# Patient Record
Sex: MALE | Race: WHITE | Employment: FULL TIME | URBAN - METROPOLITAN AREA
[De-identification: names, ages, dates, MRNs, and addresses within clinical notes are randomized per-mention and may not be internally consistent; named-entity substitution may affect disease eponyms.]

---

## 2017-05-16 ENCOUNTER — HOSPITAL ENCOUNTER (EMERGENCY)
Facility: CLINIC | Age: 77
Discharge: HOME | End: 2017-05-16
Attending: EMERGENCY MEDICINE | Admitting: EMERGENCY MEDICINE
Payer: MEDICARE

## 2017-05-16 ENCOUNTER — APPOINTMENT (OUTPATIENT)
Dept: GENERAL RADIOLOGY | Facility: CLINIC | Age: 77
End: 2017-05-16
Attending: EMERGENCY MEDICINE
Payer: MEDICARE

## 2017-05-16 VITALS
HEART RATE: 75 BPM | DIASTOLIC BLOOD PRESSURE: 77 MMHG | TEMPERATURE: 98.1 F | OXYGEN SATURATION: 96 % | SYSTOLIC BLOOD PRESSURE: 110 MMHG | RESPIRATION RATE: 10 BRPM | WEIGHT: 161 LBS | BODY MASS INDEX: 23.78 KG/M2

## 2017-05-16 DIAGNOSIS — I48.0 PAROXYSMAL ATRIAL FIBRILLATION (H): ICD-10-CM

## 2017-05-16 LAB
ALBUMIN SERPL-MCNC: 3.8 G/DL (ref 3.4–5)
ALP SERPL-CCNC: 74 U/L (ref 40–150)
ALT SERPL W P-5'-P-CCNC: 30 U/L (ref 0–70)
ANION GAP SERPL CALCULATED.3IONS-SCNC: 7 MMOL/L (ref 3–14)
AST SERPL W P-5'-P-CCNC: 26 U/L (ref 0–45)
BASOPHILS # BLD AUTO: 0 10E9/L (ref 0–0.2)
BASOPHILS NFR BLD AUTO: 0.6 %
BILIRUB SERPL-MCNC: 0.5 MG/DL (ref 0.2–1.3)
BUN SERPL-MCNC: 16 MG/DL (ref 7–30)
CALCIUM SERPL-MCNC: 8.5 MG/DL (ref 8.5–10.1)
CHLORIDE SERPL-SCNC: 109 MMOL/L (ref 94–109)
CO2 SERPL-SCNC: 25 MMOL/L (ref 20–32)
CREAT SERPL-MCNC: 1.08 MG/DL (ref 0.66–1.25)
DIFFERENTIAL METHOD BLD: NORMAL
EOSINOPHIL # BLD AUTO: 0.2 10E9/L (ref 0–0.7)
EOSINOPHIL NFR BLD AUTO: 3.1 %
ERYTHROCYTE [DISTWIDTH] IN BLOOD BY AUTOMATED COUNT: 13 % (ref 10–15)
GFR SERPL CREATININE-BSD FRML MDRD: 66 ML/MIN/1.7M2
GLUCOSE SERPL-MCNC: 81 MG/DL (ref 70–99)
HCT VFR BLD AUTO: 48 % (ref 40–53)
HGB BLD-MCNC: 16.8 G/DL (ref 13.3–17.7)
IMM GRANULOCYTES # BLD: 0 10E9/L (ref 0–0.4)
IMM GRANULOCYTES NFR BLD: 0.2 %
INTERPRETATION ECG - MUSE: NORMAL
INTERPRETATION ECG - MUSE: NORMAL
LYMPHOCYTES # BLD AUTO: 2.1 10E9/L (ref 0.8–5.3)
LYMPHOCYTES NFR BLD AUTO: 40 %
MCH RBC QN AUTO: 31.8 PG (ref 26.5–33)
MCHC RBC AUTO-ENTMCNC: 35 G/DL (ref 31.5–36.5)
MCV RBC AUTO: 91 FL (ref 78–100)
MONOCYTES # BLD AUTO: 0.4 10E9/L (ref 0–1.3)
MONOCYTES NFR BLD AUTO: 6.8 %
NEUTROPHILS # BLD AUTO: 2.5 10E9/L (ref 1.6–8.3)
NEUTROPHILS NFR BLD AUTO: 49.3 %
NRBC # BLD AUTO: 0 10*3/UL
NRBC BLD AUTO-RTO: 0 /100
PLATELET # BLD AUTO: 190 10E9/L (ref 150–450)
POTASSIUM SERPL-SCNC: 4.3 MMOL/L (ref 3.4–5.3)
PROT SERPL-MCNC: 7 G/DL (ref 6.8–8.8)
RBC # BLD AUTO: 5.28 10E12/L (ref 4.4–5.9)
SODIUM SERPL-SCNC: 141 MMOL/L (ref 133–144)
TROPONIN I SERPL-MCNC: 0.03 UG/L (ref 0–0.04)
TSH SERPL DL<=0.005 MIU/L-ACNC: 2.34 MU/L (ref 0.4–4)
WBC # BLD AUTO: 5.1 10E9/L (ref 4–11)

## 2017-05-16 PROCEDURE — 80053 COMPREHEN METABOLIC PANEL: CPT | Performed by: EMERGENCY MEDICINE

## 2017-05-16 PROCEDURE — 85025 COMPLETE CBC W/AUTO DIFF WBC: CPT | Performed by: EMERGENCY MEDICINE

## 2017-05-16 PROCEDURE — 93005 ELECTROCARDIOGRAM TRACING: CPT

## 2017-05-16 PROCEDURE — 99291 CRITICAL CARE FIRST HOUR: CPT | Mod: 25

## 2017-05-16 PROCEDURE — 71020 XR CHEST 2 VW: CPT

## 2017-05-16 PROCEDURE — 84443 ASSAY THYROID STIM HORMONE: CPT | Performed by: EMERGENCY MEDICINE

## 2017-05-16 PROCEDURE — 84484 ASSAY OF TROPONIN QUANT: CPT | Performed by: EMERGENCY MEDICINE

## 2017-05-16 PROCEDURE — 96374 THER/PROPH/DIAG INJ IV PUSH: CPT

## 2017-05-16 PROCEDURE — 25000125 ZZHC RX 250

## 2017-05-16 RX ORDER — PROPOFOL 10 MG/ML
INJECTION, EMULSION INTRAVENOUS
Status: COMPLETED
Start: 2017-05-16 | End: 2017-05-16

## 2017-05-16 RX ORDER — METOPROLOL SUCCINATE 25 MG/1
12.5 TABLET, EXTENDED RELEASE ORAL DAILY
Qty: 30 TABLET | Refills: 0 | Status: SHIPPED | OUTPATIENT
Start: 2017-05-16 | End: 2017-06-09

## 2017-05-16 RX ORDER — PROPOFOL 10 MG/ML
70 INJECTION, EMULSION INTRAVENOUS ONCE
Status: COMPLETED | OUTPATIENT
Start: 2017-05-16 | End: 2017-05-16

## 2017-05-16 RX ADMIN — PROPOFOL 70 MG: 10 INJECTION, EMULSION INTRAVENOUS at 12:26

## 2017-05-16 ASSESSMENT — ENCOUNTER SYMPTOMS
SHORTNESS OF BREATH: 0
PALPITATIONS: 1

## 2017-05-16 NOTE — ED PROVIDER NOTES
History     Chief Complaint:  Palpitations    HPI   Nigel Rodarte Jr. is a 76 year old male, who reports experiencing atrial fibrillation with increasing frequency over the past year, who presents with palpitations. The patient noticed the palpitations at 6:00 PM last night. It was a little bit difficult to sleep last night. He does feel a little bit more tired and weak than usual. His episodes of a-fib do not typically last this long. No significant shortness of breath or chest pain. He is not anticoagulated. Last ate at 0900 this morning.     Cardiac Risk Factors:  CAD:    Neg  Hypertension:   Neg  Hyperlipidemia:  Neg  Diabetes:   Neg  Tobacco use:   Neg  Gender:   M  Age:    76  Familial Hx of CAD:  Neg    Allergies:  The patient has no known drug allergies.    Medications:  Levaquin  Aspirin  Glucosamine sulfate     Past Medical History:    Arthritis  Renal disease  A-fib    Past Surgical History:    Appendectomy  Hip arthroplasty  Colonoscopy  Cystoscopy  Right ear surgery  Shoulder surgery  Bilateral carpal tunnel    Family History:    Lung cancer    Social History:  Relationship status:   Tobacco use: Negative  Alcohol use: Negative  The patient presents with his wife.     Review of Systems   Respiratory: Negative for shortness of breath.    Cardiovascular: Positive for palpitations. Negative for chest pain.   All other systems reviewed and are negative.      Physical Exam   First Vitals:  BP: 116/81  Temp: 98.1  F (36.7  C)  Temp src: Oral  Pulse: 75  Resp: 18  SpO2: 98 %    Physical Exam  Constitutional: Thin white male, supine.  HENT: No signs of trauma.   Eyes: EOM are normal. Pupils are equal, round, and reactive to light.   Neck: Normal range of motion. No JVD present. No cervical adenopathy.  Cardiovascular: Irregularly irregular rhythm.  Exam reveals no gallop and no friction rub.    No murmur heard.  2+ radial and femoral pulses bilaterally.  Pulmonary/Chest: Bilateral breath sounds  normal. No wheezes, rhonchi or rales.  Abdominal: Soft. No tenderness. No rebound or guarding.   Musculoskeletal: No edema. No tenderness.   Lymphadenopathy: No lymphadenopathy.   Neurological: Alert and oriented to person, place, and time. Normal strength. Coordination normal.   Skin: Skin is warm and dry. No rash noted. No erythema.       Emergency Department Course   ECG (10:56:12):  Indication: Screening for cardiovascular disease.   Rate 77 bpm. WY interval *. QRS duration 92. QT/QTc 360/407. P-R-T axes 72.   Interpretation: Atrial fibrillation.  Agree with computer interpretation.   Interpreted at 1104 by Dr. Barroso.    ECG (12:29:19):  Indication: Palpitations.   Rate 62 bpm. WY interval 186. QRS duration 96. QT/QTc 418/424. P-R-T axes 39.   Interpretation: Normal sinus rhythm. Possible left atrial enlargement.  Agree with computer interpretation.  Interpreted at 1230 by Dr. Barroso.      Procedures:  Cardioversion procedure note:  Patient brought to stabilization area, placed on high flow oxygen by mask. Risks and benefits were discussed again, and procedure note was agreed to. Patient received 70 mg of propofol, and was shocked once, sync mode, with 100 J, and converted to a sinus rhythm. Post-conversion EKG reveals sinus rhythm with left atrial abnormality. Patient awoke without any neuro deficits.     Imaging:  Radiographic findings were communicated with the patient who voiced understanding of the findings.    Chest XR, per radiology:  No active infiltrate. No change.    Laboratory:  CBC: WBC 5.1, HGB 16.8,   CMP: WNL (Creatinine 1.08)  1120: Troponin I: 0.027  TSH: 2.34    Interventions:  1226: Propofol, 70 mg, IV    Emergency Department Course:  Nursing notes and vitals reviewed.  I performed an exam of the patient as documented above.  The above workup was undertaken.  1215: The cardioversion procedure described above was performed.  1253: I discussed the patient with Dr. Orellana of  cardiology.    Findings and plan explained to the Patient. Patient discharged home, status improved, with instructions regarding supportive care, medications, and reasons to return as well as the importance of close follow-up was reviewed.      Impression & Plan      Medical Decision Making:  Nigel Rodarte Jr. is a 76 year old male who comes in after feeling an irregular heart beat since about 6:00 PM yesterday. He had noticed a very regular pulse before exercising and lifting weights, and then noticed the change. He does not have chest pain or pressure, or shortness of breath, but feels more tired since this started. He has had problems with intermittent atrial fibrillation, but it does not seem to have lasted this long before. He is using no stimulants or new medications. He did undergo stress testing about 2 years ago, which was unremarkable. He is physically very active, and does not drink to excess. He has an unremarkable exam, except for an irregular pulse. EKG shows atrial fibrillation with controlled rate. Labs and x-rays obtained, and are unremarkable. I discussed risks and benefits of cardioversion. Since it had less than 24 hours of symptoms, patient agreed to go ahead.     I discussed the case with Dr. Orellana, who states EP clinic will contact him for follow up. He recommended starting patient on Eliquis 5 BID, and metoprolol 12.5 XL daily. I discussed this with the patient, who agreed to take this. He will be discharged home. If he has recurrent symptoms, he should recheck in the ED.     Diagnosis:    ICD-10-CM   1. Paroxysmal atrial fibrillation (H) I48.0     Disposition:  Discharge to home with cardiology follow up.     Discharge Medications:   APIXABAN ANTICOAGULANT (ELIQUIS) 5 MG TABLET    Take 1 tablet (5 mg) by mouth 2 times daily    METOPROLOL (TOPROL-XL) 25 MG 24 HR TABLET    Take 0.5 tablets (12.5 mg) by mouth daily     IJamie am serving as a scribe on 5/16/2017 at 11:05 AM to  personally document services performed by Cortez Barroso MD, based on my observations and the provider's statements to me.     EMERGENCY DEPARTMENT       Cortez Barroso MD  05/16/17 [id number]

## 2017-05-16 NOTE — DISCHARGE INSTRUCTIONS
Discharge Instructions for Atrial Fibrillation  You have been diagnosed with atrial fibrillation. With this condition, your heart s two upper chambers quiver rather than squeeze the blood out in a normal pattern. This leads to an irregular and sometimes rapid heartbeat. Some people will develop associated symptoms such as a flip-flopping heartbeat, lightheadedness, or shortness of breath. Other people may have no symptoms at all. Atrial fibrillation is serious because it affects the heart s ability to fill with blood as it should. Blood clots may form. This increases the risk for stroke. Untreated atrial fibrillation can also lead to heart failure. Atrial fibrillation can be controlled. With treatment, most people with atrial fibrillation lead normal lives. It is estimated that over 2.5 million Americans have atrial fibrillation.  Treatment options  Recommended treatment for atrial fibrillation depends on your age, symptoms, how long you have had atrial fibrillation, and other factors. You will have a complete evaluation to find out if you have any abnormalities that caused your heart to go into atrial fibrillation. This might be blocked heart arteries or a thyroid problem. Your doctor will assess your particular case and discuss choices with you.  Treatment choices may include:    Treating an underlying disorder that puts you at risk for atrial fibrillation. For example, correcting an abnormal thyroid or electrolyte problem, or treating a blocked heart artery.    Restoring a normal heart rhythm with an electrical shock (cardioversion) or with an antiarrhythmic medicine (chemical cardioversion)    Using medication to control your heart rate in atrial fibrillation.    Preventing the risk for blood clot and stroke using blood-thinning medicines. Your doctor will tell you what he or she recommends. Choices may include aspirin, clopidogrel, warfarin, dabigatran, rivaroxaban, or apixaban.    Doing catheter ablation or  maze procedure. These use different methods to destroy certain areas of heart tissue. This interrupts the electrical signals causing atrial fibrillation. One of these procedures may be a choice when medicines do not work.    Other treatment choices may be recommended for you by your doctor.  Managing risk factors for stroke and preventing heart failure are important parts of any treatment plan for atrial fibrillation.  Home care    Take your medicines exactly as directed. Don t skip doses.    Work with your doctor to find the right medicaines and doses for you.    Learn to take your own pulse. Keep a record of your results. Ask your doctor which pulse rates mean that you need medical attention. Slowing your pulse is often the goal of treatment. Ask your doctor if it s OK for you to use an automatic machine to check your pulse at home. Sometimes these machines don t count the pulse correctly when you have atrial fibrillation.    Limit your intake of coffee, tea, cola, and other beverages with caffeine to 2 cups per day. Talk with your doctor about whether you should eliminate caffeine.    Avoid over-the-counter medicines that have caffeine in them.    Let your doctor know what medicines you take, including prescription and over-the-counter medicines, as well as any supplements. They interfere with some medicines given for atrial fibrillation.    Ask your doctor about whether you can drink alcohol. Some people need to avoid alcohol to better treat atrial fibrillation. If you are taking blood-thinner medicines, alcohol may interfere with them by increasing their effect.    Never take stimulants such as amphetamines or cocaine. These drugs can speed up your heart rate and trigger atrial fibrillation.  Follow-up  Make a follow-up appointment as directed by our staff.     When to call your doctor  Call your doctor immediately if you have any of the following:    Weakness    Dizziness    Fainting    Fatigue    Shortness of  breath    Chest pain with increased activity    A change in the usual regularity of your heartbeat, or an unusually fast heartbeat     [id number] [employer]. [address] [place] [address], [place] [address]. All rights reserved. This information is not intended as a substitute for professional medical care. Always follow your healthcare professional's instructions.

## 2017-05-16 NOTE — ED AVS SNAPSHOT
Emergency Department    [address] [place] [id number]    Phone:  [phone]    Fax:  [phone]                                       Nigel Rodarte Jr.   MRN: [id number]    Department:   Emergency Department   Date of Visit:  5/16/2017           After Visit Summary Signature Page     I have received my discharge instructions, and my questions have been answered. I have discussed any challenges I see with this plan with the nurse or doctor.    ..........................................................................................................................................  Patient/Patient Representative Signature      ..........................................................................................................................................  Patient Representative Print Name and Relationship to Patient    ..................................................               ................................................  Date                                            Time    ..........................................................................................................................................  Reviewed by Signature/Title    ...................................................              ..............................................  Date                                                            Time

## 2017-05-16 NOTE — ED AVS SNAPSHOT
Emergency Department    [address] [facility] [address]    Phone:  [phone]    Fax:  [phone]                                       Nigel Rodarte Jr.   MRN: [id number]    Department:   Emergency Department   Date of Visit:  5/16/2017           Patient Information     Date Of Birth          1940        Your diagnoses for this visit were:     Paroxysmal atrial fibrillation (H)        You were seen by Cortez Barroso MD.      Follow-up Information     Follow up with Grzegorz Malhotra MD.    Specialties:  Cardiology, Cardiology    Why:  cardiology will call;  recheck ed if symptoms reoccur    Contact information:     [facility]   [address] [facility] [address]    [facility] [id number] [phone]          Discharge Instructions         Discharge Instructions for Atrial Fibrillation  You have been diagnosed with atrial fibrillation. With this condition, your heart s two upper chambers quiver rather than squeeze the blood out in a normal pattern. This leads to an irregular and sometimes rapid heartbeat. Some people will develop associated symptoms such as a flip-flopping heartbeat, lightheadedness, or shortness of breath. Other people may have no symptoms at all. Atrial fibrillation is serious because it affects the heart s ability to fill with blood as it should. Blood clots may form. This increases the risk for stroke. Untreated atrial fibrillation can also lead to heart failure. Atrial fibrillation can be controlled. With treatment, most people with atrial fibrillation lead normal lives. It is estimated that over 2.5 million Americans have atrial fibrillation.  Treatment options  Recommended treatment for atrial fibrillation depends on your age, symptoms, how long you have had atrial fibrillation, and other factors. You will have a complete evaluation to find out if you have any abnormalities that caused your heart to go into atrial fibrillation. This might be blocked  heart arteries or a thyroid problem. Your doctor will assess your particular case and discuss choices with you.  Treatment choices may include:    Treating an underlying disorder that puts you at risk for atrial fibrillation. For example, correcting an abnormal thyroid or electrolyte problem, or treating a blocked heart artery.    Restoring a normal heart rhythm with an electrical shock (cardioversion) or with an antiarrhythmic medicine (chemical cardioversion)    Using medication to control your heart rate in atrial fibrillation.    Preventing the risk for blood clot and stroke using blood-thinning medicines. Your doctor will tell you what he or she recommends. Choices may include aspirin, clopidogrel, warfarin, dabigatran, rivaroxaban, or apixaban.    Doing catheter ablation or maze procedure. These use different methods to destroy certain areas of heart tissue. This interrupts the electrical signals causing atrial fibrillation. One of these procedures may be a choice when medicines do not work.    Other treatment choices may be recommended for you by your doctor.  Managing risk factors for stroke and preventing heart failure are important parts of any treatment plan for atrial fibrillation.  Home care    Take your medicines exactly as directed. Don t skip doses.    Work with your doctor to find the right medicaines and doses for you.    Learn to take your own pulse. Keep a record of your results. Ask your doctor which pulse rates mean that you need medical attention. Slowing your pulse is often the goal of treatment. Ask your doctor if it s OK for you to use an automatic machine to check your pulse at home. Sometimes these machines don t count the pulse correctly when you have atrial fibrillation.    Limit your intake of coffee, tea, cola, and other beverages with caffeine to 2 cups per day. Talk with your doctor about whether you should eliminate caffeine.    Avoid over-the-counter medicines that have caffeine  in them.    Let your doctor know what medicines you take, including prescription and over-the-counter medicines, as well as any supplements. They interfere with some medicines given for atrial fibrillation.    Ask your doctor about whether you can drink alcohol. Some people need to avoid alcohol to better treat atrial fibrillation. If you are taking blood-thinner medicines, alcohol may interfere with them by increasing their effect.    Never take stimulants such as amphetamines or cocaine. These drugs can speed up your heart rate and trigger atrial fibrillation.  Follow-up  Make a follow-up appointment as directed by our staff.     When to call your doctor  Call your doctor immediately if you have any of the following:    Weakness    Dizziness    Fainting    Fatigue    Shortness of breath    Chest pain with increased activity    A change in the usual regularity of your heartbeat, or an unusually fast heartbeat     0984-7151 [employer]. [address] [place], [place] [address]. All rights reserved. This information is not intended as a substitute for professional medical care. Always follow your healthcare professional's instructions.          24 Hour Appointment Hotline       To make an appointment at any [facility], call [phone] ([phone]). If you don't have a family doctor or clinic, we will help you find one. [facility] clinics are conveniently located to serve the needs of you and your family.             Review of your medicines      START taking        Dose / Directions Last dose taken    apixaban ANTICOAGULANT 5 MG tablet   Commonly known as:  ELIQUIS   Dose:  5 mg   Quantity:  60 tablet        Take 1 tablet (5 mg) by mouth 2 times daily   Refills:  0        metoprolol 25 MG 24 hr tablet   Commonly known as:  TOPROL-XL   Dose:  12.5 mg   Quantity:  30 tablet        Take 0.5 tablets (12.5 mg) by mouth daily   Refills:  0          Our records show that you are taking the  medicines listed below. If these are incorrect, please call your family doctor or clinic.        Dose / Directions Last dose taken    ASPIRIN PO   Dose:  81 mg        Take 81 mg by mouth daily   Refills:  0        DAILY MULTIVITAMIN PO   Dose:  1 tablet        Take 1 tablet by mouth daily   Refills:  0        GLUCOSAMINE SULFATE PO   Dose:  1 tablet        Take 1 tablet by mouth 2 times daily as needed   Refills:  0        levofloxacin 250 MG tablet   Commonly known as:  LEVAQUIN   Dose:  250 mg   Quantity:  3 tablet        Take 1 tablet (250 mg) by mouth daily   Refills:  0                Prescriptions were sent or printed at these locations (2 Prescriptions)                   Other Prescriptions                Printed at Department/Unit printer (2 of 2)         apixaban ANTICOAGULANT (ELIQUIS) 5 MG tablet               metoprolol (TOPROL-XL) 25 MG 24 hr tablet                Procedures and tests performed during your visit     Procedure/Test Number of Times Performed    CBC with platelets differential 1    Comprehensive metabolic panel 1    EKG 12 lead 2    TSH with free T4 reflex 1    Troponin I 1    XR Chest 2 Views 1      Orders Needing Specimen Collection     None      Pending Results     No orders found from 5/14/2017 to 5/17/2017.            Pending Culture Results     No orders found from 5/14/2017 to 5/17/2017.            Pending Results Instructions     If you had any lab results that were not finalized at the time of your Discharge, you can call the ED Lab Result RN at [phone]. You will be contacted by this team for any positive Lab results or changes in treatment. The nurses are available 7 days a week from 10A to 6:30P.  You can leave a message 24 hours per day and they will return your call.        Test Results From Your Hospital Stay        5/16/2017 11:48 AM      Component Results     Component Value Ref Range & Units Status    WBC 5.1 4.0 - 11.0 10e9/L Final    RBC Count 5.28 4.4 - 5.9 10e12/L  Final    Hemoglobin 16.8 13.3 - 17.7 g/dL Final    Hematocrit 48.0 40.0 - 53.0 % Final    MCV 91 78 - 100 fl Final    MCH 31.8 26.5 - 33.0 pg Final    MCHC 35.0 31.5 - 36.5 g/dL Final    RDW 13.0 10.0 - 15.0 % Final    Platelet Count 190 150 - 450 10e9/L Final    Diff Method Automated Method  Final    % Neutrophils 49.3 % Final    % Lymphocytes 40.0 % Final    % Monocytes 6.8 % Final    % Eosinophils 3.1 % Final    % Basophils 0.6 % Final    % Immature Granulocytes 0.2 % Final    Nucleated RBCs 0 0 /100 Final    Absolute Neutrophil 2.5 1.6 - 8.3 10e9/L Final    Absolute Lymphocytes 2.1 0.8 - 5.3 10e9/L Final    Absolute Monocytes 0.4 0.0 - 1.3 10e9/L Final    Absolute Eosinophils 0.2 0.0 - 0.7 10e9/L Final    Absolute Basophils 0.0 0.0 - 0.2 10e9/L Final    Abs Immature Granulocytes 0.0 0 - 0.4 10e9/L Final    Absolute Nucleated RBC 0.0  Final         5/16/2017 12:02 PM      Component Results     Component Value Ref Range & Units Status    Sodium 141 133 - 144 mmol/L Final    Potassium 4.3 3.4 - 5.3 mmol/L Final    Chloride 109 94 - 109 mmol/L Final    Carbon Dioxide 25 20 - 32 mmol/L Final    Anion Gap 7 3 - 14 mmol/L Final    Glucose 81 70 - 99 mg/dL Final    Urea Nitrogen 16 7 - 30 mg/dL Final    Creatinine 1.08 0.66 - 1.25 mg/dL Final    GFR Estimate 66 >60 mL/min/1.7m2 Final    Non  GFR Calc    GFR Estimate If Black 80 >60 mL/min/1.7m2 Final    African American GFR Calc    Calcium 8.5 8.5 - 10.1 mg/dL Final    Bilirubin Total 0.5 0.2 - 1.3 mg/dL Final    Albumin 3.8 3.4 - 5.0 g/dL Final    Protein Total 7.0 6.8 - 8.8 g/dL Final    Alkaline Phosphatase 74 40 - 150 U/L Final    ALT 30 0 - 70 U/L Final    AST 26 0 - 45 U/L Final         5/16/2017 12:02 PM      Component Results     Component Value Ref Range & Units Status    Troponin I ES 0.027 0.000 - 0.045 ug/L Final    The 99th percentile for upper reference range is 0.045 ug/L.  Troponin values   in   the range of 0.045 - 0.120 ug/L may be  associated with risks of adverse   clinical events.           5/16/2017 12:02 PM      Narrative     XR CHEST 2 VW   5/16/2017 11:38 AM     HISTORY: cp    COMPARISON: Film dated 7/18/2015    FINDINGS: The heart is negative.  The lungs are clear. The pulmonary  vasculature is normal.  Suture anchor is seen in the right humeral  head.        Impression     IMPRESSION: No active infiltrate. No change.        JACINTA BERNSTEIN MD         5/16/2017 12:07 PM      Component Results     Component Value Ref Range & Units Status    TSH 2.34 0.40 - 4.00 mU/L Final                Clinical Quality Measure: Blood Pressure Screening     Your blood pressure was checked while you were in the emergency department today. The last reading we obtained was  BP: 103/78 . Please read the guidelines below about what these numbers mean and what you should do about them.  If your systolic blood pressure (the top number) is less than 120 and your diastolic blood pressure (the bottom number) is less than 80, then your blood pressure is normal. There is nothing more that you need to do about it.  If your systolic blood pressure (the top number) is 120-139 or your diastolic blood pressure (the bottom number) is 80-89, your blood pressure may be higher than it should be. You should have your blood pressure rechecked within a year by a primary care provider.  If your systolic blood pressure (the top number) is 140 or greater or your diastolic blood pressure (the bottom number) is 90 or greater, you may have high blood pressure. High blood pressure is treatable, but if left untreated over time it can put you at risk for heart attack, stroke, or kidney failure. You should have your blood pressure rechecked by a primary care provider within the next 4 weeks.  If your provider in the emergency department today gave you specific instructions to follow-up with your doctor or provider even sooner than that, you should follow that instruction and not wait for up  "to 4 weeks for your follow-up visit.        Thank you for choosing [facility]       Thank you for choosing [facility] for your care. Our goal is always to provide you with excellent care. Hearing back from our patients is one way we can continue to improve our services. Please take a few minutes to complete the written survey that you may receive in the mail after you visit with us. Thank you!        [facility] Information     [employer] lets you send messages to your doctor, view your test results, renew your prescriptions, schedule appointments and more. To sign up, go to [email or web address] . Click on \"Log in\" on the left side of the screen, which will take you to the Welcome page. Then click on \"Sign up Now\" on the right side of the page.     You will be asked to enter the access code listed below, as well as some personal information. Please follow the directions to create your username and password.     Your access code is: [email or web address]  Expires: 2017 11:52 AM     Your access code will  in 90 days. If you need help or a new code, please call your [facility] clinic or [phone].        Care EveryWhere ID     This is your Care EveryWhere ID. This could be used by other organizations to access your [facility] medical records  [id number]        After Visit Summary       This is your record. Keep this with you and show to your community pharmacist(s) and doctor(s) at your next visit.                  "

## 2017-05-17 ENCOUNTER — TELEPHONE (OUTPATIENT)
Dept: CARDIOLOGY | Facility: CLINIC | Age: 77
End: 2017-05-17

## 2017-05-17 DIAGNOSIS — I48.91 ATRIAL FIBRILLATION (H): Primary | ICD-10-CM

## 2017-05-17 NOTE — TELEPHONE ENCOUNTER
Left message asking patient to contact scheduling to schedule zio patch monitor placement. Patient is scheduled to see Dr. Malhotra tomorrow. Asked that patient contact EP RN with questions/concerns. Gracie

## 2017-05-18 ENCOUNTER — ANTICOAGULATION THERAPY VISIT (OUTPATIENT)
Dept: CARDIOLOGY | Facility: CLINIC | Age: 77
End: 2017-05-18
Payer: COMMERCIAL

## 2017-05-18 ENCOUNTER — OFFICE VISIT (OUTPATIENT)
Dept: CARDIOLOGY | Facility: CLINIC | Age: 77
End: 2017-05-18
Payer: COMMERCIAL

## 2017-05-18 VITALS
WEIGHT: 167 LBS | DIASTOLIC BLOOD PRESSURE: 62 MMHG | SYSTOLIC BLOOD PRESSURE: 92 MMHG | HEIGHT: 69 IN | HEART RATE: 72 BPM | BODY MASS INDEX: 24.73 KG/M2

## 2017-05-18 DIAGNOSIS — I48.0 PAROXYSMAL ATRIAL FIBRILLATION (H): ICD-10-CM

## 2017-05-18 DIAGNOSIS — I48.0 PAROXYSMAL ATRIAL FIBRILLATION (H): Primary | ICD-10-CM

## 2017-05-18 DIAGNOSIS — I48.91 ATRIAL FIBRILLATION (H): ICD-10-CM

## 2017-05-18 DIAGNOSIS — Z79.01 LONG-TERM (CURRENT) USE OF ANTICOAGULANTS: ICD-10-CM

## 2017-05-18 DIAGNOSIS — Z79.01 LONG TERM CURRENT USE OF ANTICOAGULANT THERAPY: Primary | ICD-10-CM

## 2017-05-18 LAB — INR POINT OF CARE: 1 (ref 0.86–1.14)

## 2017-05-18 PROCEDURE — 99203 OFFICE O/P NEW LOW 30 MIN: CPT | Performed by: INTERNAL MEDICINE

## 2017-05-18 PROCEDURE — 36416 COLLJ CAPILLARY BLOOD SPEC: CPT | Performed by: INTERNAL MEDICINE

## 2017-05-18 PROCEDURE — 85610 PROTHROMBIN TIME: CPT | Mod: QW | Performed by: INTERNAL MEDICINE

## 2017-05-18 RX ORDER — WARFARIN SODIUM 5 MG/1
TABLET ORAL
Qty: 45 TABLET | Refills: 3 | Status: SHIPPED | OUTPATIENT
Start: 2017-05-18 | End: 2017-06-09

## 2017-05-18 NOTE — LETTER
5/18/2017    Osmar Espinoza MD  [address]  [facility] [address]    RE: Nigel TAE Rodarte Jr.       Dear Colleague,    I had the pleasure of seeing Nigel STRONG Cayden Silva in the [facility].    REASON FOR VISIT:  Evaluation of paroxysmal atrial fibrillation.      Mr. Rodarte is a pleasant 76-year-old gentleman, former  of [employer] who is here for evaluation of paroxysmal atrial fibrillation.      The patient does not have any major medical history.  He informs having intermittent episodes of palpitations in the last  year.  In the last 4 months, the episodes have become more frequent, sometimes may last up to week.      Most recently on 05/16, he had another episode which was sustained.  He came to the ED for evaluation and was found to be in atrial fibrillation with controlled ventricular response.  He was cardioverted and was started on Eliquis and metoprolol.      He denies any symptoms of chest pain, lightheadedness, near-syncope or syncopal episode. He also informs that he did not start Eliquis due to the price.      Stress echo obtained in 07/2015 showed normal LV function and was negative for ischemia.  EKG after cardioversion showed normal sinus rhythm.     Outpatient Encounter Prescriptions as of 5/18/2017   Medication Sig Dispense Refill     [DISCONTINUED] metoprolol (TOPROL-XL) 25 MG 24 hr tablet Take 0.5 tablets (12.5 mg) by mouth daily 30 tablet 0     [DISCONTINUED] ASPIRIN PO Take 81 mg by mouth daily       [DISCONTINUED] GLUCOSAMINE SULFATE PO Take 1 tablet by mouth 2 times daily as needed        [DISCONTINUED] Multiple Vitamin (DAILY MULTIVITAMIN PO) Take 1 tablet by mouth daily        [DISCONTINUED] apixaban ANTICOAGULANT (ELIQUIS) 5 MG tablet Take 1 tablet (5 mg) by mouth 2 times daily 60 tablet 0     [DISCONTINUED] levofloxacin (LEVAQUIN) 250 MG tablet Take 1 tablet (250 mg) by mouth daily 3 tablet 0     No facility-administered  encounter medications on file as of 5/18/2017.       ASSESSMENT AND PLAN:   1.  Paroxysmal atrial fibrillation.  He was started on metoprolol; however, he only took 1 dose so far.  Today, I recommend increasing the dose of metoprolol to 25 mg daily.  He will follow up with me in a month to see if metoprolol is controlling symptoms.   2.  Embolic prevention.  CHADS-VASc score of 2.  Apparently NOACs are too expensive.  He would like to switch to Coumadin.  I will refer him to Coumadin Clinic.   3.  Followup care.  Follow up in clinic with me in a month or earlier as needed.     Again, thank you for allowing me to participate in the care of your patient.      Sincerely,    Grzegorz Malhotra MD     [facility]

## 2017-05-18 NOTE — PROGRESS NOTES
ANTICOAGULATION INITIAL CLINIC VISIT    Patient Name:  Nigel Rodarte Jr.  Date:  5/18/2017  Referred by: Dr. Malhotra  Contact Type:  Face to Face    SUBJECTIVE:  Coumadin education was completed today.  Topics covered include:  -Introduction to coumadin  -Proper Administration  -INR Testing  -Sign/Symptoms of Bleeding  -Signs/Symptoms of Clot Formation or Stroke  -Dietary Intake of Vitamin K  -Drug Interactions  -Anticoagulation Identification (bracelet, necklace or wallet card)  -Future Surgery  -Effects of Alcohol, Tobacco, and Exercise on Coumadin    Coumadin Education Booklet and Coumadin Identification Wallet Card were given to the patient.       Patient Findings     Positives Initiation of therapy          OBJECTIVE    INR Protime   Date Value Ref Range Status   05/18/2017 1.0 0.86 - 1.14 Final       ASSESSMENT / PLAN  INR assessment SUB    Recheck INR In: 4 DAYS    INR Location Clinic      Anticoagulation Summary as of 5/18/2017     INR goal 2.0-3.0   Today's INR 1.0!   Maintenance plan No maintenance plan   Full instructions 5/18: 5 mg; 5/19: 5 mg; 5/20: 5 mg; 5/21: 5 mg; Otherwise No maintenance plan   Next INR check 5/22/2017   Target end date Indefinite    Indications   Long-term (current) use of anticoagulants [Z79.01] [Z79.01]  Atrial fibrillation (H) [I48.91] [I48.91]         Anticoagulation Episode Summary     INR check location     Preferred lab     Send INR reminders to [facility] HEART INR NURSE    Comments       Anticoagulation Care Providers     Provider Role Specialty Phone number    Grzegorz Malhotra MD Responsible Cardiology [phone]            See the Encounter Report to view Anticoagulation Flowsheet and Dosing Calendar (Go to Encounters tab in chart review, and find the Anticoagulation Therapy Visit)    INR 1.0.  New start for afib per Dr. Malhotra.  Patient was in the ER on 5/16 (Tuesday) for afib and was cardioverted and told to start Eliquis.  They were unable to  the Eliquis  from the pharmacy since they said they didn't have a prior auth.  Patient was seen today by Dr. Malhotra and he started Warfarin.  Will start 5mg/day and recheck INR on Monday.  Education provided.  He does enjoy [employer].  They will be going out of town for about 1 week so we will need to check INR on Monday and Wednesday then they are back the next Wednesday.  Bay Anton, RN

## 2017-05-18 NOTE — PROGRESS NOTES
"HPI and Plan:   See dictation  [id number]    Physical Exam:  Vitals: BP 92/62  Pulse 72  Ht 1.753 m (5' 9\")  Wt 75.8 kg (167 lb)  BMI 24.66 kg/m2    Constitutional:  AAO x3.  Pt is in NAD.  HEAD: normocephalic.  SKIN: Skin normal color, texture and turgor with no lesions or eruptions.  Eyes: PERRL, EOMI.  ENT:  Supple, normal JVP. No lymphadenopathy or thyroid enlargement.  Chest:  CTAB.  Cardiac:    RRR, normal  S1 and S2.  No murmurs rubs or gallop.    Abdomen:  Normal BS.  Soft, non-tender and non-distended.  No rebound or guarding.    Extremities:  Pedious pulses palpable B/L.  No LE edema noticed.   Neurological: Strength and sensation grossly symmetric and intact throughout.       CURRENT MEDICATIONS:  Current Outpatient Prescriptions   Medication Sig Dispense Refill     metoprolol (TOPROL-XL) 25 MG 24 hr tablet Take 0.5 tablets (12.5 mg) by mouth daily 30 tablet 0     ASPIRIN PO Take 81 mg by mouth daily       GLUCOSAMINE SULFATE PO Take 1 tablet by mouth 2 times daily as needed        Multiple Vitamin (DAILY MULTIVITAMIN PO) Take 1 tablet by mouth daily          ALLERGIES   No Known Allergies    PAST MEDICAL HISTORY:  Past Medical History:   Diagnosis Date     Arthritis     osteoarthrosis     Elevated PSA      Fatigue      Palpitations      Renal disease     kidney calculus     SOB (shortness of breath)        PAST SURGICAL HISTORY:  Past Surgical History:   Procedure Laterality Date     APPENDECTOMY       ARTHROPLASTY HIP  1/14/2013    Procedure: ARTHROPLASTY HIP;  RIGHT TOTAL HIP ARTHROPLASTY ;  Surgeon: Jamie Ohara MD;  Location:  OR     COLONOSCOPY N/A 2/9/2016    Procedure: COLONOSCOPY;  Surgeon: Astrid Vital MD;  Location:  GI     CYSTOSCOPY, RETROGRADES, EXTRACT STONE, COMBINED  9/12/2013    Procedure: COMBINED CYSTOSCOPY, RETROGRADES, EXTRACT STONE;  CYSTOSCOPY, RIGHT RETROGRADE, STONE EXTRACTION;  Surgeon: Carlos Mcknight MD;  Location:  OR     ENT SURGERY      right ear surgery "     GENITOURINARY SURGERY      cystoscopy for stone removal     ORTHOPEDIC SURGERY      shoulder surgeries,bilat carpel tunnel       FAMILY HISTORY:  Family History   Problem Relation Age of Onset     Lung Cancer Brother        SOCIAL HISTORY:  Social History     Social History     Marital status:      Spouse name: N/A     Number of children: N/A     Years of education: N/A     Social History Main Topics     Smoking status: Never Smoker     Smokeless tobacco: None     Alcohol use No     Drug use: No     Sexual activity: Not Asked     Other Topics Concern     None     Social History Narrative       Review of Systems:  Skin:        Eyes:       ENT:       Respiratory:       Cardiovascular:       Gastroenterology:      Genitourinary:       Musculoskeletal:       Neurologic:       Psychiatric:       Heme/Lymph/Imm:       Endocrine:           Recent Lab Results:  LIPID RESULTS:  No results found for: CHOL, HDL, LDL, TRIG, CHOLHDLRATIO    LIVER ENZYME RESULTS:  Lab Results   Component Value Date    AST 26 05/16/2017    ALT 30 05/16/2017       CBC RESULTS:  Lab Results   Component Value Date    WBC 5.1 05/16/2017    RBC 5.28 05/16/2017    HGB 16.8 05/16/2017    HCT 48.0 05/16/2017    MCV 91 05/16/2017    MCH 31.8 05/16/2017    MCHC 35.0 05/16/2017    RDW 13.0 05/16/2017     05/16/2017       BMP RESULTS:  Lab Results   Component Value Date     05/16/2017    POTASSIUM 4.3 05/16/2017    CHLORIDE 109 05/16/2017    CO2 25 05/16/2017    ANIONGAP 7 05/16/2017    GLC 81 05/16/2017    BUN 16 05/16/2017    CR 1.08 05/16/2017    GFRESTIMATED 66 05/16/2017    GFRESTBLACK 80 05/16/2017    CHERI 8.5 05/16/2017        A1C RESULTS:  No results found for: A1C    INR RESULTS:  Lab Results   Component Value Date    INR 1.22 (H) 01/17/2013    INR 1.18 (H) 01/16/2013         ECHOCARDIOGRAM  No results found for this or any previous visit (from the past 8760 hour(s)).      Orders Placed This Encounter   Procedures     Follow-Up  with Electrophysiologist     INR/ANTICOAG REFERRAL     No orders of the defined types were placed in this encounter.    Medications Discontinued During This Encounter   Medication Reason     levofloxacin (LEVAQUIN) 250 MG tablet Stopped by Patient     apixaban ANTICOAGULANT (ELIQUIS) 5 MG tablet          Encounter Diagnosis   Name Primary?     Paroxysmal atrial fibrillation (H) Yes         CC  No referring provider defined for this encounter.

## 2017-05-18 NOTE — MR AVS SNAPSHOT
Nigel Rodarte Jr.   5/18/2017 3:00 PM   Anticoagulation Therapy Visit    Description:  76 year old male   Provider:  YAIR ANTICOAGULATION   Department:  [facility]           INR as of 5/18/2017     Today's INR 1.0!      Anticoagulation Summary as of 5/18/2017     INR goal 2.0-3.0   Today's INR 1.0!   Full instructions 5/18: 5 mg; 5/19: 5 mg; 5/20: 5 mg; 5/21: 5 mg; Otherwise No maintenance plan   Next INR check 5/22/2017    Indications   Long-term (current) use of anticoagulants [Z79.01] [Z79.01]  Atrial fibrillation (H) [I48.91] [I48.91]         Your next Anticoagulation Clinic appointment(s)     May 22, 2017 10:00 AM CDT   Anticoagulation Visit with  ANTICOAGULATION   [facility] ([facility])    [address] [place] [id number]   [phone]              Contact Numbers     Anticoagulant (INR) Clinic Number: [id number]          May 2017 Details    Sun Mon Tue Wed Thu Fri Sat      1               2               3               4               5               6                 7               8               9               10               11               12               13                 14               15               16               17               18      5 mg   See details      19      5 mg         20      5 mg           21      5 mg         22            23               24               25               26               27                 28               29               30               31                   Date Details   05/18 This INR check       Date of next INR:  5/22/2017         How to take your warfarin dose     To take:  5 mg Take 1 of the 5 mg tablets.

## 2017-05-18 NOTE — MR AVS SNAPSHOT
After Visit Summary   5/18/2017    Nigel Rodarte Jr.    MRN: [id number]           Patient Information     Date Of Birth          1940        Visit Information        Provider Department      5/18/2017 2:15 PM Grzegorz Malhotra MD [facility]        Today's Diagnoses     Paroxysmal atrial fibrillation (H)    -  1       Follow-ups after your visit        Additional Services     INR/ANTICOAG REFERRAL       Your provider has referred you to INR Services.    Please be aware that coverage of these services is subject to the terms and limitations of your health insurance plan.  Call member services at your health plan with any benefit or coverage questions.    Indication for Anticoagulation: {IAtrial fibrillation  If nonstandard INR is desired, indicate goal range and explanation: 2-3  Expected Duration of Therapy: indefinitely            Follow-Up with Electrophysiologist                 Your next 10 appointments already scheduled     May 18, 2017  3:00 PM CDT   Anticoagulation Visit with MAZARIEGOS ANTICOAGULATION   [facility] ([facility])    [address] [place] [id number] [phone]              Future tests that were ordered for you today     Open Future Orders        Priority Expected Expires Ordered    Follow-Up with Electrophysiologist Routine 6/17/2017 5/18/2018 5/18/2017    Zio Patch Monitor Routine 5/18/2017 5/17/2018 5/17/2017            Who to contact     If you have questions or need follow up information about today's clinic visit or your schedule please contact [facility] directly at [phone].  Normal or non-critical lab and imaging results will be communicated to you by MyChart, letter or phone within 4 business days after the clinic has received the results. If you do not hear from us within 7 days, please contact the clinic  "through [employer] or phone. If you have a critical or abnormal lab result, we will notify you by phone as soon as possible.  Submit refill requests through [employer] or call your pharmacy and they will forward the refill request to us. Please allow 3 business days for your refill to be completed.          Additional Information About Your Visit        [employer] Information     [employer] lets you send messages to your doctor, view your test results, renew your prescriptions, schedule appointments and more. To sign up, go to [email or web address] . Click on \"Log in\" on the left side of the screen, which will take you to the Welcome page. Then click on \"Sign up Now\" on the right side of the page.     You will be asked to enter the access code listed below, as well as some personal information. Please follow the directions to create your username and password.     Your access code is: [email or web address]  Expires: 2017 11:52 AM     Your access code will  in 90 days. If you need help or a new code, please call your [facility] clinic or [phone].        Care EveryWhere ID     This is your Care EveryWhere ID. This could be used by other organizations to access your [facility] medical records  [id number]        Your Vitals Were     Pulse Height BMI (Body Mass Index)             72 1.753 m (5' 9\") 24.66 kg/m2          Blood Pressure from Last 3 Encounters:   17 92/62   17 110/77   16 120/78    Weight from Last 3 Encounters:   17 75.8 kg (167 lb)   17 73 kg (161 lb)   16 68.5 kg (151 lb)                 Today's Medication Changes          These changes are accurate as of: 17  2:59 PM.  If you have any questions, ask your nurse or doctor.               Stop taking these medicines if you haven't already. Please contact your care team if you have questions.     apixaban ANTICOAGULANT 5 MG tablet   Commonly known as:  ELIQUIS   Stopped by:  Grzegorz Malhotra MD                    " Primary Care Provider Office Phone # Fax #    Osmar Espinoza [phone]       [address]  [facility] [address]        Thank you!     Thank you for choosing [facility] [place]  for your care. Our goal is always to provide you with excellent care. Hearing back from our patients is one way we can continue to improve our services. Please take a few minutes to complete the written survey that you may receive in the mail after your visit with us. Thank you!             Your Updated Medication List - Protect others around you: Learn how to safely use, store and throw away your medicines at www.disposemymeds.org.          This list is accurate as of: 5/18/17  2:59 PM.  Always use your most recent med list.                   Brand Name Dispense Instructions for use    ASPIRIN PO      Take 81 mg by mouth daily       DAILY MULTIVITAMIN PO      Take 1 tablet by mouth daily       GLUCOSAMINE SULFATE PO      Take 1 tablet by mouth 2 times daily as needed       metoprolol 25 MG 24 hr tablet    TOPROL-XL    30 tablet    Take 0.5 tablets (12.5 mg) by mouth daily

## 2017-05-19 NOTE — PROGRESS NOTES
REASON FOR VISIT:  Evaluation of paroxysmal atrial fibrillation.      HISTORY OF PRESENT ILLNESS:  Mr. Ko is a pleasant 76-year-old gentleman, former  of [employer] who is here for evaluation of paroxysmal atrial fibrillation.      The patient does not have any major medical history.  He informs having intermittent episodes of palpitations in the last  year.  In the last 4 months, the episodes have become more frequent, sometimes may last up to week.      Most recently on , he had another episode which was sustained.  He came to the ED for evaluation and was found to be in atrial fibrillation with controlled ventricular response.  He was cardioverted and was started on Eliquis and metoprolol.      He denies any symptoms of chest pain, lightheadedness, near-syncope or syncopal episode. He also informs that he did not start Eliquis due to the price.      Stress echo obtained in 2015 showed normal LV function and was negative for ischemia.  EKG after cardioversion showed normal sinus rhythm.      ASSESSMENT AND PLAN:   1.  Paroxysmal atrial fibrillation.  He was started on metoprolol; however, he only took 1 dose so far.  Today, I recommend increasing the dose of metoprolol to 25 mg daily.  He will follow up with me in a month to see if metoprolol is controlling symptoms.   2.  Embolic prevention.  CHADS-VASc score of 2.  Apparently NOACs are too expensive.  He would like to switch to Coumadin.  I will refer him to Coumadin Clinic.   3.  Followup care.  Follow up in clinic with me in a month or earlier as needed.         JOSE RONQUILLO MD             D: 2017 14:51   T: 2017 12:18   MT: KERWIN      Name:     BEN KO   MRN:      [id number]        Account:      [id number]   :      1940           Service Date: 2017      Document: [id number]

## 2017-05-22 ENCOUNTER — ANTICOAGULATION THERAPY VISIT (OUTPATIENT)
Dept: CARDIOLOGY | Facility: CLINIC | Age: 77
End: 2017-05-22
Payer: COMMERCIAL

## 2017-05-22 DIAGNOSIS — Z79.01 LONG-TERM (CURRENT) USE OF ANTICOAGULANTS: ICD-10-CM

## 2017-05-22 DIAGNOSIS — I48.91 ATRIAL FIBRILLATION, UNSPECIFIED TYPE (H): ICD-10-CM

## 2017-05-22 LAB — INR POINT OF CARE: 1.1 (ref 0.86–1.14)

## 2017-05-22 PROCEDURE — 36416 COLLJ CAPILLARY BLOOD SPEC: CPT | Performed by: INTERNAL MEDICINE

## 2017-05-22 PROCEDURE — 85610 PROTHROMBIN TIME: CPT | Mod: QW | Performed by: INTERNAL MEDICINE

## 2017-05-22 PROCEDURE — 99207 ZZC NO CHARGE NURSE ONLY: CPT | Performed by: INTERNAL MEDICINE

## 2017-05-22 NOTE — MR AVS SNAPSHOT
Ngiel Rodarte Jr.   5/22/2017 10:00 AM   Anticoagulation Therapy Visit    Description:  76 year old male   Provider:  YAIR ANTICOAGULATION   Department:  [facility]           INR as of 5/22/2017     Today's INR 1.1!      Anticoagulation Summary as of 5/22/2017     INR goal 2.0-3.0   Today's INR 1.1!   Full instructions 5/22: 5 mg; 5/23: 5 mg; 5/24: 5 mg; Otherwise No maintenance plan   Next INR check 5/25/2017    Indications   Long-term (current) use of anticoagulants [Z79.01] [Z79.01]  Atrial fibrillation (H) [I48.91] [I48.91]         Your next Anticoagulation Clinic appointment(s)     May 25, 2017  7:20 AM CDT   Anticoagulation Visit with  ANTICOAGULATION   [facility] ([facility])    [address] [place] [phone]   [phone]              Contact Numbers     Anticoagulant (INR) Clinic Number: [phone]          May 2017 Details    Sun Mon Tue Wed Thu Fri Sat      1               2               3               4               5               6                 7               8               9               10               11               12               13                 14               15               16               17               18               19               20                 21               22      5 mg   See details      23      5 mg         24      5 mg         25            26               27                 28               29               30               31                   Date Details   05/22 This INR check       Date of next INR:  5/25/2017         How to take your warfarin dose     To take:  5 mg Take 1 of the 5 mg tablets.

## 2017-05-22 NOTE — PROGRESS NOTES
ANTICOAGULATION FOLLOW-UP CLINIC VISIT    Patient Name:  Nigel Rodarte Jr.  Date:  5/22/2017  Contact Type:  Face to Face    SUBJECTIVE:     Patient Findings     Positives Missed doses (he decided to wait one day before starting warfarin)           OBJECTIVE    INR Protime   Date Value Ref Range Status   05/22/2017 1.1 0.86 - 1.14 Final       ASSESSMENT / PLAN  INR assessment SUB    Recheck INR In: 3 DAYS    INR Location Clinic      Anticoagulation Summary as of 5/22/2017     INR goal 2.0-3.0   Today's INR 1.1!   Maintenance plan No maintenance plan   Full instructions 5/22: 5 mg; 5/23: 5 mg; 5/24: 5 mg; Otherwise No maintenance plan   Next INR check 5/25/2017   Target end date Indefinite    Indications   Long-term (current) use of anticoagulants [Z79.01] [Z79.01]  Atrial fibrillation (H) [I48.91] [I48.91]         Anticoagulation Episode Summary     INR check location     Preferred lab     Send INR reminders to [facility] HEART INR NURSE    Comments       Anticoagulation Care Providers     Provider Role Specialty Phone number    Grzegorz Malhotra MD Responsible Cardiology [phone]            See the Encounter Report to view Anticoagulation Flowsheet and Dosing Calendar (Go to Encounters tab in chart review, and find the Anticoagulation Therapy Visit)    INR 1.1 He decided to wait one additional day before starting warfarin so has only taken 3 doses. No change in meds or diet. No abnormal bleeding or bruising. Will continue with 5 mg daily with recheck in 3 days before he goes out of town for a long weekend. Dosage adjustment made based on physician directed care plan.    Gladys Herrera RN

## 2017-05-24 ENCOUNTER — ANTICOAGULATION THERAPY VISIT (OUTPATIENT)
Dept: CARDIOLOGY | Facility: CLINIC | Age: 77
End: 2017-05-24
Payer: COMMERCIAL

## 2017-05-24 DIAGNOSIS — Z79.01 LONG-TERM (CURRENT) USE OF ANTICOAGULANTS: ICD-10-CM

## 2017-05-24 DIAGNOSIS — I48.91 ATRIAL FIBRILLATION, UNSPECIFIED TYPE (H): ICD-10-CM

## 2017-05-24 LAB — INR POINT OF CARE: 1.3 (ref 0.86–1.14)

## 2017-05-24 PROCEDURE — 85610 PROTHROMBIN TIME: CPT | Performed by: INTERNAL MEDICINE

## 2017-05-24 PROCEDURE — 36416 COLLJ CAPILLARY BLOOD SPEC: CPT | Performed by: INTERNAL MEDICINE

## 2017-05-24 NOTE — PROGRESS NOTES
ANTICOAGULATION FOLLOW-UP CLINIC VISIT    Patient Name:  Nigel Rodarte Jr.  Date:  5/24/2017  Contact Type:  Face to Face    SUBJECTIVE:     Patient Findings     Positives No Problem Findings           OBJECTIVE    INR Protime   Date Value Ref Range Status   05/24/2017 1.3 (A) 0.86 - 1.14 Final       ASSESSMENT / PLAN  INR assessment SUB    Recheck INR In: 1 WEEK    INR Location Clinic      Anticoagulation Summary as of 5/24/2017     INR goal 2.0-3.0   Today's INR 1.3!   Maintenance plan No maintenance plan   Full instructions 5/24: 7.5 mg; 5/25: 5 mg; 5/26: 5 mg; 5/27: 5 mg; 5/28: 5 mg; 5/29: 5 mg; 5/30: 5 mg; Otherwise No maintenance plan   Next INR check 5/31/2017   Target end date Indefinite    Indications   Long-term (current) use of anticoagulants [Z79.01] [Z79.01]  Atrial fibrillation (H) [I48.91] [I48.91]         Anticoagulation Episode Summary     INR check location     Preferred lab     Send INR reminders to [facility] HEART INR NURSE    Comments       Anticoagulation Care Providers     Provider Role Specialty Phone number    Grzegorz Malhotra MD Responsible Cardiology [phone]            See the Encounter Report to view Anticoagulation Flowsheet and Dosing Calendar (Go to Encounters tab in chart review, and find the Anticoagulation Therapy Visit)    INR 1.3 Recent new start on Warfarin for afib, s/p DCCV 5/16. No change in meds or diet. He has not resumed eating his usual veggies yet. No abnormal bleeding or bruising. Will increase dosing to 7.5 mg today then 5 mg daily with recheck in 1 week (he is going out of town and not returning until late on 5/30). He will resume eating normal amount of veggies over the weekend (he likes [employer]). He's scheduled for a prostate biopsy on 6/30 and Dr Simmons is requesting a 5 day warfarin hold prior to the procedure. Will review with Dr Malhotra. He has noted an occasional bounding heart rate or burst of an irregular rate but no sustained irregular beats. He  will consider wearing his fitbit in order to keep track of his HR and will document any symptoms. Asked that he notify Dr Malhotra's nurse if he is having sustained irregular rhythm and/or symptoms. Dosage adjustment made based on physician directed care plan.    Gladys Herrera RN

## 2017-05-24 NOTE — MR AVS SNAPSHOT
Nigel Rodarte Jr.   5/24/2017 7:20 AM   Anticoagulation Therapy Visit    Description:  77 year old male   Provider:   ANTICOAGULATION   Department:  [facility]           INR as of 5/24/2017     Today's INR 1.3!      Anticoagulation Summary as of 5/24/2017     INR goal 2.0-3.0   Today's INR 1.3!   Full instructions 5/24: 7.5 mg; 5/25: 5 mg; 5/26: 5 mg; 5/27: 5 mg; 5/28: 5 mg; 5/29: 5 mg; 5/30: 5 mg; Otherwise No maintenance plan   Next INR check 5/31/2017    Indications   Long-term (current) use of anticoagulants [Z79.01] [Z79.01]  Atrial fibrillation (H) [I48.91] [I48.91]         Your next Anticoagulation Clinic appointment(s)     May 24, 2017  7:20 AM CDT   Anticoagulation Visit with  ANTICOAGULATION   [facility] ([facility])    [address] [place] [id number]   [phone]            May 31, 2017  7:00 AM CDT   Anticoagulation Visit with  ANTICOAGULATION   [facility] ([facility])    [address] [id number]  [facility] [address]   [phone]              Contact Numbers     Anticoagulant (INR) Clinic Number: [phone]          May 2017 Details    Sun Mon Tue Wed Thu Fri Sat      1               2               3               4               5               6                 7               8               9               10               11               12               13                 14               15               16               17               18               19               20                 21               22               23               24      7.5 mg   See details      25      5 mg         26      5 mg         27      5 mg           28      5 mg         29      5 mg         30      5 mg         31                Date Details   05/24 This INR check       Date of next INR:  5/31/2017         How to take your warfarin dose     To take:   5 mg Take 1 of the 5 mg tablets.    To take:  7.5 mg Take 1.5 of the 5 mg tablets.

## 2017-05-31 ENCOUNTER — ANTICOAGULATION THERAPY VISIT (OUTPATIENT)
Dept: CARDIOLOGY | Facility: CLINIC | Age: 77
End: 2017-05-31
Payer: COMMERCIAL

## 2017-05-31 DIAGNOSIS — I48.91 ATRIAL FIBRILLATION, UNSPECIFIED TYPE (H): ICD-10-CM

## 2017-05-31 DIAGNOSIS — Z79.01 LONG-TERM (CURRENT) USE OF ANTICOAGULANTS: ICD-10-CM

## 2017-05-31 LAB — INR POINT OF CARE: 1.4 (ref 0.86–1.14)

## 2017-05-31 PROCEDURE — 85610 PROTHROMBIN TIME: CPT | Mod: QW | Performed by: INTERNAL MEDICINE

## 2017-05-31 PROCEDURE — 36416 COLLJ CAPILLARY BLOOD SPEC: CPT | Performed by: INTERNAL MEDICINE

## 2017-05-31 NOTE — PROGRESS NOTES
ANTICOAGULATION FOLLOW-UP CLINIC VISIT    Patient Name:  Nigel Rodarte Jr.  Date:  5/31/2017  Contact Type:  Face to Face    SUBJECTIVE:     Patient Findings     Positives No Problem Findings           OBJECTIVE    INR Protime   Date Value Ref Range Status   05/31/2017 1.4 (A) 0.86 - 1.14 Final       ASSESSMENT / PLAN  INR assessment SUB    Recheck INR In: 5 DAYS    INR Location Clinic      Anticoagulation Summary as of 5/31/2017     INR goal 2.0-3.0   Today's INR 1.4!   Maintenance plan No maintenance plan   Full instructions 5/31: 7.5 mg; 6/1: 5 mg; 6/2: 5 mg; 6/3: 7.5 mg; 6/4: 5 mg; Otherwise No maintenance plan   Next INR check 6/5/2017   Target end date Indefinite    Indications   Long-term (current) use of anticoagulants [Z79.01] [Z79.01]  Atrial fibrillation (H) [I48.91] [I48.91]         Anticoagulation Episode Summary     INR check location     Preferred lab     Send INR reminders to [facility] HEART INR NURSE    Comments       Anticoagulation Care Providers     Provider Role Specialty Phone number    Grzegorz Malhotra MD Responsible Cardiology [phone]            See the Encounter Report to view Anticoagulation Flowsheet and Dosing Calendar (Go to Encounters tab in chart review, and find the Anticoagulation Therapy Visit)    INR 1.4. Pt is a recent new start for treatment of atrial fibrillation.No diet or medication changes. He had a salad this week but with no dark greens in it. He denies bruising or bleeding issues. He will take 7.5 mg W (today) and Saturday, and 5 mg the other days. Recheck in 5 days. He has a procedure (prostate bx) coming up in the near future (end of June), the md is aksing for a 5 day hold. Pt also mentioned today that since starting Metoprolol and warfarin he has noticed substernal heaviness. He denies any shortness of breath or chest pain. He also denies any injuries or any diet changes. Will update  and contact patient with his recommendations.Dosage adjustment  made based on physician directed care plan.    Nica James, RN        Spoke with  regarding patient's reported symptoms of chest heaviness since starting metoprolol. Per : If symptoms are not causing chest pain or shortness of breath, continue with metoprolol. Okay for 5 day warfarin hold for biopsy procedure scheduled for 6/30/17. Call attempted to reach patient with 's recommendations, left message with call back number.

## 2017-05-31 NOTE — MR AVS SNAPSHOT
Nigel Rodarte Jr.   5/31/2017 7:00 AM   Anticoagulation Therapy Visit    Description:  77 year old male   Provider:  YAIR ANTICOAGULATION   Department:  [facility]           INR as of 5/31/2017     Today's INR 1.4!      Anticoagulation Summary as of 5/31/2017     INR goal 2.0-3.0   Today's INR 1.4!   Full instructions 5/31: 7.5 mg; 6/1: 5 mg; 6/2: 5 mg; 6/3: 7.5 mg; 6/4: 5 mg; Otherwise No maintenance plan   Next INR check 6/5/2017    Indications   Long-term (current) use of anticoagulants [Z79.01] [Z79.01]  Atrial fibrillation (H) [I48.91] [I48.91]         Your next Anticoagulation Clinic appointment(s)     Jun 05, 2017  7:40 AM CDT   Anticoagulation Visit with  ANTICOAGULATION   [facility] ([facility])    [address] [place] [id number]   [phone]              Contact Numbers     Anticoagulant (INR) Clinic Number: [id number]          May 2017 Details    Sun Mon Tue Wed Thu Fri Sat      1               2               3               4               5               6                 7               8               9               10               11               12               13                 14               15               16               17               18               19               20                 21               22               23               24               25               26               27                 28               29               30               31      7.5 mg   See details          Date Details   05/31 This INR check               How to take your warfarin dose     To take:  7.5 mg Take 1.5 of the 5 mg tablets.           June 2017 Details    Sun Mon Tue Wed Thu Fri Sat         1      5 mg         2      5 mg         3      7.5 mg           4      5 mg         5            6               7               8               9               10                 11               12                13               14               15               16               17                 18               19               20               21               22               23               24                 25               26               27               28               29               30                 Date Details   No additional details    Date of next INR:  6/5/2017         How to take your warfarin dose     To take:  5 mg Take 1 of the 5 mg tablets.    To take:  7.5 mg Take 1.5 of the 5 mg tablets.

## 2017-05-31 NOTE — PROGRESS NOTES
Received message from patient saying he had his phone on but missed my call. Another call attempted to reach him to go over 's recommendations regarding symptoms and warfarin hold. Left detailed message with call back number.

## 2017-06-05 ENCOUNTER — ANTICOAGULATION THERAPY VISIT (OUTPATIENT)
Dept: CARDIOLOGY | Facility: CLINIC | Age: 77
End: 2017-06-05
Payer: COMMERCIAL

## 2017-06-05 DIAGNOSIS — I48.91 ATRIAL FIBRILLATION, UNSPECIFIED TYPE (H): ICD-10-CM

## 2017-06-05 DIAGNOSIS — Z79.01 LONG-TERM (CURRENT) USE OF ANTICOAGULANTS: ICD-10-CM

## 2017-06-05 LAB — INR POINT OF CARE: 1.5 (ref 0.86–1.14)

## 2017-06-05 PROCEDURE — 36416 COLLJ CAPILLARY BLOOD SPEC: CPT | Performed by: INTERNAL MEDICINE

## 2017-06-05 PROCEDURE — 85610 PROTHROMBIN TIME: CPT | Mod: QW | Performed by: INTERNAL MEDICINE

## 2017-06-05 NOTE — PROGRESS NOTES
ANTICOAGULATION FOLLOW-UP CLINIC VISIT    Patient Name:  Nigel Rodarte Jr.  Date:  6/5/2017  Contact Type:  Face to Face    SUBJECTIVE:     Patient Findings     Positives No Problem Findings           OBJECTIVE    INR Protime   Date Value Ref Range Status   06/05/2017 1.5 (A) 0.86 - 1.14 Final       ASSESSMENT / PLAN  INR assessment SUB    Recheck INR In: 4 DAYS    INR Location Clinic      Anticoagulation Summary as of 6/5/2017     INR goal 2.0-3.0   Today's INR 1.5!   Maintenance plan No maintenance plan   Full instructions 6/5: 7.5 mg; 6/6: 7.5 mg; 6/7: 7.5 mg; 6/8: 7.5 mg; Otherwise No maintenance plan   Next INR check 6/9/2017   Target end date Indefinite    Indications   Long-term (current) use of anticoagulants [Z79.01] [Z79.01]  Atrial fibrillation (H) [I48.91] [I48.91]         Anticoagulation Episode Summary     INR check location     Preferred lab     Send INR reminders to [facility] HEART INR NURSE    Comments       Anticoagulation Care Providers     Provider Role Specialty Phone number    Grzegorz Malhotra MD Responsible Cardiology [phone]            See the Encounter Report to view Anticoagulation Flowsheet and Dosing Calendar (Go to Encounters tab in chart review, and find the Anticoagulation Therapy Visit)    INR 1.5 INR rising slowly. No change in meds or diet. No abnormal bleeding or bruising. He took 2 days of 7.5 mg dosing and 5 mg on other days last week and INR clark only 0.1 point so will dose with 7.5 mg [facility] with recheck on Friday to see if he needs anymore 7.5 mg doses during the week or if the other doses will be 5 mg days. Dr Malhotra authorized a 5 day warfarin hold prior to a prostate biopsy scheduled on 6/30/17.   Dosage adjustment made based on physician directed care plan.    Gladys Herrera RN

## 2017-06-05 NOTE — MR AVS SNAPSHOT
Nigel Rodarte Jr.   6/5/2017 7:40 AM   Anticoagulation Therapy Visit    Description:  77 year old male   Provider:   ANTICOAGULATION   Department:  [facility]           INR as of 6/5/2017     Today's INR 1.5!      Anticoagulation Summary as of 6/5/2017     INR goal 2.0-3.0   Today's INR 1.5!   Full instructions 6/5: 7.5 mg; 6/6: 7.5 mg; 6/7: 7.5 mg; 6/8: 7.5 mg; Otherwise No maintenance plan   Next INR check 6/9/2017    Indications   Long-term (current) use of anticoagulants [Z79.01] [Z79.01]  Atrial fibrillation (H) [I48.91] [I48.91]         Your next Anticoagulation Clinic appointment(s)     Jun 05, 2017  7:40 AM CDT   Anticoagulation Visit with  ANTICOAGULATION   [facility] ([facility])    [id number]  [facility] [address]   [phone]            Jun 09, 2017  4:00 PM CDT   Anticoagulation Visit with  ANTICOAGULATION   [facility] ([facility])    [id number]  [facility] [address]   [phone]              Contact Numbers     Anticoagulant (INR) Clinic Number: [phone]          June 2017 Details    Sun Mon Tue Wed Thu Fri Sat         1               2               3                 4               5      7.5 mg   See details      6      7.5 mg         7      7.5 mg         8      7.5 mg         9            10                 11               12               13               14               15               16               17                 18               19               20               21               22               23               24                 25               26               27               28               29               30                 Date Details   06/05 This INR check       Date of next INR:  6/9/2017         How to take your warfarin dose     To take:  7.5 mg Take 1.5 of the 5 mg tablets.

## 2017-06-09 ENCOUNTER — ANTICOAGULATION THERAPY VISIT (OUTPATIENT)
Dept: CARDIOLOGY | Facility: CLINIC | Age: 77
End: 2017-06-09
Payer: COMMERCIAL

## 2017-06-09 DIAGNOSIS — Z79.01 LONG-TERM (CURRENT) USE OF ANTICOAGULANTS: ICD-10-CM

## 2017-06-09 DIAGNOSIS — Z79.01 LONG TERM CURRENT USE OF ANTICOAGULANT THERAPY: ICD-10-CM

## 2017-06-09 DIAGNOSIS — I48.91 ATRIAL FIBRILLATION, UNSPECIFIED TYPE (H): ICD-10-CM

## 2017-06-09 LAB — INR POINT OF CARE: 2.1 (ref 0.86–1.14)

## 2017-06-09 PROCEDURE — 36416 COLLJ CAPILLARY BLOOD SPEC: CPT | Performed by: INTERNAL MEDICINE

## 2017-06-09 PROCEDURE — 85610 PROTHROMBIN TIME: CPT | Mod: QW | Performed by: INTERNAL MEDICINE

## 2017-06-09 RX ORDER — WARFARIN SODIUM 5 MG/1
TABLET ORAL
Qty: 120 TABLET | Refills: 0 | Status: SHIPPED | OUTPATIENT
Start: 2017-06-09 | End: 2017-09-26

## 2017-06-09 RX ORDER — METOPROLOL SUCCINATE 25 MG/1
TABLET, EXTENDED RELEASE ORAL
Qty: 90 TABLET | Refills: 1 | Status: SHIPPED | OUTPATIENT
Start: 2017-06-09 | End: 2018-02-08

## 2017-06-09 NOTE — PROGRESS NOTES
ANTICOAGULATION FOLLOW-UP CLINIC VISIT    Patient Name:  Nigel Rodarte Jr.  Date:  6/9/2017  Contact Type:  Face to Face    SUBJECTIVE:     Patient Findings     Positives No Problem Findings           OBJECTIVE    INR Protime   Date Value Ref Range Status   06/09/2017 2.1 (A) 0.86 - 1.14 Final       ASSESSMENT / PLAN  INR assessment THER    Recheck INR In: 6 DAYS    INR Location Clinic      Anticoagulation Summary as of 6/9/2017     INR goal 2.0-3.0   Today's INR 2.1   Maintenance plan 5 mg (5 mg x 1) on Mon, Fri; 7.5 mg (5 mg x 1.5) all other days   Full instructions 6/9: 5 mg; 6/12: 5 mg; Otherwise 5 mg on Mon, Fri; 7.5 mg all other days   Weekly total 47.5 mg   Plan last modified Gladys Herrera RN (6/9/2017)   Next INR check 6/15/2017   Target end date Indefinite    Indications   Long-term (current) use of anticoagulants [Z79.01] [Z79.01]  Atrial fibrillation (H) [I48.91] [I48.91]         Anticoagulation Episode Summary     INR check location     Preferred lab     Send INR reminders to [facility] HEART INR NURSE    Comments       Anticoagulation Care Providers     Provider Role Specialty Phone number    Grzegorz Malhotra MD Responsible Cardiology [phone]            See the Encounter Report to view Anticoagulation Flowsheet and Dosing Calendar (Go to Encounters tab in chart review, and find the Anticoagulation Therapy Visit)    INR 2.1 INR in range on increased dosing schedule. No change in meds or diet. No abnormal bleeding or bruising. Will continue with current dosing of 5 mg MF and 7.5 mg all other days with recheck in 6 days. Also scheduled an INR appt on 6/19 after OV with Dr Malhotra. Pt already has authorization for a 5 day warfarin hold prior to a prostate biopsy on 6/30/17. Dosage adjustment made based on physician directed care plan.    Gladys Herrera, RN

## 2017-06-09 NOTE — MR AVS SNAPSHOT
Nigel Rodarte Jr.   6/9/2017 4:00 PM   Anticoagulation Therapy Visit    Description:  77 year old male   Provider:   ANTICOAGULATION   Department:  [facility]           INR as of 6/9/2017     Today's INR 2.1      Anticoagulation Summary as of 6/9/2017     INR goal 2.0-3.0   Today's INR 2.1   Full instructions 6/9: 5 mg; 6/12: 5 mg; Otherwise 5 mg on Mon, Fri; 7.5 mg all other days   Next INR check 6/15/2017    Indications   Long-term (current) use of anticoagulants [Z79.01] [Z79.01]  Atrial fibrillation (H) [I48.91] [I48.91]         Your next Anticoagulation Clinic appointment(s)     Jun 09, 2017  4:00 PM CDT   Anticoagulation Visit with  ANTICOAGULATION   [facility] ([facility])    [address] [place] [address]   [phone]            Car 15, 2017  7:40 AM CDT   Anticoagulation Visit with  ANTICOAGULATION   [facility] ([facility])    [address] [place], [place] [address]  [facility] [address]   [phone]              Contact Numbers     Anticoagulant (INR) Clinic Number: [id number]          June 2017 Details    Sun Mon Tue Wed Thu Fri Sat         1               2               3                 4               5               6               7               8               9      5 mg   See details      10      7.5 mg           11      7.5 mg         12      5 mg         13      7.5 mg         14      7.5 mg         15            16               17                 18               19               20               21               22               23               24                 25               26               27               28               29               30                 Date Details   06/09 This INR check       Date of next INR:  6/15/2017         How to take your warfarin dose     To take:  5 mg Take 1 of the 5 mg tablets.    To take:  7.5 mg Take 1.5  of the 5 mg tablets.

## 2017-06-15 ENCOUNTER — ANTICOAGULATION THERAPY VISIT (OUTPATIENT)
Dept: CARDIOLOGY | Facility: CLINIC | Age: 77
End: 2017-06-15
Payer: COMMERCIAL

## 2017-06-15 DIAGNOSIS — I48.91 ATRIAL FIBRILLATION, UNSPECIFIED TYPE (H): ICD-10-CM

## 2017-06-15 DIAGNOSIS — Z79.01 LONG-TERM (CURRENT) USE OF ANTICOAGULANTS: ICD-10-CM

## 2017-06-15 LAB — INR POINT OF CARE: 3 (ref 0.86–1.14)

## 2017-06-15 PROCEDURE — 99207 ZZC NO CHARGE NURSE ONLY: CPT | Performed by: INTERNAL MEDICINE

## 2017-06-15 PROCEDURE — 36416 COLLJ CAPILLARY BLOOD SPEC: CPT | Performed by: INTERNAL MEDICINE

## 2017-06-15 PROCEDURE — 85610 PROTHROMBIN TIME: CPT | Mod: QW | Performed by: INTERNAL MEDICINE

## 2017-06-15 NOTE — PROGRESS NOTES
ANTICOAGULATION FOLLOW-UP CLINIC VISIT    Patient Name:  Nigel Rodarte Jr.  Date:  6/15/2017  Contact Type:  Face to Face    SUBJECTIVE:     Patient Findings     Positives No Problem Findings           OBJECTIVE    INR Protime   Date Value Ref Range Status   06/15/2017 3.0 (A) 0.86 - 1.14 Final       ASSESSMENT / PLAN  INR assessment THER    Recheck INR In: 4 DAYS    INR Location Clinic      Anticoagulation Summary as of 6/15/2017     INR goal 2.0-3.0   Today's INR 3.0   Maintenance plan 5 mg (5 mg x 1) on Mon, Wed, Fri; 7.5 mg (5 mg x 1.5) all other days   Full instructions 6/15: 5 mg; Otherwise 5 mg on Mon, Wed, Fri; 7.5 mg all other days   Weekly total 45 mg   Plan last modified Gladys Herrera RN (6/15/2017)   Next INR check 6/19/2017   Target end date Indefinite    Indications   Long-term (current) use of anticoagulants [Z79.01] [Z79.01]  Atrial fibrillation (H) [I48.91] [I48.91]         Anticoagulation Episode Summary     INR check location     Preferred lab     Send INR reminders to [facility] HEART INR NURSE    Comments       Anticoagulation Care Providers     Provider Role Specialty Phone number    Grzegorz Malhotra MD Responsible Cardiology [phone]            See the Encounter Report to view Anticoagulation Flowsheet and Dosing Calendar (Go to Encounters tab in chart review, and find the Anticoagulation Therapy Visit)    INR 3.0 INR clark from 2.1 to 3.0 this week. No change in meds or diet (he has been eating salads every other day but lighter greens than he usually eats). No abnormal bleeding or bruising. Will decrease dosing by 2.5 mg/wk - take 5 mg today then take 5 mg MWF and 7.5 mg all other days with recheck in 4 days after he has OV with Dr House. Advised that this is the time to resume the diet he wants to eat so on Monday we will need to ask him if he has added darker greens to his diet yet. Dosage adjustment made based on physician directed care plan.    Gladys Herrera, TAMMY

## 2017-06-15 NOTE — MR AVS SNAPSHOT
Nigel Rodarte Jr.   6/15/2017 7:40 AM   Anticoagulation Therapy Visit    Description:  77 year old male   Provider:   ANTICOAGULATION   Department:  [facility]           INR as of 6/15/2017     Today's INR 3.0      Anticoagulation Summary as of 6/15/2017     INR goal 2.0-3.0   Today's INR 3.0   Full instructions 6/15: 5 mg; Otherwise 5 mg on Mon, Wed, Fri; 7.5 mg all other days   Next INR check 6/19/2017    Indications   Long-term (current) use of anticoagulants [Z79.01] [Z79.01]  Atrial fibrillation (H) [I48.91] [I48.91]         Your next Anticoagulation Clinic appointment(s)     Car 15, 2017  7:40 AM CDT   Anticoagulation Visit with  ANTICOAGULATION   [facility] ([facility])    [address] [place] [address]   [phone]            Jun 19, 2017  3:00 PM CDT   Anticoagulation Visit with  ANTICOAGULATION   [facility] ([facility])    [address] [place] [address]   [phone]              Contact Numbers     Anticoagulant (INR) Clinic Number: [id number]          June 2017 Details    Sun Mon Tue Wed Thu Fri Sat         1               2               3                 4               5               6               7               8               9               10                 11               12               13               14               15      5 mg   See details      16      5 mg         17      7.5 mg           18      7.5 mg         19            20               21               22               23               24                 25               26               27               28               29               30                 Date Details   06/15 This INR check       Date of next INR:  6/19/2017         How to take your warfarin dose     To take:  5 mg Take 1 of the 5 mg tablets.    To take:  7.5 mg Take 1.5 of the 5 mg  tablets.

## 2017-06-19 ENCOUNTER — ANTICOAGULATION THERAPY VISIT (OUTPATIENT)
Dept: CARDIOLOGY | Facility: CLINIC | Age: 77
End: 2017-06-19
Payer: COMMERCIAL

## 2017-06-19 ENCOUNTER — OFFICE VISIT (OUTPATIENT)
Dept: CARDIOLOGY | Facility: CLINIC | Age: 77
End: 2017-06-19
Attending: INTERNAL MEDICINE
Payer: COMMERCIAL

## 2017-06-19 VITALS
SYSTOLIC BLOOD PRESSURE: 118 MMHG | HEART RATE: 64 BPM | DIASTOLIC BLOOD PRESSURE: 74 MMHG | BODY MASS INDEX: 25.03 KG/M2 | WEIGHT: 169 LBS | HEIGHT: 69 IN

## 2017-06-19 DIAGNOSIS — I48.91 ATRIAL FIBRILLATION, UNSPECIFIED TYPE (H): ICD-10-CM

## 2017-06-19 DIAGNOSIS — I48.0 PAROXYSMAL ATRIAL FIBRILLATION (H): Primary | ICD-10-CM

## 2017-06-19 DIAGNOSIS — Z79.01 LONG-TERM (CURRENT) USE OF ANTICOAGULANTS: ICD-10-CM

## 2017-06-19 LAB — INR POINT OF CARE: 2.9 (ref 0.86–1.14)

## 2017-06-19 PROCEDURE — 99214 OFFICE O/P EST MOD 30 MIN: CPT | Performed by: INTERNAL MEDICINE

## 2017-06-19 PROCEDURE — 85610 PROTHROMBIN TIME: CPT | Mod: QW | Performed by: INTERNAL MEDICINE

## 2017-06-19 PROCEDURE — 36416 COLLJ CAPILLARY BLOOD SPEC: CPT | Performed by: INTERNAL MEDICINE

## 2017-06-19 RX ORDER — FLECAINIDE ACETATE 50 MG/1
50 TABLET ORAL 2 TIMES DAILY
Qty: 60 TABLET | Refills: 3 | Status: SHIPPED | OUTPATIENT
Start: 2017-06-19 | End: 2017-11-02

## 2017-06-19 NOTE — MR AVS SNAPSHOT
Nigel Rodarte Jr.   6/19/2017 3:00 PM   Anticoagulation Therapy Visit    Description:  77 year old male   Provider:   ANTICOAGULATION   Department:  [facility]           INR as of 6/19/2017     Today's INR 2.9      Anticoagulation Summary as of 6/19/2017     INR goal 2.0-3.0   Today's INR 2.9   Full instructions 6/25: Hold; 6/26: Hold; 6/27: Hold; 6/28: Hold; 6/29: Hold; Otherwise 5 mg on Mon, Wed, Fri; 7.5 mg all other days   Next INR check 7/10/2017    Indications   Long-term (current) use of anticoagulants [Z79.01] [Z79.01]  Atrial fibrillation (H) [I48.91] [I48.91]         Your next Anticoagulation Clinic appointment(s)     Jul 10, 2017  7:00 AM CDT   Anticoagulation Visit with  ANTICOAGULATION   [facility] ([facility])    [address] [place] [address]   [phone]              Contact Numbers     Anticoagulant (INR) Clinic Number: [phone]          June 2017 Details    Sun Mon Tue Wed Thu Fri Sat         1               2               3                 4               5               6               7               8               9               10                 11               12               13               14               15               16               17                 18               19      5 mg   See details      20      7.5 mg         21      5 mg         22      7.5 mg         23      5 mg         24      7.5 mg           25      Hold         26      Hold         27      Hold         28      Hold         29      Hold         30      5 mg           Date Details   06/19 This INR check               How to take your warfarin dose     To take:  5 mg Take 1 of the 5 mg tablets.    To take:  7.5 mg Take 1.5 of the 5 mg tablets.    Hold Do not take your warfarin dose. See the Details table to the right for additional instructions.                July 2017 Details    Sun Mon Tue Wed Thu Fri Sat            1      7.5 mg           2      7.5 mg         3      5 mg         4      7.5 mg         5      5 mg         6      7.5 mg         7      5 mg         8      7.5 mg           9      7.5 mg         10            11               12               13               14               15                 16               17               18               19               20               21               22                 23               24               25               26               27               28               29                 30               31                     Date Details   No additional details    Date of next INR:  7/10/2017         How to take your warfarin dose     To take:  5 mg Take 1 of the 5 mg tablets.    To take:  7.5 mg Take 1.5 of the 5 mg tablets.

## 2017-06-19 NOTE — PROGRESS NOTES
ANTICOAGULATION FOLLOW-UP CLINIC VISIT    Patient Name:  Nigel Rodarte Jr.  Date:  6/19/2017  Contact Type:  Face to Face    SUBJECTIVE:     Patient Findings     Positives No Problem Findings           OBJECTIVE    INR Protime   Date Value Ref Range Status   06/19/2017 2.9 (A) 0.86 - 1.14 Final       ASSESSMENT / PLAN  INR assessment THER    Recheck INR In: 3 WEEKS    INR Location Clinic      Anticoagulation Summary as of 6/19/2017     INR goal 2.0-3.0   Today's INR 2.9   Maintenance plan 5 mg (5 mg x 1) on Mon, Wed, Fri; 7.5 mg (5 mg x 1.5) all other days   Full instructions 6/25: Hold; 6/26: Hold; 6/27: Hold; 6/28: Hold; 6/29: Hold; Otherwise 5 mg on Mon, Wed, Fri; 7.5 mg all other days   Weekly total 45 mg   Plan last modified Gladys Herrera RN (6/15/2017)   Next INR check 7/10/2017   Target end date Indefinite    Indications   Long-term (current) use of anticoagulants [Z79.01] [Z79.01]  Atrial fibrillation (H) [I48.91] [I48.91]         Anticoagulation Episode Summary     INR check location     Preferred lab     Send INR reminders to [facility] HEART INR NURSE    Comments       Anticoagulation Care Providers     Provider Role Specialty Phone number    Grzegorz Malhotra MD Responsible Cardiology [phone]            See the Encounter Report to view Anticoagulation Flowsheet and Dosing Calendar (Go to Encounters tab in chart review, and find the Anticoagulation Therapy Visit)    INR 2.9 OV with Dr Malhotra today. Pt had about 6 hours of palpitations yesterday but heart rhythm regular now. Dr Malhotra started him on Flecainide 50 mg bid. He has been eating salads 3-4x/wk (lighter to mixed greens). He can add one vegetable to his diet per week. No abnormal bleeding or bruising. Will continue currrent dosing of 5 mg MWF and 7.5 mg all other days. He is scheduled for a prostate biopsy on 6/30 and already has authorization to hold warfarin for 5 days prior (hold starting on  6/25) then he will resume dosing evening of  procedure (unless instructed otherwise by procedural MD) with recheck of INR 10 days later. Dosage adjustment made based on physician directed care plan.    Gladys Herrear RN

## 2017-06-19 NOTE — MR AVS SNAPSHOT
After Visit Summary   6/19/2017    Nigel Rodarte Jr.    MRN: [id number]           Patient Information     Date Of Birth          1940        Visit Information        Provider Department      6/19/2017 2:15 PM Grzegorz Malhotra MD [facility]        Today's Diagnoses     Paroxysmal atrial fibrillation (H)    -  1       Follow-ups after your visit        Additional Services     Follow-Up with Cardiac Advanced Practice Provider                 Your next 10 appointments already scheduled     Jun 20, 2017  2:00 PM CDT   Ech Complete with [id number]   [facility] CV Echocardiography ([facility])    [address]  [address]  [facility] [place] [address] [phone]           1.  Please bring or wear a comfortable two-piece outfit. 2.  You may eat, drink and take your normal medicines. 3.  For any questions that cannot be answered, please contact the ordering physician            Jun 29, 2017  7:30 AM CDT   Return Visit with Briseyda Meyers PA-C   [facility] ([facility])    [address]  [place] [address] [phone]              Future tests that were ordered for you today     Open Future Orders        Priority Expected Expires Ordered    Follow-Up with Cardiac Advanced Practice Provider Routine 6/26/2017 6/19/2018 6/19/2017    EKG 12-lead complete w/read - Clinics (to be scheduled) Routine 6/26/2017 6/19/2018 6/19/2017    Echocardiogram Routine 6/26/2017 6/19/2018 6/19/2017            Who to contact     If you have questions or need follow up information about today's clinic visit or your schedule please contact [facility] directly at [phone].  Normal or non-critical lab and imaging results will be communicated to you by MyChart, letter or phone within 4 business days after the  "clinic has received the results. If you do not hear from us within 7 days, please contact the clinic through [employer] or phone. If you have a critical or abnormal lab result, we will notify you by phone as soon as possible.  Submit refill requests through [employer] or call your pharmacy and they will forward the refill request to us. Please allow 3 business days for your refill to be completed.          Additional Information About Your Visit        [employer] Information     [employer] lets you send messages to your doctor, view your test results, renew your prescriptions, schedule appointments and more. To sign up, go to [email or web address] . Click on \"Log in\" on the left side of the screen, which will take you to the Welcome page. Then click on \"Sign up Now\" on the right side of the page.     You will be asked to enter the access code listed below, as well as some personal information. Please follow the directions to create your username and password.     Your access code is: [email or web address]  Expires: 2017 11:52 AM     Your access code will  in 90 days. If you need help or a new code, please call your [facility] clinic or [phone].        Care EveryWhere ID     This is your Care EveryWhere ID. This could be used by other organizations to access your [facility] medical records  [id number]        Your Vitals Were     Pulse Height BMI (Body Mass Index)             64 1.753 m (5' 9\") 24.96 kg/m2          Blood Pressure from Last 3 Encounters:   17 118/74   17 92/62   17 110/77    Weight from Last 3 Encounters:   17 76.7 kg (169 lb)   17 75.8 kg (167 lb)   17 73 kg (161 lb)              We Performed the Following     Follow-Up with Electrophysiologist          Today's Medication Changes          These changes are accurate as of: 17  3:00 PM.  If you have any questions, ask your nurse or doctor.               Start taking these medicines.        Dose/Directions    " flecainide 50 MG tablet   Commonly known as:  TAMBOCOR   Used for:  Paroxysmal atrial fibrillation (H)   Started by:  Grzegorz Malhotra MD        Dose:  50 mg   Take 1 tablet (50 mg) by mouth 2 times daily   Quantity:  60 tablet   Refills:  3            Where to get your medicines      These medications were sent to [facility] [address] [place] [address] AT [facility]  & [facility]  [address], [facility] [address]     Phone:  [phone]     flecainide 50 MG tablet                Primary Care Provider Office Phone # Fax #    Osmar Espinoza [phone]       [employer] [address]  [facility] [address]        Thank you!     Thank you for choosing [facility] [place]  for your care. Our goal is always to provide you with excellent care. Hearing back from our patients is one way we can continue to improve our services. Please take a few minutes to complete the written survey that you may receive in the mail after your visit with us. Thank you!             Your Updated Medication List - Protect others around you: Learn how to safely use, store and throw away your medicines at www.disposemymeds.org.          This list is accurate as of: 6/19/17  3:00 PM.  Always use your most recent med list.                   Brand Name Dispense Instructions for use    flecainide 50 MG tablet    TAMBOCOR    60 tablet    Take 1 tablet (50 mg) by mouth 2 times daily       metoprolol 25 MG 24 hr tablet    TOPROL-XL    90 tablet    Take 1 tab daily       warfarin 5 MG tablet    COUMADIN    120 tablet    1 tab on Mondays and Fridays and take 1 1/2 tabs on all other days or as directed per INR clinic

## 2017-06-19 NOTE — LETTER
6/19/2017    Osmar Espinoza MD  [place]   [address]  [facility] [address]    RE: Nigel Rodarte Jr.       Dear Colleague,    I had the pleasure of seeing Nigel STRONG Cayden Silva in the [facility].    REASON FOR VISIT:  Evaluation of paroxysmal atrial fibrillation.      Mr. Rodarte is a delightful 77-year-old gentleman, former  of [employer] team, who is here for evaluation of paroxysmal atrial fibrillation.      The patient presents with intermittent episodes of palpitations which started last year.  In the last 4 months, the episodes have become more frequent and sometimes they may last for a week.  Most recently, on 05/16/2017, he had another episode which was sustained.  He came to the ED for evaluation and confirmed to have atrial fibrillation with controlled ventricular response.  He underwent cardioversion and was started on Eliquis and metoprolol.      At the moment, he is doing well.  He informs he continues to have episodes of atrial fibrillation.  He has about 2-3 episodes per week, some of the episode may last for 8 hours.  Some episodes may be associated with a sensation of tachycardia and he feels uncomfortable.      EKG done on 05/16/2017 showed normal sinus rhythm with a QRS measuring 96 milliseconds.  Stress echo obtained in 07/2015 was negative for ischemia and revealed structurally normal heart with trace to mild TR.      Outpatient Encounter Prescriptions as of 6/19/2017   Medication Sig Dispense Refill     flecainide (TAMBOCOR) 50 MG tablet Take 1 tablet (50 mg) by mouth 2 times daily 60 tablet 3     metoprolol (TOPROL-XL) 25 MG 24 hr tablet Take 1 tab daily 90 tablet 1     warfarin (COUMADIN) 5 MG tablet 1 tab on Mondays and Fridays and take 1 1/2 tabs on all other days or as directed per INR clinic 120 tablet 0     [DISCONTINUED] GLUCOSAMINE SULFATE PO Take 1 tablet by mouth 2 times daily as needed        No facility-administered encounter  medications on file as of 6/19/2017.      PLAN:   1.  Paroxysmal atrial fibrillation.  He continues to be symptomatic despite metoprolol therapy.  I recommend starting flecainide 50 mg b.i.d.  He will obtain an echocardiogram and will follow up with us in a week with an EKG.  If the EKG looks okay, we may titrate up to 75 mg or 100 mg b.i.d. depending on his symptoms.   2.  Embolic prevention.  CHADS-VASc score of 2.  He is currently taking Coumadin and doing well with Coumadin.   3.  Followup care.  Follow up in a week and probably with me in 6 months.     Again, thank you for allowing me to participate in the care of your patient.      Sincerely,    Grzegorz Malhotra MD     [facility]

## 2017-06-19 NOTE — PROGRESS NOTES
"[id number]    HPI and Plan:   See dictation      Physical Exam:  Vitals: /74  Pulse 64  Ht 1.753 m (5' 9\")  Wt 76.7 kg (169 lb)  BMI 24.96 kg/m2    Constitutional:  AAO x3.  Pt is in NAD.  HEAD: normocephalic.  SKIN: Skin normal color, texture and turgor with no lesions or eruptions.  Eyes: PERRL, EOMI.  ENT:  Supple, normal JVP. No lymphadenopathy or thyroid enlargement.  Chest:  CTAB.  Cardiac:  RRR, normal  S1 and S2.  No murmurs rubs or gallop.   Abdomen:  Normal BS.  Soft, non-tender and non-distended.  No rebound or guarding.    Extremities:  Pedious pulses palpable B/L.  No LE edema noticed.   Neurological: Strength and sensation grossly symmetric and intact throughout.       CURRENT MEDICATIONS:  Current Outpatient Prescriptions   Medication Sig Dispense Refill     flecainide (TAMBOCOR) 50 MG tablet Take 1 tablet (50 mg) by mouth 2 times daily 60 tablet 3     metoprolol (TOPROL-XL) 25 MG 24 hr tablet Take 1 tab daily 90 tablet 1     warfarin (COUMADIN) 5 MG tablet 1 tab on Mondays and Fridays and take 1 1/2 tabs on all other days or as directed per INR clinic 120 tablet 0       ALLERGIES   No Known Allergies    PAST MEDICAL HISTORY:  Past Medical History:   Diagnosis Date     Arthritis     osteoarthrosis     Elevated PSA      Fatigue      Palpitations      Renal disease     kidney calculus     SOB (shortness of breath)        PAST SURGICAL HISTORY:  Past Surgical History:   Procedure Laterality Date     APPENDECTOMY       ARTHROPLASTY HIP  1/14/2013    Procedure: ARTHROPLASTY HIP;  RIGHT TOTAL HIP ARTHROPLASTY ;  Surgeon: Jamie Ohara MD;  Location:  OR     COLONOSCOPY N/A 2/9/2016    Procedure: COLONOSCOPY;  Surgeon: Astrid Vital MD;  Location:  GI     CYSTOSCOPY, RETROGRADES, EXTRACT STONE, COMBINED  9/12/2013    Procedure: COMBINED CYSTOSCOPY, RETROGRADES, EXTRACT STONE;  CYSTOSCOPY, RIGHT RETROGRADE, STONE EXTRACTION;  Surgeon: Carlos Mcknight MD;  Location:  OR     ENT SURGERY  "     right ear surgery     GENITOURINARY SURGERY      cystoscopy for stone removal     ORTHOPEDIC SURGERY      shoulder surgeries,bilat carpel tunnel       FAMILY HISTORY:  Family History   Problem Relation Age of Onset     Lung Cancer Brother        SOCIAL HISTORY:  Social History     Social History     Marital status:      Spouse name: N/A     Number of children: N/A     Years of education: N/A     Social History Main Topics     Smoking status: Never Smoker     Smokeless tobacco: None     Alcohol use No     Drug use: No     Sexual activity: Not Asked     Other Topics Concern     None     Social History Narrative       Review of Systems:  Skin:  Negative     Eyes:  Negative    ENT:  Negative    Respiratory:  Negative    Cardiovascular:    Positive for;palpitations  Gastroenterology: Negative    Genitourinary:  Negative    Musculoskeletal:  Negative    Neurologic:  Negative    Psychiatric:  Negative    Heme/Lymph/Imm:  Negative    Endocrine:  Negative        Recent Lab Results:  LIPID RESULTS:  No results found for: CHOL, HDL, LDL, TRIG, CHOLHDLRATIO    LIVER ENZYME RESULTS:  Lab Results   Component Value Date    AST 26 05/16/2017    ALT 30 05/16/2017       CBC RESULTS:  Lab Results   Component Value Date    WBC 5.1 05/16/2017    RBC 5.28 05/16/2017    HGB 16.8 05/16/2017    HCT 48.0 05/16/2017    MCV 91 05/16/2017    MCH 31.8 05/16/2017    MCHC 35.0 05/16/2017    RDW 13.0 05/16/2017     05/16/2017       BMP RESULTS:  Lab Results   Component Value Date     05/16/2017    POTASSIUM 4.3 05/16/2017    CHLORIDE 109 05/16/2017    CO2 25 05/16/2017    ANIONGAP 7 05/16/2017    GLC 81 05/16/2017    BUN 16 05/16/2017    CR 1.08 05/16/2017    GFRESTIMATED 66 05/16/2017    GFRESTBLACK 80 05/16/2017    CHERI 8.5 05/16/2017        A1C RESULTS:  No results found for: A1C    INR RESULTS:  Lab Results   Component Value Date    INR 3.0 (A) 06/15/2017    INR 2.1 (A) 06/09/2017    INR 1.22 (H) 01/17/2013    INR 1.18  (H) 01/16/2013         ECHOCARDIOGRAM  No results found for this or any previous visit (from the past 8760 hour(s)).      Orders Placed This Encounter   Procedures     Follow-Up with Cardiac Advanced Practice Provider     EKG 12-lead complete w/read - Clinics (to be scheduled)     Echocardiogram     Orders Placed This Encounter   Medications     flecainide (TAMBOCOR) 50 MG tablet     Sig: Take 1 tablet (50 mg) by mouth 2 times daily     Dispense:  60 tablet     Refill:  3     Medications Discontinued During This Encounter   Medication Reason     GLUCOSAMINE SULFATE PO Medication Reconciliation Clean Up         Encounter Diagnosis   Name Primary?     Paroxysmal atrial fibrillation (H) Yes         CC  Grzegorz Malhotra MD   [facility]  [address]    [place] [id number]

## 2017-06-20 ENCOUNTER — HOSPITAL ENCOUNTER (OUTPATIENT)
Dept: CARDIOLOGY | Facility: CLINIC | Age: 77
Discharge: HOME | End: 2017-06-20
Attending: INTERNAL MEDICINE | Admitting: INTERNAL MEDICINE
Payer: MEDICARE

## 2017-06-20 DIAGNOSIS — I48.0 PAROXYSMAL ATRIAL FIBRILLATION (H): ICD-10-CM

## 2017-06-20 PROCEDURE — 93306 TTE W/DOPPLER COMPLETE: CPT

## 2017-06-20 PROCEDURE — 93306 TTE W/DOPPLER COMPLETE: CPT | Mod: 26 | Performed by: INTERNAL MEDICINE

## 2017-06-20 NOTE — PROGRESS NOTES
REASON FOR VISIT:  Evaluation of paroxysmal atrial fibrillation.      HISTORY OF PRESENT ILLNESS:  Mr. Ko is a delightful 77-year-old gentleman, former  of [employer], who is here for evaluation of paroxysmal atrial fibrillation.      The patient presents with intermittent episodes of palpitations which started last year.  In the last 4 months, the episodes have become more frequent and sometimes they may last for a week.  Most recently, on 2017, he had another episode which was sustained.  He came to the ED for evaluation and confirmed to have atrial fibrillation with controlled ventricular response.  He underwent cardioversion and was started on Eliquis and metoprolol.      At the moment, he is doing well.  He informs he continues to have episodes of atrial fibrillation.  He has about 2-3 episodes per week, some of the episode may last for 8 hours.  Some episodes may be associated with a sensation of tachycardia and he feels uncomfortable.      EKG done on 2017 showed normal sinus rhythm with a QRS measuring 96 milliseconds.  Stress echo obtained in 2015 was negative for ischemia and revealed structurally normal heart with trace to mild TR.      PLAN:   1.  Paroxysmal atrial fibrillation.  He continues to be symptomatic despite metoprolol therapy.  I recommend starting flecainide 50 mg b.i.d.  He will obtain an echocardiogram and will follow up with us in a week with an EKG.  If the EKG looks okay, we may titrate up to 75 mg or 100 mg b.i.d. depending on his symptoms.   2.  Embolic prevention.  CHADS-VASc score of 2.  He is currently taking Coumadin and doing well with Coumadin.   3.  Followup care.  Follow up in a week and probably with me in 6 months.         JOSE RONQUILLO MD             D: 2017 15:01   T: 2017 12:02   MT: KERWIN      Name:     BEN KO   MRN:      [id number]        Account:      [id number]   :      1940           Service Date:  06/19/2017      Document: [id number]

## 2017-06-23 ENCOUNTER — TELEPHONE (OUTPATIENT)
Dept: CARDIOLOGY | Facility: CLINIC | Age: 77
End: 2017-06-23

## 2017-06-23 NOTE — TELEPHONE ENCOUNTER
LM on identified home phone regarding normal echo results. Pt was instructed to call back at given number with any questions. TAMMY Montanez  Echo showed normal LV function/ thickness.  No change in plans.  Please update pt on results.     Grzegorz Malhotra

## 2017-06-23 NOTE — TELEPHONE ENCOUNTER
Pt's wife asking if pt should be holding just warfarin or should he hold other meds also for prostate biopsy. Reviewed with wife that pt should only hold warfarin for 5 days before the biopsy (hold starts on Sunday). Wife verbalizes understanding. Tamar

## 2017-06-29 ENCOUNTER — OFFICE VISIT (OUTPATIENT)
Dept: CARDIOLOGY | Facility: CLINIC | Age: 77
End: 2017-06-29
Payer: COMMERCIAL

## 2017-06-29 VITALS
HEIGHT: 69 IN | BODY MASS INDEX: 24.44 KG/M2 | WEIGHT: 165 LBS | DIASTOLIC BLOOD PRESSURE: 62 MMHG | HEART RATE: 60 BPM | SYSTOLIC BLOOD PRESSURE: 108 MMHG

## 2017-06-29 DIAGNOSIS — I48.0 PAROXYSMAL ATRIAL FIBRILLATION (H): ICD-10-CM

## 2017-06-29 PROCEDURE — 99213 OFFICE O/P EST LOW 20 MIN: CPT | Performed by: PHYSICIAN ASSISTANT

## 2017-06-29 PROCEDURE — 93000 ELECTROCARDIOGRAM COMPLETE: CPT | Performed by: PHYSICIAN ASSISTANT

## 2017-06-29 NOTE — LETTER
"6/29/2017    Osmar Espinoza MD  [facility]  [address]  [place] [address]    RE: Nigel Rodarte Jr.       Dear Colleague,    I had the pleasure of seeing Nigel Rodarte Jr. in the [facility].    HPI and Plan:   78 y/o M with h/o pAFib dx'd first in 5/2017 when presented to ER with c/o palpitations and \"unwellness.\"  Had DCCV and started on metoprolol and Eliquis.      Met with Dr. Malhotra in May - metoprolol increased to 25 mg daily for continued (but improved) palpitations. On 6/19, saw Dr. Malhotra and noted continued palpitations, lasting up to 8 hours, 2-3 times per week and started flecainide 50 mg BID.    Feels flecainide 50 has \"worked great\" without c/o recurrent palpitations. No c/o dizziness/lightheadedness/fatigue. No CP, SOB. Overall feeling \"normal.\"    EKG today with SB 54 bpm and QRS duration 94 ms. Has not taken meds yet this AM.      ASSESSMENT/PLAN:    1. Paroxysmal AFib - as above.  Flecainide 50 mg bid working well and no c/o palpitations. As he's bradycardic despite no meds this AM, will keep him at same dose.     To see Dr. Malhotra in 6 months but call if concerns/questions/recurrent AFib.  If recurrent AFib, may increase flecainide to 75 mg BID and decrease metoprolol back to 12.5 mg daily.    Continue warfarin for CHADSVASc 2 (age >75)      Orders Placed This Encounter   Procedures     Follow-Up with Electrophysiologist     EKG 12-lead complete w/read - Clinics       Encounter Diagnosis   Name Primary?     Paroxysmal atrial fibrillation (H)        CURRENT MEDICATIONS:  Current Outpatient Prescriptions   Medication Sig Dispense Refill     flecainide (TAMBOCOR) 50 MG tablet Take 1 tablet (50 mg) by mouth 2 times daily 60 tablet 3     metoprolol (TOPROL-XL) 25 MG 24 hr tablet Take 1 tab daily 90 tablet 1     warfarin (COUMADIN) 5 MG tablet 1 tab on Mondays and Fridays and take 1 1/2 tabs on all other days or as directed per INR clinic 120 tablet 0 "       ALLERGIES   No Known Allergies    PAST MEDICAL HISTORY:  Past Medical History:   Diagnosis Date     Arthritis     osteoarthrosis     Elevated PSA      Fatigue      Palpitations      Renal disease     kidney calculus     SOB (shortness of breath)        PAST SURGICAL HISTORY:  Past Surgical History:   Procedure Laterality Date     APPENDECTOMY       ARTHROPLASTY HIP  1/14/2013    Procedure: ARTHROPLASTY HIP;  RIGHT TOTAL HIP ARTHROPLASTY ;  Surgeon: Jamie Ohara MD;  Location:  OR     COLONOSCOPY N/A 2/9/2016    Procedure: COLONOSCOPY;  Surgeon: Astrid Vital MD;  Location:  GI     CYSTOSCOPY, RETROGRADES, EXTRACT STONE, COMBINED  9/12/2013    Procedure: COMBINED CYSTOSCOPY, RETROGRADES, EXTRACT STONE;  CYSTOSCOPY, RIGHT RETROGRADE, STONE EXTRACTION;  Surgeon: Carlos Mcknight MD;  Location:  OR     ENT SURGERY      right ear surgery     GENITOURINARY SURGERY      cystoscopy for stone removal     ORTHOPEDIC SURGERY      shoulder surgeries,bilat carpel tunnel       FAMILY HISTORY:  Family History   Problem Relation Age of Onset     Lung Cancer Brother        SOCIAL HISTORY:  Social History     Social History     Marital status:      Spouse name: N/A     Number of children: N/A     Years of education: N/A     Social History Main Topics     Smoking status: Never Smoker     Smokeless tobacco: None     Alcohol use No     Drug use: No     Sexual activity: Not Asked     Other Topics Concern     None     Social History Narrative       Review of Systems:  Skin:  Negative       Eyes:  Negative      ENT:  Negative      Respiratory:  Negative for shortness of breath;dyspnea on exertion;cough     Cardiovascular:  Negative for;chest pain;edema;syncope or near-syncope;exercise intolerance;fatigue;lightheadedness;dizziness;palpitations      Gastroenterology: Negative for melena;hematochezia    Genitourinary:  Negative      Musculoskeletal:  Negative      Neurologic:  Negative      Psychiatric:   "Negative      Heme/Lymph/Imm:  Negative      Endocrine:  Negative        Physical Exam:  Vitals: /62  Pulse 60  Ht 1.753 m (5' 9\")  Wt 74.8 kg (165 lb)  BMI 24.37 kg/m2    Constitutional:  cooperative, alert and oriented, well developed, well nourished, in no acute distress        Skin:  warm and dry to the touch, no apparent skin lesions or masses noted        Head:  normocephalic, no masses or lesions        Eyes:  pupils equal and round;conjunctivae and lids unremarkable;sclera white;no xanthalasma        ENT:  no pallor or cyanosis, dentition good        Neck:  JVP normal;no carotid bruit        Chest:  normal breath sounds, clear to auscultation, normal A-P diameter, normal symmetry, normal respiratory excursion, no use of accessory muscles          Cardiac: regular rhythm;no S3 or S4                  Abdomen:  abdomen soft        Vascular: pulses full and equal                                        Extremities and Back:  no deformities, clubbing, cyanosis, erythema observed;no edema              Neurological:  affect appropriate, oriented to time, person and place        Thank you for allowing me to participate in the care of your patient.    Sincerely,     Briseyda Meyers PA-C     [facility]    "

## 2017-06-29 NOTE — PATIENT INSTRUCTIONS
1. EKG today showed normal rhythm - hooray!  Rate was kind of slow (54 beats a minute) but as long as no symptoms (dizziness, fatigue, lightheadedness) etc.    2. Continue current medications for now as they're working well!    3. If you start having increasing palpitations or any questions/concerns about medications, CALL!  Our AFib nurses are Alycia: [phone]    4. See Dr. Malhotra in ~ 6 months but call if need to be seen sooner!

## 2017-06-29 NOTE — PROGRESS NOTES
"HPI and Plan:   76 y/o M with h/o pAFib dx'd first in 5/2017 when presented to ER with c/o palpitations and \"unwellness.\"  Had DCCV and started on metoprolol and Eliquis.      Met with Dr. Malhotra in May - metoprolol increased to 25 mg daily for continued (but improved) palpitations. On 6/19, saw Dr. Malhotra and noted continued palpitations, lasting up to 8 hours, 2-3 times per week and started flecainide 50 mg BID.    Feels flecainide 50 has \"worked great\" without c/o recurrent palpitations. No c/o dizziness/lightheadedness/fatigue. No CP, SOB. Overall feeling \"normal.\"    EKG today with SB 54 bpm and QRS duration 94 ms. Has not taken meds yet this AM.      ASSESSMENT/PLAN:    1. Paroxysmal AFib - as above.  Flecainide 50 mg bid working well and no c/o palpitations. As he's bradycardic despite no meds this AM, will keep him at same dose.     To see Dr. Malhotra in 6 months but call if concerns/questions/recurrent AFib.  If recurrent AFib, may increase flecainide to 75 mg BID and decrease metoprolol back to 12.5 mg daily.    Continue warfarin for CHADSVASc 2 (age >75)      Orders Placed This Encounter   Procedures     Follow-Up with Electrophysiologist     EKG 12-lead complete w/read - Clinics       Encounter Diagnosis   Name Primary?     Paroxysmal atrial fibrillation (H)        CURRENT MEDICATIONS:  Current Outpatient Prescriptions   Medication Sig Dispense Refill     flecainide (TAMBOCOR) 50 MG tablet Take 1 tablet (50 mg) by mouth 2 times daily 60 tablet 3     metoprolol (TOPROL-XL) 25 MG 24 hr tablet Take 1 tab daily 90 tablet 1     warfarin (COUMADIN) 5 MG tablet 1 tab on Mondays and Fridays and take 1 1/2 tabs on all other days or as directed per INR clinic 120 tablet 0       ALLERGIES   No Known Allergies    PAST MEDICAL HISTORY:  Past Medical History:   Diagnosis Date     Arthritis     osteoarthrosis     Elevated PSA      Fatigue      Palpitations      Renal disease     kidney calculus     SOB (shortness of " "breath)        PAST SURGICAL HISTORY:  Past Surgical History:   Procedure Laterality Date     APPENDECTOMY       ARTHROPLASTY HIP  1/14/2013    Procedure: ARTHROPLASTY HIP;  RIGHT TOTAL HIP ARTHROPLASTY ;  Surgeon: Jamie Ohara MD;  Location:  OR     COLONOSCOPY N/A 2/9/2016    Procedure: COLONOSCOPY;  Surgeon: Astrid Vital MD;  Location:  GI     CYSTOSCOPY, RETROGRADES, EXTRACT STONE, COMBINED  9/12/2013    Procedure: COMBINED CYSTOSCOPY, RETROGRADES, EXTRACT STONE;  CYSTOSCOPY, RIGHT RETROGRADE, STONE EXTRACTION;  Surgeon: Carlos Mcknight MD;  Location:  OR     ENT SURGERY      right ear surgery     GENITOURINARY SURGERY      cystoscopy for stone removal     ORTHOPEDIC SURGERY      shoulder surgeries,bilat carpel tunnel       FAMILY HISTORY:  Family History   Problem Relation Age of Onset     Lung Cancer Brother        SOCIAL HISTORY:  Social History     Social History     Marital status:      Spouse name: N/A     Number of children: N/A     Years of education: N/A     Social History Main Topics     Smoking status: Never Smoker     Smokeless tobacco: None     Alcohol use No     Drug use: No     Sexual activity: Not Asked     Other Topics Concern     None     Social History Narrative       Review of Systems:  Skin:  Negative       Eyes:  Negative      ENT:  Negative      Respiratory:  Negative for shortness of breath;dyspnea on exertion;cough     Cardiovascular:  Negative for;chest pain;edema;syncope or near-syncope;exercise intolerance;fatigue;lightheadedness;dizziness;palpitations      Gastroenterology: Negative for melena;hematochezia    Genitourinary:  Negative      Musculoskeletal:  Negative      Neurologic:  Negative      Psychiatric:  Negative      Heme/Lymph/Imm:  Negative      Endocrine:  Negative        Physical Exam:  Vitals: /62  Pulse 60  Ht 1.753 m (5' 9\")  Wt 74.8 kg (165 lb)  BMI 24.37 kg/m2    Constitutional:  cooperative, alert and oriented, well developed, " well nourished, in no acute distress        Skin:  warm and dry to the touch, no apparent skin lesions or masses noted        Head:  normocephalic, no masses or lesions        Eyes:  pupils equal and round;conjunctivae and lids unremarkable;sclera white;no xanthalasma        ENT:  no pallor or cyanosis, dentition good        Neck:  JVP normal;no carotid bruit        Chest:  normal breath sounds, clear to auscultation, normal A-P diameter, normal symmetry, normal respiratory excursion, no use of accessory muscles          Cardiac: regular rhythm;no S3 or S4                  Abdomen:  abdomen soft        Vascular: pulses full and equal                                        Extremities and Back:  no deformities, clubbing, cyanosis, erythema observed;no edema              Neurological:  affect appropriate, oriented to time, person and place              CC  Osmar Espinoza MD  [facility]  [address]  [place] [address]

## 2017-06-29 NOTE — MR AVS SNAPSHOT
After Visit Summary   6/29/2017    Nigel Rodarte Jr.    MRN: [id number]           Patient Information     Date Of Birth          1940        Visit Information        Provider Department      6/29/2017 7:30 AM Briseyda Meyers PA-C [facility]        Today's Diagnoses     Paroxysmal atrial fibrillation (H)          Care Instructions    1. EKG today showed normal rhythm - hooray!  Rate was kind of slow (54 beats a minute) but as long as no symptoms (dizziness, fatigue, lightheadedness) etc.    2. Continue current medications for now as they're working well!    3. If you start having increasing palpitations or any questions/concerns about medications, CALL!  Our AFib nurses are Alycia: [phone]    4. See Dr. Malhotra in ~ 6 months but call if need to be seen sooner!          Follow-ups after your visit        Additional Services     Follow-Up with Electrophysiologist                 Your next 10 appointments already scheduled     Jul 10, 2017  7:00 AM CDT   Anticoagulation Visit with [facility]TICOAG[facility]   [facility] ([facility])    [address] [place] [id number] [phone]              Future tests that were ordered for you today     Open Future Orders        Priority Expected Expires Ordered    EKG 12-lead complete w/read - Clinics Routine 12/26/2017 6/29/2018 6/29/2017    Follow-Up with Electrophysiologist Routine 12/26/2017 6/29/2018 6/29/2017            Who to contact     If you have questions or need follow up information about today's clinic visit or your schedule please contact [facility] directly at [phone].  Normal or non-critical lab and imaging results will be communicated to you by MyChart, letter or phone within 4 business days after the clinic has received the results. If you do not hear from us within  "7 days, please contact the clinic through [email or web address] or phone. If you have a critical or abnormal lab result, we will notify you by phone as soon as possible.  Submit refill requests through [email or web address] or call your pharmacy and they will forward the refill request to us. Please allow 3 business days for your refill to be completed.          Additional Information About Your Visit        [email or web address] Information     [email or web address] lets you send messages to your doctor, view your test results, renew your prescriptions, schedule appointments and more. To sign up, go to [email or web address] . Click on \"Log in\" on the left side of the screen, which will take you to the Welcome page. Then click on \"Sign up Now\" on the right side of the page.     You will be asked to enter the access code listed below, as well as some personal information. Please follow the directions to create your username and password.     Your access code is: [email or web address]  Expires: 2017 11:52 AM     Your access code will  in 90 days. If you need help or a new code, please call your [place] clinic or [phone].        Care EveryWhere ID     This is your Care EveryWhere ID. This could be used by other organizations to access your [place] medical records  [id number]        Your Vitals Were     Pulse Height BMI (Body Mass Index)             60 1.753 m (5' 9\") 24.37 kg/m2          Blood Pressure from Last 3 Encounters:   17 108/62   17 118/74   17 92/62    Weight from Last 3 Encounters:   17 74.8 kg (165 lb)   17 76.7 kg (169 lb)   17 75.8 kg (167 lb)              We Performed the Following     EKG 12-lead complete w/read - Clinics (to be scheduled)     Follow-Up with Cardiac Advanced Practice Provider        Primary Care Provider Office Phone # Fax #    Osmar Espinoza [phone]       [facility]  [address]  [facility] [address]        Equal Access to Services     ISABELA PEREZ AH: Tucker mariscal " brissa Delgadillo, waangelada naunchristianha, qasammyta kamelissa gonzalez, cameron petein hayaajames perezjosie freddyjames laferozjames shaheen. So [facility] [phone].    ATENCIÓN: Si habla barb, tiene a funez disposición servicios gratuitos de asistencia lingüística. Lesia al [phone].    We comply with applicable federal civil rights laws and [place] laws. We do not discriminate on the basis of race, color, national origin, age, disability sex, sexual orientation or gender identity.            Thank you!     Thank you for choosing [facility] [place]  for your care. Our goal is always to provide you with excellent care. Hearing back from our patients is one way we can continue to improve our services. Please take a few minutes to complete the written survey that you may receive in the mail after your visit with us. Thank you!             Your Updated Medication List - Protect others around you: Learn how to safely use, store and throw away your medicines at www.disposemymeds.org.          This list is accurate as of: 6/29/17  7:57 AM.  Always use your most recent med list.                   Brand Name Dispense Instructions for use Diagnosis    flecainide 50 MG tablet    TAMBOCOR    60 tablet    Take 1 tablet (50 mg) by mouth 2 times daily    Paroxysmal atrial fibrillation (H)       metoprolol 25 MG 24 hr tablet    TOPROL-XL    90 tablet    Take 1 tab daily    Long-term (current) use of anticoagulants, Atrial fibrillation, unspecified type (H)       warfarin 5 MG tablet    COUMADIN    120 tablet    1 tab on Mondays and Fridays and take 1 1/2 tabs on all other days or as directed per INR clinic    Long term current use of anticoagulant therapy, Atrial fibrillation, unspecified type (H)

## 2017-07-10 ENCOUNTER — ANTICOAGULATION THERAPY VISIT (OUTPATIENT)
Dept: CARDIOLOGY | Facility: CLINIC | Age: 77
End: 2017-07-10
Payer: COMMERCIAL

## 2017-07-10 DIAGNOSIS — I48.91 ATRIAL FIBRILLATION, UNSPECIFIED TYPE (H): ICD-10-CM

## 2017-07-10 DIAGNOSIS — Z79.01 LONG-TERM (CURRENT) USE OF ANTICOAGULANTS: ICD-10-CM

## 2017-07-10 LAB — INR POINT OF CARE: 1.5 (ref 0.86–1.14)

## 2017-07-10 PROCEDURE — 85610 PROTHROMBIN TIME: CPT | Mod: QW | Performed by: INTERNAL MEDICINE

## 2017-07-10 PROCEDURE — 36416 COLLJ CAPILLARY BLOOD SPEC: CPT | Performed by: INTERNAL MEDICINE

## 2017-07-10 NOTE — PROGRESS NOTES
ANTICOAGULATION FOLLOW-UP CLINIC VISIT    Patient Name:  Nigel Rodarte Jr.  Date:  7/10/2017  Contact Type:  Face to Face    SUBJECTIVE:     Patient Findings     Positives Intentional hold of therapy (held warfarin for 5 days before prostate biopsy and 3 days after per urologist request)           OBJECTIVE    INR Protime   Date Value Ref Range Status   07/10/2017 1.5 (A) 0.86 - 1.14 Final       ASSESSMENT / PLAN  INR assessment SUB    Recheck INR In: 1 WEEK    INR Location Clinic      Anticoagulation Summary as of 7/10/2017     INR goal 2.0-3.0   Today's INR 1.5!   Maintenance plan 5 mg (5 mg x 1) on Mon, Wed, Fri; 7.5 mg (5 mg x 1.5) all other days   Full instructions 5 mg on Mon, Wed, Fri; 7.5 mg all other days   Weekly total 45 mg   Plan last modified Gladys Herrera, RN (6/15/2017)   Next INR check 7/17/2017   Target end date Indefinite    Indications   Long-term (current) use of anticoagulants [Z79.01] [Z79.01]  Atrial fibrillation (H) [I48.91] [I48.91]         Anticoagulation Episode Summary     INR check location     Preferred lab     Send INR reminders to [facility] HEART INR NURSE    Comments       Anticoagulation Care Providers     Provider Role Specialty Phone number    Grzegorz Malhotra MD Responsible Cardiology [phone]            See the Encounter Report to view Anticoagulation Flowsheet and Dosing Calendar (Go to Encounters tab in chart review, and find the Anticoagulation Therapy Visit)    INR 1.5 He held warfarin for 5 days before prostate biopsy then held for 3 days after the biopsy per urologist's recommendation. He had hematuria initially after the procedure but now clear. No malignancy found. No change in other meds or diet. He has been taking warfarin again for only 7 days (typically takes 10 days of warfarin for INR to return to range) so will continue current dosing of 5 mg MWF and 7.5 mg all other days with recheck in 1 week (he can't come in later this week).Dosage adjustment made  based on physician directed care plan.    Gladys Herrera RN

## 2017-07-10 NOTE — MR AVS SNAPSHOT
Nigel Rodarte Jr.   7/10/2017 7:00 AM   Anticoagulation Therapy Visit    Description:  77 year old male   Provider:  YAIR ANTICOAGULATION   Department:  [facility]           INR as of 7/10/2017     Today's INR 1.5!      Anticoagulation Summary as of 7/10/2017     INR goal 2.0-3.0   Today's INR 1.5!   Full instructions 5 mg on Mon, Wed, Fri; 7.5 mg all other days   Next INR check 7/17/2017    Indications   Long-term (current) use of anticoagulants [Z79.01] [Z79.01]  Atrial fibrillation (H) [I48.91] [I48.91]         Your next Anticoagulation Clinic appointment(s)     Jul 17, 2017  7:40 AM CDT   Anticoagulation Visit with  ANTICOAGULATION   [facility] ([facility])    [address] [place] [address]   [phone]              Contact Numbers     Anticoagulant (INR) Clinic Number: [phone]          July 2017 Details    Sun Mon Tue Wed Thu Fri Sat           1                 2               3               4               5               6               7               8                 9               10      5 mg   See details      11      7.5 mg         12      5 mg         13      7.5 mg         14      5 mg         15      7.5 mg           16      7.5 mg         17            18               19               20               21               22                 23               24               25               26               27               28               29                 30               31                     Date Details   07/10 This INR check       Date of next INR:  7/17/2017         How to take your warfarin dose     To take:  5 mg Take 1 of the 5 mg tablets.    To take:  7.5 mg Take 1.5 of the 5 mg tablets.

## 2017-07-17 ENCOUNTER — ANTICOAGULATION THERAPY VISIT (OUTPATIENT)
Dept: CARDIOLOGY | Facility: CLINIC | Age: 77
End: 2017-07-17
Payer: COMMERCIAL

## 2017-07-17 DIAGNOSIS — I48.91 ATRIAL FIBRILLATION, UNSPECIFIED TYPE (H): ICD-10-CM

## 2017-07-17 DIAGNOSIS — Z79.01 LONG-TERM (CURRENT) USE OF ANTICOAGULANTS: ICD-10-CM

## 2017-07-17 LAB — INR POINT OF CARE: 1.6 (ref 0.86–1.14)

## 2017-07-17 PROCEDURE — 85610 PROTHROMBIN TIME: CPT | Mod: QW | Performed by: INTERNAL MEDICINE

## 2017-07-17 PROCEDURE — 36416 COLLJ CAPILLARY BLOOD SPEC: CPT | Performed by: INTERNAL MEDICINE

## 2017-07-17 NOTE — MR AVS SNAPSHOT
Nigel Rodarte Jr.   7/17/2017 7:40 AM   Anticoagulation Therapy Visit    Description:  77 year old male   Provider:   ANTICOAGULATION   Department:  [facility]           INR as of 7/17/2017     Today's INR 1.6!      Anticoagulation Summary as of 7/17/2017     INR goal 2.0-3.0   Today's INR 1.6!   Full instructions 7/17: 7.5 mg; 7/19: 7.5 mg; Otherwise 5 mg on Mon, Wed, Fri; 7.5 mg all other days   Next INR check 7/24/2017    Indications   Long-term (current) use of anticoagulants [Z79.01] [Z79.01]  Atrial fibrillation (H) [I48.91] [I48.91]         Your next Anticoagulation Clinic appointment(s)     Jul 17, 2017  7:40 AM CDT   Anticoagulation Visit with  ANTICOAGULATION   [facility] ([facility])    [facility] [facility] [address]  [facility] [address]   [phone]            Jul 24, 2017  7:40 AM CDT   Anticoagulation Visit with  ANTICOAGULATION   [facility] ([facility])    [id number]  [facility] [address]   [phone]              Contact Numbers     Anticoagulant (INR) Clinic Number: [phone]          July 2017 Details    Sun Mon Tue Wed Thu Fri Sat           1                 2               3               4               5               6               7               8                 9               10               11               12               13               14               15                 16               17      7.5 mg   See details      18      7.5 mg         19      7.5 mg         20      7.5 mg         21      5 mg         22      7.5 mg           23      7.5 mg         24            25               26               27               28               29                 30               31                     Date Details   07/17 This INR check       Date of next INR:  7/24/2017         How to take your warfarin dose     To take:  5 mg Take 1  of the 5 mg tablets.    To take:  7.5 mg Take 1.5 of the 5 mg tablets.

## 2017-07-17 NOTE — PROGRESS NOTES
ANTICOAGULATION FOLLOW-UP CLINIC VISIT    Patient Name:  Nigel Rodarte Jr.  Date:  7/17/2017  Contact Type:  Face to Face    SUBJECTIVE:     Patient Findings     Positives Missed doses (missed 1/2 dose x2)           OBJECTIVE    INR Protime   Date Value Ref Range Status   07/17/2017 1.6 (A) 0.86 - 1.14 Final       ASSESSMENT / PLAN  INR assessment SUB    Recheck INR In: 1 WEEK    INR Location Clinic      Anticoagulation Summary as of 7/17/2017     INR goal 2.0-3.0   Today's INR 1.6!   Maintenance plan 5 mg (5 mg x 1) on Mon, Wed, Fri; 7.5 mg (5 mg x 1.5) all other days   Full instructions 7/17: 7.5 mg; 7/19: 7.5 mg; Otherwise 5 mg on Mon, Wed, Fri; 7.5 mg all other days   Weekly total 45 mg   Plan last modified Gladys Herrera RN (6/15/2017)   Next INR check 7/24/2017   Target end date Indefinite    Indications   Long-term (current) use of anticoagulants [Z79.01] [Z79.01]  Atrial fibrillation (H) [I48.91] [I48.91]         Anticoagulation Episode Summary     INR check location     Preferred lab     Send INR reminders to [facility] HEART INR NURSE    Comments       Anticoagulation Care Providers     Provider Role Specialty Phone number    Grzegorz Malhotra MD Responsible Cardiology [phone]            See the Encounter Report to view Anticoagulation Flowsheet and Dosing Calendar (Go to Encounters tab in chart review, and find the Anticoagulation Therapy Visit)    INR 1.6 He missed total of 5 mg last week (took only 5 mg on 2 days when he should have taken 7.5 mg). Reviewed with him that he can make up missed mg dosing the next day. No other changes. Will boost dosing by total of 5 mg this week - take 7.5 mg MTuW then resume usual dosing of 5 mg MWF and 7.5 mg all other days with recheck in 1 week. Dosage adjustment made based on physician directed care plan.    Gladys Herrera RN

## 2017-07-24 ENCOUNTER — ANTICOAGULATION THERAPY VISIT (OUTPATIENT)
Dept: CARDIOLOGY | Facility: CLINIC | Age: 77
End: 2017-07-24
Payer: COMMERCIAL

## 2017-07-24 DIAGNOSIS — Z79.01 LONG-TERM (CURRENT) USE OF ANTICOAGULANTS: ICD-10-CM

## 2017-07-24 DIAGNOSIS — I48.91 ATRIAL FIBRILLATION, UNSPECIFIED TYPE (H): ICD-10-CM

## 2017-07-24 LAB — INR POINT OF CARE: 2.9 (ref 0.86–1.14)

## 2017-07-24 PROCEDURE — 36416 COLLJ CAPILLARY BLOOD SPEC: CPT | Performed by: INTERNAL MEDICINE

## 2017-07-24 PROCEDURE — 85610 PROTHROMBIN TIME: CPT | Mod: QW | Performed by: INTERNAL MEDICINE

## 2017-07-24 NOTE — PROGRESS NOTES
ANTICOAGULATION FOLLOW-UP CLINIC VISIT    Patient Name:  Nigel Rodarte Jr.  Date:  7/24/2017  Contact Type:  Face to Face    SUBJECTIVE:     Patient Findings     Positives No Problem Findings           OBJECTIVE    INR Protime   Date Value Ref Range Status   07/24/2017 2.9 (A) 0.86 - 1.14 Final       ASSESSMENT / PLAN  INR assessment THER    Recheck INR In: 3 WEEKS    INR Location Clinic      Anticoagulation Summary as of 7/24/2017     INR goal 2.0-3.0   Today's INR 2.9   Maintenance plan 5 mg (5 mg x 1) on Mon, Wed, Fri; 7.5 mg (5 mg x 1.5) all other days   Full instructions 5 mg on Mon, Wed, Fri; 7.5 mg all other days   Weekly total 45 mg   No change documented Gladys Herrera RN   Plan last modified Gladys Herrera RN (6/15/2017)   Next INR check 8/14/2017   Target end date Indefinite    Indications   Long-term (current) use of anticoagulants [Z79.01] [Z79.01]  Atrial fibrillation (H) [I48.91] [I48.91]         Anticoagulation Episode Summary     INR check location     Preferred lab     Send INR reminders to [facility] HEART INR NURSE    Comments       Anticoagulation Care Providers     Provider Role Specialty Phone number    Grzegorz Malhotra MD Responsible Cardiology [phone]            See the Encounter Report to view Anticoagulation Flowsheet and Dosing Calendar (Go to Encounters tab in chart review, and find the Anticoagulation Therapy Visit)    INR back in range after boost in dosing last to make up for 5 mg of missed dosing. No change in other meds or diet. No abnormal bleeding or bruising. Will continue previously successful dosing schedule of 5 mg MWF and 7.5 mg all other days with recheck in 3 weeks. Dosage adjustment made based on physician directed care plan.    Gladys Herrera, RN

## 2017-07-24 NOTE — MR AVS SNAPSHOT
Nigel Rodarte Jr.   7/24/2017 7:40 AM   Anticoagulation Therapy Visit    Description:  77 year old male   Provider:   ANTICOAGULATION   Department:  [facility]           INR as of 7/24/2017     Today's INR 2.9      Anticoagulation Summary as of 7/24/2017     INR goal 2.0-3.0   Today's INR 2.9   Full instructions 5 mg on Mon, Wed, Fri; 7.5 mg all other days   Next INR check 8/14/2017    Indications   Long-term (current) use of anticoagulants [Z79.01] [Z79.01]  Atrial fibrillation (H) [I48.91] [I48.91]         Your next Anticoagulation Clinic appointment(s)     Jul 24, 2017  7:40 AM CDT   Anticoagulation Visit with  ANTICOAGULATION   [facility] ([facility])    [address] [place] [id number]   [phone]            Aug 14, 2017  7:20 AM CDT   Anticoagulation Visit with  ANTICOAGULATION   [facility] ([facility])    [address] [place] [address]   [phone]              Contact Numbers     Anticoagulant (INR) Clinic Number: [id number]          July 2017 Details    Sun Mon Tue Wed Thu Fri Sat           1                 2               3               4               5               6               7               8                 9               10               11               12               13               14               15                 16               17               18               19               20               21               22                 23               24      5 mg   See details      25      7.5 mg         26      5 mg         27      7.5 mg         28      5 mg         29      7.5 mg           30      7.5 mg         31      5 mg               Date Details   07/24 This INR check               How to take your warfarin dose     To take:  5 mg Take 1 of the 5 mg tablets.    To take:  7.5 mg Take 1.5 of the 5 mg  tablets.           August 2017 Details    Sun Mon Tue Wed Thu Fri Sat       1      7.5 mg         2      5 mg         3      7.5 mg         4      5 mg         5      7.5 mg           6      7.5 mg         7      5 mg         8      7.5 mg         9      5 mg         10      7.5 mg         11      5 mg         12      7.5 mg           13      7.5 mg         14            15               16               17               18               19                 20               21               22               23               24               25               26                 27               28               29               30               31                  Date Details   No additional details    Date of next INR:  8/14/2017         How to take your warfarin dose     To take:  5 mg Take 1 of the 5 mg tablets.    To take:  7.5 mg Take 1.5 of the 5 mg tablets.

## 2017-08-16 ENCOUNTER — ANTICOAGULATION THERAPY VISIT (OUTPATIENT)
Dept: CARDIOLOGY | Facility: CLINIC | Age: 77
End: 2017-08-16
Payer: COMMERCIAL

## 2017-08-16 DIAGNOSIS — Z79.01 LONG-TERM (CURRENT) USE OF ANTICOAGULANTS: ICD-10-CM

## 2017-08-16 DIAGNOSIS — I48.91 ATRIAL FIBRILLATION, UNSPECIFIED TYPE (H): ICD-10-CM

## 2017-08-16 LAB — INR POINT OF CARE: 2.2 (ref 0.86–1.14)

## 2017-08-16 PROCEDURE — 85610 PROTHROMBIN TIME: CPT | Mod: QW | Performed by: INTERNAL MEDICINE

## 2017-08-16 PROCEDURE — 36416 COLLJ CAPILLARY BLOOD SPEC: CPT | Performed by: INTERNAL MEDICINE

## 2017-08-16 NOTE — MR AVS SNAPSHOT
Nigel Rodarte Jr.   8/16/2017 7:40 AM   Anticoagulation Therapy Visit    Description:  77 year old male   Provider:   ANTICOAGULATION   Department:  [facility]           INR as of 8/16/2017     Today's INR 2.2      Anticoagulation Summary as of 8/16/2017     INR goal 2.0-3.0   Today's INR 2.2   Full instructions 5 mg on Mon, Wed, Fri; 7.5 mg all other days   Next INR check 9/13/2017    Indications   Long-term (current) use of anticoagulants [Z79.01] [Z79.01]  Atrial fibrillation (H) [I48.91] [I48.91]         Your next Anticoagulation Clinic appointment(s)     Aug 16, 2017  7:40 AM CDT   Anticoagulation Visit with  ANTICOAGULATION   [facility] ([facility])    [address] [place] [id number]   [phone]            Sep 11, 2017  7:00 AM CDT   Anticoagulation Visit with  ANTICOAGULATION   [facility] ([facility])    [address] [place] [address]   [phone]              Contact Numbers     Anticoagulant (INR) Clinic Number: [phone]          August 2017 Details    Sun Mon Tue Wed Thu Fri Sat       1               2               3               4               5                 6               7               8               9               10               11               12                 13               14               15               16      5 mg   See details      17      7.5 mg         18      5 mg         19      7.5 mg           20      7.5 mg         21      5 mg         22      7.5 mg         23      5 mg         24      7.5 mg         25      5 mg         26      7.5 mg           27      7.5 mg         28      5 mg         29      7.5 mg         30      5 mg         31      7.5 mg            Date Details   08/16 This INR check               How to take your warfarin dose     To take:  5 mg Take 1 of the 5 mg tablets.    To take:   7.5 mg Take 1.5 of the 5 mg tablets.           September 2017 Details    Sun Mon Tue Wed Thu Fri Sat          1      5 mg         2      7.5 mg           3      7.5 mg         4      5 mg         5      7.5 mg         6      5 mg         7      7.5 mg         8      5 mg         9      7.5 mg           10      7.5 mg         11      5 mg         12      7.5 mg         13            14               15               16                 17               18               19               20               21               22               23                 24               25               26               27               28               29               30                Date Details   No additional details    Date of next INR:  9/13/2017         How to take your warfarin dose     To take:  5 mg Take 1 of the 5 mg tablets.    To take:  7.5 mg Take 1.5 of the 5 mg tablets.

## 2017-08-16 NOTE — PROGRESS NOTES
ANTICOAGULATION FOLLOW-UP CLINIC VISIT    Patient Name:  Nigel Rodarte Jr.  Date:  8/16/2017  Contact Type:  Face to Face    SUBJECTIVE:        OBJECTIVE    INR Protime   Date Value Ref Range Status   08/16/2017 2.2 (A) 0.86 - 1.14 Final       ASSESSMENT / PLAN  INR assessment THER    Recheck INR In: 4 WEEKS    INR Location Clinic      Anticoagulation Summary as of 8/16/2017     INR goal 2.0-3.0   Today's INR 2.2   Maintenance plan 5 mg (5 mg x 1) on Mon, Wed, Fri; 7.5 mg (5 mg x 1.5) all other days   Full instructions 5 mg on Mon, Wed, Fri; 7.5 mg all other days   Weekly total 45 mg   No change documented Gladys Herrera RN   Plan last modified Gladys Herrera RN (6/15/2017)   Next INR check 9/13/2017   Target end date Indefinite    Indications   Long-term (current) use of anticoagulants [Z79.01] [Z79.01]  Atrial fibrillation (H) [I48.91] [I48.91]         Anticoagulation Episode Summary     INR check location     Preferred lab     Send INR reminders to [facility] HEART INR NURSE    Comments       Anticoagulation Care Providers     Provider Role Specialty Phone number    Grzegorz Malhotra MD Responsible Cardiology [phone]            See the Encounter Report to view Anticoagulation Flowsheet and Dosing Calendar (Go to Encounters tab in chart review, and find the Anticoagulation Therapy Visit)    INR 2.2 No changes or complications. Will continue current dosing of 5 mg MWF and 7.5 mg all other days with recheck in 4 weeks. Dosage adjustment made based on physician directed care plan.    Gladys Herrera RN

## 2017-08-17 ENCOUNTER — HOSPITAL ENCOUNTER (EMERGENCY)
Facility: CLINIC | Age: 77
Discharge: HOME | End: 2017-08-17
Attending: EMERGENCY MEDICINE | Admitting: EMERGENCY MEDICINE
Payer: MEDICARE

## 2017-08-17 ENCOUNTER — APPOINTMENT (OUTPATIENT)
Dept: GENERAL RADIOLOGY | Facility: CLINIC | Age: 77
End: 2017-08-17
Attending: EMERGENCY MEDICINE
Payer: MEDICARE

## 2017-08-17 ENCOUNTER — APPOINTMENT (OUTPATIENT)
Dept: CT IMAGING | Facility: CLINIC | Age: 77
End: 2017-08-17
Attending: EMERGENCY MEDICINE
Payer: MEDICARE

## 2017-08-17 VITALS
BODY MASS INDEX: 23.85 KG/M2 | SYSTOLIC BLOOD PRESSURE: 122 MMHG | TEMPERATURE: 97.5 F | WEIGHT: 161 LBS | DIASTOLIC BLOOD PRESSURE: 70 MMHG | HEART RATE: 60 BPM | HEIGHT: 69 IN | RESPIRATION RATE: 16 BRPM | OXYGEN SATURATION: 98 %

## 2017-08-17 DIAGNOSIS — S43.401A SPRAIN OF RIGHT SHOULDER, UNSPECIFIED SHOULDER SPRAIN TYPE, INITIAL ENCOUNTER: ICD-10-CM

## 2017-08-17 DIAGNOSIS — S16.1XXA CERVICAL STRAIN, INITIAL ENCOUNTER: ICD-10-CM
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
## 2017-08-17 DIAGNOSIS — S09.90XA CLOSED HEAD INJURY, INITIAL ENCOUNTER: ICD-10-CM

## 2017-08-17 LAB
BASOPHILS # BLD AUTO: 0 10E9/L (ref 0–0.2)
BASOPHILS NFR BLD AUTO: 0.5 %
DIFFERENTIAL METHOD BLD: NORMAL
EOSINOPHIL # BLD AUTO: 0.2 10E9/L (ref 0–0.7)
EOSINOPHIL NFR BLD AUTO: 2.7 %
ERYTHROCYTE [DISTWIDTH] IN BLOOD BY AUTOMATED COUNT: 12.8 % (ref 10–15)
HCT VFR BLD AUTO: 43.1 % (ref 40–53)
HGB BLD-MCNC: 15 G/DL (ref 13.3–17.7)
IMM GRANULOCYTES # BLD: 0 10E9/L (ref 0–0.4)
IMM GRANULOCYTES NFR BLD: 0.2 %
INR PPP: 2.25 (ref 0.86–1.14)
LYMPHOCYTES # BLD AUTO: 1.7 10E9/L (ref 0.8–5.3)
LYMPHOCYTES NFR BLD AUTO: 25.8 %
MCH RBC QN AUTO: 30.7 PG (ref 26.5–33)
MCHC RBC AUTO-ENTMCNC: 34.8 G/DL (ref 31.5–36.5)
MCV RBC AUTO: 88 FL (ref 78–100)
MONOCYTES # BLD AUTO: 0.4 10E9/L (ref 0–1.3)
MONOCYTES NFR BLD AUTO: 6.7 %
NEUTROPHILS # BLD AUTO: 4.1 10E9/L (ref 1.6–8.3)
NEUTROPHILS NFR BLD AUTO: 64.1 %
NRBC # BLD AUTO: 0 10*3/UL
NRBC BLD AUTO-RTO: 0 /100
PLATELET # BLD AUTO: 184 10E9/L (ref 150–450)
RBC # BLD AUTO: 4.88 10E12/L (ref 4.4–5.9)
WBC # BLD AUTO: 6.4 10E9/L (ref 4–11)

## 2017-08-17 PROCEDURE — 85025 COMPLETE CBC W/AUTO DIFF WBC: CPT | Performed by: EMERGENCY MEDICINE

## 2017-08-17 PROCEDURE — 85610 PROTHROMBIN TIME: CPT | Performed by: EMERGENCY MEDICINE

## 2017-08-17 PROCEDURE — 99285 EMERGENCY DEPT VISIT HI MDM: CPT | Mod: 25

## 2017-08-17 PROCEDURE — 72125 CT NECK SPINE W/O DYE: CPT

## 2017-08-17 PROCEDURE — 73030 X-RAY EXAM OF SHOULDER: CPT | Mod: RT

## 2017-08-17 PROCEDURE — 70450 CT HEAD/BRAIN W/O DYE: CPT

## 2017-08-17 RX ORDER — HYDROCODONE BITARTRATE AND ACETAMINOPHEN 5; 325 MG/1; MG/1
1-2 TABLET ORAL EVERY 4 HOURS PRN
Qty: 10 TABLET | Refills: 0 | Status: SHIPPED | OUTPATIENT
Start: 2017-08-17 | End: 2018-01-25

## 2017-08-17 ASSESSMENT — ENCOUNTER SYMPTOMS
ARTHRALGIAS: 1
HEADACHES: 1
NUMBNESS: 0
ABDOMINAL PAIN: 0
NECK PAIN: 1
NAUSEA: 0
BACK PAIN: 0
PHOTOPHOBIA: 0
WEAKNESS: 0
LIGHT-HEADEDNESS: 0
VOMITING: 0
DIZZINESS: 0
WOUND: 0

## 2017-08-17 NOTE — ED AVS SNAPSHOT
Emergency Department    [address] [place] [address]    Phone:  [phone]    Fax:  [phone]                                       Nigel Rodarte Jr.   MRN: [id number]    Department:   Emergency Department   Date of Visit:  8/17/2017           After Visit Summary Signature Page     I have received my discharge instructions, and my questions have been answered. I have discussed any challenges I see with this plan with the nurse or doctor.    ..........................................................................................................................................  Patient/Patient Representative Signature      ..........................................................................................................................................  Patient Representative Print Name and Relationship to Patient    ..................................................               ................................................  Date                                            Time    ..........................................................................................................................................  Reviewed by Signature/Title    ...................................................              ..............................................  Date                                                            Time

## 2017-08-17 NOTE — ED AVS SNAPSHOT
Emergency Department    [address] [facility] [address]    Phone:  [phone]    Fax:  [phone]                                       Nigel Rodarte Jr.   MRN: [id number]    Department:   Emergency Department   Date of Visit:  8/17/2017           Patient Information     Date Of Birth          1940        Your diagnoses for this visit were:     Fall, initial encounter     Closed head injury, initial encounter     Cervical strain, initial encounter     Sprain and contusion of right shoulder, unspecified shoulder sprain type, initial encounter        You were seen by William Reed MD.      Follow-up Information     Schedule an appointment as soon as possible for a visit with Osmar Espinoza MD.    Specialty:  Family Practice    Contact information:    [employer]  [address]  [facility] [address] [phone]          Schedule an appointment as soon as possible for a visit to follow up.    Why:  with your orthopedist        Follow up with  Emergency Department.    Specialty:  EMERGENCY MEDICINE    Why:  If symptoms worsen    Contact information:    [address] [facility] [address] [phone]        Discharge Instructions       Discharge Instructions  Head Injury    You have been seen today for a head injury. Your evaluation included a history and physical examination. You may have had a CT (CAT) scan performed, though most head injuries do not require a scan. Based on this evaluation, your provider today does not feel that your head injury is serious.    Generally, every Emergency Department visit should have a follow-up clinic visit with either a primary or a specialty clinic/provider. Please follow-up as instructed by your emergency provider today.  Return to the Emergency Department if:    You are confused or you are not acting right.    Your headache gets worse or you start to have a really bad headache even with your recommended treatment  plan.    You vomit (throw up) more than once.    You have a seizure.    You have trouble walking.    You have weakness or paralysis (cannot move) in an arm or a leg.    You have blood or fluid coming from your ears or nose.    You have new symptoms or anything that worries you.    Sleeping:  It is okay for you to sleep, but someone should wake you up if instructed by your provider, and someone should check on you at your usual time to wake up.     Activity:    Do not drive for at least 24 hours.    Do not drive if you have dizzy spells or trouble concentrating, or remembering things.    Do not return to any contact sports until cleared by your regular provider.     MORE INFORMATION:    Concussion:  A concussion is a minor head injury that may cause temporary problems with the way the brain works. Although concussions are important, they are generally not an emergency or a reason that a person needs to be hospitalized. Some concussion symptoms include confusion, amnesia (forgetful), nausea (sick to your stomach) and vomiting (throwing up), dizziness, fatigue, memory or concentration problems, irritability and sleep problems. For most people, concussions are mild and temporary but some will have more severe and persistent symptoms that require on-going care and treatment.  CT Scans: Your evaluation today may have included a CT scan (CAT scan) to look for things like bleeding or a skull fracture (broken bone).  CT scans involve radiation and too many CT scans can cause serious health problems like cancer, especially in children.  Because of this, your provider may not have ordered a CT scan today if they think you are at low risk for a serious or life threatening problem.    If you were given a prescription for medicine here today, be sure to read all of the information (including the package insert) that comes with your prescription.  This will include important information about the medicine, its side effects, and  any warnings that you need to know about.  The pharmacist who fills the prescription can provide more information and answer questions you may have about the medicine.  If you have questions or concerns that the pharmacist cannot address, please call or return to the Emergency Department.     Remember that you can always come back to the Emergency Department if you are not able to see your regular provider in the amount of time listed above, if you get any new symptoms, or if there is anything that worries you.      Discharge References/Attachments     SHOULDER SPRAIN  (ENGLISH)    CERVICAL STRAIN, UNDERSTANDING (ENGLISH)    AC JOINT SPRAIN (ADULT) (ENGLISH)      Future Appointments        Provider Department Dept Phone Center    9/11/2017 7:00 AM MAZARIEGOS P Anticoagulation Check [facility] AT [place] [phone] [facility]      24 Hour Appointment Hotline       To make an appointment at any [facility], call [phone] ([phone]). If you don't have a family doctor or clinic, we will help you find one. [facility] clinics are conveniently located to serve the needs of you and your family.             Review of your medicines      START taking        Dose / Directions Last dose taken    HYDROcodone-acetaminophen 5-325 MG per tablet   Commonly known as:  NORCO   Dose:  1-2 tablet   Quantity:  10 tablet        Take 1-2 tablets by mouth every 4 hours as needed   Refills:  0          Our records show that you are taking the medicines listed below. If these are incorrect, please call your family doctor or clinic.        Dose / Directions Last dose taken    flecainide 50 MG tablet   Commonly known as:  TAMBOCOR   Dose:  50 mg   Quantity:  60 tablet        Take 1 tablet (50 mg) by mouth 2 times daily   Refills:  3        metoprolol 25 MG 24 hr tablet   Commonly known as:  TOPROL-XL   Quantity:  90 tablet        Take 1 tab daily   Refills:  1        warfarin 5 MG tablet   Commonly  known as:  COUMADIN   Quantity:  120 tablet        1 tab on Mondays and Fridays and take 1 1/2 tabs on all other days or as directed per INR clinic   Refills:  0                Prescriptions were sent or printed at these locations (1 Prescription)                   Other Prescriptions                Printed at Department/Unit printer (1 of 1)         HYDROcodone-acetaminophen (NORCO) 5-325 MG per tablet                Procedures and tests performed during your visit     CBC with platelets differential    CT Cervical Spine w/o Contrast    CT Head w/o Contrast    INR    XR Shoulder Right 3 Views      Orders Needing Specimen Collection     None      Pending Results     Date and Time Order Name Status Description    8/17/2017 1506 CT Cervical Spine w/o Contrast Preliminary             Pending Culture Results     No orders found from 8/15/2017 to 8/18/2017.            Pending Results Instructions     If you had any lab results that were not finalized at the time of your Discharge, you can call the ED Lab Result RN at [phone]. You will be contacted by this team for any positive Lab results or changes in treatment. The nurses are available 7 days a week from 10A to 6:30P.  You can leave a message 24 hours per day and they will return your call.        Test Results From Your Hospital Stay        8/17/2017  3:16 PM      Component Results     Component Value Ref Range & Units Status    WBC 6.4 4.0 - 11.0 10e9/L Final    RBC Count 4.88 4.4 - 5.9 10e12/L Final    Hemoglobin 15.0 13.3 - 17.7 g/dL Final    Hematocrit 43.1 40.0 - 53.0 % Final    MCV 88 78 - 100 fl Final    MCH 30.7 26.5 - 33.0 pg Final    MCHC 34.8 31.5 - 36.5 g/dL Final    RDW 12.8 10.0 - 15.0 % Final    Platelet Count 184 150 - 450 10e9/L Final    Diff Method Automated Method  Final    % Neutrophils 64.1 % Final    % Lymphocytes 25.8 % Final    % Monocytes 6.7 % Final    % Eosinophils 2.7 % Final    % Basophils 0.5 % Final    % Immature Granulocytes 0.2 %  Final    Nucleated RBCs 0 0 /100 Final    Absolute Neutrophil 4.1 1.6 - 8.3 10e9/L Final    Absolute Lymphocytes 1.7 0.8 - 5.3 10e9/L Final    Absolute Monocytes 0.4 0.0 - 1.3 10e9/L Final    Absolute Eosinophils 0.2 0.0 - 0.7 10e9/L Final    Absolute Basophils 0.0 0.0 - 0.2 10e9/L Final    Abs Immature Granulocytes 0.0 0 - 0.4 10e9/L Final    Absolute Nucleated RBC 0.0  Final         8/17/2017  3:27 PM      Component Results     Component Value Ref Range & Units Status    INR 2.25 (H) 0.86 - 1.14 Final         8/17/2017  3:40 PM      Narrative     CT SCAN OF THE HEAD WITHOUT CONTRAST   8/17/2017 3:33 PM     HISTORY: fall, head injury    TECHNIQUE:  Axial images of the head and coronal reformations without  IV contrast material. Radiation dose for this scan was reduced using  automated exposure control, adjustment of the mA and/or kV according  to patient size, or iterative reconstruction technique.    COMPARISON: MRI 8/10/2012    FINDINGS:  There is generalized atrophy of the brain.  There is low  attenuation in the white matter of the cerebral hemispheres consistent  with sequelae of small vessel ischemic disease. There is no evidence  of intracranial hemorrhage, mass, acute infarct or anomaly.     The visualized portions of the sinuses and mastoids appear normal.  There is no evidence of trauma.        Impression     IMPRESSION:   1. No acute abnormality.  2.  Atrophy of the brain.  White matter changes consistent with  sequelae of small vessel ischemic disease. This is unchanged.      ASHLEY CASTRO MD         8/17/2017  3:41 PM      Narrative     CT CERVICAL SPINE WITHOUT CONTRAST   8/17/2017  3:31 PM     HISTORY: Fall, neck pain.     TECHNIQUE: Axial images of the cervical spine were obtained without  intravenous contrast. Multiplanar reformations were performed.   Radiation dose for this scan was reduced using automated exposure  control, adjustment of the mA and/or kV according to patient size, or  iterative  reconstruction technique.    COMPARISON: None.    FINDINGS: There is no evidence of fracture. There is multilevel  degenerative disc disease and degenerative facet arthropathy. There is  no significant spinal canal stenosis. Multiple mild to moderate  foraminal stenoses are seen. Paraspinous soft tissues are  unremarkable.        Impression     IMPRESSION: Degenerative changes. No evidence of acute trauma.         8/17/2017  3:53 PM      Narrative     RIGHT SHOULDER THREE OR MORE VIEWS   8/17/2017 3:39 PM     HISTORY: Pain after fall.    COMPARISON: 3/20/2005.    FINDINGS: Orthopedic anchor in the right humeral neck region, new  since the prior study. Additionally, there is widened distance at the  right AC joint, new since the prior study. No adjacent soft tissue  swelling and I suspect this AC joint separation is also chronic. Right  shoulder otherwise negative. Nothing clearly acute.        Impression     IMPRESSION:   1. No fracture.  2. Right AC joint separation, new since 3/20/2005 but no adjacent soft  tissue swelling. I suspect this is chronic but clinical correlation  requested.   3. Orthopedic anchor in the right humeral neck.    HUNTER MA MD                Clinical Quality Measure: Blood Pressure Screening     Your blood pressure was checked while you were in the emergency department today. The last reading we obtained was  BP: 122/70 . Please read the guidelines below about what these numbers mean and what you should do about them.  If your systolic blood pressure (the top number) is less than 120 and your diastolic blood pressure (the bottom number) is less than 80, then your blood pressure is normal. There is nothing more that you need to do about it.  If your systolic blood pressure (the top number) is 120-139 or your diastolic blood pressure (the bottom number) is 80-89, your blood pressure may be higher than it should be. You should have your blood pressure rechecked within a year by a  "primary care provider.  If your systolic blood pressure (the top number) is 140 or greater or your diastolic blood pressure (the bottom number) is 90 or greater, you may have high blood pressure. High blood pressure is treatable, but if left untreated over time it can put you at risk for heart attack, stroke, or kidney failure. You should have your blood pressure rechecked by a primary care provider within the next 4 weeks.  If your provider in the emergency department today gave you specific instructions to follow-up with your doctor or provider even sooner than that, you should follow that instruction and not wait for up to 4 weeks for your follow-up visit.        Thank you for choosing [facility]       Thank you for choosing [facility] for your care. Our goal is always to provide you with excellent care. Hearing back from our patients is one way we can continue to improve our services. Please take a few minutes to complete the written survey that you may receive in the mail after you visit with us. Thank you!        [employer] Information     [employer] lets you send messages to your doctor, view your test results, renew your prescriptions, schedule appointments and more. To sign up, go to [email or web address] . Click on \"Log in\" on the left side of the screen, which will take you to the Welcome page. Then click on \"Sign up Now\" on the right side of the page.     You will be asked to enter the access code listed below, as well as some personal information. Please follow the directions to create your username and password.     Your access code is: [email or web address]  Expires: 11/15/2017  3:55 PM     Your access code will  in 90 days. If you need help or a new code, please call your [facility] clinic or [phone].        Care EveryWhere ID     This is your Care EveryWhere ID. This could be used by other organizations to access your [facility] medical records  [id number]        Equal Access to Services     ISABELA PEREZ AH: " Hadii davey Delgadillo, wajabier middleton, qasammyta kaalcameron calzada. So [facility] [phone].    ATENCIÓN: Si habla español, tiene a funez disposición servicios gratuitos de asistencia lingüística. Llame al [phone].    We comply with applicable federal civil rights laws and [place] laws. We do not discriminate on the basis of race, color, national origin, age, disability sex, sexual orientation or gender identity.            After Visit Summary       This is your record. Keep this with you and show to your community pharmacist(s) and doctor(s) at your next visit.

## 2017-08-17 NOTE — ED PROVIDER NOTES
History     Chief Complaint:  Fall     HPI   Nigel Rodarte Jr. is a 77 year old male with a history of atrial fibrillation on Coumadin who presents for evaluation after a mechanical fall. The patient reports he was trying to adjust the sail on his boat this afternoon when he slipped on the slippery dock and fell.  He fell on his right shoulder and hit the back of his head on the dock. The patient did not lose consciousness. He states he has pain in his right shoulder and was concerned given his history of two right rotator cuff surgeries, prompting wife to bring him to the ED for evaluation. On arrival, the patient notes pain in his right shoulder, neck soreness, and a mild headache. The patient denies any numbness, weakness, or tingling in his right arm. He denies nausea, vomiting, vision changes, light-headedness, or any other injuries from the fall. He has been ambulatory since the fall. He also notes a painful callus on the small toe on his left foot, and is going to see a podiatrist next week.     Allergies:  No known drug allergies    Medications:    flecainide (TAMBOCOR) 50 MG tablet  metoprolol (TOPROL-XL) 25 MG 24 hr tablet  warfarin (COUMADIN) 5 MG tablet    Past Medical History:    Arthritis  Elevated PSA  Fatigue  Palpitations  Renal disease  Shortness of breath  Atrial fibrillation  Palpitations  Ureteral stone  Hip arthritis    Past Surgical History:    Appendectomy  Arthroplasty hip  Stone extraction  Right ear surgery  Genitourinary surgery  Orthopedic surgery    Family History:    Lung cancer    Social History:  Smoking status: Never smoker  Alcohol use: None  Marital Status:   [2]     Review of Systems   Eyes: Negative for photophobia and visual disturbance.   Gastrointestinal: Negative for abdominal pain, nausea and vomiting.   Musculoskeletal: Positive for arthralgias and neck pain. Negative for back pain.   Skin: Negative for wound.   Neurological: Positive for headaches. Negative for  "dizziness, syncope, weakness, light-headedness and numbness.   All other systems reviewed and are negative.    Physical Exam     Patient Vitals for the past 24 hrs:   BP Temp Pulse Resp SpO2 Height Weight   08/17/17 1437 122/70 97.5  F (36.4  C) 60 16 98 % 1.753 m (5' 9\") 73 kg (161 lb)     Physical Exam  Nursing note and vitals reviewed.  Constitutional:  Oriented to person, place, and time. Cooperative.   HENT:    Head atraumatic.  Nose:    Nose normal.   Mouth/Throat:   Mucous membranes are normal.   Eyes:    Conjunctivae normal and EOM are normal.      Pupils are equal, round, and reactive to light.   Neck:    Trachea normal.   Cardiovascular:  Normal rate, regular rhythm, normal heart sounds and normal pulses. No murmur heard.  Pulmonary/Chest:  Effort normal and breath sounds normal.   Abdominal:   Soft. Normal appearance and bowel sounds are normal.      There is no tenderness.      There is no rebound and no CVA tenderness.   Musculoskeletal:  Slight tenderness to palpation of the cervical spine. Right shoulder normal in appearance but diffuse tenderness to palpation and limited range of motion secondary to pain. Extremities otherwise atraumatic.   Lymphadenopathy:  No cervical adenopathy.   Neurological:   Alert and oriented to person, place, and time. Normal strength.      No cranial nerve deficit or sensory deficit. GCS eye subscore is 4. GCS verbal subscore is 5. GCS motor subscore is 6.      Distal CMS intact in the right hand.  Skin:    Skin is intact. No rash noted.   Psychiatric:   Normal mood and affect.    Emergency Department Course   Imaging:  Radiographic findings were communicated with the patient and family who voiced understanding of the findings.    XR Shoulder Right 3 Views  1. No fracture.  2. Right AC joint separation, new since 3/20/2005 but no adjacent soft  tissue swelling. I suspect this is chronic but clinical correlation  requested.   3. Orthopedic anchor in the right humeral " neck.  As read by Radiology.    CT Head w/o Contrast  1. No acute abnormality.  2. Atrophy of the brain.  White matter changes consistent with  sequelae of small vessel ischemic disease. This is unchanged.  As read by Radiology.    CT Cervical Spine w/o Contrast  Degenerative changes. No evidence of acute trauma.  As read by Radiology.    Laboratory:  CBC: WNL (WBC 6.4, HGB 15.0, )   INR: 2.25 (H)    Emergency Department Course:  Past medical records, nursing notes, and vitals reviewed.  1500: I performed an exam of the patient and obtained history, as documented above.   IV inserted and blood drawn.  The patient was sent for a right shoulder X-ray, head CT, and cervical spine CT while in the emergency department, findings above.  Sling was placed prior to discharge.  1555: I rechecked the patient. Findings and plan explained to the Patient. Patient discharged home with instructions regarding supportive care, medications, and reasons to return. The importance of close follow-up was reviewed.     Impression & Plan    Medical Decision Making:  This is a 77 year old male on coumadin who came in after he slipped and fell on a dock, hitting his head and injuring his head, neck, and right shoulder. He did not want anything for pain here. I felt that it was reasonable and appropriate to check the above blood work, and especially his INR. I also obtained a CT scan of his head and neck as well as X-rays of his right shoulder. He indicates that his main complaint actually is his shoulder. While he may have a recurrent rotator cuff injury, there is nothing acutely wrong on the X-rays other than the possibility of an AC joint separation. I discussed this with him as well. He really does not have much in the way of pain over the AC joint though, but rather in the joint itself. He was provided a sling for comfort. He is going to follow up with his orthopedic surgeon, Dr. Ohara. I also recommended following up with his  primary MD as soon as possible and certainly returning though with any concerns or worsening symptoms. I will send him home with a small prescription for Norco for any breakthrough pain that he might have.    Diagnosis:    ICD-10-CM   1. Fall, initial encounter W19.XXXA   2. Closed head injury, initial encounter S09.90XA   3. Cervical strain, initial encounter S16.1XXA   4. Sprain and contusion of right shoulder, unspecified shoulder sprain type, initial encounter S43.401A     Disposition:  Discharged to home    Discharge Medications:  New Prescriptions    HYDROCODONE-ACETAMINOPHEN (NORCO) 5-325 MG PER TABLET    Take 1-2 tablets by mouth every 4 hours as needed       Leana Zarate  8/17/2017    EMERGENCY DEPARTMENT  I, Leana Zarate, am serving as a scribe at 3:00 PM on 8/17/2017 to document services personally performed by William Reed MD based on my observations and the provider's statements to me.        William Reed MD  08/17/17 7798

## 2017-08-17 NOTE — DISCHARGE INSTRUCTIONS
Discharge Instructions  Head Injury    You have been seen today for a head injury. Your evaluation included a history and physical examination. You may have had a CT (CAT) scan performed, though most head injuries do not require a scan. Based on this evaluation, your provider today does not feel that your head injury is serious.    Generally, every Emergency Department visit should have a follow-up clinic visit with either a primary or a specialty clinic/provider. Please follow-up as instructed by your emergency provider today.  Return to the Emergency Department if:    You are confused or you are not acting right.    Your headache gets worse or you start to have a really bad headache even with your recommended treatment plan.    You vomit (throw up) more than once.    You have a seizure.    You have trouble walking.    You have weakness or paralysis (cannot move) in an arm or a leg.    You have blood or fluid coming from your ears or nose.    You have new symptoms or anything that worries you.    Sleeping:  It is okay for you to sleep, but someone should wake you up if instructed by your provider, and someone should check on you at your usual time to wake up.     Activity:    Do not drive for at least 24 hours.    Do not drive if you have dizzy spells or trouble concentrating, or remembering things.    Do not return to any contact sports until cleared by your regular provider.     MORE INFORMATION:    Concussion:  A concussion is a minor head injury that may cause temporary problems with the way the brain works. Although concussions are important, they are generally not an emergency or a reason that a person needs to be hospitalized. Some concussion symptoms include confusion, amnesia (forgetful), nausea (sick to your stomach) and vomiting (throwing up), dizziness, fatigue, memory or concentration problems, irritability and sleep problems. For most people, concussions are mild and temporary but some will have more  severe and persistent symptoms that require on-going care and treatment.  CT Scans: Your evaluation today may have included a CT scan (CAT scan) to look for things like bleeding or a skull fracture (broken bone).  CT scans involve radiation and too many CT scans can cause serious health problems like cancer, especially in children.  Because of this, your provider may not have ordered a CT scan today if they think you are at low risk for a serious or life threatening problem.    If you were given a prescription for medicine here today, be sure to read all of the information (including the package insert) that comes with your prescription.  This will include important information about the medicine, its side effects, and any warnings that you need to know about.  The pharmacist who fills the prescription can provide more information and answer questions you may have about the medicine.  If you have questions or concerns that the pharmacist cannot address, please call or return to the Emergency Department.     Remember that you can always come back to the Emergency Department if you are not able to see your regular provider in the amount of time listed above, if you get any new symptoms, or if there is anything that worries you.

## 2017-09-11 ENCOUNTER — ANTICOAGULATION THERAPY VISIT (OUTPATIENT)
Dept: CARDIOLOGY | Facility: CLINIC | Age: 77
End: 2017-09-11
Payer: COMMERCIAL

## 2017-09-11 DIAGNOSIS — Z79.01 LONG-TERM (CURRENT) USE OF ANTICOAGULANTS: ICD-10-CM

## 2017-09-11 DIAGNOSIS — I48.91 ATRIAL FIBRILLATION, UNSPECIFIED TYPE (H): ICD-10-CM

## 2017-09-11 LAB — INR POINT OF CARE: 1.3 (ref 0.86–1.14)

## 2017-09-11 PROCEDURE — 85610 PROTHROMBIN TIME: CPT | Mod: QW | Performed by: INTERNAL MEDICINE

## 2017-09-11 PROCEDURE — 36416 COLLJ CAPILLARY BLOOD SPEC: CPT | Performed by: INTERNAL MEDICINE

## 2017-09-11 NOTE — PROGRESS NOTES
ANTICOAGULATION FOLLOW-UP CLINIC VISIT    Patient Name:  Nigel Rodarte Jr.  Date:  9/11/2017  Contact Type:  Face to Face    SUBJECTIVE:     Patient Findings     Positives No Problem Findings           OBJECTIVE    INR Protime   Date Value Ref Range Status   09/11/2017 1.3 (A) 0.86 - 1.14 Final       ASSESSMENT / PLAN  INR assessment SUB    Recheck INR In: 4 DAYS    INR Location Clinic      Anticoagulation Summary as of 9/11/2017     INR goal 2.0-3.0   Today's INR 1.3!   Maintenance plan 5 mg (5 mg x 1) on Mon, Wed, Fri; 7.5 mg (5 mg x 1.5) all other days   Full instructions 9/11: 10 mg; Otherwise 5 mg on Mon, Wed, Fri; 7.5 mg all other days   Weekly total 45 mg   Plan last modified Gladys Herrera RN (6/15/2017)   Next INR check 9/15/2017   Target end date Indefinite    Indications   Long-term (current) use of anticoagulants [Z79.01] [Z79.01]  Atrial fibrillation (H) [I48.91] [I48.91]         Anticoagulation Episode Summary     INR check location     Preferred lab     Send INR reminders to [facility] HEART INR NURSE    Comments       Anticoagulation Care Providers     Provider Role Specialty Phone number    Grzegorz Malhotra MD Responsible Cardiology [phone]            See the Encounter Report to view Anticoagulation Flowsheet and Dosing Calendar (Go to Encounters tab in chart review, and find the Anticoagulation Therapy Visit)    INR 1.3. No diet changes. He has only been taking 5 mg daily, he said he forgets to take the other half pill, doesn't want to use a pill box. No bleeding or bruising issues. He will take 10 mg today then resume his normal warfarin dosing of 5 mg MWF and 7.5 mg the other days.We discussed the importance of medication compliance.He will hold off on taking in greens until next inr check. Recheck in 4 days. Dosage adjustment made based on physician directed care plan.    Nica James RN

## 2017-09-11 NOTE — MR AVS SNAPSHOT
Nigel Rodarte Jr.   9/11/2017 7:00 AM   Anticoagulation Therapy Visit    Description:  77 year old male   Provider:  MAZARIEGOS ANTICOAGULATION   Department:  [facility]           INR as of 9/11/2017     Today's INR 1.3!      Anticoagulation Summary as of 9/11/2017     INR goal 2.0-3.0   Today's INR 1.3!   Full instructions 9/11: 10 mg; Otherwise 5 mg on Mon, Wed, Fri; 7.5 mg all other days   Next INR check 9/15/2017    Indications   Long-term (current) use of anticoagulants [Z79.01] [Z79.01]  Atrial fibrillation (H) [I48.91] [I48.91]         Your next Anticoagulation Clinic appointment(s)     Sep 15, 2017  7:00 AM CDT   Anticoagulation Visit with  ANTICOAGULATION   [facility] ([facility])    [address] [place] [id number]   [phone]              Contact Numbers     Anticoagulant (INR) Clinic Number: [id number]          September 2017 Details    Sun Mon Tue Wed Thu Fri Sat          1               2                 3               4               5               6               7               8               9                 10               11      10 mg   See details      12      7.5 mg         13      5 mg         14      7.5 mg         15            16                 17               18               19               20               21               22               23                 24               25               26               27               28               29               30                Date Details   09/11 This INR check       Date of next INR:  9/15/2017         How to take your warfarin dose     To take:  5 mg Take 1 of the 5 mg tablets.    To take:  7.5 mg Take 1.5 of the 5 mg tablets.    To take:  10 mg Take 2 of the 5 mg tablets.

## 2017-09-18 ENCOUNTER — ANTICOAGULATION THERAPY VISIT (OUTPATIENT)
Dept: CARDIOLOGY | Facility: CLINIC | Age: 77
End: 2017-09-18
Payer: COMMERCIAL

## 2017-09-18 DIAGNOSIS — I48.91 ATRIAL FIBRILLATION, UNSPECIFIED TYPE (H): ICD-10-CM

## 2017-09-18 DIAGNOSIS — Z79.01 LONG-TERM (CURRENT) USE OF ANTICOAGULANTS: ICD-10-CM

## 2017-09-18 LAB — INR POINT OF CARE: 1.6 (ref 0.86–1.14)

## 2017-09-18 PROCEDURE — 85610 PROTHROMBIN TIME: CPT | Mod: QW | Performed by: INTERNAL MEDICINE

## 2017-09-18 PROCEDURE — 36416 COLLJ CAPILLARY BLOOD SPEC: CPT | Performed by: INTERNAL MEDICINE

## 2017-09-18 NOTE — PROGRESS NOTES
ANTICOAGULATION FOLLOW-UP CLINIC VISIT    Patient Name:  Nigel Rodarte Jr.  Date:  9/18/2017  Contact Type:  Face to Face    SUBJECTIVE:     Patient Findings     Positives No Problem Findings           OBJECTIVE    INR Protime   Date Value Ref Range Status   09/18/2017 1.6 (A) 0.86 - 1.14 Final       ASSESSMENT / PLAN  INR assessment SUB    Recheck INR In: 10 DAYS    INR Location Clinic      Anticoagulation Summary as of 9/18/2017     INR goal 2.0-3.0   Today's INR 1.6!   Maintenance plan 5 mg (5 mg x 1) on Sun, Wed; 7.5 mg (5 mg x 1.5) all other days   Full instructions 9/18: 7.5 mg; Otherwise 5 mg on Sun, Wed; 7.5 mg all other days   Weekly total 47.5 mg   Plan last modified Gilma Anton RN (9/18/2017)   Next INR check 9/27/2017   Target end date Indefinite    Indications   Long-term (current) use of anticoagulants [Z79.01] [Z79.01]  Atrial fibrillation (H) [I48.91] [I48.91]         Anticoagulation Episode Summary     INR check location     Preferred lab     Send INR reminders to [facility] HEART INR NURSE    Comments       Anticoagulation Care Providers     Provider Role Specialty Phone number    Grzegorz Malhotra MD Responsible Cardiology [phone]            See the Encounter Report to view Anticoagulation Flowsheet and Dosing Calendar (Go to Encounters tab in chart review, and find the Anticoagulation Therapy Visit)    INR 1.6.  His INR was low last week at 1.3 since he was only taking 5mg/day and no days of 7.5mg.  Increase by 2.5mg overall and recheck in about 10 days.  Bay Anton, RN

## 2017-09-26 DIAGNOSIS — I48.91 ATRIAL FIBRILLATION, UNSPECIFIED TYPE (H): ICD-10-CM

## 2017-09-26 DIAGNOSIS — Z79.01 LONG TERM CURRENT USE OF ANTICOAGULANT THERAPY: ICD-10-CM

## 2017-09-26 RX ORDER — WARFARIN SODIUM 5 MG/1
TABLET ORAL
Qty: 120 TABLET | Refills: 0 | Status: SHIPPED | OUTPATIENT
Start: 2017-09-26 | End: 2017-12-18

## 2017-09-27 ENCOUNTER — ANTICOAGULATION THERAPY VISIT (OUTPATIENT)
Dept: CARDIOLOGY | Facility: CLINIC | Age: 77
End: 2017-09-27
Payer: COMMERCIAL

## 2017-09-27 DIAGNOSIS — I48.91 ATRIAL FIBRILLATION (H): ICD-10-CM

## 2017-09-27 DIAGNOSIS — Z79.01 LONG-TERM (CURRENT) USE OF ANTICOAGULANTS: ICD-10-CM

## 2017-09-27 LAB — INR POINT OF CARE: 2.1 (ref 0.86–1.14)

## 2017-09-27 PROCEDURE — 85610 PROTHROMBIN TIME: CPT | Mod: QW | Performed by: INTERNAL MEDICINE

## 2017-09-27 PROCEDURE — 36416 COLLJ CAPILLARY BLOOD SPEC: CPT | Performed by: INTERNAL MEDICINE

## 2017-09-27 NOTE — MR AVS SNAPSHOT
Nigel Rodarte Jr.   9/27/2017 7:00 AM   Anticoagulation Therapy Visit    Description:  77 year old male   Provider:  YAIR ANTICOAGULATION   Department:  [facility]           INR as of 9/27/2017     Today's INR 2.1      Anticoagulation Summary as of 9/27/2017     INR goal 2.0-3.0   Today's INR 2.1   Full instructions 5 mg on Sun, Wed; 7.5 mg all other days   Next INR check 10/26/2017    Indications   Long-term (current) use of anticoagulants [Z79.01] [Z79.01]  Atrial fibrillation (H) [I48.91] [I48.91]         Your next Anticoagulation Clinic appointment(s)     Oct 26, 2017  7:00 AM CDT   Anticoagulation Visit with  ANTICOAGULATION   [facility] ([facility])    [address] [place] [id number]   [phone]              Contact Numbers     Anticoagulant (INR) Clinic Number: [phone]          September 2017 Details    Sun Mon Tue Wed Thu Fri Sat          1               2                 3               4               5               6               7               8               9                 10               11               12               13               14               15               16                 17               18               19               20               21               22               23                 24               25               26               27      5 mg   See details      28      7.5 mg         29      7.5 mg         30      7.5 mg          Date Details   09/27 This INR check               How to take your warfarin dose     To take:  5 mg Take 1 of the 5 mg tablets.    To take:  7.5 mg Take 1.5 of the 5 mg tablets.           October 2017 Details    Sun Mon Tue Wed Thu Fri Sat     1      5 mg         2      7.5 mg         3      7.5 mg         4      5 mg         5      7.5 mg         6      7.5 mg         7      7.5 mg           8      5 mg         9      7.5 mg         10      7.5  mg         11      5 mg         12      7.5 mg         13      7.5 mg         14      7.5 mg           15      5 mg         16      7.5 mg         17      7.5 mg         18      5 mg         19      7.5 mg         20      7.5 mg         21      7.5 mg           22      5 mg         23      7.5 mg         24      7.5 mg         25      5 mg         26            27               28                 29               30               31                    Date Details   No additional details    Date of next INR:  10/26/2017         How to take your warfarin dose     To take:  5 mg Take 1 of the 5 mg tablets.    To take:  7.5 mg Take 1.5 of the 5 mg tablets.

## 2017-09-27 NOTE — PROGRESS NOTES
ANTICOAGULATION FOLLOW-UP CLINIC VISIT    Patient Name:  Nigel Rodarte Jr.  Date:  9/27/2017  Contact Type:  Face to Face    SUBJECTIVE:        OBJECTIVE    INR Protime   Date Value Ref Range Status   09/27/2017 2.1 (A) 0.86 - 1.14 Final       ASSESSMENT / PLAN  INR assessment THER    Recheck INR In: 4 WEEKS    INR Location Clinic      Anticoagulation Summary as of 9/27/2017     INR goal 2.0-3.0   Today's INR 2.1   Maintenance plan 5 mg (5 mg x 1) on Sun, Wed; 7.5 mg (5 mg x 1.5) all other days   Full instructions 5 mg on Sun, Wed; 7.5 mg all other days   Weekly total 47.5 mg   No change documented Judy Vincent RN   Plan last modified Gilma Anton RN (9/18/2017)   Next INR check 10/26/2017   Target end date Indefinite    Indications   Long-term (current) use of anticoagulants [Z79.01] [Z79.01]  Atrial fibrillation (H) [I48.91] [I48.91]         Anticoagulation Episode Summary     INR check location     Preferred lab     Send INR reminders to [facility] HEART INR NURSE    Comments       Anticoagulation Care Providers     Provider Role Specialty Phone number    Grzegorz Malhotra MD Responsible Cardiology [phone]          INR 2.1. No changes to diet or medications. INR low last time because pt only took 5 mg on his 7.5 mg days. Will have him continue with 5 mg [place],Wed and 7.5 mg ROW. Recheck INR in 4 weeks.  See the Encounter Report to view Anticoagulation Flowsheet and Dosing Calendar (Go to Encounters tab in chart review, and find the Anticoagulation Therapy Visit)    Dosage adjustment made based on physician directed care plan.    Judy Vincent, TAMMY

## 2017-10-26 ENCOUNTER — ANTICOAGULATION THERAPY VISIT (OUTPATIENT)
Dept: CARDIOLOGY | Facility: CLINIC | Age: 77
End: 2017-10-26
Payer: COMMERCIAL

## 2017-10-26 DIAGNOSIS — I48.91 ATRIAL FIBRILLATION, UNSPECIFIED TYPE (H): ICD-10-CM

## 2017-10-26 DIAGNOSIS — Z79.01 LONG-TERM (CURRENT) USE OF ANTICOAGULANTS: ICD-10-CM

## 2017-10-26 LAB — INR POINT OF CARE: 1.9 (ref 0.86–1.14)

## 2017-10-26 PROCEDURE — 36416 COLLJ CAPILLARY BLOOD SPEC: CPT | Performed by: INTERNAL MEDICINE

## 2017-10-26 PROCEDURE — 85610 PROTHROMBIN TIME: CPT | Mod: QW | Performed by: INTERNAL MEDICINE

## 2017-10-26 NOTE — PROGRESS NOTES
ANTICOAGULATION FOLLOW-UP CLINIC VISIT    Patient Name:  Nigel Rodarte Jr.  Date:  10/26/2017  Contact Type:  Face to Face    SUBJECTIVE:     Patient Findings     Positives Unexplained INR or factor level change           OBJECTIVE    INR Protime   Date Value Ref Range Status   10/26/2017 1.9 (A) 0.86 - 1.14 Final       ASSESSMENT / PLAN  INR assessment THER    Recheck INR In: 2 WEEKS    INR Location Clinic      Anticoagulation Summary as of 10/26/2017     INR goal 2.0-3.0   Today's INR 1.9!   Maintenance plan 5 mg (5 mg x 1) on Wed; 7.5 mg (5 mg x 1.5) all other days   Full instructions 10/26: 10 mg; Otherwise 5 mg on Wed; 7.5 mg all other days   Weekly total 50 mg   Plan last modified Gladys Herrera RN (10/26/2017)   Next INR check 11/9/2017   Target end date Indefinite    Indications   Long-term (current) use of anticoagulants [Z79.01] [Z79.01]  Atrial fibrillation (H) [I48.91] [I48.91]         Anticoagulation Episode Summary     INR check location     Preferred lab     Send INR reminders to [facility] HEART INR NURSE    Comments       Anticoagulation Care Providers     Provider Role Specialty Phone number    Grzegorz Malhotra MD Responsible Cardiology [phone]            See the Encounter Report to view Anticoagulation Flowsheet and Dosing Calendar (Go to Encounters tab in chart review, and find the Anticoagulation Therapy Visit)    INR 1.9 No change in meds or diet (still eats salads/veggies regularly). No abnormal bleeding or bruising. Will boost today's dose to 10 mg then increase dosing schedule to 5 mg Wed and 7.5 mg all other days with recheck in 2 weeks. Dosage adjustment made based on physician directed care plan.    Gladys Herrera, RN

## 2017-10-26 NOTE — MR AVS SNAPSHOT
Nigel Rodarte Jr.   10/26/2017 7:00 AM   Anticoagulation Therapy Visit    Description:  77 year old male   Provider:  YAIR ANTICOAGULATION   Department:  [facility]           INR as of 10/26/2017     Today's INR 1.9!      Anticoagulation Summary as of 10/26/2017     INR goal 2.0-3.0   Today's INR 1.9!   Full instructions 10/26: 10 mg; Otherwise 5 mg on Wed; 7.5 mg all other days   Next INR check 11/9/2017    Indications   Long-term (current) use of anticoagulants [Z79.01] [Z79.01]  Atrial fibrillation (H) [I48.91] [I48.91]         Your next Anticoagulation Clinic appointment(s)     Nov 09, 2017  7:40 AM CST   Anticoagulation Visit with  ANTICOAGULATION   [facility] ([facility])    [address] [place] [phone]   [phone]              Contact Numbers     Anticoagulant (INR) Clinic Number: [id number]          October 2017 Details    Sun Mon Tue Wed Thu Fri Sat     1               2               3               4               5               6               7                 8               9               10               11               12               13               14                 15               16               17               18               19               20               21                 22               23               24               25               26      10 mg   See details      27      7.5 mg         28      7.5 mg           29      7.5 mg         30      7.5 mg         31      7.5 mg              Date Details   10/26 This INR check               How to take your warfarin dose     To take:  7.5 mg Take 1.5 of the 5 mg tablets.    To take:  10 mg Take 2 of the 5 mg tablets.           November 2017 Details    Sun Mon Tue Wed Thu Fri Sat        1      5 mg         2      7.5 mg         3      7.5 mg         4      7.5 mg           5      7.5 mg         6      7.5 mg         7      7.5 mg          8      5 mg         9            10               11                 12               13               14               15               16               17               18                 19               20               21               22               23               24               25                 26               27               28               29               30                  Date Details   No additional details    Date of next INR:  11/9/2017         How to take your warfarin dose     To take:  5 mg Take 1 of the 5 mg tablets.    To take:  7.5 mg Take 1.5 of the 5 mg tablets.

## 2017-11-02 ENCOUNTER — TELEPHONE (OUTPATIENT)
Dept: CARDIOLOGY | Facility: CLINIC | Age: 77
End: 2017-11-02

## 2017-11-02 DIAGNOSIS — I48.0 PAROXYSMAL ATRIAL FIBRILLATION (H): ICD-10-CM

## 2017-11-02 RX ORDER — FLECAINIDE ACETATE 50 MG/1
50 TABLET ORAL 2 TIMES DAILY
Qty: 60 TABLET | Refills: 3 | Status: SHIPPED | OUTPATIENT
Start: 2017-11-02 | End: 2018-03-12

## 2017-11-02 NOTE — TELEPHONE ENCOUNTER
"Call from Kylie in scheduling; states pt's wife is upset as the request to fill Flecainide has not been addressed \"all week\".  RC to Gladys, pt's spouse/ Informed her that the Flecainide is re-ordered.  Nigel is due to see Dr Malhotra in December (order placed by [place] in 6-2017)  Scheduling phone # left on AM for him to arrange this. [email or web address]  "

## 2017-11-09 ENCOUNTER — ANTICOAGULATION THERAPY VISIT (OUTPATIENT)
Dept: CARDIOLOGY | Facility: CLINIC | Age: 77
End: 2017-11-09
Payer: COMMERCIAL

## 2017-11-09 DIAGNOSIS — Z79.01 LONG-TERM (CURRENT) USE OF ANTICOAGULANTS: ICD-10-CM

## 2017-11-09 DIAGNOSIS — I48.91 ATRIAL FIBRILLATION, UNSPECIFIED TYPE (H): ICD-10-CM

## 2017-11-09 LAB — INR POINT OF CARE: 2.3 (ref 0.86–1.14)

## 2017-11-09 PROCEDURE — 85610 PROTHROMBIN TIME: CPT | Mod: QW | Performed by: INTERNAL MEDICINE

## 2017-11-09 PROCEDURE — 36416 COLLJ CAPILLARY BLOOD SPEC: CPT | Performed by: INTERNAL MEDICINE

## 2017-11-09 NOTE — MR AVS SNAPSHOT
Nigel Rodarte Jr.   11/9/2017 7:40 AM   Anticoagulation Therapy Visit    Description:  77 year old male   Provider:  YAIR ANTICOAGULATION   Department:  [facility]           INR as of 11/9/2017     Today's INR 2.3      Anticoagulation Summary as of 11/9/2017     INR goal 2.0-3.0   Today's INR 2.3   Full instructions 5 mg on Sun; 7.5 mg all other days   Next INR check 11/28/2017    Indications   Long-term (current) use of anticoagulants [Z79.01] [Z79.01]  Atrial fibrillation (H) [I48.91] [I48.91]         Your next Anticoagulation Clinic appointment(s)     Nov 28, 2017  7:00 AM CST   Anticoagulation Visit with  ANTICOAGULATION   [facility] ([facility])    [address] [place] [id number]   [phone]              Contact Numbers     Anticoagulant (INR) Clinic Number: [id number]          November 2017 Details    Sun Mon Tue Wed Thu Fri Sat        1               2               3               4                 5               6               7               8               9      7.5 mg   See details      10      7.5 mg         11      7.5 mg           12      5 mg         13      7.5 mg         14      7.5 mg         15      7.5 mg         16      7.5 mg         17      7.5 mg         18      7.5 mg           19      5 mg         20      7.5 mg         21      7.5 mg         22      7.5 mg         23      7.5 mg         24      7.5 mg         25      7.5 mg           26      5 mg         27      7.5 mg         28            29               30                  Date Details   11/09 This INR check       Date of next INR:  11/28/2017         How to take your warfarin dose     To take:  5 mg Take 1 of the 5 mg tablets.    To take:  7.5 mg Take 1.5 of the 5 mg tablets.

## 2017-11-09 NOTE — PROGRESS NOTES
ANTICOAGULATION FOLLOW-UP CLINIC VISIT    Patient Name:  Nigel Rodarte Jr.  Date:  11/9/2017  Contact Type:  Face to Face    SUBJECTIVE:     Patient Findings     Positives No Problem Findings           OBJECTIVE    INR Protime   Date Value Ref Range Status   11/09/2017 2.3 (A) 0.86 - 1.14 Final       ASSESSMENT / PLAN  INR assessment THER    Recheck INR In: 3 WEEKS    INR Location Clinic      Anticoagulation Summary as of 11/9/2017     INR goal 2.0-3.0   Today's INR 2.3   Maintenance plan 5 mg (5 mg x 1) on Sun; 7.5 mg (5 mg x 1.5) all other days   Full instructions 5 mg on Sun; 7.5 mg all other days   Weekly total 50 mg   Plan last modified Gilma Anton, RN (11/9/2017)   Next INR check 11/28/2017   Target end date Indefinite    Indications   Long-term (current) use of anticoagulants [Z79.01] [Z79.01]  Atrial fibrillation (H) [I48.91] [I48.91]         Anticoagulation Episode Summary     INR check location     Preferred lab     Send INR reminders to [facility] HEART INR NURSE    Comments       Anticoagulation Care Providers     Provider Role Specialty Phone number    Grzegorz Malhotra MD Responsible Cardiology [phone]            See the Encounter Report to view Anticoagulation Flowsheet and Dosing Calendar (Go to Encounters tab in chart review, and find the Anticoagulation Therapy Visit)    INR 2.3.  Dosing was increased last time and now back in range.  Continue increased dose and he thought he did the 1 day of 5mg on Sunday instead of Wednesday so we will continue that schedule.  No other changes.  Bay Anton, RN

## 2017-11-28 ENCOUNTER — ANTICOAGULATION THERAPY VISIT (OUTPATIENT)
Dept: CARDIOLOGY | Facility: CLINIC | Age: 77
End: 2017-11-28
Payer: COMMERCIAL

## 2017-11-28 DIAGNOSIS — I48.91 ATRIAL FIBRILLATION (H): ICD-10-CM

## 2017-11-28 DIAGNOSIS — Z79.01 LONG-TERM (CURRENT) USE OF ANTICOAGULANTS: ICD-10-CM

## 2017-11-28 LAB — INR POINT OF CARE: 2.4 (ref 0.86–1.14)

## 2017-11-28 PROCEDURE — 85610 PROTHROMBIN TIME: CPT | Mod: QW | Performed by: INTERNAL MEDICINE

## 2017-11-28 PROCEDURE — 36416 COLLJ CAPILLARY BLOOD SPEC: CPT | Performed by: INTERNAL MEDICINE

## 2017-11-28 NOTE — MR AVS SNAPSHOT
Nigel Rodarte Jr.   11/28/2017 7:00 AM   Anticoagulation Therapy Visit    Description:  77 year old male   Provider:  YAIR ANTICOAGULATION   Department:  [facility]           INR as of 11/28/2017     Today's INR 2.4      Anticoagulation Summary as of 11/28/2017     INR goal 2.0-3.0   Today's INR 2.4   Full instructions 5 mg on Sun; 7.5 mg all other days   Next INR check 12/22/2017    Indications   Long-term (current) use of anticoagulants [Z79.01] [Z79.01]  Atrial fibrillation (H) [I48.91] [I48.91]         Your next Anticoagulation Clinic appointment(s)     Dec 22, 2017  7:00 AM CST   Anticoagulation Visit with  ANTICOAGULATION   [facility] ([facility])    [address] [place] [phone]   [phone]              Contact Numbers     Anticoagulant (INR) Clinic Number: [id number]          November 2017 Details    Sun Mon Tue Wed Thu Fri Sat        1               2               3               4                 5               6               7               8               9               10               11                 12               13               14               15               16               17               18                 19               20               21               22               23               24               25                 26               27               28      7.5 mg   See details      29      7.5 mg         30      7.5 mg            Date Details   11/28 This INR check               How to take your warfarin dose     To take:  7.5 mg Take 1.5 of the 5 mg tablets.           December 2017 Details    Sun Mon Tue Wed Thu Fri Sat          1      7.5 mg         2      7.5 mg           3      5 mg         4      7.5 mg         5      7.5 mg         6      7.5 mg         7      7.5 mg         8      7.5 mg         9      7.5 mg           10      5 mg         11      7.5 mg         12      7.5  mg         13      7.5 mg         14      7.5 mg         15      7.5 mg         16      7.5 mg           17      5 mg         18      7.5 mg         19      7.5 mg         20      7.5 mg         21      7.5 mg         22            23                 24               25               26               27               28               29               30                 31                      Date Details   No additional details    Date of next INR:  12/22/2017         How to take your warfarin dose     To take:  5 mg Take 1 of the 5 mg tablets.    To take:  7.5 mg Take 1.5 of the 5 mg tablets.

## 2017-11-28 NOTE — PROGRESS NOTES
ANTICOAGULATION FOLLOW-UP CLINIC VISIT    Patient Name:  Nigel Rodarte Jr.  Date:  11/28/2017  Contact Type:  Face to Face    SUBJECTIVE:     Patient Findings     Positives No Problem Findings           OBJECTIVE    INR Protime   Date Value Ref Range Status   11/28/2017 2.4 (A) 0.86 - 1.14 Final       ASSESSMENT / PLAN  INR assessment THER    Recheck INR In: 4 WEEKS    INR Location Clinic      Anticoagulation Summary as of 11/28/2017     INR goal 2.0-3.0   Today's INR 2.4   Maintenance plan 5 mg (5 mg x 1) on Sun; 7.5 mg (5 mg x 1.5) all other days   Full instructions 5 mg on Sun; 7.5 mg all other days   Weekly total 50 mg   No change documented Judy Vincent RN   Plan last modified Gilma Anton RN (11/9/2017)   Next INR check 12/22/2017   Target end date Indefinite    Indications   Long-term (current) use of anticoagulants [Z79.01] [Z79.01]  Atrial fibrillation (H) [I48.91] [I48.91]         Anticoagulation Episode Summary     INR check location     Preferred lab     Send INR reminders to [facility] HEART INR NURSE    Comments       Anticoagulation Care Providers     Provider Role Specialty Phone number    Grzegorz Malhotra MD Responsible Cardiology [phone]          INR 2.4. No changes to diet or medications. Will continue same dosing of 5 mg Mazariegos and 7.5 mg ROW. Recheck INR in 4 weeks.  See the Encounter Report to view Anticoagulation Flowsheet and Dosing Calendar (Go to Encounters tab in chart review, and find the Anticoagulation Therapy Visit)    Dosage adjustment made based on physician directed care plan.    Judy Vicnent RN

## 2017-12-18 DIAGNOSIS — I48.91 ATRIAL FIBRILLATION, UNSPECIFIED TYPE (H): ICD-10-CM

## 2017-12-18 DIAGNOSIS — Z79.01 LONG TERM CURRENT USE OF ANTICOAGULANT THERAPY: ICD-10-CM

## 2017-12-18 RX ORDER — WARFARIN SODIUM 5 MG/1
TABLET ORAL
Qty: 125 TABLET | Refills: 0 | Status: SHIPPED | OUTPATIENT
Start: 2017-12-18 | End: 2018-03-23

## 2017-12-27 ENCOUNTER — ANTICOAGULATION THERAPY VISIT (OUTPATIENT)
Dept: CARDIOLOGY | Facility: CLINIC | Age: 77
End: 2017-12-27
Payer: COMMERCIAL

## 2017-12-27 DIAGNOSIS — I48.91 ATRIAL FIBRILLATION (H): ICD-10-CM

## 2017-12-27 DIAGNOSIS — Z79.01 LONG-TERM (CURRENT) USE OF ANTICOAGULANTS: ICD-10-CM

## 2017-12-27 LAB — INR POINT OF CARE: 1.6 (ref 0.86–1.14)

## 2017-12-27 PROCEDURE — 85610 PROTHROMBIN TIME: CPT | Mod: QW | Performed by: INTERNAL MEDICINE

## 2017-12-27 PROCEDURE — 36416 COLLJ CAPILLARY BLOOD SPEC: CPT | Performed by: INTERNAL MEDICINE

## 2017-12-27 NOTE — PROGRESS NOTES
ANTICOAGULATION FOLLOW-UP CLINIC VISIT    Patient Name:  Nigel Rodarte Jr.  Date:  12/27/2017  Contact Type:  Face to Face    SUBJECTIVE:     Patient Findings     Positives Change in diet/appetite (eating darker greens and brussel sprouts)           OBJECTIVE    INR Protime   Date Value Ref Range Status   12/27/2017 1.6 (A) 0.86 - 1.14 Final       ASSESSMENT / PLAN  INR assessment SUB    Recheck INR In: 2 WEEKS    INR Location Clinic      Anticoagulation Summary as of 12/27/2017     INR goal 2.0-3.0   Today's INR 1.6!   Maintenance plan 7.5 mg (5 mg x 1.5) every day   Full instructions 12/27: 10 mg; Otherwise 7.5 mg every day   Weekly total 52.5 mg   Plan last modified Gladys Herrera RN (12/27/2017)   Next INR check 1/10/2018   Target end date Indefinite    Indications   Long-term (current) use of anticoagulants [Z79.01] [Z79.01]  Atrial fibrillation (H) [I48.91] [I48.91]         Anticoagulation Episode Summary     INR check location     Preferred lab     Send INR reminders to [facility] HEART INR NURSE    Comments       Anticoagulation Care Providers     Provider Role Specialty Phone number    Grzegorz Malhotra MD Responsible Cardiology [phone]            See the Encounter Report to view Anticoagulation Flowsheet and Dosing Calendar (Go to Encounters tab in chart review, and find the Anticoagulation Therapy Visit)    INR 1.6 He denies missing doses. No change in meds. He likes to eat dark greens and has been eating more dark greens along with brussel sprouts. He wants to continue to eat the darker greens. No abnormal bleeding or bruising. Will boost today's dose to 10 mg then increase dosing to 7.5 mg daily with recheck in 2 weeks. Dosage adjustment made based on physician directed care plan.    Gladys Herrera, TAMMY

## 2017-12-27 NOTE — MR AVS SNAPSHOT
Nigel Rodarte Jr.   12/27/2017 8:20 AM   Anticoagulation Therapy Visit    Description:  77 year old male   Provider:  YAIR ANTICOAGULATION   Department:  [facility]           INR as of 12/27/2017     Today's INR 1.6!      Anticoagulation Summary as of 12/27/2017     INR goal 2.0-3.0   Today's INR 1.6!   Full instructions 12/27: 10 mg; Otherwise 7.5 mg every day   Next INR check 1/10/2018    Indications   Long-term (current) use of anticoagulants [Z79.01] [Z79.01]  Atrial fibrillation (H) [I48.91] [I48.91]         Your next Anticoagulation Clinic appointment(s)     Jan 12, 2018  7:00 AM CST   Anticoagulation Visit with [facility]OS ANTICOAGULATION   [facility] ([facility])    [address] [place] [id number]   [phone]              Contact Numbers     Anticoagulant (INR) Clinic Number: [id number]          December 2017 Details    Sun Mon Tue Wed Thu Fri Sat          1               2                 3               4               5               6               7               8               9                 10               11               12               13               14               15               16                 17               18               19               20               21               22               23                 24               25               26               27      10 mg   See details      28      7.5 mg         29      7.5 mg         30      7.5 mg           31      7.5 mg                Date Details   12/27 This INR check               How to take your warfarin dose     To take:  7.5 mg Take 1.5 of the 5 mg tablets.    To take:  10 mg Take 2 of the 5 mg tablets.           January 2018 Details    Sun Mon Tue Wed Thu Fri Sat      1      7.5 mg         2      7.5 mg         3      7.5 mg         4      7.5 mg         5      7.5 mg         6      7.5 mg           7      7.5 mg         8      7.5 mg          9      7.5 mg         10            11               12               13                 14               15               16               17               18               19               20                 21               22               23               24               25               26               27                 28               29               30               31                   Date Details   No additional details    Date of next INR:  1/10/2018         How to take your warfarin dose     To take:  7.5 mg Take 1.5 of the 5 mg tablets.

## 2018-01-12 ENCOUNTER — ANTICOAGULATION THERAPY VISIT (OUTPATIENT)
Dept: CARDIOLOGY | Facility: CLINIC | Age: 78
End: 2018-01-12
Payer: COMMERCIAL

## 2018-01-12 DIAGNOSIS — Z79.01 LONG-TERM (CURRENT) USE OF ANTICOAGULANTS: ICD-10-CM

## 2018-01-12 DIAGNOSIS — I48.91 ATRIAL FIBRILLATION, UNSPECIFIED TYPE (H): ICD-10-CM

## 2018-01-12 LAB — INR POINT OF CARE: 1.8 (ref 0.86–1.14)

## 2018-01-12 PROCEDURE — 36416 COLLJ CAPILLARY BLOOD SPEC: CPT | Performed by: INTERNAL MEDICINE

## 2018-01-12 PROCEDURE — 85610 PROTHROMBIN TIME: CPT | Mod: QW | Performed by: INTERNAL MEDICINE

## 2018-01-12 NOTE — MR AVS SNAPSHOT
Nigel Rodarte Jr.   1/12/2018 7:00 AM   Anticoagulation Therapy Visit    Description:  77 year old male   Provider:  YAIR ANTICOAGULATION   Department:  [facility]           INR as of 1/12/2018     Today's INR 1.8!      Anticoagulation Summary as of 1/12/2018     INR goal 2.0-3.0   Today's INR 1.8!   Full instructions 1/12: 10 mg; Otherwise 10 mg on Fri; 7.5 mg all other days   Next INR check 1/25/2018    Indications   Long-term (current) use of anticoagulants [Z79.01] [Z79.01]  Atrial fibrillation (H) [I48.91] [I48.91]         Your next Anticoagulation Clinic appointment(s)     Jan 25, 2018  2:20 PM CST   Anticoagulation Visit with  ANTICOAGULATION   [facility] ([facility])    [address] [place] [id number]   [phone]              Contact Numbers     Anticoagulant (INR) Clinic Number: [id number]          January 2018 Details    Sun Mon Tue Wed Thu Fri Sat      1               2               3               4               5               6                 7               8               9               10               11               12      10 mg   See details      13      7.5 mg           14      7.5 mg         15      7.5 mg         16      7.5 mg         17      7.5 mg         18      7.5 mg         19      10 mg         20      7.5 mg           21      7.5 mg         22      7.5 mg         23      7.5 mg         24      7.5 mg         25            26               27                 28               29               30               31                   Date Details   01/12 This INR check       Date of next INR:  1/25/2018         How to take your warfarin dose     To take:  7.5 mg Take 1.5 of the 5 mg tablets.    To take:  10 mg Take 2 of the 5 mg tablets.

## 2018-01-12 NOTE — PROGRESS NOTES
ANTICOAGULATION FOLLOW-UP CLINIC VISIT    Patient Name:  Nigel Rodarte Jr.  Date:  1/12/2018  Contact Type:  Face to Face    SUBJECTIVE:     Patient Findings     Positives Change in diet/appetite (continues to eat more dark green veggies and salads)           OBJECTIVE    INR Protime   Date Value Ref Range Status   01/12/2018 1.8 (A) 0.86 - 1.14 Final       ASSESSMENT / PLAN  INR assessment SUB    Recheck INR In: 2 WEEKS    INR Location Clinic      Anticoagulation Summary as of 1/12/2018     INR goal 2.0-3.0   Today's INR 1.8!   Maintenance plan 10 mg (5 mg x 2) on Fri; 7.5 mg (5 mg x 1.5) all other days   Full instructions 1/12: 10 mg; Otherwise 10 mg on Fri; 7.5 mg all other days   Weekly total 55 mg   Plan last modified Gladys Herrera, RN (1/12/2018)   Next INR check 1/25/2018   Target end date Indefinite    Indications   Long-term (current) use of anticoagulants [Z79.01] [Z79.01]  Atrial fibrillation (H) [I48.91] [I48.91]         Anticoagulation Episode Summary     INR check location     Preferred lab     Send INR reminders to [facility] HEART INR NURSE    Comments       Anticoagulation Care Providers     Provider Role Specialty Phone number    Grzegorz Malhotra MD Responsible Cardiology [phone]            See the Encounter Report to view Anticoagulation Flowsheet and Dosing Calendar (Go to Encounters tab in chart review, and find the Anticoagulation Therapy Visit)    INR 1.8 INR came up with a one time boost but is still low. He continues to eat more green veggies and salads. He thinks this will continue. No change in meds. No missed doses. No abnormal bleeding or bruising. Will increase dosing to 10  mg Fridays and 7.5 mg all other days with recheck in 2 weeks after his OV with Dr Malhotra. Dosage adjustment made based on physician directed care plan.    Gladys Herrera, RN

## 2018-01-25 ENCOUNTER — ANTICOAGULATION THERAPY VISIT (OUTPATIENT)
Dept: CARDIOLOGY | Facility: CLINIC | Age: 78
End: 2018-01-25
Payer: COMMERCIAL

## 2018-01-25 ENCOUNTER — OFFICE VISIT (OUTPATIENT)
Dept: CARDIOLOGY | Facility: CLINIC | Age: 78
End: 2018-01-25
Attending: PHYSICIAN ASSISTANT
Payer: COMMERCIAL

## 2018-01-25 VITALS
DIASTOLIC BLOOD PRESSURE: 76 MMHG | WEIGHT: 169 LBS | HEIGHT: 69 IN | BODY MASS INDEX: 25.03 KG/M2 | HEART RATE: 65 BPM | SYSTOLIC BLOOD PRESSURE: 121 MMHG

## 2018-01-25 DIAGNOSIS — I48.0 PAROXYSMAL ATRIAL FIBRILLATION (H): Primary | ICD-10-CM

## 2018-01-25 DIAGNOSIS — I48.91 ATRIAL FIBRILLATION, UNSPECIFIED TYPE (H): ICD-10-CM

## 2018-01-25 DIAGNOSIS — Z79.01 LONG-TERM (CURRENT) USE OF ANTICOAGULANTS: ICD-10-CM

## 2018-01-25 LAB — INR POINT OF CARE: 2.8 (ref 0.86–1.14)

## 2018-01-25 PROCEDURE — 99214 OFFICE O/P EST MOD 30 MIN: CPT | Performed by: INTERNAL MEDICINE

## 2018-01-25 PROCEDURE — 85610 PROTHROMBIN TIME: CPT | Mod: QW | Performed by: INTERNAL MEDICINE

## 2018-01-25 PROCEDURE — 36416 COLLJ CAPILLARY BLOOD SPEC: CPT | Performed by: INTERNAL MEDICINE

## 2018-01-25 PROCEDURE — 93000 ELECTROCARDIOGRAM COMPLETE: CPT | Performed by: INTERNAL MEDICINE

## 2018-01-25 PROCEDURE — 99207 ZZC NO CHARGE NURSE ONLY: CPT | Performed by: INTERNAL MEDICINE

## 2018-01-25 NOTE — PROGRESS NOTES
"Electrophysiology/ Clinic Note         H&P and Plan:     REASON FOR VISIT:  Evaluation of paroxysmal atrial fibrillation.       HISTORY OF PRESENT ILLNESS:  Mr. Rodarte is a delightful 77-year-old gentleman (former  of [employer] team), who here for evaluation of lone paroxysmal atrial fibrillation.       I lost saw him 06/19/2017.  At the time, he was having frequent episodes of PAF and I started him on a combination of metoprolol and flecainide.    At the moment, he is doing well.  He had done well on medications and denies recurrence of arrhythmia.  He denies any symptoms such as chest pain, shortness of breath, palpitation, lightheadedness, near syncope or syncope.        EKG done today showed normal sinus rhythm with a QRS measuring 90 milliseconds.  Stress echo obtained in 07/2015 was negative for ischemia and revealed structurally normal heart with trace to mild TR.       PLAN:   1.  Paroxysmal atrial fibrillation.  He responded well to the combination of metoprolol and flecainide.  We will continue current therapy.  I recommend repeat stress echo to evaluate ischemia and QRS changes on flecainide.  2.  Embolic prevention.  CHADS-VASc score of 2. Continue anticoagulation with Coumadin indefinitely.  3.  Followup care.  Follow up in 6 months or earlier as needed.            Grzegorz Malhotra MD    Physical Exam:  Vitals: /76  Pulse 65  Ht 1.753 m (5' 9\")  Wt 76.7 kg (169 lb)  BMI 24.96 kg/m2    Constitutional:  AAO x3.  Pt is in NAD.  HEAD: normocephalic.  SKIN: Skin normal color, texture and turgor with no lesions or eruptions.  Eyes: PERRL, EOMI.  ENT:  Supple, normal JVP. No lymphadenopathy or thyroid enlargement.  Chest:  CTAB.  Cardiac:    RRR, normal  S1 and S2.  No murmurs rubs or gallop.   Abdomen:  Normal BS.  Soft, non-tender and non-distended.  No rebound or guarding.    Extremities:  Pedious pulses palpable B/L.  No LE edema noticed.   Neurological: Strength and sensation grossly " symmetric and intact throughout.       CURRENT MEDICATIONS:  Current Outpatient Prescriptions   Medication Sig Dispense Refill     warfarin (COUMADIN) 5 MG tablet 1 tab on Sundays and take 1 1/2 tabs on all other days or as directed per INR clinic 125 tablet 0     flecainide (TAMBOCOR) 50 MG tablet Take 1 tablet (50 mg) by mouth 2 times daily 60 tablet 3     metoprolol (TOPROL-XL) 25 MG 24 hr tablet Take 1 tab daily 90 tablet 1       ALLERGIES   No Known Allergies    PAST MEDICAL HISTORY:  Past Medical History:   Diagnosis Date     Arthritis     osteoarthrosis     Elevated PSA      Fatigue      Palpitations      Paroxysmal atrial fibrillation (H) 05/16/2017     Renal disease     kidney calculus     SOB (shortness of breath)        PAST SURGICAL HISTORY:  Past Surgical History:   Procedure Laterality Date     APPENDECTOMY       ARTHROPLASTY HIP  1/14/2013    Procedure: ARTHROPLASTY HIP;  RIGHT TOTAL HIP ARTHROPLASTY ;  Surgeon: Jamie Ohara MD;  Location:  OR     CARDIOVERSION       COLONOSCOPY N/A 2/9/2016    Procedure: COLONOSCOPY;  Surgeon: Astrid Vital MD;  Location:  GI     CYSTOSCOPY, RETROGRADES, EXTRACT STONE, COMBINED  9/12/2013    Procedure: COMBINED CYSTOSCOPY, RETROGRADES, EXTRACT STONE;  CYSTOSCOPY, RIGHT RETROGRADE, STONE EXTRACTION;  Surgeon: Carlos Mcknight MD;  Location:  OR     ENT SURGERY      right ear surgery     GENITOURINARY SURGERY      cystoscopy for stone removal     ORTHOPEDIC SURGERY      shoulder surgeries,bilat carpel tunnel       FAMILY HISTORY:  Family History   Problem Relation Age of Onset     Lung Cancer Brother        SOCIAL HISTORY:  Social History     Social History     Marital status:      Spouse name: N/A     Number of children: N/A     Years of education: N/A     Social History Main Topics     Smoking status: Never Smoker     Smokeless tobacco: Never Used     Alcohol use No     Drug use: No     Sexual activity: Not Asked     Other Topics Concern      None     Social History Narrative       Review of Systems:  Skin:  Negative     Eyes:  Negative    ENT:  Negative    Respiratory:  Negative for shortness of breath;dyspnea on exertion;cough  Cardiovascular:  Negative for;chest pain;edema;syncope or near-syncope;exercise intolerance;fatigue;lightheadedness;dizziness;palpitations    Gastroenterology: Negative for melena;hematochezia  Genitourinary:  Negative    Musculoskeletal:  Negative    Neurologic:  Negative    Psychiatric:  Negative    Heme/Lymph/Imm:  Negative    Endocrine:  Negative        Recent Lab Results:  LIPID RESULTS:  No results found for: CHOL, HDL, LDL, TRIG, CHOLHDLRATIO    LIVER ENZYME RESULTS:  Lab Results   Component Value Date    AST 26 2017    ALT 30 2017       CBC RESULTS:  Lab Results   Component Value Date    WBC 6.4 2017    RBC 4.88 2017    HGB 15.0 2017    HCT 43.1 2017    MCV 88 2017    MCH 30.7 2017    MCHC 34.8 2017    RDW 12.8 2017     2017       BMP RESULTS:  Lab Results   Component Value Date     2017    POTASSIUM 4.3 2017    CHLORIDE 109 2017    CO2 25 2017    ANIONGAP 7 2017    GLC 81 2017    BUN 16 2017    CR 1.08 2017    GFRESTIMATED 66 2017    GFRESTBLACK 80 2017    CHERI 8.5 2017        A1C RESULTS:  No results found for: A1C    INR RESULTS:  Lab Results   Component Value Date    INR 1.8 (A) 2018    INR 1.6 (A) 2017    INR 2.25 (H) 2017    INR 1.22 (H) 2013         ECHOCARDIOGRAM  Recent Results (from the past 8760 hour(s))   Echocardiogram    Narrative    [id number]  [facility]19  [id number]  [id number]^YG^JOSE^[facility]  [facility]  Echocardiography Laboratory  [address]  [place] [address]  Phone [phone]  Fax [phone]        Name: BEN KO JR.  MRN: [id number]  :  1940  Study Date: 06/20/2017 02:04 PM  Age: 77 yrs  Gender: Male  Patient Location: [facility]  Reason For Study: Paroxysmal atrial fibrillation  Ordering Physician: JOSE RONQUILLO  Referring Physician: Osmar Espinoza  Performed By: [facility]     BSA: 1.9 m2  Height: 69 in  Weight: 169 lb  HR: 64  BP: 118/74 mmHg  _____________________________________________________________________________  __     Procedure  Complete Echo Adult.     _____________________________________________________________________________  __        Interpretation Summary     The visual ejection fraction is estimated at 60-65%.  Normal left ventricular wall motion  The right ventricle is normal in size and function.  Normal left atrial size.  Right atrial size is normal.  Right ventricle systolic pressure estimate normal  The IVC is normal in size and reactivity with respiration, suggesting normal  central venous pressure.  There is no pericardial effusion.  The study was technically difficult.  _____________________________________________________________________________  __        Left Ventricle  The left ventricle is normal in size. A sigmoid septum is present. The visual  ejection fraction is estimated at 60-65%. The transmitral spectral Doppler  flow pattern is normal for age. Normal left ventricular wall motion.     Right Ventricle  The right ventricle is normal in size and function.     Atria  Normal left atrial size. Right atrial size is normal. There is no color  Doppler evidence of an atrial shunt.     Mitral Valve  The mitral valve is normal in structure and function. There is trace mitral  regurgitation.     Tricuspid Valve  The tricuspid valve is normal in structure and function. There is trace  tricuspid regurgitation. The right ventricular systolic pressure is  approximated at 14.5 mmHg plus the right atrial pressure. Small IVC (<1.5cm)  with normal respiratory collapse; right atrial pressure is estimated  at 0-  5mmHg. Right ventricle systolic pressure estimate normal.        Aortic Valve  There is mild trileaflet aortic sclerosis. No aortic regurgitation is present.  No aortic stenosis is present.     Pulmonic Valve  The pulmonic valve is not well visualized. There is no pulmonic valvular  regurgitation. Normal pulmonic valve velocity.     Vessels  The aortic root is normal size. Normal size ascending aorta. The IVC is normal  in size and reactivity with respiration, suggesting normal central venous  pressure.     Pericardium  There is no pericardial effusion.     Rhythm  The rhythm was normal sinus.     _____________________________________________________________________________  __  MMode/2D Measurements & Calculations  IVSd: 1.3 cm  LVIDd: 4.0 cm  LVIDs: 2.9 cm  LVPWd: 1.0 cm  FS: 26.1 %  EDV(Teich): 69.0 ml  ESV(Teich): 33.3 ml  LV mass(C)d: 154.8 grams  LV mass(C)dI: 80.5 grams/m2  Ao root diam: 3.5 cm  LA dimension: 2.8 cm     asc Aorta Diam: 2.8 cm  LA/Ao: 0.80  LA Volume (BP): 51.0 ml  LA Volume Index (BP): 26.6 ml/m2        Doppler Measurements & Calculations  MV E max deborah: 42.0 cm/sec  MV A max deborah: 40.7 cm/sec  MV E/A: 1.0  MV dec time: 0.27 sec  PA acc time: 0.05 sec  TR max deborah: 190.2 cm/sec  TR max P.5 mmHg     Lateral E/e': 5.5  Medial E/e': 5.5           _____________________________________________________________________________  __           Report approved by: Sadi Giles 2017 05:17 PM            Orders Placed This Encounter   Procedures     EKG 12-lead complete w/read - Clinics (performed today)     Exercise Stress Echocardiogram     No orders of the defined types were placed in this encounter.    Medications Discontinued During This Encounter   Medication Reason     HYDROcodone-acetaminophen (NORCO) 5-325 MG per tablet Stopped by Patient         Encounter Diagnosis   Name Primary?     Paroxysmal atrial fibrillation (H) Yes         CC  Briseyda Meyers, PA-C  [id number]  [place] [id number]

## 2018-01-25 NOTE — MR AVS SNAPSHOT
After Visit Summary   1/25/2018    Nigel Rodarte Jr.    MRN: [id number]           Patient Information     Date Of Birth          1940        Visit Information        Provider Department      1/25/2018 1:45 PM Grzegorz Malhotra MD [facility]        Today's Diagnoses     Paroxysmal atrial fibrillation (H)    -  1       Follow-ups after your visit        Your next 10 appointments already scheduled     Feb 01, 2018  8:45 AM CST   Ech Stress Test with [id number]   [facility] CV Echocardiography (Cardiovascular Imaging at [facility])    [address] [id number] [phone]           1. Please bring or wear a comfortable two-piece outfit and walking shoes. 2. Stop eating 3 hours before the test. You may drink water or juice. 3. Stop all caffeine 12 hours before the test. This includes coffee, tea, soda pop, chocolate and certain medicines (such as Anacin and Excederin). Also avoid decaf coffee and tea, as these contain small amounts of caffeine. 4. No alcohol, smoking or use of other tobacco products for 12 hours before the test. 5. Refer to your provider instructions to see if you need to stop any medications (such as beta-blockers or nitrates) for this test. 6. For patients with diabetes: - If you take insulin, call your diabetes care team. Ask if you should take a   dose the morning of your test. - If you take diabetes medicine by mouth, dont take it on the morning of your test. Bring it with you to take after the test. (If you have questions, call your diabetes care team) 7. When you arrive, please tell us if: - You have diabetes. - You have taken Viagra, Cialis or Levitra in the past 48 hours. 8. For any questions that cannot be answered, please contact the ordering physician              Future tests that were ordered for you today     Open Future Orders        Priority Expected Expires Ordered     "Exercise Stress Echocardiogram Routine 2018            Who to contact     If you have questions or need follow up information about today's clinic visit or your schedule please contact [facility] directly at [phone].  Normal or non-critical lab and imaging results will be communicated to you by MyChart, letter or phone within 4 business days after the clinic has received the results. If you do not hear from us within 7 days, please contact the clinic through MyChart or phone. If you have a critical or abnormal lab result, we will notify you by phone as soon as possible.  Submit refill requests through [facility] or call your pharmacy and they will forward the refill request to us. Please allow 3 business days for your refill to be completed.          Additional Information About Your Visit        MyChart Information     [facility] lets you send messages to your doctor, view your test results, renew your prescriptions, schedule appointments and more. To sign up, go to [email or web address] . Click on \"Log in\" on the left side of the screen, which will take you to the Welcome page. Then click on \"Sign up Now\" on the right side of the page.     You will be asked to enter the access code listed below, as well as some personal information. Please follow the directions to create your username and password.     Your access code is: [email or web address]  Expires: 2018  2:20 PM     Your access code will  in 90 days. If you need help or a new code, please call your [place] clinic or [phone].        Care EveryWhere ID     This is your Care EveryWhere ID. This could be used by other organizations to access your [place] medical records  [id number]        Your Vitals Were     Pulse Height BMI (Body Mass Index)             65 1.753 m (5' 9\") 24.96 kg/m2          Blood Pressure from Last 3 Encounters:   18 121/76   17 122/70   17 108/62    " Weight from Last 3 Encounters:   01/25/18 76.7 kg (169 lb)   08/17/17 73 kg (161 lb)   06/29/17 74.8 kg (165 lb)              We Performed the Following     EKG 12-lead complete w/read - Clinics (performed today)     EKG 12-lead complete w/read - Clinics     Follow-Up with Electrophysiologist        Primary Care Provider Office Phone # Fax #    Osmar Espinoza [phone]       [facility] [address]  [facility] [address]        Equal Access to Services     [facility]: Hadii aad ku hadasho Soomaali, waaxda luqadaha, qaybta kaalmada adeegyada, waxay idiin hayaan adejosie thomas . So [facility] [phone].    ATENCIÓN: Si habla español, tiene a funez disposición servicios gratuitos de asistencia lingüística. [facility] [phone].    We comply with applicable federal civil rights laws and [place] laws. We do not discriminate on the basis of race, color, national origin, age, disability, sex, sexual orientation, or gender identity.            Thank you!     Thank you for choosing [facility] HEART [facility]  for your care. Our goal is always to provide you with excellent care. Hearing back from our patients is one way we can continue to improve our services. Please take a few minutes to complete the written survey that you may receive in the mail after your visit with us. Thank you!             Your Updated Medication List - Protect others around you: Learn how to safely use, store and throw away your medicines at www.disposemymeds.org.          This list is accurate as of 1/25/18  2:20 PM.  Always use your most recent med list.                   Brand Name Dispense Instructions for use Diagnosis    flecainide 50 MG tablet    TAMBOCOR    60 tablet    Take 1 tablet (50 mg) by mouth 2 times daily    Paroxysmal atrial fibrillation (H)       metoprolol succinate 25 MG 24 hr tablet    TOPROL-XL    90 tablet    Take 1 tab daily    Long-term (current) use of anticoagulants,  Atrial fibrillation, unspecified type (H)       warfarin 5 MG tablet    COUMADIN    125 tablet    1 tab on Sundays and take 1 1/2 tabs on all other days or as directed per INR clinic    Long term current use of anticoagulant therapy, Atrial fibrillation, unspecified type (H)

## 2018-01-25 NOTE — LETTER
"1/25/2018    Osmar Espinoza MD  [facility] [address]  [facility] [address]    RE: Nigel Rodarte .       Dear Colleague,    I had the pleasure of seeing Nigel Rodarte Jr. in the [facility].    Electrophysiology/ Clinic Note         H&P and Plan:     REASON FOR VISIT:  Evaluation of paroxysmal atrial fibrillation.       HISTORY OF PRESENT ILLNESS:  Mr. Rodarte is a delightful 77-year-old gentleman (former  of [employer] team), who here for evaluation of lone paroxysmal atrial fibrillation.       I lost saw him 06/19/2017.  At the time, he was having frequent episodes of PAF and I started him on a combination of metoprolol and flecainide.    At the moment, he is doing well.  He  had done well on medications and denies recurrence of arrhythmia.  He denies any symptoms such as chest pain, shortness of breath, palpitation, lightheadedness, near syncope or syncope.        EKG done today showed normal sinus rhythm with a QRS measuring 90 milliseconds.  Stress echo obtained in 07/2015 was negative for ischemia and revealed structurally normal heart with trace to mild TR.       PLAN:   1.  Paroxysmal atrial fibrillation.  He  responded well to the combination of metoprolol and flecainide.  We will continue current therapy.  I recommend repeat stress echo to evaluate ischemia and QRS changes on flecainide.  2.  Embolic prevention.  CHADS-VASc score of 2. Continue anticoagulation with Coumadin indefinitely.  3.  Followup care.  Follow up in 6 months or earlier as needed.            Grzegorz Malhotra MD    Physical Exam:  Vitals: /76  Pulse 65  Ht 1.753 m (5' 9\")  Wt 76.7 kg (169 lb)  BMI 24.96 kg/m2    Constitutional:  AAO x3.  Pt is in NAD.  HEAD: normocephalic.  SKIN: Skin normal color, texture and turgor with no lesions or eruptions.  Eyes: PERRL, EOMI.  ENT:  Supple, normal JVP. No lymphadenopathy or thyroid enlargement.  Chest:  CTAB.  Cardiac:    RRR, normal "  S1 and S2.  No murmurs rubs or gallop.   Abdomen:  Normal BS.  Soft, non-tender and non-distended.  No rebound or guarding.    Extremities:  Pedious pulses palpable B/L.  No LE edema noticed.   Neurological: Strength and sensation grossly symmetric and intact throughout.       CURRENT MEDICATIONS:  Current Outpatient Prescriptions   Medication Sig Dispense Refill     warfarin (COUMADIN) 5 MG tablet 1 tab on Sundays and take 1 1/2 tabs on all other days or as directed per INR clinic 125 tablet 0     flecainide (TAMBOCOR) 50 MG tablet Take 1 tablet (50 mg) by mouth 2 times daily 60 tablet 3     metoprolol (TOPROL-XL) 25 MG 24 hr tablet Take 1 tab daily 90 tablet 1       ALLERGIES   No Known Allergies    PAST MEDICAL HISTORY:  Past Medical History:   Diagnosis Date     Arthritis     osteoarthrosis     Elevated PSA      Fatigue      Palpitations      Paroxysmal atrial fibrillation (H) 05/16/2017     Renal disease     kidney calculus     SOB (shortness of breath)        PAST SURGICAL HISTORY:  Past Surgical History:   Procedure Laterality Date     APPENDECTOMY       ARTHROPLASTY HIP  1/14/2013    Procedure: ARTHROPLASTY HIP;  RIGHT TOTAL HIP ARTHROPLASTY ;  Surgeon: Jamie Ohara MD;  Location:  OR     CARDIOVERSION       COLONOSCOPY N/A 2/9/2016    Procedure: COLONOSCOPY;  Surgeon: Astrid Vital MD;  Location:  GI     CYSTOSCOPY, RETROGRADES, EXTRACT STONE, COMBINED  9/12/2013    Procedure: COMBINED CYSTOSCOPY, RETROGRADES, EXTRACT STONE;  CYSTOSCOPY, RIGHT RETROGRADE, STONE EXTRACTION;  Surgeon: Carlos Mcknight MD;  Location:  OR     ENT SURGERY      right ear surgery     GENITOURINARY SURGERY      cystoscopy for stone removal     ORTHOPEDIC SURGERY      shoulder surgeries,bilat carpel tunnel       FAMILY HISTORY:  Family History   Problem Relation Age of Onset     Lung Cancer Brother        SOCIAL HISTORY:  Social History     Social History     Marital status:      Spouse name: N/A     Number  of children: N/A     Years of education: N/A     Social History Main Topics     Smoking status: Never Smoker     Smokeless tobacco: Never Used     Alcohol use No     Drug use: No     Sexual activity: Not Asked     Other Topics Concern     None     Social History Narrative       Review of Systems:  Skin:  Negative     Eyes:  Negative    ENT:  Negative    Respiratory:  Negative for shortness of breath;dyspnea on exertion;cough  Cardiovascular:  Negative for;chest pain;edema;syncope or near-syncope;exercise intolerance;fatigue;lightheadedness;dizziness;palpitations    Gastroenterology: Negative for melena;hematochezia  Genitourinary:  Negative    Musculoskeletal:  Negative    Neurologic:  Negative    Psychiatric:  Negative    Heme/Lymph/Imm:  Negative    Endocrine:  Negative        Recent Lab Results:  LIPID RESULTS:  No results found for: CHOL, HDL, LDL, TRIG, CHOLHDLRATIO    LIVER ENZYME RESULTS:  Lab Results   Component Value Date    AST 26 05/16/2017    ALT 30 05/16/2017       CBC RESULTS:  Lab Results   Component Value Date    WBC 6.4 08/17/2017    RBC 4.88 08/17/2017    HGB 15.0 08/17/2017    HCT 43.1 08/17/2017    MCV 88 08/17/2017    MCH 30.7 08/17/2017    MCHC 34.8 08/17/2017    RDW 12.8 08/17/2017     08/17/2017       BMP RESULTS:  Lab Results   Component Value Date     05/16/2017    POTASSIUM 4.3 05/16/2017    CHLORIDE 109 05/16/2017    CO2 25 05/16/2017    ANIONGAP 7 05/16/2017    GLC 81 05/16/2017    BUN 16 05/16/2017    CR 1.08 05/16/2017    GFRESTIMATED 66 05/16/2017    GFRESTBLACK 80 05/16/2017    CHERI 8.5 05/16/2017        A1C RESULTS:  No results found for: A1C    INR RESULTS:  Lab Results   Component Value Date    INR 1.8 (A) 01/12/2018    INR 1.6 (A) 12/27/2017    INR 2.25 (H) 08/17/2017    INR 1.22 (H) 01/17/2013         ECHOCARDIOGRAM  Recent Results (from the past 8760 hour(s))   Echocardiogram    Narrative    [id number]  ECH19  [id number]  [id number]^YG^JOSE^CHAD        [facility]  [address]  [address]  [place] [address]  Phone [phone]  Fax [phone]        Name: BEN KO JR.  MRN: [id number]  : 1940  Study Date: 2017 02:04 PM  Age: 77 yrs  Gender: Male  Patient Location: [facility]  Reason For Study: Paroxysmal atrial fibrillation  Ordering Physician: JOSE RONQUILLO  Referring Physician: Osmar Espinoza  Performed By: KEL Ames     BSA: 1.9 m2  Height: 69 in  Weight: 169 lb  HR: 64  BP: 118/74 mmHg  _____________________________________________________________________________  __     Procedure  Complete Echo Adult.     _____________________________________________________________________________  __        Interpretation Summary     The visual ejection fraction is estimated at 60-65%.  Normal left ventricular wall motion  The right ventricle is normal in size and function.  Normal left atrial size.  Right atrial size is normal.  Right ventricle systolic pressure estimate normal  The IVC is normal in size and reactivity with respiration, suggesting normal  central venous pressure.  There is no pericardial effusion.  The study was technically difficult.  _____________________________________________________________________________  __        Left Ventricle  The left ventricle is normal in size. A sigmoid septum is present. The visual  ejection fraction is estimated at 60-65%. The transmitral spectral Doppler  flow pattern is normal for age. Normal left ventricular wall motion.     Right Ventricle  The right ventricle is normal in size and function.     Atria  Normal left atrial size. Right atrial size is normal. There is no color  Doppler evidence of an atrial shunt.     Mitral Valve  The mitral valve is normal in structure and function. There is trace mitral  regurgitation.     Tricuspid Valve  The tricuspid valve is normal in structure and  function. There is trace  tricuspid regurgitation. The right ventricular systolic pressure is  approximated at 14.5 mmHg plus the right atrial pressure. Small IVC (<1.5cm)  with normal respiratory collapse; right atrial pressure is estimated at 0-  5mmHg. Right ventricle systolic pressure estimate normal.        Aortic Valve  There is mild trileaflet aortic sclerosis. No aortic regurgitation is present.  No aortic stenosis is present.     Pulmonic Valve  The pulmonic valve is not well visualized. There is no pulmonic valvular  regurgitation. Normal pulmonic valve velocity.     Vessels  The aortic root is normal size. Normal size ascending aorta. The IVC is normal  in size and reactivity with respiration, suggesting normal central venous  pressure.     Pericardium  There is no pericardial effusion.     Rhythm  The rhythm was normal sinus.     _____________________________________________________________________________  __  MMode/2D Measurements & Calculations  IVSd: 1.3 cm  LVIDd: 4.0 cm  LVIDs: 2.9 cm  LVPWd: 1.0 cm  FS: 26.1 %  EDV(Teich): 69.0 ml  ESV(Teich): 33.3 ml  LV mass(C)d: 154.8 grams  LV mass(C)dI: 80.5 grams/m2  Ao root diam: 3.5 cm  LA dimension: 2.8 cm     asc Aorta Diam: 2.8 cm  LA/Ao: 0.80  LA Volume (BP): 51.0 ml  LA Volume Index (BP): 26.6 ml/m2        Doppler Measurements & Calculations  MV E max deborah: 42.0 cm/sec  MV A max deborah: 40.7 cm/sec  MV E/A: 1.0  MV dec time: 0.27 sec  PA acc time: 0.05 sec  TR max deborah: 190.2 cm/sec  TR max P.5 mmHg     Lateral E/e': 5.5  Medial E/e': 5.5           _____________________________________________________________________________  __           Report approved by: Sadi Giles 2017 05:17 PM            Orders Placed This Encounter   Procedures     EKG 12-lead complete w/read - Clinics (performed today)     Exercise Stress Echocardiogram     No orders of the defined types were placed in this encounter.    Medications Discontinued During This  Encounter   Medication Reason     HYDROcodone-acetaminophen (NORCO) 5-325 MG per tablet Stopped by Patient         Encounter Diagnosis   Name Primary?     Paroxysmal atrial fibrillation (H) Yes     Thank you for allowing me to participate in the care of your patient.    Sincerely,     Grzegorz Malhotra MD     [facility]

## 2018-01-25 NOTE — PROGRESS NOTES
ANTICOAGULATION FOLLOW-UP CLINIC VISIT    Patient Name:  Nigel Rodarte Jr.  Date:  1/25/2018  Contact Type:  Face to Face    SUBJECTIVE:     Patient Findings     Positives Change in diet/appetite (eating salads/veggies 3x/wk)           OBJECTIVE    INR Protime   Date Value Ref Range Status   01/25/2018 2.8 (A) 0.86 - 1.14 Final       ASSESSMENT / PLAN  INR assessment THER    Recheck INR In: 3 WEEKS    INR Location Clinic      Anticoagulation Summary as of 1/25/2018     INR goal 2.0-3.0   Today's INR 2.8   Maintenance plan 10 mg (5 mg x 2) on Fri; 7.5 mg (5 mg x 1.5) all other days   Full instructions 10 mg on Fri; 7.5 mg all other days   Weekly total 55 mg   No change documented Gladys Herrera RN   Plan last modified Gladys Herrera RN (1/12/2018)   Next INR check 2/15/2018   Target end date Indefinite    Indications   Long-term (current) use of anticoagulants [Z79.01] [Z79.01]  Atrial fibrillation (H) [I48.91] [I48.91]         Anticoagulation Episode Summary     INR check location     Preferred lab     Send INR reminders to [facility] HEART INR NURSE    Comments       Anticoagulation Care Providers     Provider Role Specialty Phone number    Grzegorz Malhotra MD Responsible Cardiology [phone]            See the Encounter Report to view Anticoagulation Flowsheet and Dosing Calendar (Go to Encounters tab in chart review, and find the Anticoagulation Therapy Visit)    INR 2.8 Eating salads about 3x/wk. No change in meds. No abnormal bleeding or bruising. Will continue current dosing of 10 mg Fridays and 7.5 mg all other days with recheck in 3 weeks. Dosage adjustment made based on physician directed care plan.    Gladys Herrera, RN

## 2018-02-01 ENCOUNTER — HOSPITAL ENCOUNTER (OUTPATIENT)
Dept: CARDIOLOGY | Facility: CLINIC | Age: 78
Discharge: HOME | End: 2018-02-01
Attending: INTERNAL MEDICINE | Admitting: INTERNAL MEDICINE
Payer: MEDICARE

## 2018-02-01 DIAGNOSIS — I48.0 PAROXYSMAL ATRIAL FIBRILLATION (H): ICD-10-CM

## 2018-02-01 PROCEDURE — 25500064 ZZH RX 255 OP 636: Performed by: INTERNAL MEDICINE

## 2018-02-01 PROCEDURE — 93018 CV STRESS TEST I&R ONLY: CPT | Performed by: INTERNAL MEDICINE

## 2018-02-01 PROCEDURE — 93325 DOPPLER ECHO COLOR FLOW MAPG: CPT | Mod: 26 | Performed by: INTERNAL MEDICINE

## 2018-02-01 PROCEDURE — 40000264 ECHO STRESS WITH OPTISON

## 2018-02-01 PROCEDURE — 93016 CV STRESS TEST SUPVJ ONLY: CPT | Performed by: INTERNAL MEDICINE

## 2018-02-01 PROCEDURE — 93350 STRESS TTE ONLY: CPT | Mod: 26 | Performed by: INTERNAL MEDICINE

## 2018-02-01 PROCEDURE — 25500064 ZZH RX 255 OP 636

## 2018-02-01 PROCEDURE — 93321 DOPPLER ECHO F-UP/LMTD STD: CPT | Mod: 26 | Performed by: INTERNAL MEDICINE

## 2018-02-01 RX ADMIN — HUMAN ALBUMIN MICROSPHERES AND PERFLUTREN 8 ML: 10; .22 INJECTION, SOLUTION INTRAVENOUS at 09:45

## 2018-02-08 DIAGNOSIS — I48.91 ATRIAL FIBRILLATION, UNSPECIFIED TYPE (H): ICD-10-CM

## 2018-02-08 DIAGNOSIS — Z79.01 LONG-TERM (CURRENT) USE OF ANTICOAGULANTS: ICD-10-CM

## 2018-02-08 RX ORDER — METOPROLOL SUCCINATE 25 MG/1
TABLET, EXTENDED RELEASE ORAL
Qty: 90 TABLET | Refills: 3 | Status: SHIPPED | OUTPATIENT
Start: 2018-02-08 | End: 2019-02-27

## 2018-02-15 ENCOUNTER — ANTICOAGULATION THERAPY VISIT (OUTPATIENT)
Dept: CARDIOLOGY | Facility: CLINIC | Age: 78
End: 2018-02-15
Payer: COMMERCIAL

## 2018-02-15 DIAGNOSIS — I48.91 ATRIAL FIBRILLATION, UNSPECIFIED TYPE (H): ICD-10-CM

## 2018-02-15 DIAGNOSIS — Z79.01 LONG-TERM (CURRENT) USE OF ANTICOAGULANTS: ICD-10-CM

## 2018-02-15 LAB — INR POINT OF CARE: 1.9 (ref 0.86–1.14)

## 2018-02-15 PROCEDURE — 85610 PROTHROMBIN TIME: CPT | Mod: QW | Performed by: INTERNAL MEDICINE

## 2018-02-15 PROCEDURE — 36416 COLLJ CAPILLARY BLOOD SPEC: CPT | Performed by: INTERNAL MEDICINE

## 2018-02-15 NOTE — PROGRESS NOTES
ANTICOAGULATION FOLLOW-UP CLINIC VISIT    Patient Name:  Nigel Rodarte Jr.  Date:  2/15/2018  Contact Type:  Face to Face    SUBJECTIVE:     Patient Findings     Positives Missed doses (he's not sure if he missed a dose this month)           OBJECTIVE    INR Protime   Date Value Ref Range Status   02/15/2018 1.9 (A) 0.86 - 1.14 Final       ASSESSMENT / PLAN  INR assessment THER    Recheck INR In: 4 WEEKS    INR Location Clinic      Anticoagulation Summary as of 2/15/2018     INR goal 2.0-3.0   Today's INR 1.9!   Maintenance plan 10 mg (5 mg x 2) on Fri; 7.5 mg (5 mg x 1.5) all other days   Full instructions 2/15: 10 mg; Otherwise 10 mg on Fri; 7.5 mg all other days   Weekly total 55 mg   Plan last modified Gladys Herrera RN (1/12/2018)   Next INR check 3/16/2018   Target end date Indefinite    Indications   Long-term (current) use of anticoagulants [Z79.01] [Z79.01]  Atrial fibrillation (H) [I48.91] [I48.91]         Anticoagulation Episode Summary     INR check location     Preferred lab     Send INR reminders to [facility] HEART INR NURSE    Comments       Anticoagulation Care Providers     Provider Role Specialty Phone number    Grzegorz Malhotra MD Responsible Cardiology [phone]            See the Encounter Report to view Anticoagulation Flowsheet and Dosing Calendar (Go to Encounters tab in chart review, and find the Anticoagulation Therapy Visit)    INR 1.9 No change in diet (salads/veggies about 2x/wk). He's not sure if he missed a warfarin dose this month. No other med changes. No abnormal bleeding or bruising.Will boost today's dose to 10 mg then resume 10 mg Fridays and 7.5 mg all other days with recheck in 4 weeks. Dosage adjustment made based on physician directed care plan.    Gladys Herrera, TAMMY

## 2018-02-15 NOTE — MR AVS SNAPSHOT
Nigel Rodarte Jr.   2/15/2018 7:00 AM   Anticoagulation Therapy Visit    Description:  77 year old male   Provider:  YAIR ANTICOAGULATION   Department:  [facility]           INR as of 2/15/2018     Today's INR 1.9!      Anticoagulation Summary as of 2/15/2018     INR goal 2.0-3.0   Today's INR 1.9!   Full instructions 2/15: 10 mg; Otherwise 10 mg on Fri; 7.5 mg all other days   Next INR check 3/16/2018    Indications   Long-term (current) use of anticoagulants [Z79.01] [Z79.01]  Atrial fibrillation (H) [I48.91] [I48.91]         Your next Anticoagulation Clinic appointment(s)     Mar 16, 2018  7:00 AM CDT   Anticoagulation Visit with  ANTICOAGULATION   [facility])    [address] [place] [id number]   [phone]              Contact Numbers     Anticoagulant (INR) Clinic Number: [phone]          February 2018 Details    Sun Mon Tue Wed Thu Fri Sat         1               2               3                 4               5               6               7               8               9               10                 11               12               13               14               15      10 mg   See details      16      10 mg         17      7.5 mg           18      7.5 mg         19      7.5 mg         20      7.5 mg         21      7.5 mg         22      7.5 mg         23      10 mg         24      7.5 mg           25      7.5 mg         26      7.5 mg         27      7.5 mg         28      7.5 mg             Date Details   02/15 This INR check               How to take your warfarin dose     To take:  7.5 mg Take 1.5 of the 5 mg tablets.    To take:  10 mg Take 2 of the 5 mg tablets.           March 2018 Details    Sun Mon Tue Wed Thu Fri Sat         1      7.5 mg         2      10 mg         3      7.5 mg           4      7.5 mg         5      7.5 mg         6      7.5 mg         7      7.5 mg         8       7.5 mg         9      10 mg         10      7.5 mg           11      7.5 mg         12      7.5 mg         13      7.5 mg         14      7.5 mg         15      7.5 mg         16            17                 18               19               20               21               22               23               24                 25               26               27               28               29               30               31                Date Details   No additional details    Date of next INR:  3/16/2018         How to take your warfarin dose     To take:  7.5 mg Take 1.5 of the 5 mg tablets.    To take:  10 mg Take 2 of the 5 mg tablets.

## 2018-02-27 ENCOUNTER — TELEPHONE (OUTPATIENT)
Dept: CARDIOLOGY | Facility: CLINIC | Age: 78
End: 2018-02-27

## 2018-02-27 NOTE — TELEPHONE ENCOUNTER
Pt recalled for stress test results. Left message with normal stress echo results after reviewed by Dr Malhotra

## 2018-02-27 NOTE — TELEPHONE ENCOUNTER
Message  Received: Today       Grzegorz Malhotra MD  [facility] Heart Ep Nurse                     Normal stress echo. Please update patient on results. No change in therapy.     Thank you,     Grzegorz Malhotra MD       Attempted to call pt with results, but no answer. VM left to return our phone call. CARLO Sanders RN.

## 2018-03-01 ENCOUNTER — TELEPHONE (OUTPATIENT)
Dept: CARDIOLOGY | Facility: CLINIC | Age: 78
End: 2018-03-01

## 2018-03-01 NOTE — TELEPHONE ENCOUNTER
Pt's wife, Denise, calling to report that pt has an URI with a cough and was started on Cefuroxime (10 day course) and Virtussin (codeine cough syrup) on Tuesday. No known interaction between Cefuroxime and Warfarin per [facility]. Codeine can raise the INR. Scheduled INR appt tomorrow. Pt has noted an increase in size of a hernia due to his recent cough. He sees MD on 3/6 to discuss treatment of the hernia. Advised that if surgery is recommended, then he should ask surgeon how many days they want him to hold warfarin then let us know so we can review with Dr Malhotra. Tamar

## 2018-03-02 ENCOUNTER — ANTICOAGULATION THERAPY VISIT (OUTPATIENT)
Dept: CARDIOLOGY | Facility: CLINIC | Age: 78
End: 2018-03-02
Payer: COMMERCIAL

## 2018-03-02 DIAGNOSIS — Z79.01 LONG-TERM (CURRENT) USE OF ANTICOAGULANTS: ICD-10-CM

## 2018-03-02 DIAGNOSIS — I48.91 ATRIAL FIBRILLATION, UNSPECIFIED TYPE (H): ICD-10-CM

## 2018-03-02 LAB — INR POINT OF CARE: 2.2 (ref 0.86–1.14)

## 2018-03-02 PROCEDURE — 36416 COLLJ CAPILLARY BLOOD SPEC: CPT | Performed by: INTERNAL MEDICINE

## 2018-03-02 PROCEDURE — 85610 PROTHROMBIN TIME: CPT | Mod: QW | Performed by: INTERNAL MEDICINE

## 2018-03-02 PROCEDURE — 99207 ZZC NO CHARGE NURSE ONLY: CPT | Performed by: INTERNAL MEDICINE

## 2018-03-02 NOTE — PROGRESS NOTES
ANTICOAGULATION FOLLOW-UP CLINIC VISIT    Patient Name:  Nigel Rodarte Jr.  Date:  3/2/2018  Contact Type:  Face to Face    SUBJECTIVE:     Patient Findings     Positives Change in medications (URI - taking Cefuroxime for 10 days and Codeine cough syrup twice daily)           OBJECTIVE    INR Protime   Date Value Ref Range Status   03/02/2018 2.2 (A) 0.86 - 1.14 Final       ASSESSMENT / PLAN  INR assessment THER    Recheck INR In: 2 WEEKS    INR Location Clinic      Anticoagulation Summary as of 3/2/2018     INR goal 2.0-3.0   Today's INR 2.2   Maintenance plan 10 mg (5 mg x 2) on Fri; 7.5 mg (5 mg x 1.5) all other days   Full instructions 10 mg on Fri; 7.5 mg all other days   Weekly total 55 mg   No change documented Gladys Herrera, RN   Plan last modified Gladys Herrera, RN (1/12/2018)   Next INR check 3/16/2018   Target end date Indefinite    Indications   Long-term (current) use of anticoagulants [Z79.01] [Z79.01]  Atrial fibrillation (H) [I48.91] [I48.91]         Anticoagulation Episode Summary     INR check location     Preferred lab     Send INR reminders to [facility] HEART INR NURSE    Comments       Anticoagulation Care Providers     Provider Role Specialty Phone number    Grzegorz Malhotra MD Responsible Cardiology [phone]            See the Encounter Report to view Anticoagulation Flowsheet and Dosing Calendar (Go to Encounters tab in chart review, and find the Anticoagulation Therapy Visit)    INR 2.2 He has a URI and was started on a 10 day course of Cefuroxime (no known interaction with warfarin) and codeine cough syrup (taking twice daily). He is starting to feel better. No change in diet. No abnormal bleeding or bruising. Will continue current dosing of 10 mg Fridays and 7.5 mg all other days with recheck in 2 weeks when he will be done with the antibiotic and cough syrup. Because of his coughing, he has noted increase in a hernia so is seeing a MD next week to discuss treatment options.   [place]coe had previously authorized a 5 day warfarin hold for a procedure so pt could hold warfarin for up to 5 days if he needs to have surgical hernia repair. Dosage adjustment made based on physician directed care plan.    Gladys Herrera RN

## 2018-03-02 NOTE — MR AVS SNAPSHOT
Nigel Rodarte Jr.   3/2/2018 7:20 AM   Anticoagulation Therapy Visit    Description:  77 year old male   Provider:   ANTICOAGULATION   Department:  [facility]           INR as of 3/2/2018     Today's INR 2.2      Anticoagulation Summary as of 3/2/2018     INR goal 2.0-3.0   Today's INR 2.2   Full instructions 10 mg on Fri; 7.5 mg all other days   Next INR check 3/16/2018    Indications   Long-term (current) use of anticoagulants [Z79.01] [Z79.01]  Atrial fibrillation (H) [I48.91] [I48.91]         Your next Anticoagulation Clinic appointment(s)     Mar 02, 2018  7:20 AM CST   Anticoagulation Visit with  ANTICOAGULATION   [facility] ([facility])    [address] [place] [address]   [phone]            Mar 16, 2018  7:00 AM CDT   Anticoagulation Visit with  ANTICOAGULATION   [facility] ([facility])    [id number]  [facility] [address]   [phone]              Contact Numbers     Anticoagulant (INR) Clinic Number: [phone]          March 2018 Details    Sun Mon Tue Wed Thu Fri Sat         1               2      10 mg   See details      3      7.5 mg           4      7.5 mg         5      7.5 mg         6      7.5 mg         7      7.5 mg         8      7.5 mg         9      10 mg         10      7.5 mg           11      7.5 mg         12      7.5 mg         13      7.5 mg         14      7.5 mg         15      7.5 mg         16            17                 18               19               20               21               22               23               24                 25               26               27               28               29               30               31                Date Details   03/02 This INR check       Date of next INR:  3/16/2018         How to take your warfarin dose     To take:  7.5 mg Take 1.5 of the 5 mg tablets.     To take:  10 mg Take 2 of the 5 mg tablets.

## 2018-03-07 ENCOUNTER — TELEPHONE (OUTPATIENT)
Dept: CARDIOLOGY | Facility: CLINIC | Age: 78
End: 2018-03-07

## 2018-03-07 NOTE — TELEPHONE ENCOUNTER
Pt's wife calling to report that pt is scheduled for hernia repair with Dr Valenzuela at [facility] on 4/5/18. Surgeon requested a 4 day warfarin hold and told pt he would need to bridge with Lovenox. Pt and his wife are confused about the use of Lovenox. In the past, Dr Malhotra has allowed pt to hold warfarin for up to 5 days with no Lovenox bridging needed (ChadsVasc score is 2). Left a message for Eunice, Dr Valenzuela's coordinator [phone], to call back to clarify if Lovenox was needed or if pt could just hold warfarin for 4 or 5 days before surgery. Tamar    09:10 am Received a message from Eunice at Dr Valenzuela's office. She will review with Dr Valenzuela that pt has been allowed to hold warfarin without Lovenox bridging in the past to see if Dr Valenzuela is OK with that then will let us know. She stated that they usually defer the question regarding bridging to the MD ordering the anticoagulation. Tamar    9:50 am Spoke with Eunice at Dr Valenzuela's office. She states that Dr Valenzuela is requesting a 5 day warfarin hold before surgery and is OK with cardiology determining if pt needs bridging (he has not bridged in the past when he was allowed to hold warfarin for 5 days). Surgery is scheduled on 4/5 so he will start the 5 day hold on 3/31/18. Reviewed this information with pt's wife. Pt has an INR appt in clinic before that time so will review again in more detail. Tamar

## 2018-03-12 DIAGNOSIS — I48.0 PAROXYSMAL ATRIAL FIBRILLATION (H): ICD-10-CM

## 2018-03-12 RX ORDER — FLECAINIDE ACETATE 50 MG/1
50 TABLET ORAL 2 TIMES DAILY
Qty: 180 TABLET | Refills: 2 | Status: SHIPPED | OUTPATIENT
Start: 2018-03-12 | End: 2018-11-27

## 2018-03-16 ENCOUNTER — ANTICOAGULATION THERAPY VISIT (OUTPATIENT)
Dept: CARDIOLOGY | Facility: CLINIC | Age: 78
End: 2018-03-16
Payer: COMMERCIAL

## 2018-03-16 DIAGNOSIS — I48.91 ATRIAL FIBRILLATION, UNSPECIFIED TYPE (H): ICD-10-CM

## 2018-03-16 DIAGNOSIS — Z79.01 LONG-TERM (CURRENT) USE OF ANTICOAGULANTS: ICD-10-CM

## 2018-03-16 LAB — INR POINT OF CARE: 3.1 (ref 0.86–1.14)

## 2018-03-16 PROCEDURE — 85610 PROTHROMBIN TIME: CPT | Mod: QW | Performed by: INTERNAL MEDICINE

## 2018-03-16 PROCEDURE — 36416 COLLJ CAPILLARY BLOOD SPEC: CPT | Performed by: INTERNAL MEDICINE

## 2018-03-16 PROCEDURE — 99207 ZZC NO CHARGE NURSE ONLY: CPT | Performed by: INTERNAL MEDICINE

## 2018-03-16 NOTE — PROGRESS NOTES
ANTICOAGULATION FOLLOW-UP CLINIC VISIT    Patient Name:  Nigel Rodarte Jr.  Date:  3/16/2018  Contact Type:  Face to Face    SUBJECTIVE:     Patient Findings     Positives Inflammation (had URI with cough -- symptoms almost resolved)           OBJECTIVE    INR Protime   Date Value Ref Range Status   03/16/2018 3.1 (A) 0.86 - 1.14 Final       ASSESSMENT / PLAN  INR assessment THER    Recheck INR In: 4 WEEKS    INR Location Clinic      Anticoagulation Summary as of 3/16/2018     INR goal 2.0-3.0   Today's INR 3.1!   Maintenance plan 10 mg (5 mg x 2) on Fri; 7.5 mg (5 mg x 1.5) all other days   Full instructions 3/31: Hold; 4/1: Hold; 4/2: Hold; 4/3: Hold; 4/4: Hold; Otherwise 10 mg on Fri; 7.5 mg all other days   Weekly total 55 mg   Plan last modified Gladys Herrera RN (1/12/2018)   Next INR check 4/16/2018   Target end date Indefinite    Indications   Long-term (current) use of anticoagulants [Z79.01] [Z79.01]  Atrial fibrillation (H) [I48.91] [I48.91]         Anticoagulation Episode Summary     INR check location     Preferred lab     Send INR reminders to [facility] HEART INR NURSE    Comments       Anticoagulation Care Providers     Provider Role Specialty Phone number    Grzegorz Malhotra MD Responsible Cardiology [phone]            See the Encounter Report to view Anticoagulation Flowsheet and Dosing Calendar (Go to Encounters tab in chart review, and find the Anticoagulation Therapy Visit)    INR 3.1 He had an URI with cough that was treated with an antibiotic and codeine cough syrup. No longer taking either med and cough almost completely resolved. No change in other meds or diet. Will continue current dosing of 10 mg Friday and 7.5 mg all other days. He will eat an additional serving of salad or veggie today. With all of the coughing, his hernia became more noticeable so he is scheduled for a hernia repair on 4/5. Will hold Warfarin 5 days before procedure (starting 3/31) and resume warfarin dosing  evening of procedure (4/5) with recheck of INR 4/16. Dosage adjustment made based on physician directed care plan.    Gladys Herrera RN

## 2018-03-16 NOTE — MR AVS SNAPSHOT
Nigel Rodaret Jr.   3/16/2018 7:00 AM   Anticoagulation Therapy Visit    Description:  77 year old male   Provider:   ANTICOAGULATION   Department:  [facility]           INR as of 3/16/2018     Today's INR 3.1!      Anticoagulation Summary as of 3/16/2018     INR goal 2.0-3.0   Today's INR 3.1!   Full instructions 3/31: Hold; 4/1: Hold; 4/2: Hold; 4/3: Hold; 4/4: Hold; Otherwise 10 mg on Fri; 7.5 mg all other days   Next INR check 4/16/2018    Indications   Long-term (current) use of anticoagulants [Z79.01] [Z79.01]  Atrial fibrillation (H) [I48.91] [I48.91]         Your next Anticoagulation Clinic appointment(s)     Mar 16, 2018  7:00 AM CDT   Anticoagulation Visit with  ANTICOAGULATION   [facility] ([facility])    [address] [address]   [phone]            Apr 16, 2018  7:00 AM CDT   Anticoagulation Visit with  ANTICOAGULATION   [facility] ([facility])    [id number]  [facility] [address]   [phone]              Contact Numbers     Anticoagulant (INR) Clinic Number: [phone]          March 2018 Details    Sun Mon Tue Wed Thu Fri Sat         1               2               3                 4               5               6               7               8               9               10                 11               12               13               14               15               16      10 mg   See details      17      7.5 mg           18      7.5 mg         19      7.5 mg         20      7.5 mg         21      7.5 mg         22      7.5 mg         23      10 mg         24      7.5 mg           25      7.5 mg         26      7.5 mg         27      7.5 mg         28      7.5 mg         29      7.5 mg         30      10 mg         31      Hold          Date Details   03/16 This INR check               How to take your warfarin  dose     To take:  7.5 mg Take 1.5 of the 5 mg tablets.    To take:  10 mg Take 2 of the 5 mg tablets.    Hold Do not take your warfarin dose. See the Details table to the right for additional instructions.                April 2018 Details    Sun Mon Tue Wed Thu Fri Sat     1      Hold         2      Hold         3      Hold         4      Hold         5      7.5 mg         6      10 mg         7      7.5 mg           8      7.5 mg         9      7.5 mg         10      7.5 mg         11      7.5 mg         12      7.5 mg         13      10 mg         14      7.5 mg           15      7.5 mg         16            17               18               19               20               21                 22               23               24               25               26               27               28                 29               30                     Date Details   No additional details    Date of next INR:  4/16/2018         How to take your warfarin dose     To take:  7.5 mg Take 1.5 of the 5 mg tablets.    To take:  10 mg Take 2 of the 5 mg tablets.    Hold Do not take your warfarin dose. See the Details table to the right for additional instructions.

## 2018-03-23 DIAGNOSIS — I48.91 ATRIAL FIBRILLATION, UNSPECIFIED TYPE (H): ICD-10-CM

## 2018-03-23 DIAGNOSIS — Z79.01 LONG TERM CURRENT USE OF ANTICOAGULANT THERAPY: ICD-10-CM

## 2018-03-23 RX ORDER — WARFARIN SODIUM 5 MG/1
TABLET ORAL
Qty: 186 TABLET | Refills: 1 | Status: SHIPPED | OUTPATIENT
Start: 2018-03-23 | End: 2018-11-27

## 2018-04-09 ENCOUNTER — TELEPHONE (OUTPATIENT)
Dept: CARDIOLOGY | Facility: CLINIC | Age: 78
End: 2018-04-09

## 2018-04-09 NOTE — TELEPHONE ENCOUNTER
Pt and his wife are calling to ask about OTC pain meds that pt can take after his hernia repair last week. She gave him a dose of Advil this morning. Advised that he not take NSAIDs as they will increase bleeding risk when taken with Warfarin. Pt will try Tylenol as directed on the packaging and advised that if he is needing to take it regularly for a few days then he should eat extra greens. He is also constipated and will try either Colace or Miralax for constipation. INR appt already scheduled on 4/16/18. Tamar

## 2018-04-16 ENCOUNTER — ANTICOAGULATION THERAPY VISIT (OUTPATIENT)
Dept: CARDIOLOGY | Facility: CLINIC | Age: 78
End: 2018-04-16
Payer: COMMERCIAL

## 2018-04-16 DIAGNOSIS — I48.91 ATRIAL FIBRILLATION, UNSPECIFIED TYPE (H): ICD-10-CM

## 2018-04-16 DIAGNOSIS — Z79.01 LONG-TERM (CURRENT) USE OF ANTICOAGULANTS: ICD-10-CM

## 2018-04-16 LAB — INR POINT OF CARE: 2.5 (ref 0.86–1.14)

## 2018-04-16 PROCEDURE — 36416 COLLJ CAPILLARY BLOOD SPEC: CPT | Performed by: INTERNAL MEDICINE

## 2018-04-16 PROCEDURE — 85610 PROTHROMBIN TIME: CPT | Mod: QW | Performed by: INTERNAL MEDICINE

## 2018-04-16 NOTE — PROGRESS NOTES
ANTICOAGULATION FOLLOW-UP CLINIC VISIT    Patient Name:  Nigel Rodarte Jr.  Date:  4/16/2018  Contact Type:  Face to Face    SUBJECTIVE:     Patient Findings     Positives Intentional hold of therapy    Comments Held 5 days for hernia surgery on 4/5/18           OBJECTIVE    INR Protime   Date Value Ref Range Status   04/16/2018 2.5 (A) 0.86 - 1.14 Final       ASSESSMENT / PLAN  INR assessment THER    Recheck INR In: 3 WEEKS    INR Location Clinic      Anticoagulation Summary as of 4/16/2018     INR goal 2.0-3.0   Today's INR 2.5   Maintenance plan 10 mg (5 mg x 2) on Fri; 7.5 mg (5 mg x 1.5) all other days   Full instructions 10 mg on Fri; 7.5 mg all other days   Weekly total 55 mg   No change documented Gilma Anton RN   Plan last modified Gladys Herrera RN (1/12/2018)   Next INR check 5/7/2018   Target end date Indefinite    Indications   Long-term (current) use of anticoagulants [Z79.01] [Z79.01]  Atrial fibrillation (H) [I48.91] [I48.91]         Anticoagulation Episode Summary     INR check location     Preferred lab     Send INR reminders to [facility] HEART INR NURSE    Comments       Anticoagulation Care Providers     Provider Role Specialty Phone number    Grzegorz Malhotra MD Responsible Cardiology [phone]            See the Encounter Report to view Anticoagulation Flowsheet and Dosing Calendar (Go to Encounters tab in chart review, and find the Anticoagulation Therapy Visit)    INR 2.5.  He had hernia surgery on 4/5/18 and held Warfarin for 5 days prior then he was told by surgeon to hold for 48 hours after so he resumed dosing on 4/7.  He took pain meds the first couple days but nothing since then.  Continue same schedule and recheck in 3 weeks.  Bay Anton, RN

## 2018-04-16 NOTE — MR AVS SNAPSHOT
Nigel Rodarte Jr.   4/16/2018 7:00 AM   Anticoagulation Therapy Visit    Description:  77 year old male   Provider:  YAIR ANTICOAGULATION   Department:  [facility]           INR as of 4/16/2018     Today's INR 2.5      Anticoagulation Summary as of 4/16/2018     INR goal 2.0-3.0   Today's INR 2.5   Full instructions 10 mg on Fri; 7.5 mg all other days   Next INR check 5/7/2018    Indications   Long-term (current) use of anticoagulants [Z79.01] [Z79.01]  Atrial fibrillation (H) [I48.91] [I48.91]         Your next Anticoagulation Clinic appointment(s)     May 07, 2018  7:00 AM CDT   Anticoagulation Visit with  ANTICOAGULATION   [facility] ([facility])    [address] [place] [address]   [phone] OPT 2              Contact Numbers     Anticoagulant (INR) Clinic Number: [phone]          April 2018 Details    Sun Mon Tue Wed Thu Fri Sat     1               2               3               4               5               6               7                 8               9               10               11               12               13               14                 15               16      7.5 mg   See details      17      7.5 mg         18      7.5 mg         19      7.5 mg         20      10 mg         21      7.5 mg           22      7.5 mg         23      7.5 mg         24      7.5 mg         25      7.5 mg         26      7.5 mg         27      10 mg         28      7.5 mg           29      7.5 mg         30      7.5 mg               Date Details   04/16 This INR check               How to take your warfarin dose     To take:  7.5 mg Take 1.5 of the 5 mg tablets.    To take:  10 mg Take 2 of the 5 mg tablets.           May 2018 Details    Sun Mon Tue Wed Thu Fri Sat       1      7.5 mg         2      7.5 mg         3      7.5 mg         4      10 mg         5      7.5 mg           6      7.5 mg         7            8                9               10               11               12                 13               14               15               16               17               18               19                 20               21               22               23               24               25               26                 27               28               29               30               31                  Date Details   No additional details    Date of next INR:  5/7/2018         How to take your warfarin dose     To take:  7.5 mg Take 1.5 of the 5 mg tablets.    To take:  10 mg Take 2 of the 5 mg tablets.

## 2018-05-07 ENCOUNTER — ANTICOAGULATION THERAPY VISIT (OUTPATIENT)
Dept: CARDIOLOGY | Facility: CLINIC | Age: 78
End: 2018-05-07
Payer: COMMERCIAL

## 2018-05-07 DIAGNOSIS — Z79.01 LONG-TERM (CURRENT) USE OF ANTICOAGULANTS: ICD-10-CM

## 2018-05-07 DIAGNOSIS — I48.91 ATRIAL FIBRILLATION, UNSPECIFIED TYPE (H): ICD-10-CM

## 2018-05-07 LAB — INR POINT OF CARE: 3 (ref 0.86–1.14)

## 2018-05-07 PROCEDURE — 85610 PROTHROMBIN TIME: CPT | Mod: QW | Performed by: INTERNAL MEDICINE

## 2018-05-07 PROCEDURE — 36416 COLLJ CAPILLARY BLOOD SPEC: CPT | Performed by: INTERNAL MEDICINE

## 2018-05-07 NOTE — MR AVS SNAPSHOT
Nigel Rodarte Jr.   5/7/2018 7:00 AM   Anticoagulation Therapy Visit    Description:  77 year old male   Provider:  YAIR ANTICOAGULATION   Department:  [facility]           INR as of 5/7/2018     Today's INR 3.0      Anticoagulation Summary as of 5/7/2018     INR goal 2.0-3.0   Today's INR 3.0   Full instructions 10 mg on Fri; 7.5 mg all other days   Next INR check 6/11/2018    Indications   Long-term (current) use of anticoagulants [Z79.01] [Z79.01]  Atrial fibrillation (H) [I48.91] [I48.91]         Your next Anticoagulation Clinic appointment(s)     Jun 11, 2018  7:00 AM CDT   Anticoagulation Visit with  ANTICOAGULATION   [facility] ([facility])    [address] [place] [id number]   [phone] OPT 2              Contact Numbers     Anticoagulant (INR) Clinic Number: [phone]          May 2018 Details    Sun Mon Tue Wed Thu Fri Sat       1               2               3               4               5                 6               7      7.5 mg   See details      8      7.5 mg         9      7.5 mg         10      7.5 mg         11      10 mg         12      7.5 mg           13      7.5 mg         14      7.5 mg         15      7.5 mg         16      7.5 mg         17      7.5 mg         18      10 mg         19      7.5 mg           20      7.5 mg         21      7.5 mg         22      7.5 mg         23      7.5 mg         24      7.5 mg         25      10 mg         26      7.5 mg           27      7.5 mg         28      7.5 mg         29      7.5 mg         30      7.5 mg         31      7.5 mg            Date Details   05/07 This INR check               How to take your warfarin dose     To take:  7.5 mg Take 1.5 of the 5 mg tablets.    To take:  10 mg Take 2 of the 5 mg tablets.           June 2018 Details    Sun Mon Tue Wed Thu Fri Sat          1      10 mg         2      7.5 mg           3      7.5 mg         4       7.5 mg         5      7.5 mg         6      7.5 mg         7      7.5 mg         8      10 mg         9      7.5 mg           10      7.5 mg         11            12               13               14               15               16                 17               18               19               20               21               22               23                 24               25               26               27               28               29               30                Date Details   No additional details    Date of next INR:  6/11/2018         How to take your warfarin dose     To take:  7.5 mg Take 1.5 of the 5 mg tablets.    To take:  10 mg Take 2 of the 5 mg tablets.

## 2018-05-07 NOTE — PROGRESS NOTES
ANTICOAGULATION FOLLOW-UP CLINIC VISIT    Patient Name:  Nigel Rodarte Jr.  Date:  5/7/2018  Contact Type:  Face to Face    SUBJECTIVE:     Patient Findings     Positives No Problem Findings           OBJECTIVE    INR Protime   Date Value Ref Range Status   05/07/2018 3.0 (A) 0.86 - 1.14 Final       ASSESSMENT / PLAN  INR assessment THER    Recheck INR In: 5 WEEKS    INR Location Clinic      Anticoagulation Summary as of 5/7/2018     INR goal 2.0-3.0   Today's INR 3.0   Maintenance plan 10 mg (5 mg x 2) on Fri; 7.5 mg (5 mg x 1.5) all other days   Full instructions 10 mg on Fri; 7.5 mg all other days   Weekly total 55 mg   No change documented Gladys Herrera, RN   Plan last modified Gladys Herrera RN (1/12/2018)   Next INR check 6/11/2018   Target end date Indefinite    Indications   Long-term (current) use of anticoagulants [Z79.01] [Z79.01]  Atrial fibrillation (H) [I48.91] [I48.91]         Anticoagulation Episode Summary     INR check location     Preferred lab     Send INR reminders to [facility] HEART INR NURSE    Comments       Anticoagulation Care Providers     Provider Role Specialty Phone number    Grzegorz Malhotra MD Responsible Cardiology [phone]            See the Encounter Report to view Anticoagulation Flowsheet and Dosing Calendar (Go to Encounters tab in chart review, and find the Anticoagulation Therapy Visit)    INR 3.0 No change in meds. He thinks he ate fewer greens recently. No abnormal bleeding or bruising. He will add the salads back to his diet. Will continue current dosing of 10 mg Fridays and 7.5 mg all other days with recheck in 5 weeks. If still in range at that time will plan to stretch interval to 6 weeks.    Gladys Herrera, RN

## 2018-05-20 ENCOUNTER — HOSPITAL ENCOUNTER (EMERGENCY)
Facility: CLINIC | Age: 78
Discharge: HOME | End: 2018-05-20
Attending: EMERGENCY MEDICINE | Admitting: EMERGENCY MEDICINE
Payer: MEDICARE

## 2018-05-20 VITALS
HEART RATE: 79 BPM | DIASTOLIC BLOOD PRESSURE: 87 MMHG | WEIGHT: 162 LBS | BODY MASS INDEX: 23.99 KG/M2 | SYSTOLIC BLOOD PRESSURE: 129 MMHG | HEIGHT: 69 IN | OXYGEN SATURATION: 95 % | RESPIRATION RATE: 16 BRPM | TEMPERATURE: 97.1 F

## 2018-05-20 DIAGNOSIS — I48.0 PAROXYSMAL ATRIAL FIBRILLATION (H): ICD-10-CM

## 2018-05-20 LAB
ANION GAP SERPL CALCULATED.3IONS-SCNC: 4 MMOL/L (ref 3–14)
BASOPHILS # BLD AUTO: 0 10E9/L (ref 0–0.2)
BASOPHILS NFR BLD AUTO: 0.8 %
BUN SERPL-MCNC: 23 MG/DL (ref 7–30)
CALCIUM SERPL-MCNC: 8.8 MG/DL (ref 8.5–10.1)
CHLORIDE SERPL-SCNC: 111 MMOL/L (ref 94–109)
CO2 SERPL-SCNC: 27 MMOL/L (ref 20–32)
CREAT SERPL-MCNC: 1.18 MG/DL (ref 0.66–1.25)
DIFFERENTIAL METHOD BLD: NORMAL
EOSINOPHIL # BLD AUTO: 0.1 10E9/L (ref 0–0.7)
EOSINOPHIL NFR BLD AUTO: 2.3 %
ERYTHROCYTE [DISTWIDTH] IN BLOOD BY AUTOMATED COUNT: 13.1 % (ref 10–15)
GFR SERPL CREATININE-BSD FRML MDRD: 60 ML/MIN/1.7M2
GLUCOSE SERPL-MCNC: 97 MG/DL (ref 70–99)
HCT VFR BLD AUTO: 45.1 % (ref 40–53)
HGB BLD-MCNC: 15.5 G/DL (ref 13.3–17.7)
IMM GRANULOCYTES # BLD: 0 10E9/L (ref 0–0.4)
IMM GRANULOCYTES NFR BLD: 0.2 %
INR PPP: 2.22 (ref 0.86–1.14)
INTERPRETATION ECG - MUSE: NORMAL
INTERPRETATION ECG - MUSE: NORMAL
LYMPHOCYTES # BLD AUTO: 2 10E9/L (ref 0.8–5.3)
LYMPHOCYTES NFR BLD AUTO: 41 %
MCH RBC QN AUTO: 30.5 PG (ref 26.5–33)
MCHC RBC AUTO-ENTMCNC: 34.4 G/DL (ref 31.5–36.5)
MCV RBC AUTO: 89 FL (ref 78–100)
MONOCYTES # BLD AUTO: 0.5 10E9/L (ref 0–1.3)
MONOCYTES NFR BLD AUTO: 9.9 %
NEUTROPHILS # BLD AUTO: 2.2 10E9/L (ref 1.6–8.3)
NEUTROPHILS NFR BLD AUTO: 45.8 %
PLATELET # BLD AUTO: 188 10E9/L (ref 150–450)
POTASSIUM SERPL-SCNC: 4.5 MMOL/L (ref 3.4–5.3)
RBC # BLD AUTO: 5.09 10E12/L (ref 4.4–5.9)
SODIUM SERPL-SCNC: 142 MMOL/L (ref 133–144)
WBC # BLD AUTO: 4.8 10E9/L (ref 4–11)

## 2018-05-20 PROCEDURE — 85025 COMPLETE CBC W/AUTO DIFF WBC: CPT | Performed by: EMERGENCY MEDICINE

## 2018-05-20 PROCEDURE — 99285 EMERGENCY DEPT VISIT HI MDM: CPT

## 2018-05-20 PROCEDURE — 85610 PROTHROMBIN TIME: CPT | Performed by: EMERGENCY MEDICINE

## 2018-05-20 PROCEDURE — 93005 ELECTROCARDIOGRAM TRACING: CPT

## 2018-05-20 PROCEDURE — 80048 BASIC METABOLIC PNL TOTAL CA: CPT | Performed by: EMERGENCY MEDICINE

## 2018-05-20 PROCEDURE — 93005 ELECTROCARDIOGRAM TRACING: CPT | Mod: 76

## 2018-05-20 ASSESSMENT — ENCOUNTER SYMPTOMS
RHINORRHEA: 0
CHILLS: 0
SORE THROAT: 0
FEVER: 0
PALPITATIONS: 1
COUGH: 0

## 2018-05-20 NOTE — DISCHARGE INSTRUCTIONS
Understanding Atrial Fibrillation  What is atrial fibrillation?  Atrial fibrillation is an irregular heartbeat. It is fairly common, but it can be serious.   The atria are the two upper chambers of the heart. Normally, they have a steady, constant beat.   If the beat is rapid and uneven, we call this atrial fibrillation. It may feel as if your heart is racing out of control. This can cause discomfort, but the real danger is that blood can pool and form clots in the heart.   If a piece of a blood clot breaks loose, it may travel through the arteries until it gets stuck. This could block blood flow to an organ or other body part. If it blocks a heart artery, you could have a heart attack. If it blocks a brain artery, you could have a stroke.   What causes it?  Atrial fibrillation can be caused by:    Heart disease, such as coronary artery disease, myocarditis (inflammation of the heart muscle), rheumatic heart disease or heart valve disorder    Heart defects you were born with    A long history of high blood pressure    Swelling caused by an injury near the heart.  Sometimes it seems to happen for no reason.  What are the symptoms?  Not everyone feels symptoms, but if they do, symptoms may include:    Fast, fluttering or irregular heartbeat    Chest pain    Trouble breathing, or a sense that you can't catch your breath    Feeling weak or dizzy    Feeling far more tired than normal    Cold sweats.  How is it diagnosed?  Your doctor will do an electrocardiogram (EKG). Electrodes will be place on your wrists, ankles and chest. Wires connect the electrodes to an EKG machine, which will record electrical signals from your heart.  How is it treated?  Treatment is important to reduce the risk of stroke, heart attack or other tissue damage.    A number of medicines are used to treat atrial fibrillation. Some slow your heart rate. Some help change the heartbeat back to normal. Some help keep blood clots from forming.    An  electric charge may be used to get your heart back to its regular beat.    In some cases, a pacemaker or another implanted device can be used to control your heartbeat.  For informational purposes only. Not to replace the advice of your health care provider.   Copyright   2006 [employer] [facility]. All rights reserved. [employer] [id number] - REV 07/17.

## 2018-05-20 NOTE — ED AVS SNAPSHOT
Emergency Department    [address] [place] [address]    Phone:  [phone]    Fax:  [phone]                                       Nigel Rodarte Jr.   MRN: [id number]    Department:   Emergency Department   Date of Visit:  5/20/2018           After Visit Summary Signature Page     I have received my discharge instructions, and my questions have been answered. I have discussed any challenges I see with this plan with the nurse or doctor.    ..........................................................................................................................................  Patient/Patient Representative Signature      ..........................................................................................................................................  Patient Representative Print Name and Relationship to Patient    ..................................................               ................................................  Date                                            Time    ..........................................................................................................................................  Reviewed by Signature/Title    ...................................................              ..............................................  Date                                                            Time

## 2018-05-20 NOTE — ED PROVIDER NOTES
History     Chief Complaint:  Tachycardia    HPI   Nigel Rodarte Jr. is a 77 year old male with a history of paroxysmal atrial fibrillation on Warfarin and renal disease who presents with tachycardia. The patient states [place] around 0800 when he noticed his heart felt as if it was beating faster and more irregularly than normal. The patient reports the last time he experienced these symptoms was 5/16/17 where he was cardioverted and started on Warfarin. The patient notes he went home and took an extra Warfarin in attempt to stop his symptoms, but they persisted, so he came to the ED for further evaluation. On presentation to the ED, the patient reports his symptoms feel the same and notes he feels slightly weaker than normal. Of note, the patient does report he took his medications off schedule yesterday because he was working in the morning, and is wondering if that caused his symptoms. The patient denies any recent cold, cough, or other acute symptoms.     Allergies:  No known drug allergies    Medications:    Flecainide  Metoprolol succinate  Warfarin    Past Medical History:    Arthritis  Paroxysmal atrial fibrillation  Renal disease  Ureteral stones    Past Surgical History:    Appendectomy  Arthroplasty right hip  Cardioversion  Cystoscopy with retrogrades and stone extraction  Right ear surgery  Orthopedic shoulder surgery  Bilateral carpal tunnel surgery  Hernia repair    Family History:    Lung cancer    Social History:  Smoking status: No  Alcohol use: No  PCP: Osmar Espinoza  Presents to the ED with his spouse  Marital Status:  [2]     Review of Systems   Constitutional: Negative for chills and fever.   HENT: Negative for congestion, rhinorrhea and sore throat.    Respiratory: Negative for cough.    Cardiovascular: Positive for palpitations. Negative for chest pain.   All other systems reviewed and are negative.    Physical Exam  "    Patient Vitals for the past 24 hrs:   BP Temp Temp src Pulse Resp SpO2 Height Weight   05/20/18 0927 129/87 97.1  F (36.2  C) Temporal 79 18 95 % 1.753 m (5' 9\") 73.5 kg (162 lb)     Physical Exam  Constitutional: Thin white male supine. No respiratory distress.  HENT: No signs of trauma.   Eyes: EOM are normal. Pupils are equal, round, and reactive to light.   Neck: Normal range of motion. No JVD present. No cervical adenopathy.  Cardiovascular: Regular rhythm.  Exam reveals no gallop and no friction rub.    No murmur heard.  Pulmonary/Chest: Bilateral breath sounds normal. No wheezes, rhonchi or rales.  Abdominal: Soft. No tenderness. No rebound or guarding. Healing surgical incisions. 2+ femoral pulses bilateral.  Musculoskeletal: No edema. No tenderness.   Lymphadenopathy: No lymphadenopathy.   Neurological: Alert and oriented to person, place, and time. Normal strength. Coordination normal.   Skin: Skin is warm and dry. No rash noted. No erythema.     Emergency Department Course   ECG (09:32:27):  Rate 84 bpm. WV interval *. QRS duration 92. QT/QTc 388/458. P-R-T axes * 62 57. Atrial fibrillation. Nonspecific ST abnormality. Interpreted by Cortez Barroso MD.    ECG (09:43:05):  Rate 61 bpm. WV interval 200. QRS duration 94. QT/QTc 418/420. P-R-T axes 74 75 73. Normal sinus rhythm. Interpreted by Cortez Barroso MD.    Laboratory:  CBC: WBC 4.8, HGB 15.5,   BMP: Chloride 111, GFR 60, Creatinine 1.18, all other fields WNL  INR: 2.22    Emergency Department Course:  Past medical records, nursing notes, and vitals reviewed.  0929: I performed an exam of the patient and obtained history, as documented above.  ECG performed, results above.  IV inserted and blood drawn.    0939: The patient converted to normal sinus rhythm without intervention. A second ECG was obtained, results above.    1026: I rechecked the patient. Explained findings to the patient.    I rechecked the patient. Findings " and plan explained to the patient. Patient discharged home with instructions regarding supportive care, medications, and reasons to return. The importance of close follow-up was reviewed.     Impression & Plan    Medical Decision Making:  Nigel Rodarte Jr. is a 77 year old male with a history of paroxysmal atrial fibrillation on Coumadin, Metoprolol, and Tambocor. 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 The patient came to the ED to be checked out after his symptoms persisted. He denies chest pain or dyspnea at rest or any new medications or problems. The patient was late on taking his Tambocor dose yesterday and is uncertain if this may have affected things. On exam, he is comfortable and his pulse is in the 70s, but he is in atrial fibrillation. We did discuss cardioversion again and he agreed to have this done. However, while I was examining him, he converted back to a sinus rhythm. His blood was checked. His Coumadin is therapeutic. We talked about what he could do in the future. If he is not significantly symptomatic, I suggested he may be able to wait a few hours to see if it resolves. If his pulse is fast, he may take another Metoprolol and I have asked him to call his electrophysiologist tomorrow to discuss whether or not he could even take an extra Tambocor if he notices it becoming irregular again. The patient will be discharged home.    Diagnosis:    ICD-10-CM   1. Paroxysmal atrial fibrillation (H) I48.0     Disposition: Discharged to home    Cecile Jones  5/20/2018    EMERGENCY DEPARTMENT    I, Cecile Jones, am serving as a scribe at 9:29 AM on 5/20/2018 to document services personally performed by Cortez Barroso MD based on my observations and the provider's statements to me.      Cortez Barroso MD  05/20/18 2030

## 2018-05-20 NOTE — ED AVS SNAPSHOT
Emergency Department    [address] [facility] [address]    Phone:  [phone]    Fax:  [phone]                                       Nigel Rodarte Jr.   MRN: [id number]    Department:   Emergency Department   Date of Visit:  5/20/2018           Patient Information     Date Of Birth          1940        Your diagnoses for this visit were:     Paroxysmal atrial fibrillation (H)        You were seen by Cortez Barroso MD.      Follow-up Information     Follow up with Grzegorz Malhotra MD. Call in 1 day.    Specialties:  Cardiology, Cardiology    Why:  recheck ed, If symptoms worsen    Contact information:    [address]  [place] [address] [phone]          Discharge Instructions       Understanding Atrial Fibrillation  What is atrial fibrillation?  Atrial fibrillation is an irregular heartbeat. It is fairly common, but it can be serious.   The atria are the two upper chambers of the heart. Normally, they have a steady, constant beat.   If the beat is rapid and uneven, we call this atrial fibrillation. It may feel as if your heart is racing out of control. This can cause discomfort, but the real danger is that blood can pool and form clots in the heart.   If a piece of a blood clot breaks loose, it may travel through the arteries until it gets stuck. This could block blood flow to an organ or other body part. If it blocks a heart artery, you could have a heart attack. If it blocks a brain artery, you could have a stroke.   What causes it?  Atrial fibrillation can be caused by:    Heart disease, such as coronary artery disease, myocarditis (inflammation of the heart muscle), rheumatic heart disease or heart valve disorder    Heart defects you were born with    A long history of high blood pressure    Swelling caused by an injury near the heart.  Sometimes it seems to happen for no reason.  What are the symptoms?  Not everyone feels symptoms, but if they do,  symptoms may include:    Fast, fluttering or irregular heartbeat    Chest pain    Trouble breathing, or a sense that you can't catch your breath    Feeling weak or dizzy    Feeling far more tired than normal    Cold sweats.  How is it diagnosed?  Your doctor will do an electrocardiogram (EKG). Electrodes will be place on your wrists, ankles and chest. Wires connect the electrodes to an EKG machine, which will record electrical signals from your heart.  How is it treated?  Treatment is important to reduce the risk of stroke, heart attack or other tissue damage.    A number of medicines are used to treat atrial fibrillation. Some slow your heart rate. Some help change the heartbeat back to normal. Some help keep blood clots from forming.    An electric charge may be used to get your heart back to its regular beat.    In some cases, a pacemaker or another implanted device can be used to control your heartbeat.  For informational purposes only. Not to replace the advice of your health care provider.   Copyright   2006 [facility]. All rights reserved. [employer] 638740 - REV 07/17.      Your next 10 appointments already scheduled     Jun 11, 2018  7:00 AM CDT   Anticoagulation Visit with [facility]   [facility] ([facility])    [address] [place] [address] [phone] OPT 2              24 Hour Appointment Hotline       To make an appointment at any [facility], call [phone] ([phone]). If you don't have a family doctor or clinic, we will help you find one. [facility] are conveniently located to serve the needs of you and your family.             Review of your medicines      Our records show that you are taking the medicines listed below. If these are incorrect, please call your family doctor or clinic.        Dose / Directions Last dose taken    flecainide 50 MG tablet   Commonly known as:  TAMBOCOR    Dose:  50 mg   Quantity:  180 tablet        Take 1 tablet (50 mg) by mouth 2 times daily   Refills:  2        metoprolol succinate 25 MG 24 hr tablet   Commonly known as:  TOPROL-XL   Quantity:  90 tablet        Take 1 tab daily   Refills:  3        warfarin 5 MG tablet   Commonly known as:  COUMADIN   Quantity:  186 tablet        Take 2 tabs on Fridays and take 1 1/2 tabs on all other days or as directed per INR clinic   Refills:  1                Procedures and tests performed during your visit     Procedure/Test Number of Times Performed    Basic metabolic panel 1    CBC with platelets differential 1    EKG 12-lead, tracing only 2    INR 1      Orders Needing Specimen Collection     None      Pending Results     No orders found from 5/18/2018 to 5/21/2018.            Pending Culture Results     No orders found from 5/18/2018 to 5/21/2018.            Pending Results Instructions     If you had any lab results that were not finalized at the time of your Discharge, you can call the ED Lab Result RN at [phone]. You will be contacted by this team for any positive Lab results or changes in treatment. The nurses are available 7 days a week from 10A to 6:30P.  You can leave a message 24 hours per day and they will return your call.        Test Results From Your Hospital Stay        5/20/2018 10:09 AM      Component Results     Component Value Ref Range & Units Status    WBC 4.8 4.0 - 11.0 10e9/L Final    RBC Count 5.09 4.4 - 5.9 10e12/L Final    Hemoglobin 15.5 13.3 - 17.7 g/dL Final    Hematocrit 45.1 40.0 - 53.0 % Final    MCV 89 78 - 100 fl Final    MCH 30.5 26.5 - 33.0 pg Final    MCHC 34.4 31.5 - 36.5 g/dL Final    RDW 13.1 10.0 - 15.0 % Final    Platelet Count 188 150 - 450 10e9/L Final    Diff Method Automated Method  Final    % Neutrophils 45.8 % Final    % Lymphocytes 41.0 % Final    % Monocytes 9.9 % Final    % Eosinophils 2.3 % Final    % Basophils 0.8 % Final    % Immature Granulocytes 0.2 % Final     Absolute Neutrophil 2.2 1.6 - 8.3 10e9/L Final    Absolute Lymphocytes 2.0 0.8 - 5.3 10e9/L Final    Absolute Monocytes 0.5 0.0 - 1.3 10e9/L Final    Absolute Eosinophils 0.1 0.0 - 0.7 10e9/L Final    Absolute Basophils 0.0 0.0 - 0.2 10e9/L Final    Abs Immature Granulocytes 0.0 0 - 0.4 10e9/L Final         5/20/2018 10:16 AM      Component Results     Component Value Ref Range & Units Status    INR 2.22 (H) 0.86 - 1.14 Final         5/20/2018 10:16 AM      Component Results     Component Value Ref Range & Units Status    Sodium 142 133 - 144 mmol/L Final    Potassium 4.5 3.4 - 5.3 mmol/L Final    Chloride 111 (H) 94 - 109 mmol/L Final    Carbon Dioxide 27 20 - 32 mmol/L Final    Anion Gap 4 3 - 14 mmol/L Final    Glucose 97 70 - 99 mg/dL Final    Urea Nitrogen 23 7 - 30 mg/dL Final    Creatinine 1.18 0.66 - 1.25 mg/dL Final    GFR Estimate 60 (L) >60 mL/min/1.7m2 Final    Non  GFR Calc    GFR Estimate If Black 72 >60 mL/min/1.7m2 Final    African American GFR Calc    Calcium 8.8 8.5 - 10.1 mg/dL Final                Clinical Quality Measure: Blood Pressure Screening     Your blood pressure was checked while you were in the emergency department today. The last reading we obtained was  BP: 129/87 . Please read the guidelines below about what these numbers mean and what you should do about them.  If your systolic blood pressure (the top number) is less than 120 and your diastolic blood pressure (the bottom number) is less than 80, then your blood pressure is normal. There is nothing more that you need to do about it.  If your systolic blood pressure (the top number) is 120-139 or your diastolic blood pressure (the bottom number) is 80-89, your blood pressure may be higher than it should be. You should have your blood pressure rechecked within a year by a primary care provider.  If your systolic blood pressure (the top number) is 140 or greater or your diastolic blood pressure (the bottom number) is 90  "or greater, you may have high blood pressure. High blood pressure is treatable, but if left untreated over time it can put you at risk for heart attack, stroke, or kidney failure. You should have your blood pressure rechecked by a primary care provider within the next 4 weeks.  If your provider in the emergency department today gave you specific instructions to follow-up with your doctor or provider even sooner than that, you should follow that instruction and not wait for up to 4 weeks for your follow-up visit.        Thank you for choosing [facility]       Thank you for choosing [facility] for your care. Our goal is always to provide you with excellent care. Hearing back from our patients is one way we can continue to improve our services. Please take a few minutes to complete the written survey that you may receive in the mail after you visit with us. Thank you!        "[employer]hart Information     [employer] lets you send messages to your doctor, view your test results, renew your prescriptions, schedule appointments and more. To sign up, go to [email or web address] . Click on \"Log in\" on the left side of the screen, which will take you to the Welcome page. Then click on \"Sign up Now\" on the right side of the page.     You will be asked to enter the access code listed below, as well as some personal information. Please follow the directions to create your username and password.     Your access code is: [email or web address]  Expires: 2018 10:25 AM     Your access code will  in 90 days. If you need help or a new code, please call your [facility] clinic or [phone].        Care EveryWhere ID     This is your Care EveryWhere ID. This could be used by other organizations to access your [facility] medical records  [id number]        Equal Access to Services     ISABELA PEREZ AH: palomo Chisholm, cameron knapp. So aleksey " [phone].    ATENCIÓN: Si habla español, tiene a funez disposición servicios gratuitos de asistencia lingüística. Llame al [phone].    We comply with applicable federal civil rights laws and [place] laws. We do not discriminate on the basis of race, color, national origin, age, disability, sex, sexual orientation, or gender identity.            After Visit Summary       This is your record. Keep this with you and show to your community pharmacist(s) and doctor(s) at your next visit.

## 2018-05-25 ENCOUNTER — OFFICE VISIT (OUTPATIENT)
Dept: CARDIOLOGY | Facility: CLINIC | Age: 78
End: 2018-05-25
Payer: COMMERCIAL

## 2018-05-25 VITALS
DIASTOLIC BLOOD PRESSURE: 79 MMHG | WEIGHT: 168 LBS | BODY MASS INDEX: 24.88 KG/M2 | HEART RATE: 62 BPM | HEIGHT: 69 IN | SYSTOLIC BLOOD PRESSURE: 124 MMHG

## 2018-05-25 DIAGNOSIS — I48.91 ATRIAL FIBRILLATION, UNSPECIFIED TYPE (H): ICD-10-CM

## 2018-05-25 DIAGNOSIS — I48.0 PAROXYSMAL ATRIAL FIBRILLATION (H): Primary | ICD-10-CM

## 2018-05-25 DIAGNOSIS — R00.2 PALPITATIONS: ICD-10-CM

## 2018-05-25 PROCEDURE — 99214 OFFICE O/P EST MOD 30 MIN: CPT | Performed by: PHYSICIAN ASSISTANT

## 2018-05-25 PROCEDURE — 93000 ELECTROCARDIOGRAM COMPLETE: CPT | Performed by: PHYSICIAN ASSISTANT

## 2018-05-25 NOTE — PROGRESS NOTES
"Service Date: 05/25/2018      HISTORY OF PRESENT ILLNESS:  I had the pleasure of seeing Mr. Rodarte today when he came for followup of her recent ER visit.  He is a 78-year-old with a history of atrial fibrillation first diagnosed in 05/2017.  He had feelings of \"unwellness\" and palpitations.  He underwent cardioversion and was initially placed on metoprolol and Eliquis.  He subsequently saw Dr. Malhotra and flecainide was added.  He was also switched to warfarin.      He saw Dr. Malhotra in January at which time things were going well with no recurrent atrial fibrillation.  A stress echocardiogram was done 02/2018 showing no proarrhythmic effect and no evidence of ischemia. On 05/01, he underwent hernia repair at [facility].  He did well through that, but on 05/20 he presented to the Emergency Department after he had noted more fatigue.  He checked his heart rate and it was irregular, but in the 80s.  He opted to come to the Emergency Department about an hour or 2 after this occurred.  EKG confirmed atrial fibrillation with a controlled ventricular response.  They talked about cardioversion as he was adequately anticoagulated.  However, he spontaneously converted to sinus rhythm with a heart rate of 61.      Nigel thinks that this may have occurred as he took his flecainide at odd hours the day previous.  He had taken the evening dose of flecainide about 2-3 hours after he typically would.  He did not have any increased amount of alcohol.  He did not have any issues with caffeine or allergy medications or anything else to account for this.      Since then, he does not think he has had any recurrent atrial fibrillation.  He remains on flecainide 50 and metoprolol succinate 25.  He is also on warfarin without any bleeding issues.      EKG today which I overread showed sinus bradycardia at 49 beats per minute, QRS duration on flecainide 50 mg twice daily was 92 milliseconds.        ASSESSMENT AND PLAN:     1.  " Recurrent paroxysmal atrial fibrillation.  This is generally symptomatic despite controlled heart rate with palpitations and fatigue.  He spontaneously converted just now on 05/20 and he does not think he has had any atrial fibrillation recurrence before or after.      We discussed the potential etiology was likely a change in the dosing schedule of his flecainide.  I have encouraged him to try to take these 12 hours apart.      We also talked about potentially increasing flecainide to 75 mg twice daily.  We would have to reduce his metoprolol to 12.5 mg and probably have him take this at night given underlying bradycardia.      Finally, we also talked about the fact that we could have him just continue to monitor everything and if he had more recurrent atrial fibrillation lasting longer than an hour he could take an extra dose of metoprolol in 2-3 hours.  If he had not converted to sinus rhythm, he could potentially either contact our office so we could set up an outpatient cardioversion or if he is feeling unwell over the weekend go to the emergency department for cardioversion.      I explained that as he is completely anticoagulated we did not have as much of a time sensitivity as  cardioversion could take place without increased risk of stroke.      At the current time, he agrees that he would like to just keep monitoring everything and continue a more routine dosing schedule of flecainide.  He will call us if he gets any recurrent atrial fibrillation or if he has to start using increased doses of metoprolol.      2.  Dyslipidemia.  He asked me to comment on his cholesterol panel done at Dr. Espinoza's office.  He has been getting annual cholesterol checks.  The last one was done in 01/2018.  Total cholesterol was 244, triglycerides 111, HDL 44 and LDL was 178.  I explained that that certainly was higher than goal.  He has changed his diet as Dr. Espinoza suggested and has really cut out a lot of the white rice and  bread.  He eats very little processed food now.  He is interested in rechecking his cholesterol to see if these dietary modifications have made a difference and I have recommended that he call Dr. Espinoza's office and see if they would check it about 6 months after he made these dietary modifications to see if he has made any progress on his cholesterol.      It has been a pleasure to see Nigel in clinic.         JIMMY PONCE PA-C             D: 2018   T: 2018   MT: BRIGHT      Name:     NIGEL KO   MRN:      [id number]        Account:      [id number]   :      1940           Service Date: 2018      Document: [id number]

## 2018-05-25 NOTE — MR AVS SNAPSHOT
"              After Visit Summary   5/25/2018    Nigel Rodarte Jr.    MRN: [id number]           Patient Information     Date Of Birth          1940        Visit Information        Provider Department      5/25/2018 7:30 AM Briseyda Meyers PA-C [facility]        Today's Diagnoses     Paroxysmal atrial fibrillation (H)    -  1    Atrial fibrillation, unspecified type (H)        Palpitations          Care Instructions    1. EKG today showed normal rhythm. Your heart rate is a bit on the slow side.    2. Talk to Dr. Espinoza's office about when you should have your cholesterol rechecked given that you've changed your diet.     For LDL/bad cholesterol    Limit saturated fat in diet (from animal products)    Red meat    High fat dairy products, like cheese, ice cream, butter, etc    Processed foods (creamy salad dressings, peanut butter ... Check serving size!!)    Restaurant foods    Limit \"junk\" - crackers, chips, cookies, etc     Exercise - aim for at least 150 minutes of AEROBIC exercise per week      3. Discussed the ER visit - make sure you take the meds roughly 12 hours apart to help prevent recurrent AFib.     4. For future episodes, OK to just monitor for ~ 1 hour, then take an extra metoprolol XL 25 mg x 1.  If AFib continues to last ~2-3 hours, call us during business hours (to help avoid ER visit) or go to ER.    5. Our AFib Nurses (Fifi Jimenez and Lazarus): [phone]    6. See us back in 1/2019 per Dr. Malhotra's instructino          Follow-ups after your visit        Additional Services     Follow-Up with Electrophysiologist                 Your next 10 appointments already scheduled     Jun 11, 2018  7:00 AM CDT   Anticoagulation Visit with [facility]TICO[facility]   [facility] ([facility])    [address] [place], [place] [address]  [facility] [address] [phone] OPT 2              Future tests that were ordered for you " "today     Open Future Orders        Priority Expected Expires Ordered    EKG 12-lead complete w/read - Clinics Routine 2019    Follow-Up with Electrophysiologist Routine 2019            Who to contact     If you have questions or need follow up information about today's clinic visit or your schedule please contact [facility] directly at [phone].  Normal or non-critical lab and imaging results will be communicated to you by MyChart, letter or phone within 4 business days after the clinic has received the results. If you do not hear from us within 7 days, please contact the clinic through [email or web address] or phone. If you have a critical or abnormal lab result, we will notify you by phone as soon as possible.  Submit refill requests through [employer] or call your pharmacy and they will forward the refill request to us. Please allow 3 business days for your refill to be completed.          Additional Information About Your Visit        [email or web address] Information     [employer] lets you send messages to your doctor, view your test results, renew your prescriptions, schedule appointments and more. To sign up, go to [email or web address] . Click on \"Log in\" on the left side of the screen, which will take you to the Welcome page. Then click on \"Sign up Now\" on the right side of the page.     You will be asked to enter the access code listed below, as well as some personal information. Please follow the directions to create your username and password.     Your access code is: [email or web address]  Expires: 2018 10:25 AM     Your access code will  in 90 days. If you need help or a new code, please call your [facility] clinic or [phone].        Care EveryWhere ID     This is your Care EveryWhere ID. This could be used by other organizations to access your [facility] medical records  [id number]        Your Vitals Were     Pulse Height BMI (Body " "Mass Index)             62 1.753 m (5' 9\") 24.81 kg/m2          Blood Pressure from Last 3 Encounters:   05/25/18 124/79   05/20/18 129/87   01/25/18 121/76    Weight from Last 3 Encounters:   05/25/18 76.2 kg (168 lb)   05/20/18 73.5 kg (162 lb)   01/25/18 76.7 kg (169 lb)              We Performed the Following     EKG 12-lead complete w/read - Clinics (performed today)        Primary Care Provider Office Phone # Fax #    Osmar Espinoza [phone]       [employer] [address]  [facility] [address]        Equal Access to Services     ISABELA PEREZ : Hadii davey elyo Leona, waaxda luqadaha, qaybta kaalmada adejosieyalyle, cameron thomas . So [facility] [phone].    ATENCIÓN: Si habla español, tiene a funez disposición servicios gratuitos de asistencia lingüística. Llame al [phone].    We comply with applicable federal civil rights laws and [place] laws. We do not discriminate on the basis of race, color, national origin, age, disability, sex, sexual orientation, or gender identity.            Thank you!     Thank you for choosing [facility]  for your care. Our goal is always to provide you with excellent care. Hearing back from our patients is one way we can continue to improve our services. Please take a few minutes to complete the written survey that you may receive in the mail after your visit with us. Thank you!             Your Updated Medication List - Protect others around you: Learn how to safely use, store and throw away your medicines at www.disposemymeds.org.          This list is accurate as of 5/25/18  8:00 AM.  Always use your most recent med list.                   Brand Name Dispense Instructions for use Diagnosis    flecainide 50 MG tablet    TAMBOCOR    180 tablet    Take 1 tablet (50 mg) by mouth 2 times daily    Paroxysmal atrial fibrillation (H)       metoprolol succinate 25 MG 24 hr tablet    " TOPROL-XL    90 tablet    Take 1 tab daily    Long-term (current) use of anticoagulants, Atrial fibrillation, unspecified type (H)       warfarin 5 MG tablet    COUMADIN    186 tablet    Take 2 tabs on Fridays and take 1 1/2 tabs on all other days or as directed per INR clinic    Long term current use of anticoagulant therapy, Atrial fibrillation, unspecified type (H)

## 2018-05-25 NOTE — LETTER
5/25/2018    Osmar Espinoza MD  [employer] [address]  [facility] [address]    RE: Nigel Rodarte Jr.       Dear Colleague,    I had the pleasure of seeing Nigel Rodarte Jr. in the [facility].    HPI and Plan:   See dictation #[id number]    Orders Placed This Encounter   Procedures     Follow-Up with Electrophysiologist     EKG 12-lead complete w/read - Clinics (performed today)     EKG 12-lead complete w/read - Clinics       No orders of the defined types were placed in this encounter.      There are no discontinued medications.      Encounter Diagnoses   Name Primary?     Paroxysmal atrial fibrillation (H) Yes     Atrial fibrillation, unspecified type (H)      Palpitations        CURRENT MEDICATIONS:  Current Outpatient Prescriptions   Medication Sig Dispense Refill     flecainide (TAMBOCOR) 50 MG tablet Take 1 tablet (50 mg) by mouth 2 times daily 180 tablet 2     metoprolol succinate (TOPROL-XL) 25 MG 24 hr tablet Take 1 tab daily 90 tablet 3     warfarin (COUMADIN) 5 MG tablet Take 2 tabs on Fridays and take 1 1/2 tabs on all other days or as directed per INR clinic 186 tablet 1       ALLERGIES   No Known Allergies    PAST MEDICAL HISTORY:  Past Medical History:   Diagnosis Date     Arthritis     osteoarthrosis     Elevated PSA      Fatigue      Palpitations      Paroxysmal atrial fibrillation (H) 05/16/2017     Renal disease     kidney calculus     SOB (shortness of breath)        PAST SURGICAL HISTORY:  Past Surgical History:   Procedure Laterality Date     APPENDECTOMY       ARTHROPLASTY HIP  1/14/2013    Procedure: ARTHROPLASTY HIP;  RIGHT TOTAL HIP ARTHROPLASTY ;  Surgeon: Jamie Ohara MD;  Location:  OR     CARDIOVERSION       COLONOSCOPY N/A 2/9/2016    Procedure: COLONOSCOPY;  Surgeon: Astrid Vital MD;  Location:  GI     CYSTOSCOPY, RETROGRADES, EXTRACT STONE, COMBINED  9/12/2013    Procedure: COMBINED CYSTOSCOPY, RETROGRADES, EXTRACT STONE;   "CYSTOSCOPY, RIGHT RETROGRADE, STONE EXTRACTION;  Surgeon: Carlos Mcknight MD;  Location: [facility] OR     ENT SURGERY      right ear surgery     GENITOURINARY SURGERY      cystoscopy for stone removal     HERNIA REPAIR       ORTHOPEDIC SURGERY      shoulder surgeries,bilat carpel tunnel       FAMILY HISTORY:  Family History   Problem Relation Age of Onset     Lung Cancer Brother        SOCIAL HISTORY:  Social History     Social History     Marital status:      Spouse name: N/A     Number of children: N/A     Years of education: N/A     Social History Main Topics     Smoking status: Never Smoker     Smokeless tobacco: Never Used     Alcohol use No     Drug use: No     Sexual activity: Not Asked     Other Topics Concern     None     Social History Narrative       Review of Systems:  Skin:  Negative       Eyes:  Negative      ENT:  Negative      Respiratory:  Negative for shortness of breath;dyspnea on exertion;cough     Cardiovascular:  Negative for;chest pain;edema;syncope or near-syncope;exercise intolerance;fatigue;lightheadedness;dizziness Positive for;palpitations Recent ER visit 5/20 for AFib - spontaneous conversion  Gastroenterology: Negative for melena;hematochezia    Genitourinary:  Negative      Musculoskeletal:  Negative      Neurologic:  Negative      Psychiatric:  Negative      Heme/Lymph/Imm:  Negative      Endocrine:  Negative        Physical Exam:  Vitals: /79  Pulse 62  Ht 1.753 m (5' 9\")  Wt 76.2 kg (168 lb)  BMI 24.81 kg/m2    Constitutional:  cooperative, alert and oriented, well developed, well nourished, in no acute distress        Skin:  warm and dry to the touch, no apparent skin lesions or masses noted          Head:  normocephalic, no masses or lesions        Eyes:  pupils equal and round;conjunctivae and lids unremarkable;sclera white;no xanthalasma        Lymph:      ENT:  no pallor or cyanosis, dentition good        Neck:  JVP normal;no carotid bruit        Respiratory:  normal " breath sounds, clear to auscultation, normal A-P diameter, normal symmetry, normal respiratory excursion, no use of accessory muscles         Cardiac: regular rhythm;no S3 or S4 bradycardic              pulses full and equal                                        GI:  abdomen soft        Extremities and Muscular Skeletal:  no deformities, clubbing, cyanosis, erythema observed;no edema              Neurological:  no gross motor deficits        Psych:  Alert and Oriented x 3                  Thank you for allowing me to participate in the care of your patient.      Sincerely,     Briseyda Meyers PA-C     [facility]    cc:   No referring provider defined for this encounter.

## 2018-05-25 NOTE — LETTER
"5/25/2018      Osmar Espinoza MD  [employer] [address]  [facility] [address]      RE: Nigel STRONG Cayden Duran.       Dear Colleague,    I had the pleasure of seeing Nigel Rodarte  in the [facility].    Service Date: 05/25/2018      HISTORY OF PRESENT ILLNESS:  I had the pleasure of seeing Mr. Rodarte today when he came for followup of her recent ER visit.  He is a 78-year-old with a history of atrial fibrillation first diagnosed in 05/2017.  He had feelings of \"unwellness\" and palpitations.  He underwent cardioversion and was initially placed on metoprolol and Eliquis.  He subsequently saw Dr. Malhotra and flecainide was added.  He was also switched to warfarin.      He saw Dr. Malhotra in January at which time things were going well with no recurrent atrial fibrillation.  A stress echocardiogram was done 02/2018 showing no proarrhythmic effect and no evidence of ischemia. On 05/01, he underwent hernia repair at [facility].  He did well through that, but on 05/20 he presented to the Emergency Department after he had noted more fatigue.  He checked his heart rate and it was irregular, but in the 80s.  He opted to come to the Emergency Department about an hour or 2 after this occurred.  EKG confirmed atrial fibrillation with a controlled ventricular response.  They talked about cardioversion as he was adequately anticoagulated.  However, he spontaneously converted to sinus rhythm with a heart rate of 61.      Nigel thinks that this may have occurred as he took his flecainide at odd hours the day previous.  He had taken the evening dose of flecainide about 2-3 hours after he typically would.  He did not have any increased amount of alcohol.  He did not have any issues with caffeine or allergy medications or anything else to account for this.      Since then, he does not think he has had any recurrent atrial fibrillation.  He remains on flecainide 50 and metoprolol succinate " 25.  He is also on warfarin without any bleeding issues.      EKG today which I overread showed sinus bradycardia at 49 beats per minute, QRS duration on flecainide 50 mg twice daily was 92 milliseconds.        ASSESSMENT AND PLAN:     1.  Recurrent paroxysmal atrial fibrillation.  This is generally symptomatic despite controlled heart rate with palpitations and fatigue.  He spontaneously converted just now on 05/20 and he does not think he has had any atrial fibrillation recurrence before or after.      We discussed the potential etiology was likely a change in the dosing schedule of his flecainide.  I have encouraged him to try to take these 12 hours apart.      We also talked about potentially increasing flecainide to 75 mg twice daily.  We would have to reduce his metoprolol to 12.5 mg and probably have him take this at night given underlying bradycardia.      Finally, we also talked about the fact that we could have him just continue to monitor everything and if he had more recurrent atrial fibrillation lasting longer than an hour he could take an extra dose of metoprolol in 2-3 hours.  If he had not converted to sinus rhythm, he could potentially either contact our office so we could set up an outpatient cardioversion or if he is feeling unwell over the weekend go to the emergency department for cardioversion.      I explained that as he is completely anticoagulated we did not have as much of a time sensitivity as  cardioversion could take place without increased risk of stroke.      At the current time, he agrees that he would like to just keep monitoring everything and continue a more routine dosing schedule of flecainide.  He will call us if he gets any recurrent atrial fibrillation or if he has to start using increased doses of metoprolol.      2.  Dyslipidemia.  He asked me to comment on his cholesterol panel done at Dr. Espinoza's office.  He has been getting annual cholesterol checks.  The last one was  done in 2018.  Total cholesterol was 244, triglycerides 111, HDL 44 and LDL was 178.  I explained that that certainly was higher than goal.  He has changed his diet as Dr. Espinoza suggested and has really cut out a lot of the white rice and bread.  He eats very little processed food now.  He is interested in rechecking his cholesterol to see if these dietary modifications have made a difference and I have recommended that he call Dr. Espinoza's office and see if they would check it about 6 months after he made these dietary modifications to see if he has made any progress on his cholesterol.      It has been a pleasure to see Nigel in clinic.         BRISEYDA PONCE PA-C             D: 2018   T: 2018   MT: BRIGHT      Name:     NIGEL KO   MRN:      [id number]        Account:      [id number]   :      1940           Service Date: 2018      Document: [id number]           Outpatient Encounter Prescriptions as of 2018   Medication Sig Dispense Refill     flecainide (TAMBOCOR) 50 MG tablet Take 1 tablet (50 mg) by mouth 2 times daily 180 tablet 2     metoprolol succinate (TOPROL-XL) 25 MG 24 hr tablet Take 1 tab daily 90 tablet 3     warfarin (COUMADIN) 5 MG tablet Take 2 tabs on  and take 1 1/2 tabs on all other days or as directed per INR clinic 186 tablet 1     No facility-administered encounter medications on file as of 2018.        Again, thank you for allowing me to participate in the care of your patient.      Sincerely,    Briseyda Ponce PA-C     [facility]

## 2018-05-25 NOTE — PROGRESS NOTES
HPI and Plan:   See dictation #[id number]    Orders Placed This Encounter   Procedures     Follow-Up with Electrophysiologist     EKG 12-lead complete w/read - Clinics (performed today)     EKG 12-lead complete w/read - Clinics       No orders of the defined types were placed in this encounter.      There are no discontinued medications.      Encounter Diagnoses   Name Primary?     Paroxysmal atrial fibrillation (H) Yes     Atrial fibrillation, unspecified type (H)      Palpitations        CURRENT MEDICATIONS:  Current Outpatient Prescriptions   Medication Sig Dispense Refill     flecainide (TAMBOCOR) 50 MG tablet Take 1 tablet (50 mg) by mouth 2 times daily 180 tablet 2     metoprolol succinate (TOPROL-XL) 25 MG 24 hr tablet Take 1 tab daily 90 tablet 3     warfarin (COUMADIN) 5 MG tablet Take 2 tabs on Fridays and take 1 1/2 tabs on all other days or as directed per INR clinic 186 tablet 1       ALLERGIES   No Known Allergies    PAST MEDICAL HISTORY:  Past Medical History:   Diagnosis Date     Arthritis     osteoarthrosis     Elevated PSA      Fatigue      Palpitations      Paroxysmal atrial fibrillation (H) 05/16/2017     Renal disease     kidney calculus     SOB (shortness of breath)        PAST SURGICAL HISTORY:  Past Surgical History:   Procedure Laterality Date     APPENDECTOMY       ARTHROPLASTY HIP  1/14/2013    Procedure: ARTHROPLASTY HIP;  RIGHT TOTAL HIP ARTHROPLASTY ;  Surgeon: Jamie Ohara MD;  Location:  OR     CARDIOVERSION       COLONOSCOPY N/A 2/9/2016    Procedure: COLONOSCOPY;  Surgeon: Astrid Vital MD;  Location:  GI     CYSTOSCOPY, RETROGRADES, EXTRACT STONE, COMBINED  9/12/2013    Procedure: COMBINED CYSTOSCOPY, RETROGRADES, EXTRACT STONE;  CYSTOSCOPY, RIGHT RETROGRADE, STONE EXTRACTION;  Surgeon: Carlos Mcknight MD;  Location:  OR     ENT SURGERY      right ear surgery     GENITOURINARY SURGERY      cystoscopy for stone removal     HERNIA REPAIR       ORTHOPEDIC SURGERY       "shoulder surgeries,bilat carpel tunnel       FAMILY HISTORY:  Family History   Problem Relation Age of Onset     Lung Cancer Brother        SOCIAL HISTORY:  Social History     Social History     Marital status:      Spouse name: N/A     Number of children: N/A     Years of education: N/A     Social History Main Topics     Smoking status: Never Smoker     Smokeless tobacco: Never Used     Alcohol use No     Drug use: No     Sexual activity: Not Asked     Other Topics Concern     None     Social History Narrative       Review of Systems:  Skin:  Negative       Eyes:  Negative      ENT:  Negative      Respiratory:  Negative for shortness of breath;dyspnea on exertion;cough     Cardiovascular:  Negative for;chest pain;edema;syncope or near-syncope;exercise intolerance;fatigue;lightheadedness;dizziness Positive for;palpitations Recent ER visit 5/20 for AFib - spontaneous conversion  Gastroenterology: Negative for melena;hematochezia    Genitourinary:  Negative      Musculoskeletal:  Negative      Neurologic:  Negative      Psychiatric:  Negative      Heme/Lymph/Imm:  Negative      Endocrine:  Negative        Physical Exam:  Vitals: /79  Pulse 62  Ht 1.753 m (5' 9\")  Wt 76.2 kg (168 lb)  BMI 24.81 kg/m2    Constitutional:  cooperative, alert and oriented, well developed, well nourished, in no acute distress        Skin:  warm and dry to the touch, no apparent skin lesions or masses noted          Head:  normocephalic, no masses or lesions        Eyes:  pupils equal and round;conjunctivae and lids unremarkable;sclera white;no xanthalasma        Lymph:      ENT:  no pallor or cyanosis, dentition good        Neck:  JVP normal;no carotid bruit        Respiratory:  normal breath sounds, clear to auscultation, normal A-P diameter, normal symmetry, normal respiratory excursion, no use of accessory muscles         Cardiac: regular rhythm;no S3 or S4 bradycardic              pulses full and equal                   "                      GI:  abdomen soft        Extremities and Muscular Skeletal:  no deformities, clubbing, cyanosis, erythema observed;no edema              Neurological:  no gross motor deficits        Psych:  Alert and Oriented x 3

## 2018-05-25 NOTE — PATIENT INSTRUCTIONS
"1. EKG today showed normal rhythm. Your heart rate is a bit on the slow side.    2. Talk to Dr. Espinoza's office about when you should have your cholesterol rechecked given that you've changed your diet.     For LDL/bad cholesterol    Limit saturated fat in diet (from animal products)    Red meat    High fat dairy products, like cheese, ice cream, butter, etc    Processed foods (creamy salad dressings, peanut butter ... Check serving size!!)    Restaurant foods    Limit \"junk\" - crackers, chips, cookies, etc     Exercise - aim for at least 150 minutes of AEROBIC exercise per week      3. Discussed the ER visit - make sure you take the meds roughly 12 hours apart to help prevent recurrent AFib.     4. For future episodes, OK to just monitor for ~ 1 hour, then take an extra metoprolol XL 25 mg x 1.  If AFib continues to last ~2-3 hours, call us during business hours (to help avoid ER visit) or go to ER.    5. Our AFib Nurses (Fifi Jimenez and Lazarus): [phone]    6. See us back in 1/2019 per Dr. Malhotra's instructino  "

## 2018-06-11 ENCOUNTER — ANTICOAGULATION THERAPY VISIT (OUTPATIENT)
Dept: CARDIOLOGY | Facility: CLINIC | Age: 78
End: 2018-06-11
Payer: COMMERCIAL

## 2018-06-11 DIAGNOSIS — I48.91 ATRIAL FIBRILLATION, UNSPECIFIED TYPE (H): ICD-10-CM

## 2018-06-11 DIAGNOSIS — Z79.01 LONG-TERM (CURRENT) USE OF ANTICOAGULANTS: ICD-10-CM

## 2018-06-11 LAB — INR POINT OF CARE: 2.5 (ref 0.86–1.14)

## 2018-06-11 PROCEDURE — 85610 PROTHROMBIN TIME: CPT | Mod: QW | Performed by: INTERNAL MEDICINE

## 2018-06-11 PROCEDURE — 36416 COLLJ CAPILLARY BLOOD SPEC: CPT | Performed by: INTERNAL MEDICINE

## 2018-06-11 NOTE — PROGRESS NOTES
ANTICOAGULATION FOLLOW-UP CLINIC VISIT    Patient Name:  Nigel Rodarte Jr.  Date:  6/11/2018  Contact Type:  Face to Face    SUBJECTIVE:     Patient Findings     Positives No Problem Findings           OBJECTIVE    INR Protime   Date Value Ref Range Status   06/11/2018 2.5 (A) 0.86 - 1.14 Final       ASSESSMENT / PLAN  INR assessment THER    Recheck INR In: 5 WEEKS    INR Location Clinic      Anticoagulation Summary as of 6/11/2018     INR goal 2.0-3.0   Today's INR 2.5   Warfarin maintenance plan 10 mg (5 mg x 2) on Fri; 7.5 mg (5 mg x 1.5) all other days   Full warfarin instructions 10 mg on Fri; 7.5 mg all other days   Weekly warfarin total 55 mg   No change documented Gladys Herrera RN   Plan last modified Gladys Herrera RN (1/12/2018)   Next INR check 7/17/2018   Target end date Indefinite    Indications   Long-term (current) use of anticoagulants [Z79.01] [Z79.01]  Atrial fibrillation (H) [I48.91] [I48.91]         Anticoagulation Episode Summary     INR check location     Preferred lab     Send INR reminders to [facility] INR NURSE    Comments       Anticoagulation Care Providers     Provider Role Specialty Phone number    Grzegorz Malhotra MD Responsible Cardiology [phone]            See the Encounter Report to view Anticoagulation Flowsheet and Dosing Calendar (Go to Encounters tab in chart review, and find the Anticoagulation Therapy Visit)    INR 2.5 No change in meds or diet. No abnormal bleeding or bruising. He had an episode of afib on 5/20 and was evaluated in ER (spontaneously converted to SR while in ER). No recurrence of afib noted by pt. Will continue current dosing of 10 mg Fridays and 7.5 mg all other days with recheck in 5 weeks. INR clinic referral order submitted.  Has the patient previously taken warfarin? yes  If yes, for what indication? Afib    Does the patient have any of the following indications for a higher range of 2.5-3.5:    Mitral position mechanical valve?  no    Abelardo-Shiley, Ball and Cage or Monoleaflet valve (regardless of position) no    Other (if yes, please explain) no      Gladys Herrera RN

## 2018-06-11 NOTE — MR AVS SNAPSHOT
Nigel Rodarte Jr.   6/11/2018 7:00 AM   Anticoagulation Therapy Visit    Description:  78 year old male   Provider:  YAIR ANTICOAGULATION   Department:  [facility]           INR as of 6/11/2018     Today's INR 2.5      Anticoagulation Summary as of 6/11/2018     INR goal 2.0-3.0   Today's INR 2.5   Full warfarin instructions 10 mg on Fri; 7.5 mg all other days   Next INR check 7/17/2018    Indications   Long-term (current) use of anticoagulants [Z79.01] [Z79.01]  Atrial fibrillation (H) [I48.91] [I48.91]         Your next Anticoagulation Clinic appointment(s)     Jul 17, 2018  7:00 AM CDT   Anticoagulation Visit with  ANTICOAGULATION   [facility] ([facility])    [address] [place] [id number]   [phone] OPT 2              Contact Numbers     Anticoagulant (INR) Clinic Number: [phone]          June 2018 Details    Sun Mon Tue Wed Thu Fri Sat          1               2                 3               4               5               6               7               8               9                 10               11      7.5 mg   See details      12      7.5 mg         13      7.5 mg         14      7.5 mg         15      10 mg         16      7.5 mg           17      7.5 mg         18      7.5 mg         19      7.5 mg         20      7.5 mg         21      7.5 mg         22      10 mg         23      7.5 mg           24      7.5 mg         25      7.5 mg         26      7.5 mg         27      7.5 mg         28      7.5 mg         29      10 mg         30      7.5 mg          Date Details   06/11 This INR check               How to take your warfarin dose     To take:  7.5 mg Take 1.5 of the 5 mg tablets.    To take:  10 mg Take 2 of the 5 mg tablets.           July 2018 Details    Sun Mon Tue Wed Thu Fri Sat     1      7.5 mg         2      7.5 mg         3      7.5 mg         4      7.5 mg         5      7.5 mg          6      10 mg         7      7.5 mg           8      7.5 mg         9      7.5 mg         10      7.5 mg         11      7.5 mg         12      7.5 mg         13      10 mg         14      7.5 mg           15      7.5 mg         16      7.5 mg         17            18               19               20               21                 22               23               24               25               26               27               28                 29               30               31                    Date Details   No additional details    Date of next INR:  7/17/2018         How to take your warfarin dose     To take:  7.5 mg Take 1.5 of the 5 mg tablets.    To take:  10 mg Take 2 of the 5 mg tablets.

## 2018-07-13 ENCOUNTER — ANTICOAGULATION THERAPY VISIT (OUTPATIENT)
Dept: CARDIOLOGY | Facility: CLINIC | Age: 78
End: 2018-07-13
Payer: COMMERCIAL

## 2018-07-13 DIAGNOSIS — I48.91 ATRIAL FIBRILLATION, UNSPECIFIED TYPE (H): ICD-10-CM

## 2018-07-13 DIAGNOSIS — Z79.01 LONG-TERM (CURRENT) USE OF ANTICOAGULANTS: ICD-10-CM

## 2018-07-13 LAB — INR POINT OF CARE: 1.8 (ref 0.86–1.14)

## 2018-07-13 PROCEDURE — 36416 COLLJ CAPILLARY BLOOD SPEC: CPT | Performed by: INTERNAL MEDICINE

## 2018-07-13 PROCEDURE — 85610 PROTHROMBIN TIME: CPT | Mod: QW | Performed by: INTERNAL MEDICINE

## 2018-07-13 NOTE — PROGRESS NOTES
ANTICOAGULATION FOLLOW-UP CLINIC VISIT    Patient Name:  Nigel Rodaret Jr.  Date:  7/13/2018  Contact Type:  Face to Face    SUBJECTIVE:     Patient Findings     Positives Change in diet/appetite (possibly more greens recently), Bruising (occasional bruise on arms)           OBJECTIVE    INR Protime   Date Value Ref Range Status   07/13/2018 1.8 (A) 0.86 - 1.14 Final       ASSESSMENT / PLAN  INR assessment SUB    Recheck INR In: 2 WEEKS    INR Location Clinic      Anticoagulation Summary as of 7/13/2018     INR goal 2.0-3.0   Today's INR 1.8!   Warfarin maintenance plan 10 mg (5 mg x 2) on Fri; 7.5 mg (5 mg x 1.5) all other days   Full warfarin instructions 7/14: 10 mg; Otherwise 10 mg on Fri; 7.5 mg all other days   Weekly warfarin total 55 mg   Plan last modified Gladys Herrera, RN (1/12/2018)   Next INR check 7/30/2018   Target end date Indefinite    Indications   Long-term (current) use of anticoagulants [Z79.01] [Z79.01]  Atrial fibrillation (H) [I48.91] [I48.91]         Anticoagulation Episode Summary     INR check location     Preferred lab     Send INR reminders to [facility] HEART INR NURSE    Comments       Anticoagulation Care Providers     Provider Role Specialty Phone number    Grzegorz Malhotra MD Responsible Cardiology [phone]            See the Encounter Report to view Anticoagulation Flowsheet and Dosing Calendar (Go to Encounters tab in chart review, and find the Anticoagulation Therapy Visit)    INR 1.8 He possibly ate more greens recently. Denies missing doses. No change in meds. Occasional bruising on arms. Will dose with 10 mg today and tomorrow then resume usual dosing of 10 mg Fridays and 7.5 mg all other days with recheck in 2 weeks. He will avoid greens today and tomorrow then resume usual diet.  Dosage adjustment made based on physician directed care plan.    Gladys Herrera, TAMMY

## 2018-07-30 ENCOUNTER — ANTICOAGULATION THERAPY VISIT (OUTPATIENT)
Dept: CARDIOLOGY | Facility: CLINIC | Age: 78
End: 2018-07-30
Payer: COMMERCIAL

## 2018-07-30 DIAGNOSIS — I48.91 ATRIAL FIBRILLATION, UNSPECIFIED TYPE (H): ICD-10-CM

## 2018-07-30 DIAGNOSIS — Z79.01 LONG-TERM (CURRENT) USE OF ANTICOAGULANTS: ICD-10-CM

## 2018-07-30 LAB — INR POINT OF CARE: 3.2 (ref 0.86–1.14)

## 2018-07-30 PROCEDURE — 85610 PROTHROMBIN TIME: CPT | Mod: QW | Performed by: INTERNAL MEDICINE

## 2018-07-30 PROCEDURE — 36416 COLLJ CAPILLARY BLOOD SPEC: CPT | Performed by: INTERNAL MEDICINE

## 2018-07-30 NOTE — MR AVS SNAPSHOT
Nigel Rodarte Jr.   7/30/2018 7:00 AM   Anticoagulation Therapy Visit    Description:  78 year old male   Provider:  YAIR ANTICOAGULATION   Department:  [facility]           INR as of 7/30/2018     Today's INR 3.2!      Anticoagulation Summary as of 7/30/2018     INR goal 2.0-3.0   Today's INR 3.2!   Full warfarin instructions 10 mg on Fri; 7.5 mg all other days   Next INR check 8/24/2018    Indications   Long-term (current) use of anticoagulants [Z79.01] [Z79.01]  Atrial fibrillation (H) [I48.91] [I48.91]         Your next Anticoagulation Clinic appointment(s)     Aug 24, 2018  7:00 AM CDT   Anticoagulation Visit with  ANTICOAGULATION   [facility] ([facility])    [address] [place] [id number]   [phone] OPT 2              Contact Numbers     Anticoagulant (INR) Clinic Number: [phone]          July 2018 Details    Sun Mon Tue Wed Thu Fri Sat     1               2               3               4               5               6               7                 8               9               10               11               12               13               14                 15               16               17               18               19               20               21                 22               23               24               25               26               27               28                 29               30      7.5 mg   See details      31      7.5 mg              Date Details   07/30 This INR check               How to take your warfarin dose     To take:  7.5 mg Take 1.5 of the 5 mg tablets.           August 2018 Details    Sun Mon Tue Wed Thu Fri Sat        1      7.5 mg         2      7.5 mg         3      10 mg         4      7.5 mg           5      7.5 mg         6      7.5 mg         7      7.5 mg         8      7.5 mg         9      7.5 mg         10      10 mg         11      7.5 mg            12      7.5 mg         13      7.5 mg         14      7.5 mg         15      7.5 mg         16      7.5 mg         17      10 mg         18      7.5 mg           19      7.5 mg         20      7.5 mg         21      7.5 mg         22      7.5 mg         23      7.5 mg         24            25                 26               27               28               29               30               31                 Date Details   No additional details    Date of next INR:  8/24/2018         How to take your warfarin dose     To take:  7.5 mg Take 1.5 of the 5 mg tablets.    To take:  10 mg Take 2 of the 5 mg tablets.

## 2018-07-30 NOTE — PROGRESS NOTES
ANTICOAGULATION FOLLOW-UP CLINIC VISIT    Patient Name:  Nigel Rodarte Jr.  Date:  7/30/2018  Contact Type:  Face to Face    SUBJECTIVE:     Patient Findings     Positives Change in diet/appetite (eating fewer salads since the last INR)           OBJECTIVE    INR Protime   Date Value Ref Range Status   07/30/2018 3.2 (A) 0.86 - 1.14 Final       ASSESSMENT / PLAN  INR assessment SUPRA    Recheck INR In: 3 WEEKS    INR Location Clinic      Anticoagulation Summary as of 7/30/2018     INR goal 2.0-3.0   Today's INR 3.2!   Warfarin maintenance plan 10 mg (5 mg x 2) on Fri; 7.5 mg (5 mg x 1.5) all other days   Full warfarin instructions 10 mg on Fri; 7.5 mg all other days   Weekly warfarin total 55 mg   No change documented Gladys Herrera, TAMMY   Plan last modified Gladys Herrera, RN (1/12/2018)   Next INR check 8/24/2018   Target end date Indefinite    Indications   Long-term (current) use of anticoagulants [Z79.01] [Z79.01]  Atrial fibrillation (H) [I48.91] [I48.91]         Anticoagulation Episode Summary     INR check location     Preferred lab     Send INR reminders to [facility] HEART INR NURSE    Comments       Anticoagulation Care Providers     Provider Role Specialty Phone number    Grzegorz Malhotra MD Responsible Cardiology [phone]            See the Encounter Report to view Anticoagulation Flowsheet and Dosing Calendar (Go to Encounters tab in chart review, and find the Anticoagulation Therapy Visit)    INR 3.2 He has been eating fewer salads since the last low INR of 1.8 three weeks ago. No change in meds. No abnormal bleeding or bruising. Will continue current dosing of 10 mg Fridays and 7.5 mg all other days. He will add one more salad to his diet every week. Recheck in 3 weeks.    Gladys Herrera, RN

## 2018-08-24 ENCOUNTER — ANTICOAGULATION THERAPY VISIT (OUTPATIENT)
Dept: CARDIOLOGY | Facility: CLINIC | Age: 78
End: 2018-08-24
Payer: COMMERCIAL

## 2018-08-24 DIAGNOSIS — Z79.01 LONG-TERM (CURRENT) USE OF ANTICOAGULANTS: ICD-10-CM

## 2018-08-24 DIAGNOSIS — I48.91 ATRIAL FIBRILLATION, UNSPECIFIED TYPE (H): ICD-10-CM

## 2018-08-24 LAB — INR POINT OF CARE: 4.6 (ref 0.86–1.14)

## 2018-08-24 PROCEDURE — 36416 COLLJ CAPILLARY BLOOD SPEC: CPT | Performed by: INTERNAL MEDICINE

## 2018-08-24 PROCEDURE — 85610 PROTHROMBIN TIME: CPT | Mod: QW | Performed by: INTERNAL MEDICINE

## 2018-08-24 NOTE — PROGRESS NOTES
ANTICOAGULATION FOLLOW-UP CLINIC VISIT    Patient Name:  Nigel Rodarte Jr.  Date:  8/24/2018  Contact Type:  Face to Face    SUBJECTIVE:     Patient Findings     Positives Other complaints (Eating less greens)           OBJECTIVE    INR Protime   Date Value Ref Range Status   08/24/2018 4.6 (A) 0.86 - 1.14 Final       ASSESSMENT / PLAN  INR assessment SUPRA    Recheck INR In: 2 WEEKS    INR Location Clinic      Anticoagulation Summary as of 8/24/2018     INR goal 2.0-3.0   Today's INR 4.6!   Warfarin maintenance plan 10 mg (5 mg x 2) on Fri; 7.5 mg (5 mg x 1.5) all other days   Full warfarin instructions 8/25: 2.5 mg; Otherwise 10 mg on Fri; 7.5 mg all other days   Weekly warfarin total 55 mg   Plan last modified Gladys Herrera RN (1/12/2018)   Next INR check 9/7/2018   Target end date Indefinite    Indications   Long-term (current) use of anticoagulants [Z79.01] [Z79.01]  Atrial fibrillation (H) [I48.91] [I48.91]         Anticoagulation Episode Summary     INR check location     Preferred lab     Send INR reminders to [facility] HEART INR NURSE    Comments       Anticoagulation Care Providers     Provider Role Specialty Phone number    Grzegorz Malhotra MD Responsible Cardiology [phone]            See the Encounter Report to view Anticoagulation Flowsheet and Dosing Calendar (Go to Encounters tab in chart review, and find the Anticoagulation Therapy Visit)    INR 4.6. Pt took his dose this morning. Reduced tomorrow's dose to 2.5 mg. Pt is not eating greens as usual which is almost every day. Only had one salad this week. Pt will eat greens tonight and increase his greens to 3 times per week. Denies unusual bleeding or bruising. Next apt in 2 weeks. Dosage adjustment made based on physician directed care plan.    Jeanne Kelley, RN

## 2018-08-24 NOTE — MR AVS SNAPSHOT
Nigel Rodarte Jr.   8/24/2018 7:00 AM   Anticoagulation Therapy Visit    Description:  78 year old male   Provider:  YAIR ANTICOAGULATION   Department:  [facility]           INR as of 8/24/2018     Today's INR 4.6!      Anticoagulation Summary as of 8/24/2018     INR goal 2.0-3.0   Today's INR 4.6!   Full warfarin instructions 8/25: 2.5 mg; Otherwise 10 mg on Fri; 7.5 mg all other days   Next INR check 9/7/2018    Indications   Long-term (current) use of anticoagulants [Z79.01] [Z79.01]  Atrial fibrillation (H) [I48.91] [I48.91]         Your next Anticoagulation Clinic appointment(s)     Sep 06, 2018  7:00 AM CDT   Anticoagulation Visit with  ANTICOAGULATION   [facility] ([facility])    [address] [place] [id number]   [phone] OPT 2              Contact Numbers     Anticoagulant (INR) Clinic Number: [phone]          August 2018 Details    Sun Mon Tue Wed Thu Fri Sat        1               2               3               4                 5               6               7               8               9               10               11                 12               13               14               15               16               17               18                 19               20               21               22               23               24      10 mg   See details      25      2.5 mg           26      7.5 mg         27      7.5 mg         28      7.5 mg         29      7.5 mg         30      7.5 mg         31      10 mg           Date Details   08/24 This INR check               How to take your warfarin dose     To take:  2.5 mg Take 0.5 of a 5 mg tablet.    To take:  7.5 mg Take 1.5 of the 5 mg tablets.    To take:  10 mg Take 2 of the 5 mg tablets.           September 2018 Details    Sun Mon Tue Wed Thu Fri Sat           1      7.5 mg           2      7.5 mg         3      7.5 mg         4      7.5  mg         5      7.5 mg         6      7.5 mg         7            8                 9               10               11               12               13               14               15                 16               17               18               19               20               21               22                 23               24               25               26               27               28               29                 30                      Date Details   No additional details    Date of next INR:  9/7/2018         How to take your warfarin dose     To take:  7.5 mg Take 1.5 of the 5 mg tablets.    To take:  10 mg Take 2 of the 5 mg tablets.

## 2018-09-06 ENCOUNTER — ANTICOAGULATION THERAPY VISIT (OUTPATIENT)
Dept: CARDIOLOGY | Facility: CLINIC | Age: 78
End: 2018-09-06
Payer: COMMERCIAL

## 2018-09-06 DIAGNOSIS — Z79.01 LONG-TERM (CURRENT) USE OF ANTICOAGULANTS: ICD-10-CM

## 2018-09-06 DIAGNOSIS — I48.91 ATRIAL FIBRILLATION, UNSPECIFIED TYPE (H): ICD-10-CM

## 2018-09-06 LAB — INR POINT OF CARE: 1.9 (ref 0.86–1.14)

## 2018-09-06 PROCEDURE — 85610 PROTHROMBIN TIME: CPT | Mod: QW | Performed by: INTERNAL MEDICINE

## 2018-09-06 PROCEDURE — 36416 COLLJ CAPILLARY BLOOD SPEC: CPT | Performed by: INTERNAL MEDICINE

## 2018-09-06 NOTE — MR AVS SNAPSHOT
Nigel Rodarte Jr.   9/6/2018 7:00 AM   Anticoagulation Therapy Visit    Description:  78 year old male   Provider:  YAIR ANTICOAGULATION   Department:  [facility]           INR as of 9/6/2018     Today's INR 1.9!      Anticoagulation Summary as of 9/6/2018     INR goal 2.0-3.0   Today's INR 1.9!   Full warfarin instructions 10 mg on Fri; 7.5 mg all other days   Next INR check 9/25/2018    Indications   Long-term (current) use of anticoagulants [Z79.01] [Z79.01]  Atrial fibrillation (H) [I48.91] [I48.91]         Your next Anticoagulation Clinic appointment(s)     Sep 25, 2018  7:00 AM CDT   Anticoagulation Visit with  ANTICOAGULATION   [facility] ([facility])    [address] [place] [address]   [phone] OPT 2              Contact Numbers     Anticoagulant (INR) Clinic Number: [phone]          September 2018 Details    Sun Mon Tue Wed Thu Fri Sat           1                 2               3               4               5               6      7.5 mg   See details      7      10 mg         8      7.5 mg           9      7.5 mg         10      7.5 mg         11      7.5 mg         12      7.5 mg         13      7.5 mg         14      10 mg         15      7.5 mg           16      7.5 mg         17      7.5 mg         18      7.5 mg         19      7.5 mg         20      7.5 mg         21      10 mg         22      7.5 mg           23      7.5 mg         24      7.5 mg         25            26               27               28               29                 30                      Date Details   09/06 This INR check       Date of next INR:  9/25/2018         How to take your warfarin dose     To take:  7.5 mg Take 1.5 of the 5 mg tablets.    To take:  10 mg Take 2 of the 5 mg tablets.

## 2018-09-06 NOTE — PROGRESS NOTES
ANTICOAGULATION FOLLOW-UP CLINIC VISIT    Patient Name:  Nigel Rodarte Jr.  Date:  9/6/2018  Contact Type:  Face to Face    SUBJECTIVE:     Patient Findings     Positives Change in diet/appetite (ate more greens/veggies this week)           OBJECTIVE    INR Protime   Date Value Ref Range Status   09/06/2018 1.9 (A) 0.86 - 1.14 Final       ASSESSMENT / PLAN  INR assessment THER    Recheck INR In: 3 WEEKS    INR Location Clinic      Anticoagulation Summary as of 9/6/2018     INR goal 2.0-3.0   Today's INR 1.9!   Warfarin maintenance plan 10 mg (5 mg x 2) on Fri; 7.5 mg (5 mg x 1.5) all other days   Full warfarin instructions 10 mg on Fri; 7.5 mg all other days   Weekly warfarin total 55 mg   No change documented Gladys Herrera RN   Plan last modified Gladys Herrera RN (1/12/2018)   Next INR check 9/25/2018   Target end date Indefinite    Indications   Long-term (current) use of anticoagulants [Z79.01] [Z79.01]  Atrial fibrillation (H) [I48.91] [I48.91]         Anticoagulation Episode Summary     INR check location     Preferred lab     Send INR reminders to [facility] HEART INR NURSE    Comments       Anticoagulation Care Providers     Provider Role Specialty Phone number    Grzegorz Malhotra MD Responsible Cardiology [phone]            See the Encounter Report to view Anticoagulation Flowsheet and Dosing Calendar (Go to Encounters tab in chart review, and find the Anticoagulation Therapy Visit)    INR 1.9 He ate more greens/veggies this week after INR was recently elevated from unknown cause. No change in meds. No abnormal bleeding or bruising. Will avoid greens for 2 days then resume his usual diet. Continue current dosing of 10 mg Fridays and 7.5 mg all other days with recheck in 2 1/2 weeks.     Gladys Herrera, TAMMY

## 2018-09-25 ENCOUNTER — ANTICOAGULATION THERAPY VISIT (OUTPATIENT)
Dept: CARDIOLOGY | Facility: CLINIC | Age: 78
End: 2018-09-25
Payer: COMMERCIAL

## 2018-09-25 DIAGNOSIS — Z79.01 LONG-TERM (CURRENT) USE OF ANTICOAGULANTS: ICD-10-CM

## 2018-09-25 DIAGNOSIS — I48.91 ATRIAL FIBRILLATION, UNSPECIFIED TYPE (H): ICD-10-CM

## 2018-09-25 LAB — INR POINT OF CARE: 3.2 (ref 0.86–1.14)

## 2018-09-25 PROCEDURE — 85610 PROTHROMBIN TIME: CPT | Mod: QW | Performed by: INTERNAL MEDICINE

## 2018-09-25 PROCEDURE — 36416 COLLJ CAPILLARY BLOOD SPEC: CPT | Performed by: INTERNAL MEDICINE

## 2018-09-25 NOTE — PROGRESS NOTES
ANTICOAGULATION FOLLOW-UP CLINIC VISIT    Patient Name:  Nigel Rodarte Jr.  Date:  9/25/2018  Contact Type:  Face to Face    SUBJECTIVE:     Patient Findings     Positives Unexplained INR or factor level change           OBJECTIVE    INR Protime   Date Value Ref Range Status   09/25/2018 3.2 (A) 0.86 - 1.14 Final       ASSESSMENT / PLAN  INR assessment SUPRA    Recheck INR In: 2 WEEKS    INR Location Clinic      Anticoagulation Summary as of 9/25/2018     INR goal 2.0-3.0   Today's INR 3.2!   Warfarin maintenance plan 7.5 mg (5 mg x 1.5) every day   Full warfarin instructions 7.5 mg every day   Weekly warfarin total 52.5 mg   Plan last modified Gladys Herrera RN (9/25/2018)   Next INR check 10/9/2018   Target end date Indefinite    Indications   Long-term (current) use of anticoagulants [Z79.01] [Z79.01]  Atrial fibrillation (H) [I48.91] [I48.91]         Anticoagulation Episode Summary     INR check location     Preferred lab     Send INR reminders to [facility] HEART INR NURSE    Comments       Anticoagulation Care Providers     Provider Role Specialty Phone number    Grzegorz Malhotra MD Responsible Cardiology [phone]            See the Encounter Report to view Anticoagulation Flowsheet and Dosing Calendar (Go to Encounters tab in chart review, and find the Anticoagulation Therapy Visit)    INR 3.2 INR elevated again with no known cause. He avoided greens for only 2 days when his INR was 1.9 2 weeks ago then he resumed his usual diet. No change in meds. Denies missing doses. Occasional bruise on his hands. Will decrease dosing by 2.5 mg/wk - take 7.5 mg daily and recheck in 2 weeks. Dosage adjustment made based on physician directed care plan.    Gladys Herrera, TAMMY

## 2018-09-25 NOTE — MR AVS SNAPSHOT
Nigel Rodarte Jr.   9/25/2018 7:00 AM   Anticoagulation Therapy Visit    Description:  78 year old male   Provider:  YAIR ANTICOAGULATION   Department:  [facility]           INR as of 9/25/2018     Today's INR 3.2!      Anticoagulation Summary as of 9/25/2018     INR goal 2.0-3.0   Today's INR 3.2!   Full warfarin instructions 7.5 mg every day   Next INR check 10/9/2018    Indications   Long-term (current) use of anticoagulants [Z79.01] [Z79.01]  Atrial fibrillation (H) [I48.91] [I48.91]         Your next Anticoagulation Clinic appointment(s)     Oct 09, 2018  7:00 AM CDT   Anticoagulation Visit with  ANTICOAGULATION   [facility] ([facility])    [address] [place] [id number] [phone] OPT 2              Contact Numbers     Anticoagulant (INR) Clinic Number: [phone]          September 2018 Details    Sun Mon Tue Wed Thu Fri Sat           1                 2               3               4               5               6               7               8                 9               10               11               12               13               14               15                 16               17               18               19               20               21               22                 23               24               25      7.5 mg   See details      26      7.5 mg         27      7.5 mg         28      7.5 mg         29      7.5 mg           30      7.5 mg                Date Details   09/25 This INR check               How to take your warfarin dose     To take:  7.5 mg Take 1.5 of the 5 mg tablets.           October 2018 Details    Sun Mon Tue Wed Thu Fri Sat      1      7.5 mg         2      7.5 mg         3      7.5 mg         4      7.5 mg         5      7.5 mg         6      7.5 mg           7      7.5 mg         8      7.5 mg         9            10               11               12                13                 14               15               16               17               18               19               20                 21               22               23               24               25               26               27                 28               29               30               31                   Date Details   No additional details    Date of next INR:  10/9/2018         How to take your warfarin dose     To take:  7.5 mg Take 1.5 of the 5 mg tablets.

## 2018-10-09 ENCOUNTER — ANTICOAGULATION THERAPY VISIT (OUTPATIENT)
Dept: CARDIOLOGY | Facility: CLINIC | Age: 78
End: 2018-10-09
Payer: COMMERCIAL

## 2018-10-09 DIAGNOSIS — I48.91 ATRIAL FIBRILLATION, UNSPECIFIED TYPE (H): ICD-10-CM

## 2018-10-09 DIAGNOSIS — Z79.01 LONG TERM CURRENT USE OF ANTICOAGULANTS WITH INR GOAL OF 2.0-3.0: ICD-10-CM

## 2018-10-09 LAB — INR POINT OF CARE: 3 (ref 0.86–1.14)

## 2018-10-09 PROCEDURE — 85610 PROTHROMBIN TIME: CPT | Mod: QW | Performed by: INTERNAL MEDICINE

## 2018-10-09 PROCEDURE — 36416 COLLJ CAPILLARY BLOOD SPEC: CPT | Performed by: INTERNAL MEDICINE

## 2018-10-09 NOTE — MR AVS SNAPSHOT
Nigel Rodarte Jr.   10/9/2018 7:00 AM   Anticoagulation Therapy Visit    Description:  78 year old male   Provider:  YAIR ANTICOAGULATION   Department:  [facility]           INR as of 10/9/2018     Today's INR 3.0      Anticoagulation Summary as of 10/9/2018     INR goal 2.0-3.0   Today's INR 3.0   Full warfarin instructions 7.5 mg every day   Next INR check 10/31/2018    Indications   Atrial fibrillation (H) [I48.91] [I48.91]  Long term current use of anticoagulants with INR goal of 2.0-3.0 [Z79.01]         Your next Anticoagulation Clinic appointment(s)     Oct 31, 2018  7:00 AM CDT   Anticoagulation Visit with  ANTICOAGULATION   [facility] ([facility])    [address] [place] [id number]   [phone] OPT 2              Contact Numbers     Anticoagulant (INR) Clinic Number: [phone]          October 2018 Details    Sun Mon Tue Wed Thu Fri Sat      1               2               3               4               5               6                 7               8               9      7.5 mg   See details      10      7.5 mg         11      7.5 mg         12      7.5 mg         13      7.5 mg           14      7.5 mg         15      7.5 mg         16      7.5 mg         17      7.5 mg         18      7.5 mg         19      7.5 mg         20      7.5 mg           21      7.5 mg         22      7.5 mg         23      7.5 mg         24      7.5 mg         25      7.5 mg         26      7.5 mg         27      7.5 mg           28      7.5 mg         29      7.5 mg         30      7.5 mg         31                Date Details   10/09 This INR check       Date of next INR:  10/31/2018         How to take your warfarin dose     To take:  7.5 mg Take 1.5 of the 5 mg tablets.

## 2018-10-09 NOTE — PROGRESS NOTES
ANTICOAGULATION FOLLOW-UP CLINIC VISIT    Patient Name:  Nigel Rodarte Jr.  Date:  10/9/2018  Contact Type:  Face to Face    SUBJECTIVE:     Patient Findings     Positives Change in diet/appetite (ate one salad this week)           OBJECTIVE    INR Protime   Date Value Ref Range Status   10/09/2018 3.0 (A) 0.86 - 1.14 Final       ASSESSMENT / PLAN  INR assessment THER    Recheck INR In: 3 WEEKS    INR Location Clinic      Anticoagulation Summary as of 10/9/2018     INR goal 2.0-3.0   Today's INR 3.0   Warfarin maintenance plan 7.5 mg (5 mg x 1.5) every day   Full warfarin instructions 7.5 mg every day   Weekly warfarin total 52.5 mg   No change documented Gladys Herrera, TAMMY   Plan last modified Gladys Herrera RN (9/25/2018)   Next INR check 10/31/2018   Target end date Indefinite    Indications   Atrial fibrillation (H) [I48.91] [I48.91]  Long term current use of anticoagulants with INR goal of 2.0-3.0 [Z79.01]         Anticoagulation Episode Summary     INR check location     Preferred lab     Send INR reminders to [facility] HEART INR NURSE    Comments       Anticoagulation Care Providers     Provider Role Specialty Phone number    Grzegorz Malhotra MD Responsible Cardiology [phone]            See the Encounter Report to view Anticoagulation Flowsheet and Dosing Calendar (Go to Encounters tab in chart review, and find the Anticoagulation Therapy Visit)    INR 3.0 INR at upper end of range on decreased dosing schedule. No change in other meds. He ate only 1 salad this week and isn't sure that he ate any green veggies. Occasional bruise on hand. Will continue current dosing of 7.5 mg daily and try to eat 2 servings of greens or green veggies a week with recheck in 3 weeks.     Gladys Herrera, TAMMY

## 2018-10-31 ENCOUNTER — ANTICOAGULATION THERAPY VISIT (OUTPATIENT)
Dept: CARDIOLOGY | Facility: CLINIC | Age: 78
End: 2018-10-31
Payer: COMMERCIAL

## 2018-10-31 DIAGNOSIS — Z79.01 LONG TERM CURRENT USE OF ANTICOAGULANTS WITH INR GOAL OF 2.0-3.0: ICD-10-CM

## 2018-10-31 DIAGNOSIS — I48.91 ATRIAL FIBRILLATION (H): ICD-10-CM

## 2018-10-31 LAB — INR POINT OF CARE: 2.7 (ref 0.86–1.14)

## 2018-10-31 PROCEDURE — 85610 PROTHROMBIN TIME: CPT | Mod: QW | Performed by: INTERNAL MEDICINE

## 2018-10-31 PROCEDURE — 36416 COLLJ CAPILLARY BLOOD SPEC: CPT | Performed by: INTERNAL MEDICINE

## 2018-10-31 PROCEDURE — 99207 ZZC NO CHARGE NURSE ONLY: CPT | Performed by: INTERNAL MEDICINE

## 2018-10-31 NOTE — PROGRESS NOTES
ANTICOAGULATION FOLLOW-UP CLINIC VISIT    Patient Name:  Nigel Rodarte Jr.  Date:  10/31/2018  Contact Type:  Face to Face    SUBJECTIVE:     Patient Findings     Positives No Problem Findings           OBJECTIVE    INR Protime   Date Value Ref Range Status   10/31/2018 2.7 (A) 0.86 - 1.14 Final       ASSESSMENT / PLAN  INR assessment THER    Recheck INR In: 4 WEEKS    INR Location Clinic      Anticoagulation Summary as of 10/31/2018     INR goal 2.0-3.0   Today's INR 2.7   Warfarin maintenance plan 7.5 mg (5 mg x 1.5) every day   Full warfarin instructions 7.5 mg every day   Weekly warfarin total 52.5 mg   No change documented Gladys Herrera RN   Plan last modified Gladys Herrera RN (9/25/2018)   Next INR check 11/30/2018   Target end date Indefinite    Indications   Atrial fibrillation (H) [I48.91] [I48.91]  Long term current use of anticoagulants with INR goal of 2.0-3.0 [Z79.01]         Anticoagulation Episode Summary     INR check location     Preferred lab     Send INR reminders to [facility] HEART INR NURSE    Comments       Anticoagulation Care Providers     Provider Role Specialty Phone number    Grzegorz Malhotra MD Responsible Cardiology [phone]            See the Encounter Report to view Anticoagulation Flowsheet and Dosing Calendar (Go to Encounters tab in chart review, and find the Anticoagulation Therapy Visit)    INR 2.7 No change in meds or diet. No abnormal bleeding or bruising. Will continue current dosing of 7.5 mg daily and recheck in 4 weeks.    Gladys Herrera RN

## 2018-10-31 NOTE — MR AVS SNAPSHOT
Nigel Rodarte Jr.   10/31/2018 7:00 AM   Anticoagulation Therapy Visit    Description:  78 year old male   Provider:  [facility]   Department:  [facility]           INR as of 10/31/2018     Today's INR 2.7      Anticoagulation Summary as of 10/31/2018     INR goal 2.0-3.0   Today's INR 2.7   Full warfarin instructions 7.5 mg every day   Next INR check 11/30/2018    Indications   Atrial fibrillation (H) [I48.91] [I48.91]  Long term current use of anticoagulants with INR goal of 2.0-3.0 [Z79.01]         Your next Anticoagulation Clinic appointment(s)     Nov 30, 2018  7:00 AM CST   Anticoagulation Visit with  ANTICOAGULATION   [facility] ([facility])    [address] [place] [id number] [phone] OPT 2              Contact Numbers     Anticoagulant (INR) Clinic Number: [phone]          October 2018 Details    Sun Mon Tue Wed Thu Fri Sat      1               2               3               4               5               6                 7               8               9               10               11               12               13                 14               15               16               17               18               19               20                 21               22               23               24               25               26               27                 28               29               30               31      7.5 mg   See details          Date Details   10/31 This INR check               How to take your warfarin dose     To take:  7.5 mg Take 1.5 of the 5 mg tablets.           November 2018 Details    Sun Mon Tue Wed Thu Fri Sat         1      7.5 mg         2      7.5 mg         3      7.5 mg           4      7.5 mg         5      7.5 mg         6      7.5 mg         7      7.5 mg         8      7.5 mg         9      7.5 mg         10      7.5 mg           11      7.5 mg          12      7.5 mg         13      7.5 mg         14      7.5 mg         15      7.5 mg         16      7.5 mg         17      7.5 mg           18      7.5 mg         19      7.5 mg         20      7.5 mg         21      7.5 mg         22      7.5 mg         23      7.5 mg         24      7.5 mg           25      7.5 mg         26      7.5 mg         27      7.5 mg         28      7.5 mg         29      7.5 mg         30              Date Details   No additional details    Date of next INR:  11/30/2018         How to take your warfarin dose     To take:  7.5 mg Take 1.5 of the 5 mg tablets.

## 2018-11-27 DIAGNOSIS — I48.91 ATRIAL FIBRILLATION, UNSPECIFIED TYPE (H): ICD-10-CM

## 2018-11-27 DIAGNOSIS — I48.0 PAROXYSMAL ATRIAL FIBRILLATION (H): ICD-10-CM

## 2018-11-27 DIAGNOSIS — Z79.01 LONG TERM CURRENT USE OF ANTICOAGULANT THERAPY: ICD-10-CM

## 2018-11-27 RX ORDER — FLECAINIDE ACETATE 50 MG/1
50 TABLET ORAL 2 TIMES DAILY
Qty: 180 TABLET | Refills: 1 | Status: SHIPPED | OUTPATIENT
Start: 2018-11-27 | End: 2019-06-03

## 2018-11-27 RX ORDER — WARFARIN SODIUM 5 MG/1
TABLET ORAL
Qty: 150 TABLET | Refills: 1 | Status: SHIPPED | OUTPATIENT
Start: 2018-11-27 | End: 2019-06-24

## 2018-11-30 ENCOUNTER — ANTICOAGULATION THERAPY VISIT (OUTPATIENT)
Dept: CARDIOLOGY | Facility: CLINIC | Age: 78
End: 2018-11-30
Payer: COMMERCIAL

## 2018-11-30 DIAGNOSIS — I48.91 ATRIAL FIBRILLATION, UNSPECIFIED TYPE (H): ICD-10-CM

## 2018-11-30 DIAGNOSIS — Z79.01 LONG TERM CURRENT USE OF ANTICOAGULANTS WITH INR GOAL OF 2.0-3.0: ICD-10-CM

## 2018-11-30 LAB — INR POINT OF CARE: 2.1 (ref 0.86–1.14)

## 2018-11-30 PROCEDURE — 36416 COLLJ CAPILLARY BLOOD SPEC: CPT | Performed by: INTERNAL MEDICINE

## 2018-11-30 PROCEDURE — 85610 PROTHROMBIN TIME: CPT | Mod: QW | Performed by: INTERNAL MEDICINE

## 2018-11-30 NOTE — PROGRESS NOTES
ANTICOAGULATION FOLLOW-UP CLINIC VISIT    Patient Name:  Nigel Rodarte Jr.  Date:  11/30/2018  Contact Type:  Face to Face    SUBJECTIVE:     Patient Findings     Positives No Problem Findings           OBJECTIVE    INR Protime   Date Value Ref Range Status   11/30/2018 2.1 (A) 0.86 - 1.14 Final       ASSESSMENT / PLAN  INR assessment THER    Recheck INR In: 4 WEEKS    INR Location Clinic      Anticoagulation Summary as of 11/30/2018     INR goal 2.0-3.0   Today's INR 2.1   Warfarin maintenance plan 7.5 mg (5 mg x 1.5) every day   Full warfarin instructions 7.5 mg every day   Weekly warfarin total 52.5 mg   No change documented Gladys Herrera RN   Plan last modified Gladys Herrera RN (9/25/2018)   Next INR check 12/31/2018   Target end date Indefinite    Indications   Atrial fibrillation (H) [I48.91] [I48.91]  Long term current use of anticoagulants with INR goal of 2.0-3.0 [Z79.01]         Anticoagulation Episode Summary     INR check location     Preferred lab     Send INR reminders to [facility] HEART INR NURSE    Comments       Anticoagulation Care Providers     Provider Role Specialty Phone number    Grzegorz Malhotra MD Responsible Cardiology [phone]            See the Encounter Report to view Anticoagulation Flowsheet and Dosing Calendar (Go to Encounters tab in chart review, and find the Anticoagulation Therapy Visit)    INR 2.1 No change in meds or diet. No abnormal bleeding or bruising. Will continue current dosing of 7.5 mg daily and recheck in 4 weeks.     Gladys Herrera RN

## 2018-11-30 NOTE — MR AVS SNAPSHOT
Nigel Rodarte Jr.   11/30/2018 7:00 AM   Anticoagulation Therapy Visit    Description:  78 year old male   Provider:  [facility]   Department:  [facility]           INR as of 11/30/2018     Today's INR 2.1      Anticoagulation Summary as of 11/30/2018     INR goal 2.0-3.0   Today's INR 2.1   Full warfarin instructions 7.5 mg every day   Next INR check 12/31/2018    Indications   Atrial fibrillation (H) [I48.91] [I48.91]  Long term current use of anticoagulants with INR goal of 2.0-3.0 [Z79.01]         Your next Anticoagulation Clinic appointment(s)     Dec 31, 2018  7:00 AM CST   Anticoagulation Visit with  ANTICOAGULATION   [facility] ([facility] Clinics)    [address] [place] [id number] [phone] OPT 2              Contact Numbers     Anticoagulant (INR) Clinic Number: [phone]          November 2018 Details    Sun Mon Tue Wed Thu Fri Sat         1               2               3                 4               5               6               7               8               9               10                 11               12               13               14               15               16               17                 18               19               20               21               22               23               24                 25               26               27               28               29               30      7.5 mg   See details        Date Details   11/30 This INR check               How to take your warfarin dose     To take:  7.5 mg Take 1.5 of the 5 mg tablets.           December 2018 Details    Sun Mon Tue Wed Thu Fri Sat           1      7.5 mg           2      7.5 mg         3      7.5 mg         4      7.5 mg         5      7.5 mg         6      7.5 mg         7      7.5 mg         8      7.5 mg           9      7.5 mg         10      7.5 mg         11      7.5 mg         12       7.5 mg         13      7.5 mg         14      7.5 mg         15      7.5 mg           16      7.5 mg         17      7.5 mg         18      7.5 mg         19      7.5 mg         20      7.5 mg         21      7.5 mg         22      7.5 mg           23      7.5 mg         24      7.5 mg         25      7.5 mg         26      7.5 mg         27      7.5 mg         28      7.5 mg         29      7.5 mg           30      7.5 mg         31                  Date Details   No additional details    Date of next INR:  12/31/2018         How to take your warfarin dose     To take:  7.5 mg Take 1.5 of the 5 mg tablets.

## 2018-12-31 ENCOUNTER — ANTICOAGULATION THERAPY VISIT (OUTPATIENT)
Dept: CARDIOLOGY | Facility: CLINIC | Age: 78
End: 2018-12-31
Payer: COMMERCIAL

## 2018-12-31 DIAGNOSIS — I48.91 ATRIAL FIBRILLATION, UNSPECIFIED TYPE (H): ICD-10-CM

## 2018-12-31 DIAGNOSIS — Z79.01 LONG TERM CURRENT USE OF ANTICOAGULANTS WITH INR GOAL OF 2.0-3.0: ICD-10-CM

## 2018-12-31 LAB — INR POINT OF CARE: 2.9 (ref 0.86–1.14)

## 2018-12-31 PROCEDURE — 85610 PROTHROMBIN TIME: CPT | Mod: QW | Performed by: INTERNAL MEDICINE

## 2018-12-31 PROCEDURE — 36416 COLLJ CAPILLARY BLOOD SPEC: CPT | Performed by: INTERNAL MEDICINE

## 2018-12-31 PROCEDURE — 99207 ZZC NO CHARGE NURSE ONLY: CPT | Performed by: INTERNAL MEDICINE

## 2018-12-31 NOTE — PROGRESS NOTES
ANTICOAGULATION FOLLOW-UP CLINIC VISIT    Patient Name:  Nigel Rodarte Jr.  Date:  2018  Contact Type:  Face to Face    SUBJECTIVE:     Patient Findings     Positives:   No Problem Findings           OBJECTIVE    INR Protime   Date Value Ref Range Status   2018 2.9 (A) 0.86 - 1.14 Final       ASSESSMENT / PLAN  INR assessment THER    Recheck INR In: 4 WEEKS    INR Location Clinic      Anticoagulation Summary  As of 2018    INR goal:   2.0-3.0   TTR:   67.2 % (1.6 y)   INR used for dosin.9 (2018)   Warfarin maintenance plan:   7.5 mg (5 mg x 1.5) every day   Full warfarin instructions:   7.5 mg every day   Weekly warfarin total:   52.5 mg   No change documented:   Gladys Herrera RN   Plan last modified:   Gladys Herrera RN (2018)   Next INR check:   2019   Target end date:   Indefinite    Indications    Atrial fibrillation (H) [I48.91] [I48.91]  Long term current use of anticoagulants with INR goal of 2.0-3.0 [Z79.01]             Anticoagulation Episode Summary     INR check location:       Preferred lab:       Send INR reminders to:   [facility] HEART INR NURSE    Comments:         Anticoagulation Care Providers     Provider Role Specialty Phone number    Grzegorz Malhotra MD [facility] Cardiology [phone]            See the Encounter Report to view Anticoagulation Flowsheet and Dosing Calendar (Go to Encounters tab in chart review, and find the Anticoagulation Therapy Visit)    INR 2.9 No change in meds or diet. No abnormal bleeding or bruising. Will continue current dosing of 7.5 mg daily. Recheck in 4 weeks.     Gladys Herrera RN

## 2019-01-28 ENCOUNTER — ANTICOAGULATION THERAPY VISIT (OUTPATIENT)
Dept: CARDIOLOGY | Facility: CLINIC | Age: 79
End: 2019-01-28
Payer: MEDICARE

## 2019-01-28 DIAGNOSIS — Z79.01 LONG TERM CURRENT USE OF ANTICOAGULANTS WITH INR GOAL OF 2.0-3.0: ICD-10-CM

## 2019-01-28 DIAGNOSIS — I48.91 ATRIAL FIBRILLATION, UNSPECIFIED TYPE (H): ICD-10-CM

## 2019-01-28 LAB — INR POINT OF CARE: 2.7 (ref 0.86–1.14)

## 2019-01-28 PROCEDURE — 85610 PROTHROMBIN TIME: CPT | Mod: QW | Performed by: INTERNAL MEDICINE

## 2019-01-28 PROCEDURE — 36416 COLLJ CAPILLARY BLOOD SPEC: CPT | Performed by: INTERNAL MEDICINE

## 2019-01-28 NOTE — PROGRESS NOTES
ANTICOAGULATION FOLLOW-UP CLINIC VISIT    Patient Name:  Nigel Rodarte Jr.  Date:  2019  Contact Type:  Face to Face    SUBJECTIVE:     Patient Findings     Positives:   No Problem Findings           OBJECTIVE    INR Protime   Date Value Ref Range Status   2019 2.7 (A) 0.86 - 1.14 Final       ASSESSMENT / PLAN  INR assessment THER    Recheck INR In: 4 WEEKS    INR Location Clinic      Anticoagulation Summary  As of 2019    INR goal:   2.0-3.0   TTR:   68.7 % (1.7 y)   INR used for dosin.7 (2019)   Warfarin maintenance plan:   7.5 mg (5 mg x 1.5) every day   Full warfarin instructions:   7.5 mg every day   Weekly warfarin total:   52.5 mg   No change documented:   Gladys Herrera RN   Plan last modified:   Gladys Herrera RN (2018)   Next INR check:   2019   Target end date:   Indefinite    Indications    Atrial fibrillation (H) [I48.91] [I48.91]  Long term current use of anticoagulants with INR goal of 2.0-3.0 [Z79.01]             Anticoagulation Episode Summary     INR check location:       Preferred lab:       Send INR reminders to:   [facility] HEART INR NURSE    Comments:         Anticoagulation Care Providers     Provider Role Specialty Phone number    Grzegorz Malhotra MD [facility] Cardiology [phone]            See the Encounter Report to view Anticoagulation Flowsheet and Dosing Calendar (Go to Encounters tab in chart review, and find the Anticoagulation Therapy Visit)    INR 2.7 No change in meds or diet. No abnormal bleeding or bruising. Will continue current dosing of 7.5 mg daily and recheck in 4 weeks.    Gladys Herrera RN

## 2019-02-25 ENCOUNTER — OFFICE VISIT (OUTPATIENT)
Dept: CARDIOLOGY | Facility: CLINIC | Age: 79
End: 2019-02-25
Payer: MEDICARE

## 2019-02-25 ENCOUNTER — ANTICOAGULATION THERAPY VISIT (OUTPATIENT)
Dept: CARDIOLOGY | Facility: CLINIC | Age: 79
End: 2019-02-25
Payer: MEDICARE

## 2019-02-25 VITALS
HEART RATE: 56 BPM | SYSTOLIC BLOOD PRESSURE: 120 MMHG | BODY MASS INDEX: 24.68 KG/M2 | HEIGHT: 69 IN | WEIGHT: 166.6 LBS | DIASTOLIC BLOOD PRESSURE: 70 MMHG

## 2019-02-25 DIAGNOSIS — I48.0 PAROXYSMAL ATRIAL FIBRILLATION (H): ICD-10-CM

## 2019-02-25 DIAGNOSIS — I48.91 ATRIAL FIBRILLATION, UNSPECIFIED TYPE (H): ICD-10-CM

## 2019-02-25 DIAGNOSIS — Z79.01 LONG TERM CURRENT USE OF ANTICOAGULANTS WITH INR GOAL OF 2.0-3.0: ICD-10-CM

## 2019-02-25 LAB — INR POINT OF CARE: 2.6 (ref 0.86–1.14)

## 2019-02-25 PROCEDURE — 36416 COLLJ CAPILLARY BLOOD SPEC: CPT | Performed by: INTERNAL MEDICINE

## 2019-02-25 PROCEDURE — 93000 ELECTROCARDIOGRAM COMPLETE: CPT | Performed by: INTERNAL MEDICINE

## 2019-02-25 PROCEDURE — 99213 OFFICE O/P EST LOW 20 MIN: CPT | Performed by: INTERNAL MEDICINE

## 2019-02-25 PROCEDURE — 85610 PROTHROMBIN TIME: CPT | Mod: QW | Performed by: INTERNAL MEDICINE

## 2019-02-25 ASSESSMENT — MIFFLIN-ST. JEOR: SCORE: 1471.07

## 2019-02-25 NOTE — LETTER
"2/25/2019    Osmar Espinoza MD  [address]  [facility] [address]    RE: Nigel STRONG Cayden Duran.       Dear Colleague,    I had the pleasure of seeing Nigel Rodarte Jr. in the [facility].    Electrophysiology/ Clinic Note         H&P and Plan:     REASON FOR VISIT:  Evaluation of paroxysmal atrial fibrillation.       HISTORY OF PRESENT ILLNESS:  Mr. Rodarte is a delightful 78-year-old gentleman (former  of [employer]), who here for evaluation of paroxysmal atrial fibrillation.       She is on chronic therapy with metoprolol and flecainide.  Today he affirms he continues to do well.  He informs last year he only had 2 breakthroughs of A. fib.  During the 2 episodes of A. fib, he took an extra dose of flecainide which resulted in termination of tachycardia.      He denies any symptoms today such as chest pain, shortness of breath, lightheadedness, near-syncope or syncope.      EKG done today showed normal sinus rhythm with presence of PACs and QRS measuring 98 milliseconds.  Stress echo done February 1, 2018 was negative for ischemia.  He was able to exercise for 7 minutes and 20 seconds.  No significant arrhythmia or QRS changes were noticed.     PLAN:   1.  Paroxysmal atrial fibrillation.  He continues to respond well to a combination of metoprolol and flecainide.     We will continue current therapy.  2.  Embolic prevention.  CHADS-VASc score of 2. Continue anticoagulation with Coumadin indefinitely.  3.  Followup care.  Follow up in 1 year with IVA or earlier as needed.        Grzegorz Malhotra MD    Physical Exam:  Vitals: /70   Pulse 56   Ht 1.753 m (5' 9\")   Wt 75.6 kg (166 lb 9.6 oz)   BMI 24.60 kg/m       Constitutional:  AAO x3.  Pt is in NAD.  HEAD: normocephalic.  SKIN: Skin normal color, texture and turgor with no lesions or eruptions.  Eyes: PERRL, EOMI.  ENT:  Supple, normal JVP. No lymphadenopathy or thyroid enlargement.  Chest:  " CTAB.  Cardiac:  RRR, normal  S1 and S2.  No murmurs rubs or gallop.    Abdomen:  Normal BS.  Soft, non-tender and non-distended.  No rebound or guarding.    Extremities:  Pedious pulses palpable B/L.  No LE edema noticed.   Neurological: Strength and sensation grossly symmetric and intact throughout.       CURRENT MEDICATIONS:  Current Outpatient Medications   Medication Sig Dispense Refill     flecainide (TAMBOCOR) 50 MG tablet Take 1 tablet (50 mg) by mouth 2 times daily 180 tablet 1     metoprolol succinate (TOPROL-XL) 25 MG 24 hr tablet Take 1 tab daily 90 tablet 3     warfarin (COUMADIN) 5 MG tablet Take 1 1/2 tabs daily or as directed per INR clinic 150 tablet 1       ALLERGIES   No Known Allergies    PAST MEDICAL HISTORY:  Past Medical History:   Diagnosis Date     Arthritis     osteoarthrosis     Elevated PSA      Fatigue      Palpitations      Paroxysmal atrial fibrillation (H) 05/16/2017     Renal disease     kidney calculus     SOB (shortness of breath)        PAST SURGICAL HISTORY:  Past Surgical History:   Procedure Laterality Date     APPENDECTOMY       ARTHROPLASTY HIP  1/14/2013    Procedure: ARTHROPLASTY HIP;  RIGHT TOTAL HIP ARTHROPLASTY ;  Surgeon: Jamie Ohara MD;  Location:  OR     CARDIOVERSION       COLONOSCOPY N/A 2/9/2016    Procedure: COLONOSCOPY;  Surgeon: Astrid Vital MD;  Location:  GI     CYSTOSCOPY, RETROGRADES, EXTRACT STONE, COMBINED  9/12/2013    Procedure: COMBINED CYSTOSCOPY, RETROGRADES, EXTRACT STONE;  CYSTOSCOPY, RIGHT RETROGRADE, STONE EXTRACTION;  Surgeon: Carlos Mcknight MD;  Location:  OR     ENT SURGERY      right ear surgery     GENITOURINARY SURGERY      cystoscopy for stone removal     HERNIA REPAIR       ORTHOPEDIC SURGERY      shoulder surgeries,bilat carpel tunnel       FAMILY HISTORY:  Family History   Problem Relation Age of Onset     Lung Cancer Brother        SOCIAL HISTORY:  Social History     Socioeconomic History     Marital status:       Spouse name: None     Number of children: None     Years of education: None     Highest education level: None   Occupational History     None   Social Needs     Financial resource strain: None     Food insecurity:     Worry: None     Inability: None     Transportation needs:     Medical: None     Non-medical: None   Tobacco Use     Smoking status: Never Smoker     Smokeless tobacco: Never Used   Substance and Sexual Activity     Alcohol use: No     Drug use: No     Sexual activity: None   Lifestyle     Physical activity:     Days per week: None     Minutes per session: None     Stress: None   Relationships     Social connections:     Talks on phone: None     Gets together: None     Attends Rastafarian service: None     Active member of club or organization: None     Attends meetings of clubs or organizations: None     Relationship status: None     Intimate partner violence:     Fear of current or ex partner: None     Emotionally abused: None     Physically abused: None     Forced sexual activity: None   Other Topics Concern     Parent/sibling w/ CABG, MI or angioplasty before 65F 55M? Not Asked   Social History Narrative     None       Review of Systems:  Skin:  Negative     Eyes:  Positive for glasses  ENT:  Negative    Respiratory:  Negative    Cardiovascular:  Negative    Gastroenterology: Negative    Genitourinary:  Negative    Musculoskeletal:  Positive for arthritis  Neurologic:  Negative    Psychiatric:  Negative    Heme/Lymph/Imm:  Negative    Endocrine:  Negative        Recent Lab Results:  LIPID RESULTS:  No results found for: CHOL, HDL, LDL, TRIG, CHOLHDLRATIO    LIVER ENZYME RESULTS:  Lab Results   Component Value Date    AST 26 05/16/2017    ALT 30 05/16/2017       CBC RESULTS:  Lab Results   Component Value Date    WBC 4.8 05/20/2018    RBC 5.09 05/20/2018    HGB 15.5 05/20/2018    HCT 45.1 05/20/2018    MCV 89 05/20/2018    MCH 30.5 05/20/2018    MCHC 34.4 05/20/2018    RDW 13.1 05/20/2018      05/20/2018       BMP RESULTS:  Lab Results   Component Value Date     05/20/2018    POTASSIUM 4.5 05/20/2018    CHLORIDE 111 (H) 05/20/2018    CO2 27 05/20/2018    ANIONGAP 4 05/20/2018    GLC 97 05/20/2018    BUN 23 05/20/2018    CR 1.18 05/20/2018    GFRESTIMATED 60 (L) 05/20/2018    GFRESTBLACK 72 05/20/2018    CHERI 8.8 05/20/2018        A1C RESULTS:  No results found for: A1C    INR RESULTS:  Lab Results   Component Value Date    INR 2.6 (A) 02/25/2019    INR 2.7 (A) 01/28/2019    INR 2.22 (H) 05/20/2018    INR 2.25 (H) 08/17/2017         ECHOCARDIOGRAM  No results found for this or any previous visit (from the past 8760 hour(s)).      Orders Placed This Encounter   Procedures     EKG 12-lead complete w/read - Clinics (performed today)     No orders of the defined types were placed in this encounter.    There are no discontinued medications.      Encounter Diagnosis   Name Primary?     Paroxysmal atrial fibrillation (H)          CC  Briseyda Meyers PA-C  [address]  [place] [address]                Thank you for allowing me to participate in the care of your patient.      Sincerely,     Grzegorz Malhotra MD     [facility]    cc:   Briseyda Meyers PA-C  [address]  [place] [address]

## 2019-02-25 NOTE — PROGRESS NOTES
ANTICOAGULATION FOLLOW-UP CLINIC VISIT    Patient Name:  Nigel Rodarte Jr.  Date:  2019  Contact Type:  Face to Face    SUBJECTIVE:     Patient Findings     Positives:   No Problem Findings           OBJECTIVE    INR Protime   Date Value Ref Range Status   2019 2.6 (A) 0.86 - 1.14 Final       ASSESSMENT / PLAN  INR assessment THER    Recheck INR In: 4 WEEKS    INR Location Clinic      Anticoagulation Summary  As of 2019    INR goal:   2.0-3.0   TTR:   70.1 % (1.7 y)   INR used for dosin.6 (2019)   Warfarin maintenance plan:   7.5 mg (5 mg x 1.5) every day   Full warfarin instructions:   7.5 mg every day   Weekly warfarin total:   52.5 mg   No change documented:   Gladys Herrera RN   Plan last modified:   Gladys Herrera RN (2018)   Next INR check:   3/25/2019   Target end date:   Indefinite    Indications    Atrial fibrillation (H) [I48.91] [I48.91]  Long term current use of anticoagulants with INR goal of 2.0-3.0 [Z79.01]             Anticoagulation Episode Summary     INR check location:       Preferred lab:       Send INR reminders to:   [facility] HEART INR NURSE    Comments:         Anticoagulation Care Providers     Provider Role Specialty Phone number    Grzegorz Malhotra MD [facility] Cardiology [phone]            See the Encounter Report to view Anticoagulation Flowsheet and Dosing Calendar (Go to Encounters tab in chart review, and find the Anticoagulation Therapy Visit)    INR 2.6 No change in meds or diet. No abnormal bleeding or bruising. Will continue current dosing of 7.5 mg daily and recheck in 4 weeks.    Gladys Herrera RN                  No

## 2019-02-25 NOTE — PROGRESS NOTES
"Electrophysiology/ Clinic Note         H&P and Plan:     REASON FOR VISIT:  Evaluation of paroxysmal atrial fibrillation.       HISTORY OF PRESENT ILLNESS:  Mr. Rodarte is a delightful 78-year-old gentleman (former  of [employer] team), who here for evaluation of paroxysmal atrial fibrillation.       She is on chronic therapy with metoprolol and flecainide.  Today he affirms he continues to do well.  He informs last year he only had 2 breakthroughs of A. fib.  During the 2 episodes of A. fib, he took an extra dose of flecainide which resulted in termination of tachycardia.      He denies any symptoms today such as chest pain, shortness of breath, lightheadedness, near-syncope or syncope.      EKG done today showed normal sinus rhythm with presence of PACs and QRS measuring 98 milliseconds.  Stress echo done February 1, 2018 was negative for ischemia.  He was able to exercise for 7 minutes and 20 seconds.  No significant arrhythmia or QRS changes were noticed.     PLAN:   1.  Paroxysmal atrial fibrillation.  He continues to respond well to a combination of metoprolol and flecainide.    We will continue current therapy.  2.  Embolic prevention.  CHADS-VASc score of 2. Continue anticoagulation with Coumadin indefinitely.  3.  Followup care.  Follow up in 1 year with IVA or earlier as needed.        Grzegorz Malhotra MD    Physical Exam:  Vitals: /70   Pulse 56   Ht 1.753 m (5' 9\")   Wt 75.6 kg (166 lb 9.6 oz)   BMI 24.60 kg/m      Constitutional:  AAO x3.  Pt is in NAD.  HEAD: normocephalic.  SKIN: Skin normal color, texture and turgor with no lesions or eruptions.  Eyes: PERRL, EOMI.  ENT:  Supple, normal JVP. No lymphadenopathy or thyroid enlargement.  Chest:  CTAB.  Cardiac:  RRR, normal  S1 and S2.  No murmurs rubs or gallop.    Abdomen:  Normal BS.  Soft, non-tender and non-distended.  No rebound or guarding.    Extremities:  Pedious pulses palpable B/L.  No LE edema noticed.   Neurological: " Strength and sensation grossly symmetric and intact throughout.       CURRENT MEDICATIONS:  Current Outpatient Medications   Medication Sig Dispense Refill     flecainide (TAMBOCOR) 50 MG tablet Take 1 tablet (50 mg) by mouth 2 times daily 180 tablet 1     metoprolol succinate (TOPROL-XL) 25 MG 24 hr tablet Take 1 tab daily 90 tablet 3     warfarin (COUMADIN) 5 MG tablet Take 1 1/2 tabs daily or as directed per INR clinic 150 tablet 1       ALLERGIES   No Known Allergies    PAST MEDICAL HISTORY:  Past Medical History:   Diagnosis Date     Arthritis     osteoarthrosis     Elevated PSA      Fatigue      Palpitations      Paroxysmal atrial fibrillation (H) 05/16/2017     Renal disease     kidney calculus     SOB (shortness of breath)        PAST SURGICAL HISTORY:  Past Surgical History:   Procedure Laterality Date     APPENDECTOMY       ARTHROPLASTY HIP  1/14/2013    Procedure: ARTHROPLASTY HIP;  RIGHT TOTAL HIP ARTHROPLASTY ;  Surgeon: Jamie Ohara MD;  Location:  OR     CARDIOVERSION       COLONOSCOPY N/A 2/9/2016    Procedure: COLONOSCOPY;  Surgeon: Astrid Vital MD;  Location:  GI     CYSTOSCOPY, RETROGRADES, EXTRACT STONE, COMBINED  9/12/2013    Procedure: COMBINED CYSTOSCOPY, RETROGRADES, EXTRACT STONE;  CYSTOSCOPY, RIGHT RETROGRADE, STONE EXTRACTION;  Surgeon: Carlos Mcknight MD;  Location:  OR     ENT SURGERY      right ear surgery     GENITOURINARY SURGERY      cystoscopy for stone removal     HERNIA REPAIR       ORTHOPEDIC SURGERY      shoulder surgeries,bilat carpel tunnel       FAMILY HISTORY:  Family History   Problem Relation Age of Onset     Lung Cancer Brother        SOCIAL HISTORY:  Social History     Socioeconomic History     Marital status:      Spouse name: None     Number of children: None     Years of education: None     Highest education level: None   Occupational History     None   Social Needs     Financial resource strain: None     Food insecurity:     Worry: None      Inability: None     Transportation needs:     Medical: None     Non-medical: None   Tobacco Use     Smoking status: Never Smoker     Smokeless tobacco: Never Used   Substance and Sexual Activity     Alcohol use: No     Drug use: No     Sexual activity: None   Lifestyle     Physical activity:     Days per week: None     Minutes per session: None     Stress: None   Relationships     Social connections:     Talks on phone: None     Gets together: None     Attends Congregational service: None     Active member of club or organization: None     Attends meetings of clubs or organizations: None     Relationship status: None     Intimate partner violence:     Fear of current or ex partner: None     Emotionally abused: None     Physically abused: None     Forced sexual activity: None   Other Topics Concern     Parent/sibling w/ CABG, MI or angioplasty before 65F 55M? Not Asked   Social History Narrative     None       Review of Systems:  Skin:  Negative     Eyes:  Positive for glasses  ENT:  Negative    Respiratory:  Negative    Cardiovascular:  Negative    Gastroenterology: Negative    Genitourinary:  Negative    Musculoskeletal:  Positive for arthritis  Neurologic:  Negative    Psychiatric:  Negative    Heme/Lymph/Imm:  Negative    Endocrine:  Negative        Recent Lab Results:  LIPID RESULTS:  No results found for: CHOL, HDL, LDL, TRIG, CHOLHDLRATIO    LIVER ENZYME RESULTS:  Lab Results   Component Value Date    AST 26 05/16/2017    ALT 30 05/16/2017       CBC RESULTS:  Lab Results   Component Value Date    WBC 4.8 05/20/2018    RBC 5.09 05/20/2018    HGB 15.5 05/20/2018    HCT 45.1 05/20/2018    MCV 89 05/20/2018    MCH 30.5 05/20/2018    MCHC 34.4 05/20/2018    RDW 13.1 05/20/2018     05/20/2018       BMP RESULTS:  Lab Results   Component Value Date     05/20/2018    POTASSIUM 4.5 05/20/2018    CHLORIDE 111 (H) 05/20/2018    CO2 27 05/20/2018    ANIONGAP 4 05/20/2018    GLC 97 05/20/2018    BUN 23 05/20/2018     CR 1.18 05/20/2018    GFRESTIMATED 60 (L) 05/20/2018    GFRESTBLACK 72 05/20/2018    CHERI 8.8 05/20/2018        A1C RESULTS:  No results found for: A1C    INR RESULTS:  Lab Results   Component Value Date    INR 2.6 (A) 02/25/2019    INR 2.7 (A) 01/28/2019    INR 2.22 (H) 05/20/2018    INR 2.25 (H) 08/17/2017         ECHOCARDIOGRAM  No results found for this or any previous visit (from the past 8760 hour(s)).      Orders Placed This Encounter   Procedures     EKG 12-lead complete w/read - Clinics (performed today)     No orders of the defined types were placed in this encounter.    There are no discontinued medications.      Encounter Diagnosis   Name Primary?     Paroxysmal atrial fibrillation (H)          CLAU Meyers PA-C  [address]  [place] [id number]

## 2019-02-25 NOTE — LETTER
"2/25/2019    Osmar Espinoza MD  [employer] [address]  [facility] [address]    RE: Nigel STRONG Cayden Duran.       Dear Colleague,    I had the pleasure of seeing Nigel Rodarte Jr. in the [facility].    Electrophysiology/ Clinic Note         H&P and Plan:     REASON FOR VISIT:  Evaluation of paroxysmal atrial fibrillation.       HISTORY OF PRESENT ILLNESS:  Mr. Rodarte is a delightful 78-year-old gentleman (former  of [employer] team), who here for evaluation of paroxysmal atrial fibrillation.       She is on chronic therapy with metoprolol and flecainide.  Today he affirms he continues to do well.  He informs last year he only had 2 breakthroughs of A. fib.  During the 2 episodes of A. fib, he took an extra dose of flecainide which resulted in termination of tachycardia.      He denies any symptoms today such as chest pain, shortness of breath, lightheadedness, near-syncope or syncope.      EKG done today showed normal sinus rhythm with presence of PACs and QRS measuring 98 milliseconds.  Stress echo done February 1, 2018 was negative for ischemia.  He was able to exercise for 7 minutes and 20 seconds.  No significant arrhythmia or QRS changes were noticed.     PLAN:   1.  Paroxysmal atrial fibrillation.  He continues to respond well to a combination of metoprolol and flecainide.     We will continue current therapy.  2.  Embolic prevention.  CHADS-VASc score of 2. Continue anticoagulation with Coumadin indefinitely.  3.  Followup care.  Follow up in 1 year with IVA or earlier as needed.        Grzegorz Malhotra MD    Physical Exam:  Vitals: /70   Pulse 56   Ht 1.753 m (5' 9\")   Wt 75.6 kg (166 lb 9.6 oz)   BMI 24.60 kg/m       Constitutional:  AAO x3.  Pt is in NAD.  HEAD: normocephalic.  SKIN: Skin normal color, texture and turgor with no lesions or eruptions.  Eyes: PERRL, EOMI.  ENT:  Supple, normal JVP. No lymphadenopathy or thyroid enlargement.  Chest:  " CTAB.  Cardiac:  RRR, normal  S1 and S2.  No murmurs rubs or gallop.    Abdomen:  Normal BS.  Soft, non-tender and non-distended.  No rebound or guarding.    Extremities:  Pedious pulses palpable B/L.  No LE edema noticed.   Neurological: Strength and sensation grossly symmetric and intact throughout.       CURRENT MEDICATIONS:  Current Outpatient Medications   Medication Sig Dispense Refill     flecainide (TAMBOCOR) 50 MG tablet Take 1 tablet (50 mg) by mouth 2 times daily 180 tablet 1     metoprolol succinate (TOPROL-XL) 25 MG 24 hr tablet Take 1 tab daily 90 tablet 3     warfarin (COUMADIN) 5 MG tablet Take 1 1/2 tabs daily or as directed per INR clinic 150 tablet 1       ALLERGIES   No Known Allergies    PAST MEDICAL HISTORY:  Past Medical History:   Diagnosis Date     Arthritis     osteoarthrosis     Elevated PSA      Fatigue      Palpitations      Paroxysmal atrial fibrillation (H) 05/16/2017     Renal disease     kidney calculus     SOB (shortness of breath)        PAST SURGICAL HISTORY:  Past Surgical History:   Procedure Laterality Date     APPENDECTOMY       ARTHROPLASTY HIP  1/14/2013    Procedure: ARTHROPLASTY HIP;  RIGHT TOTAL HIP ARTHROPLASTY ;  Surgeon: Jamie Ohara MD;  Location:  OR     CARDIOVERSION       COLONOSCOPY N/A 2/9/2016    Procedure: COLONOSCOPY;  Surgeon: Astrid Vital MD;  Location:  GI     CYSTOSCOPY, RETROGRADES, EXTRACT STONE, COMBINED  9/12/2013    Procedure: COMBINED CYSTOSCOPY, RETROGRADES, EXTRACT STONE;  CYSTOSCOPY, RIGHT RETROGRADE, STONE EXTRACTION;  Surgeon: Carlos Mcknight MD;  Location:  OR     ENT SURGERY      right ear surgery     GENITOURINARY SURGERY      cystoscopy for stone removal     HERNIA REPAIR       ORTHOPEDIC SURGERY      shoulder surgeries,bilat carpel tunnel       FAMILY HISTORY:  Family History   Problem Relation Age of Onset     Lung Cancer Brother        SOCIAL HISTORY:  Social History     Socioeconomic History     Marital status:       Spouse name: None     Number of children: None     Years of education: None     Highest education level: None   Occupational History     None   Social Needs     Financial resource strain: None     Food insecurity:     Worry: None     Inability: None     Transportation needs:     Medical: None     Non-medical: None   Tobacco Use     Smoking status: Never Smoker     Smokeless tobacco: Never Used   Substance and Sexual Activity     Alcohol use: No     Drug use: No     Sexual activity: None   Lifestyle     Physical activity:     Days per week: None     Minutes per session: None     Stress: None   Relationships     Social connections:     Talks on phone: None     Gets together: None     Attends Yarsani service: None     Active member of club or organization: None     Attends meetings of clubs or organizations: None     Relationship status: None     Intimate partner violence:     Fear of current or ex partner: None     Emotionally abused: None     Physically abused: None     Forced sexual activity: None   Other Topics Concern     Parent/sibling w/ CABG, MI or angioplasty before 65F 55M? Not Asked   Social History Narrative     None       Review of Systems:  Skin:  Negative     Eyes:  Positive for glasses  ENT:  Negative    Respiratory:  Negative    Cardiovascular:  Negative    Gastroenterology: Negative    Genitourinary:  Negative    Musculoskeletal:  Positive for arthritis  Neurologic:  Negative    Psychiatric:  Negative    Heme/Lymph/Imm:  Negative    Endocrine:  Negative        Recent Lab Results:  LIPID RESULTS:  No results found for: CHOL, HDL, LDL, TRIG, CHOLHDLRATIO    LIVER ENZYME RESULTS:  Lab Results   Component Value Date    AST 26 05/16/2017    ALT 30 05/16/2017       CBC RESULTS:  Lab Results   Component Value Date    WBC 4.8 05/20/2018    RBC 5.09 05/20/2018    HGB 15.5 05/20/2018    HCT 45.1 05/20/2018    MCV 89 05/20/2018    MCH 30.5 05/20/2018    MCHC 34.4 05/20/2018    RDW 13.1 05/20/2018      05/20/2018       BMP RESULTS:  Lab Results   Component Value Date     05/20/2018    POTASSIUM 4.5 05/20/2018    CHLORIDE 111 (H) 05/20/2018    CO2 27 05/20/2018    ANIONGAP 4 05/20/2018    GLC 97 05/20/2018    BUN 23 05/20/2018    CR 1.18 05/20/2018    GFRESTIMATED 60 (L) 05/20/2018    GFRESTBLACK 72 05/20/2018    CHERI 8.8 05/20/2018        A1C RESULTS:  No results found for: A1C    INR RESULTS:  Lab Results   Component Value Date    INR 2.6 (A) 02/25/2019    INR 2.7 (A) 01/28/2019    INR 2.22 (H) 05/20/2018    INR 2.25 (H) 08/17/2017         ECHOCARDIOGRAM  No results found for this or any previous visit (from the past 8760 hour(s)).      Orders Placed This Encounter   Procedures     EKG 12-lead complete w/read - Clinics (performed today)     No orders of the defined types were placed in this encounter.    There are no discontinued medications.      Encounter Diagnosis   Name Primary?     Paroxysmal atrial fibrillation (H)          Thank you for allowing me to participate in the care of your patient.    Sincerely,     Grzegorz Malhotra MD     [facility]

## 2019-02-27 DIAGNOSIS — I48.91 ATRIAL FIBRILLATION (H): ICD-10-CM

## 2019-02-27 DIAGNOSIS — I48.91 ATRIAL FIBRILLATION, UNSPECIFIED TYPE (H): ICD-10-CM

## 2019-02-27 RX ORDER — METOPROLOL SUCCINATE 25 MG/1
TABLET, EXTENDED RELEASE ORAL
Qty: 90 TABLET | Refills: 2 | Status: SHIPPED | OUTPATIENT
Start: 2019-02-27 | End: 2019-11-15

## 2019-03-25 ENCOUNTER — ANTICOAGULATION THERAPY VISIT (OUTPATIENT)
Dept: CARDIOLOGY | Facility: CLINIC | Age: 79
End: 2019-03-25
Payer: MEDICARE

## 2019-03-25 DIAGNOSIS — Z79.01 LONG TERM CURRENT USE OF ANTICOAGULANTS WITH INR GOAL OF 2.0-3.0: ICD-10-CM

## 2019-03-25 DIAGNOSIS — I48.91 ATRIAL FIBRILLATION, UNSPECIFIED TYPE (H): ICD-10-CM

## 2019-03-25 LAB — INR POINT OF CARE: 1.4 (ref 0.86–1.14)

## 2019-03-25 PROCEDURE — 85610 PROTHROMBIN TIME: CPT | Mod: QW | Performed by: INTERNAL MEDICINE

## 2019-03-25 PROCEDURE — 36416 COLLJ CAPILLARY BLOOD SPEC: CPT | Performed by: INTERNAL MEDICINE

## 2019-03-25 NOTE — PROGRESS NOTES
ANTICOAGULATION FOLLOW-UP CLINIC VISIT    Patient Name:  Nigel Rodarte Jr.  Date:  3/25/2019  Contact Type:  Face to Face    SUBJECTIVE:     Patient Findings            OBJECTIVE    INR Protime   Date Value Ref Range Status   2019 1.4 (A) 0.86 - 1.14 Final       ASSESSMENT / PLAN  INR assessment SUB    Recheck INR In: 2 WEEKS    INR Location Clinic      Anticoagulation Summary  As of 3/25/2019    INR goal:   2.0-3.0   TTR:   69.2 % (1.8 y)   INR used for dosin.4! (3/25/2019)   Warfarin maintenance plan:   7.5 mg (5 mg x 1.5) every day   Full warfarin instructions:   3/25: 12.5 mg; 3/26: 10 mg; Otherwise 7.5 mg every day   Weekly warfarin total:   52.5 mg   Plan last modified:   Gladys Herrera RN (2018)   Next INR check:   2019   Target end date:   Indefinite    Indications    Atrial fibrillation (H) [I48.91] [I48.91]  Long term current use of anticoagulants with INR goal of 2.0-3.0 [Z79.01]             Anticoagulation Episode Summary     INR check location:       Preferred lab:       Send INR reminders to:   [facility] HEART INR NURSE    Comments:         Anticoagulation Care Providers     Provider Role Specialty Phone number    Grzegorz Malhotra MD [facility] [phone]            See the Encounter Report to view Anticoagulation Flowsheet and Dosing Calendar (Go to Encounters tab in chart review, and find the Anticoagulation Therapy Visit)    INR 1.4 Denies change in meds or diet (brussel sprouts once a week) except that he may have gotten green veggie in a slice of quiche but that was 2 weeks ago. He doesn't think he missed a dose but doesn't use a pillbox and doesn't want to start using a pillbox yet (he has a system to help him remember his pills). No abnormal bleeding, bruising or clotting symptoms. Will boost today's dose to 12.5 mg and tomorrow to 10 mg then resume usual dosing of 7.5 mg daily with recheck in 2 weeks. He will avoid greens/green veggies for 2 days then  resume usual diet. If out of range at next appt will discuss use of a pillbox again with pt. Dosage adjustment made based on physician directed care plan.    Gladys Herrera RN

## 2019-04-08 ENCOUNTER — ANTICOAGULATION THERAPY VISIT (OUTPATIENT)
Dept: CARDIOLOGY | Facility: CLINIC | Age: 79
End: 2019-04-08
Payer: MEDICARE

## 2019-04-08 DIAGNOSIS — I48.91 ATRIAL FIBRILLATION, UNSPECIFIED TYPE (H): ICD-10-CM

## 2019-04-08 DIAGNOSIS — Z79.01 LONG TERM CURRENT USE OF ANTICOAGULANTS WITH INR GOAL OF 2.0-3.0: ICD-10-CM

## 2019-04-08 LAB — INR POINT OF CARE: 3.1 (ref 0.86–1.14)

## 2019-04-08 PROCEDURE — 36416 COLLJ CAPILLARY BLOOD SPEC: CPT | Performed by: INTERNAL MEDICINE

## 2019-04-08 PROCEDURE — 85610 PROTHROMBIN TIME: CPT | Mod: QW | Performed by: INTERNAL MEDICINE

## 2019-04-08 NOTE — PROGRESS NOTES
ANTICOAGULATION FOLLOW-UP CLINIC VISIT    Patient Name:  Nigel Rodarte Jr.  Date:  4/8/2019  Contact Type:  Face to Face    SUBJECTIVE:     Patient Findings     Positives:   Change in diet/appetite (ate greens only twice over the last 2 weeks)           OBJECTIVE    INR Protime   Date Value Ref Range Status   04/08/2019 3.1 (A) 0.86 - 1.14 Final       ASSESSMENT / PLAN  INR assessment THER    Recheck INR In: 4 WEEKS    INR Location Clinic      Anticoagulation Summary  As of 4/8/2019    INR goal:   2.0-3.0   TTR:   69.0 % (1.9 y)   INR used for dosing:   3.1! (4/8/2019)   Warfarin maintenance plan:   7.5 mg (5 mg x 1.5) every day   Full warfarin instructions:   7.5 mg every day   Weekly warfarin total:   52.5 mg   No change documented:   Gladys Herrera RN   Plan last modified:   Gladys Herrera RN (9/25/2018)   Next INR check:   5/6/2019   Target end date:   Indefinite    Indications    Atrial fibrillation (H) [I48.91] [I48.91]  Long term current use of anticoagulants with INR goal of 2.0-3.0 [Z79.01]             Anticoagulation Episode Summary     INR check location:       Preferred lab:       Send INR reminders to:   [facility] HEART INR NURSE    Comments:         Anticoagulation Care Providers     Provider Role Specialty Phone number    Grzegorz Malhotra MD [facility] Cardiology [phone]            See the Encounter Report to view Anticoagulation Flowsheet and Dosing Calendar (Go to Encounters tab in chart review, and find the Anticoagulation Therapy Visit)    INR 3.1 INR increased after boost in dosing when INR was low 2 weeks ago. He ate greens/green veggies only twice over the last 2 weeks. No change in meds. No abnormal bleeding or bruising. Will continue current dosing of 7.5 mg daily and resume his usual diet (will eat greens today). Recheck in 4 weeks.     Gladys Herrera RN

## 2019-05-06 ENCOUNTER — ANTICOAGULATION THERAPY VISIT (OUTPATIENT)
Dept: CARDIOLOGY | Facility: CLINIC | Age: 79
End: 2019-05-06
Payer: MEDICARE

## 2019-05-06 DIAGNOSIS — I48.91 ATRIAL FIBRILLATION, UNSPECIFIED TYPE (H): ICD-10-CM

## 2019-05-06 DIAGNOSIS — Z79.01 LONG TERM CURRENT USE OF ANTICOAGULANTS WITH INR GOAL OF 2.0-3.0: ICD-10-CM

## 2019-05-06 LAB — INR POINT OF CARE: 3.5 (ref 0.86–1.14)

## 2019-05-06 PROCEDURE — 85610 PROTHROMBIN TIME: CPT | Mod: QW | Performed by: INTERNAL MEDICINE

## 2019-05-06 PROCEDURE — 36416 COLLJ CAPILLARY BLOOD SPEC: CPT | Performed by: INTERNAL MEDICINE

## 2019-05-06 NOTE — PROGRESS NOTES
ANTICOAGULATION FOLLOW-UP CLINIC VISIT    Patient Name:  Nigel Rodarte Jr.  Date:  5/6/2019  Contact Type:  Face to Face    SUBJECTIVE:     Patient Findings     Positives:   Change in diet/appetite (ate fewer salads recently)           OBJECTIVE    INR Protime   Date Value Ref Range Status   05/06/2019 3.5 (A) 0.86 - 1.14 Final       ASSESSMENT / PLAN  INR assessment SUPRA    Recheck INR In: 2 WEEKS    INR Location Clinic      Anticoagulation Summary  As of 5/6/2019    INR goal:   2.0-3.0   TTR:   66.3 % (1.9 y)   INR used for dosing:   3.5! (5/6/2019)   Warfarin maintenance plan:   7.5 mg (5 mg x 1.5) every day   Full warfarin instructions:   5/6: 5 mg; Otherwise 7.5 mg every day   Weekly warfarin total:   52.5 mg   Plan last modified:   Gladys Herrera RN (9/25/2018)   Next INR check:   5/20/2019   Target end date:   Indefinite    Indications    Atrial fibrillation (H) [I48.91] [I48.91]  Long term current use of anticoagulants with INR goal of 2.0-3.0 [Z79.01]             Anticoagulation Episode Summary     INR check location:       Preferred lab:       Send INR reminders to:   [facility] HEART INR NURSE    Comments:         Anticoagulation Care Providers     Provider Role Specialty Phone number    Grzegorz Malhotra MD [facility] Cardiology [phone]            See the Encounter Report to view Anticoagulation Flowsheet and Dosing Calendar (Go to Encounters tab in chart review, and find the Anticoagulation Therapy Visit)    INR 3.5 He missed several salads recently. No change in meds. Has a healing bruise on his right hand. He would prefer to increase greens rather than change maintenance dose. Will take 5 mg today then resume 7.5 mg daily. He will resume eating 3 salads a week and recheck INR in 2 weeks. Dosage adjustment made based on physician directed care plan.    Gladys Herrera RN

## 2019-05-20 ENCOUNTER — ANTICOAGULATION THERAPY VISIT (OUTPATIENT)
Dept: CARDIOLOGY | Facility: CLINIC | Age: 79
End: 2019-05-20
Payer: MEDICARE

## 2019-05-20 DIAGNOSIS — Z79.01 LONG TERM CURRENT USE OF ANTICOAGULANTS WITH INR GOAL OF 2.0-3.0: ICD-10-CM

## 2019-05-20 DIAGNOSIS — I48.91 ATRIAL FIBRILLATION, UNSPECIFIED TYPE (H): ICD-10-CM

## 2019-05-20 LAB — INR POINT OF CARE: 3.5 (ref 0.86–1.14)

## 2019-05-20 PROCEDURE — 36416 COLLJ CAPILLARY BLOOD SPEC: CPT | Performed by: INTERNAL MEDICINE

## 2019-05-20 PROCEDURE — 85610 PROTHROMBIN TIME: CPT | Mod: QW | Performed by: INTERNAL MEDICINE

## 2019-05-20 NOTE — PROGRESS NOTES
ANTICOAGULATION FOLLOW-UP CLINIC VISIT    Patient Name:  Nigel Rodarte Jr.  Date:  5/20/2019  Contact Type:  Face to Face    SUBJECTIVE:  Patient Findings     Positives:   Change in diet/appetite (ate 2-3 salads and broccoli 2-3x since last INR)             OBJECTIVE    INR Protime   Date Value Ref Range Status   05/20/2019 3.5 (A) 0.86 - 1.14 Final       ASSESSMENT / PLAN  INR assessment SUPRA    Recheck INR In: 2 WEEKS    INR Location Clinic      Anticoagulation Summary  As of 5/20/2019    INR goal:   2.0-3.0   TTR:   65.0 % (2 y)   INR used for dosing:   3.5! (5/20/2019)   Warfarin maintenance plan:   5 mg (5 mg x 1) every Tue; 7.5 mg (5 mg x 1.5) all other days   Full warfarin instructions:   5/21: 5 mg; Otherwise 5 mg every Tue; 7.5 mg all other days   Weekly warfarin total:   50 mg   Plan last modified:   Gladys Herrera RN (5/20/2019)   Next INR check:   6/3/2019   Target end date:   Indefinite    Indications    Atrial fibrillation (H) [I48.91] [I48.91]  Long term current use of anticoagulants with INR goal of 2.0-3.0 [Z79.01]             Anticoagulation Episode Summary     INR check location:       Preferred lab:       Send INR reminders to:   [facility] HEART INR NURSE    Comments:         Anticoagulation Care Providers     Provider Role Specialty Phone number    Grzegorz Malhotra MD Responsible Cardiology [phone]            See the Encounter Report to view Anticoagulation Flowsheet and Dosing Calendar (Go to Encounters tab in chart review, and find the Anticoagulation Therapy Visit)    INR 3.5 INR remains elevated even with eating more veggies (ate 2-3 salads and broccoli 2-3x since last INR). No change in meds. No abnormal bleeding or bruising. He already took today's dose so will decrease dosing schedule to 5 mg Tuesdays and 7.5 mg all other days with recheck in 2 weeks. Dosage adjustment made based on physician directed care plan.    Gladys Herrera RN

## 2019-06-03 ENCOUNTER — ANTICOAGULATION THERAPY VISIT (OUTPATIENT)
Dept: CARDIOLOGY | Facility: CLINIC | Age: 79
End: 2019-06-03
Payer: MEDICARE

## 2019-06-03 DIAGNOSIS — I48.0 PAROXYSMAL ATRIAL FIBRILLATION (H): ICD-10-CM

## 2019-06-03 DIAGNOSIS — I48.91 ATRIAL FIBRILLATION, UNSPECIFIED TYPE (H): ICD-10-CM

## 2019-06-03 DIAGNOSIS — Z79.01 LONG TERM CURRENT USE OF ANTICOAGULANTS WITH INR GOAL OF 2.0-3.0: ICD-10-CM

## 2019-06-03 LAB — INR POINT OF CARE: 2.7 (ref 0.86–1.14)

## 2019-06-03 PROCEDURE — 85610 PROTHROMBIN TIME: CPT | Mod: QW | Performed by: INTERNAL MEDICINE

## 2019-06-03 PROCEDURE — 36416 COLLJ CAPILLARY BLOOD SPEC: CPT | Performed by: INTERNAL MEDICINE

## 2019-06-03 RX ORDER — FLECAINIDE ACETATE 50 MG/1
50 TABLET ORAL 2 TIMES DAILY
Qty: 180 TABLET | Refills: 2 | Status: SHIPPED | OUTPATIENT
Start: 2019-06-03 | End: 2020-03-02

## 2019-06-03 NOTE — PROGRESS NOTES
ANTICOAGULATION FOLLOW-UP CLINIC VISIT    Patient Name:  Nigel Rodarte Jr.  Date:  6/3/2019  Contact Type:  Face to Face    SUBJECTIVE:  Patient Findings         Clinical Outcomes     Negatives:   Major bleeding event, Thromboembolic event, Anticoagulation-related hospital admission, Anticoagulation-related ED visit, Anticoagulation-related fatality           OBJECTIVE    INR Protime   Date Value Ref Range Status   2019 2.7 (A) 0.86 - 1.14 Final       ASSESSMENT / PLAN  INR assessment THER    Recheck INR In: 3 WEEKS    INR Location Clinic      Anticoagulation Summary  As of 6/3/2019    INR goal:   2.0-3.0   TTR:   64.5 % (2 y)   INR used for dosin.7 (6/3/2019)   Warfarin maintenance plan:   5 mg (5 mg x 1) every Tue; 7.5 mg (5 mg x 1.5) all other days   Full warfarin instructions:   5 mg every Tue; 7.5 mg all other days   Weekly warfarin total:   50 mg   No change documented:   Gladys Herrera RN   Plan last modified:   Gladys Herrera RN (2019)   Next INR check:   2019   Target end date:   Indefinite    Indications    Atrial fibrillation (H) [I48.91] [I48.91]  Long term current use of anticoagulants with INR goal of 2.0-3.0 [Z79.01]             Anticoagulation Episode Summary     INR check location:       Preferred lab:       Send INR reminders to:   [facility] HEART INR NURSE    Comments:         Anticoagulation Care Providers     Provider Role Specialty Phone number    Grzegorz Malhotra MD Responsible Cardiology [phone]            See the Encounter Report to view Anticoagulation Flowsheet and Dosing Calendar (Go to Encounters tab in chart review, and find the Anticoagulation Therapy Visit)    INR 2.7 INR back in range on decreased dosing schedule and eating about 3 salads a week along with broccoli on another day each week. No change in other meds. No abnormal bleeding or bruising. Will continue current dosing of 5 mg  and 7.5 mg all other days with recheck in 3  weeks.    Gladys Herrera RN

## 2019-06-20 ENCOUNTER — ANTICOAGULATION THERAPY VISIT (OUTPATIENT)
Dept: CARDIOLOGY | Facility: CLINIC | Age: 79
End: 2019-06-20
Payer: MEDICARE

## 2019-06-20 DIAGNOSIS — Z79.01 LONG TERM CURRENT USE OF ANTICOAGULANTS WITH INR GOAL OF 2.0-3.0: ICD-10-CM

## 2019-06-20 DIAGNOSIS — I48.91 ATRIAL FIBRILLATION, UNSPECIFIED TYPE (H): ICD-10-CM

## 2019-06-20 LAB — INR POINT OF CARE: 2.6 (ref 0.86–1.14)

## 2019-06-20 PROCEDURE — 99207 ZZC NO CHARGE NURSE ONLY: CPT | Performed by: INTERNAL MEDICINE

## 2019-06-20 PROCEDURE — 85610 PROTHROMBIN TIME: CPT | Mod: QW | Performed by: INTERNAL MEDICINE

## 2019-06-20 PROCEDURE — 36416 COLLJ CAPILLARY BLOOD SPEC: CPT | Performed by: INTERNAL MEDICINE

## 2019-06-20 NOTE — PROGRESS NOTES
ANTICOAGULATION FOLLOW-UP CLINIC VISIT    Patient Name:  Nigel Rodarte Jr.  Date:  2019  Contact Type:  Face to Face    SUBJECTIVE:  Patient Findings     Comments:   The patient was assessed for diet, medication, and activity level changes, missed or extra doses, bruising or bleeding, with no problem findings.        Clinical Outcomes     Negatives:   Major bleeding event, Thromboembolic event, Anticoagulation-related hospital admission, Anticoagulation-related ED visit, Anticoagulation-related fatality    Comments:   The patient was assessed for diet, medication, and activity level changes, missed or extra doses, bruising or bleeding, with no problem findings.           OBJECTIVE    INR Protime   Date Value Ref Range Status   2019 2.6 (A) 0.86 - 1.14 Final       ASSESSMENT / PLAN  INR assessment THER    Recheck INR In: 4 WEEKS    INR Location Clinic      Anticoagulation Summary  As of 2019    INR goal:   2.0-3.0   TTR:   65.3 % (2.1 y)   INR used for dosin.6 (2019)   Warfarin maintenance plan:   5 mg (5 mg x 1) every Tue; 7.5 mg (5 mg x 1.5) all other days   Full warfarin instructions:   5 mg every Tue; 7.5 mg all other days   Weekly warfarin total:   50 mg   No change documented:   Gladys Herrera, RN   Plan last modified:   Gladys Herrera RN (2019)   Next INR check:   2019   Target end date:   Indefinite    Indications    Atrial fibrillation (H) [I48.91] [I48.91]  Long term current use of anticoagulants with INR goal of 2.0-3.0 [Z79.01]             Anticoagulation Episode Summary     INR check location:       Preferred lab:       Send INR reminders to:   [facility] HEART INR NURSE    Comments:         Anticoagulation Care Providers     Provider Role Specialty Phone number    Grzegorz Malhotra MD Responsible Cardiology [phone]            See the Encounter Report to view Anticoagulation Flowsheet and Dosing Calendar (Go to Encounters tab in chart review, and find the  Anticoagulation Therapy Visit)    INR 2.6 INR staying in range on decreased dosing schedule. Eating 2 large salads/wk. No abnormal bleeding or bruising. Will continue current dosing of 5 mg Tuesdays and 7.5 mg all other days with recheck in 4 weeks. INR clinic referral renewal order submitted for signature.  Has the patient previously taken warfarin? yes  If yes, for what indication? Atrial fib    Does the patient have any of the following indications for a higher range of 2.5-3.5:    Mitral position mechanical valve? no    Abelardo-Shiley, Ball and Cage or Monoleaflet valve (regardless of position) no    Other (if yes, please explain) no      Gladys Herrera RN

## 2019-06-24 DIAGNOSIS — Z79.01 LONG TERM CURRENT USE OF ANTICOAGULANT THERAPY: ICD-10-CM

## 2019-06-24 DIAGNOSIS — I48.91 ATRIAL FIBRILLATION, UNSPECIFIED TYPE (H): ICD-10-CM

## 2019-06-24 RX ORDER — WARFARIN SODIUM 5 MG/1
TABLET ORAL
Qty: 135 TABLET | Refills: 1 | Status: SHIPPED | OUTPATIENT
Start: 2019-06-24 | End: 2019-09-12

## 2019-07-18 ENCOUNTER — TELEPHONE (OUTPATIENT)
Dept: CARDIOLOGY | Facility: CLINIC | Age: 79
End: 2019-07-18

## 2019-07-18 ENCOUNTER — ANTICOAGULATION THERAPY VISIT (OUTPATIENT)
Dept: CARDIOLOGY | Facility: CLINIC | Age: 79
End: 2019-07-18
Payer: MEDICARE

## 2019-07-18 DIAGNOSIS — I48.91 ATRIAL FIBRILLATION, UNSPECIFIED TYPE (H): ICD-10-CM

## 2019-07-18 DIAGNOSIS — I48.0 PAROXYSMAL ATRIAL FIBRILLATION (H): Primary | ICD-10-CM

## 2019-07-18 DIAGNOSIS — Z79.01 LONG TERM CURRENT USE OF ANTICOAGULANTS WITH INR GOAL OF 2.0-3.0: ICD-10-CM

## 2019-07-18 DIAGNOSIS — I48.0 PAROXYSMAL ATRIAL FIBRILLATION (H): ICD-10-CM

## 2019-07-18 LAB — INR POINT OF CARE: 1.8 (ref 0.86–1.14)

## 2019-07-18 PROCEDURE — 36416 COLLJ CAPILLARY BLOOD SPEC: CPT | Performed by: INTERNAL MEDICINE

## 2019-07-18 PROCEDURE — 93000 ELECTROCARDIOGRAM COMPLETE: CPT

## 2019-07-18 PROCEDURE — 85610 PROTHROMBIN TIME: CPT | Mod: QW | Performed by: INTERNAL MEDICINE

## 2019-07-18 NOTE — TELEPHONE ENCOUNTER
EKG notes A Fib HR 72, reviewed by Brooklyn carr with recommendations.  Pt states that he woke at 0200 in a Fib, took an extra dose at 0300 along with an extra dose of Metoprolol.  Discussed that at this time pt is rate controlled and a cardioversion can not be done because of the subtherapeutic INR. Per Brooklyn pt can take an extra 1/2 dose of Metoprolol if HR is trending up again.  Did explain that will discuss with Dr Malhotra also as to what to do going forward.  Cardioversion, med change,etc.  Pt will now go to work and is aware that this writer will call later with recommendations. Dara

## 2019-07-18 NOTE — TELEPHONE ENCOUNTER
Pt was in for an INR and then came to A Fib nurse office door and stated that he was in a Fib.  Pt wondering if he should take an extra dose of Flecainide.  Per Dr Malhotra note pt is to take an extra dose of Flecainide. Pt wondering if he can take another dose on top of that dose.  Pt INR is not therapeutic today at 1.8. Pt to get an EKG to look at rate and rhythm. Dara

## 2019-07-18 NOTE — PROGRESS NOTES
Patient here for EKG, EKG obtained and given to Catalina MUNOZ  ( A-Fib clinic)  Gwendolyn Khalil LPN

## 2019-07-19 NOTE — TELEPHONE ENCOUNTER
Spoke to pt who states that he was back in rhythm about 1000.  States that he is fine today.  Discussed some of the triggers, such as dehydration and alcohol.  Pt did state that the day before he had a 1/2 glass of rum punch and he does not drink alcohol.  Pt stays hydrated, but drinking plenty of water. Pt has cut out caffeine also.  Discussed that pt could in the future use that extra 1/2 dose of Metoprolol to slow the rate if needed. But did also explain that after taking either Flecainide or the Metoprolol XL it will take time to work as both, with Flecainide sometimes taking up to 3 hours.  Asked pt to contact clinic if back in A Fib and stated that if the episodes begin to increase, then will need to discuss a new plan.  Pt states understanding. Dara

## 2019-08-05 ENCOUNTER — ANTICOAGULATION THERAPY VISIT (OUTPATIENT)
Dept: CARDIOLOGY | Facility: CLINIC | Age: 79
End: 2019-08-05
Payer: MEDICARE

## 2019-08-05 DIAGNOSIS — I48.91 ATRIAL FIBRILLATION, UNSPECIFIED TYPE (H): ICD-10-CM

## 2019-08-05 DIAGNOSIS — Z79.01 LONG TERM CURRENT USE OF ANTICOAGULANTS WITH INR GOAL OF 2.0-3.0: ICD-10-CM

## 2019-08-05 LAB — INR POINT OF CARE: 3.3 (ref 0.86–1.14)

## 2019-08-05 PROCEDURE — 36416 COLLJ CAPILLARY BLOOD SPEC: CPT | Performed by: INTERNAL MEDICINE

## 2019-08-05 PROCEDURE — 85610 PROTHROMBIN TIME: CPT | Mod: QW | Performed by: INTERNAL MEDICINE

## 2019-08-05 NOTE — PROGRESS NOTES
ANTICOAGULATION FOLLOW-UP CLINIC VISIT    Patient Name:  Nigel Rodarte Jr.  Date:  8/5/2019  Contact Type:  Face to Face    SUBJECTIVE:  Patient Findings     Positives:   Change in diet/appetite (he did not eat as many greens (3x/wk))             OBJECTIVE    INR Protime   Date Value Ref Range Status   08/05/2019 3.3 (A) 0.86 - 1.14 Final       ASSESSMENT / PLAN  INR assessment SUPRA    Recheck INR In: 2 WEEKS    INR Location Clinic      Anticoagulation Summary  As of 8/5/2019    INR goal:   2.0-3.0   TTR:   65.7 % (2.2 y)   INR used for dosing:   3.3! (8/5/2019)   Warfarin maintenance plan:   7.5 mg (5 mg x 1.5) every day   Full warfarin instructions:   7.5 mg every day   Weekly warfarin total:   52.5 mg   Plan last modified:   Gladys Herrera RN (8/5/2019)   Next INR check:   8/20/2019   Target end date:   Indefinite    Indications    Atrial fibrillation (H) [I48.91] [I48.91]  Long term current use of anticoagulants with INR goal of 2.0-3.0 [Z79.01]             Anticoagulation Episode Summary     INR check location:       Preferred lab:       Send INR reminders to:   [facility] HEART INR NURSE    Comments:         Anticoagulation Care Providers     Provider Role Specialty Phone number    Grzegorz Malhotra MD [facility] Cardiology [phone]            See the Encounter Report to view Anticoagulation Flowsheet and Dosing Calendar (Go to Encounters tab in chart review, and find the Anticoagulation Therapy Visit)    INR 3.3 Dosing was increased after last low INR because he was going to continue eating more greens but he did not eat as many greens recently (only had them 3x/wk). No change in meds. 1 small bruise on his right forearm. He wants to eat more greens so will increase intake of greens to 4-5x/wk and continue to take 7.5 mg daily with recheck in 2 weeks. Dosage adjustment made based on physician directed care plan.    Gladys Herrera RN

## 2019-08-20 ENCOUNTER — ANTICOAGULATION THERAPY VISIT (OUTPATIENT)
Dept: CARDIOLOGY | Facility: CLINIC | Age: 79
End: 2019-08-20
Payer: MEDICARE

## 2019-08-20 DIAGNOSIS — I48.91 ATRIAL FIBRILLATION, UNSPECIFIED TYPE (H): ICD-10-CM

## 2019-08-20 DIAGNOSIS — Z79.01 LONG TERM CURRENT USE OF ANTICOAGULANTS WITH INR GOAL OF 2.0-3.0: ICD-10-CM

## 2019-08-20 LAB — INR POINT OF CARE: 4.3 (ref 0.86–1.14)

## 2019-08-20 PROCEDURE — 85610 PROTHROMBIN TIME: CPT | Mod: QW | Performed by: INTERNAL MEDICINE

## 2019-08-20 PROCEDURE — 36416 COLLJ CAPILLARY BLOOD SPEC: CPT | Performed by: INTERNAL MEDICINE

## 2019-08-20 NOTE — PROGRESS NOTES
ANTICOAGULATION FOLLOW-UP CLINIC VISIT    Patient Name:  Nigel Rodarte Jr.  Date:  2019  Contact Type:  Face to Face    SUBJECTIVE:  Patient Findings     Positives:   Change in diet/appetite (eating salads 3-5x/wk)        Clinical Outcomes     Negatives:   Major bleeding event, Thromboembolic event, Anticoagulation-related hospital admission, Anticoagulation-related ED visit, Anticoagulation-related fatality           OBJECTIVE    INR Protime   Date Value Ref Range Status   2019 4.3 (A) 0.86 - 1.14 Final       ASSESSMENT / PLAN  INR assessment SUPRA    Recheck INR In: 10 DAYS    INR Location Clinic      Anticoagulation Summary  As of 2019    INR goal:   2.0-3.0   TTR:   64.4 % (2.2 y)   INR used for dosin.3! (2019)   Warfarin maintenance plan:   5 mg (5 mg x 1) every Tue; 7.5 mg (5 mg x 1.5) all other days   Full warfarin instructions:   : Hold; Otherwise 5 mg every Tue; 7.5 mg all other days   Weekly warfarin total:   50 mg   Plan last modified:   Gladys Herrera RN (2019)   Next INR check:   2019   Target end date:   Indefinite    Indications    Atrial fibrillation (H) [I48.91] [I48.91]  Long term current use of anticoagulants with INR goal of 2.0-3.0 [Z79.01]             Anticoagulation Episode Summary     INR check location:       Preferred lab:       Send INR reminders to:   [facility] HEART INR NURSE    Comments:         Anticoagulation Care Providers     Provider Role Specialty Phone number    Grzegorz Malhotra MD Responsible Cardiology [phone]            See the Encounter Report to view Anticoagulation Flowsheet and Dosing Calendar (Go to Encounters tab in chart review, and find the Anticoagulation Therapy Visit)    INR 4.3 He has been eating salads 3-5x/wk. He has been eating less food overall (trying to lose a little weight because of knee pain). He has lost about 3-4#. No med changes. Denies change in ETOH intake. Denies infection or diarrhea. Denies  abnormal bleeding or bruising. Will hold today's dose then decrease dosing to 5 mg Tues and 7.5 mg all other days with recheck in 10 days before he goes to [place] for his son's wedding. Dosage adjustment made based on physician directed care plan.    Gladys Herrera RN

## 2019-08-29 ENCOUNTER — ANTICOAGULATION THERAPY VISIT (OUTPATIENT)
Dept: CARDIOLOGY | Facility: CLINIC | Age: 79
End: 2019-08-29
Payer: MEDICARE

## 2019-08-29 DIAGNOSIS — I48.91 ATRIAL FIBRILLATION, UNSPECIFIED TYPE (H): ICD-10-CM

## 2019-08-29 DIAGNOSIS — Z79.01 LONG TERM CURRENT USE OF ANTICOAGULANTS WITH INR GOAL OF 2.0-3.0: ICD-10-CM

## 2019-08-29 LAB — INR POINT OF CARE: 2.3 (ref 0.86–1.14)

## 2019-08-29 PROCEDURE — 36416 COLLJ CAPILLARY BLOOD SPEC: CPT | Performed by: INTERNAL MEDICINE

## 2019-08-29 PROCEDURE — 85610 PROTHROMBIN TIME: CPT | Mod: QW | Performed by: INTERNAL MEDICINE

## 2019-08-29 NOTE — PROGRESS NOTES
ANTICOAGULATION FOLLOW-UP CLINIC VISIT    Patient Name:  Nigel Rodarte Jr.  Date:  2019  Contact Type:  Face to Face    SUBJECTIVE:  Patient Findings     Positives:   Change in diet/appetite (eating greens or veggies 5-6x/wk)        Clinical Outcomes     Negatives:   Major bleeding event, Thromboembolic event, Anticoagulation-related hospital admission, Anticoagulation-related ED visit, Anticoagulation-related fatality           OBJECTIVE    INR Protime   Date Value Ref Range Status   2019 2.3 (A) 0.86 - 1.14 Final       ASSESSMENT / PLAN  INR assessment THER    Recheck INR In: 3 WEEKS    INR Location Clinic      Anticoagulation Summary  As of 2019    INR goal:   2.0-3.0   TTR:   64.1 % (2.3 y)   INR used for dosin.3 (2019)   Warfarin maintenance plan:   5 mg (5 mg x 1) every Tue; 7.5 mg (5 mg x 1.5) all other days   Full warfarin instructions:   5 mg every Tue; 7.5 mg all other days   Weekly warfarin total:   50 mg   No change documented:   Gladys Herrera RN   Plan last modified:   Gladys Herrera RN (2019)   Next INR check:   2019   Target end date:   Indefinite    Indications    Atrial fibrillation (H) [I48.91] [I48.91]  Long term current use of anticoagulants with INR goal of 2.0-3.0 [Z79.01]             Anticoagulation Episode Summary     INR check location:       Preferred lab:       Send INR reminders to:   [facility] HEART INR NURSE    Comments:         Anticoagulation Care Providers     Provider Role Specialty Phone number    Grzegorz Malhotra MD [facility] Cardiology [phone]            See the Encounter Report to view Anticoagulation Flowsheet and Dosing Calendar (Go to Encounters tab in chart review, and find the Anticoagulation Therapy Visit)    INR 2.3 INR back in range on decreased dosing schedule. No change in other meds. Ate greens or veggies 5-6x/wk recently. Denies abnormal bleeding or bruising. Will continue current dosing of 5 mg  and 7.5 mg  all other days with recheck in 3 weeks (after he returns from his son's wedding in [place]).    Gladys Herrera RN

## 2019-09-12 DIAGNOSIS — Z79.01 LONG TERM CURRENT USE OF ANTICOAGULANT THERAPY: ICD-10-CM

## 2019-09-12 DIAGNOSIS — I48.91 ATRIAL FIBRILLATION, UNSPECIFIED TYPE (H): ICD-10-CM

## 2019-09-12 RX ORDER — WARFARIN SODIUM 5 MG/1
TABLET ORAL
Qty: 135 TABLET | Refills: 1 | Status: SHIPPED | OUTPATIENT
Start: 2019-09-12 | End: 2020-03-17

## 2019-09-24 ENCOUNTER — ANTICOAGULATION THERAPY VISIT (OUTPATIENT)
Dept: CARDIOLOGY | Facility: CLINIC | Age: 79
End: 2019-09-24
Payer: MEDICARE

## 2019-09-24 DIAGNOSIS — Z79.01 LONG TERM CURRENT USE OF ANTICOAGULANTS WITH INR GOAL OF 2.0-3.0: ICD-10-CM

## 2019-09-24 DIAGNOSIS — I48.91 ATRIAL FIBRILLATION, UNSPECIFIED TYPE (H): ICD-10-CM

## 2019-09-24 LAB — INR POINT OF CARE: 3.7 (ref 0.86–1.14)

## 2019-09-24 PROCEDURE — 85610 PROTHROMBIN TIME: CPT | Mod: QW | Performed by: INTERNAL MEDICINE

## 2019-09-24 PROCEDURE — 36416 COLLJ CAPILLARY BLOOD SPEC: CPT | Performed by: INTERNAL MEDICINE

## 2019-09-24 NOTE — PROGRESS NOTES
ANTICOAGULATION FOLLOW-UP CLINIC VISIT    Patient Name:  Nigel Rodarte Jr.  Date:  9/24/2019  Contact Type:  Face to Face    SUBJECTIVE:  Patient Findings     Positives:   Change in diet/appetite (diet was variable while he was in [place])        Clinical Outcomes     Negatives:   Major bleeding event, Thromboembolic event, Anticoagulation-related hospital admission, Anticoagulation-related ED visit, Anticoagulation-related fatality           OBJECTIVE    INR Protime   Date Value Ref Range Status   09/24/2019 3.7 (A) 0.86 - 1.14 Final       ASSESSMENT / PLAN  INR assessment THER    Recheck INR In: 3 WEEKS    INR Location Clinic      Anticoagulation Summary  As of 9/24/2019    INR goal:   2.0-3.0   TTR:   63.7 % (2.3 y)   INR used for dosing:   3.7! (9/24/2019)   Warfarin maintenance plan:   5 mg (5 mg x 1) every Tue; 7.5 mg (5 mg x 1.5) all other days   Full warfarin instructions:   9/24: Hold; Otherwise 5 mg every Tue; 7.5 mg all other days   Weekly warfarin total:   50 mg   Plan last modified:   Gladys Herrera RN (8/20/2019)   Next INR check:   10/14/2019   Target end date:   Indefinite    Indications    Atrial fibrillation (H) [I48.91] [I48.91]  Long term current use of anticoagulants with INR goal of 2.0-3.0 [Z79.01]             Anticoagulation Episode Summary     INR check location:       Preferred lab:       Send INR reminders to:   [facility] HEART INR NURSE    Comments:         Anticoagulation Care Providers     Provider Role Specialty Phone number    Grzegorz Malhotra MD Responsible Cardiology [phone]            See the Encounter Report to view Anticoagulation Flowsheet and Dosing Calendar (Go to Encounters tab in chart review, and find the Anticoagulation Therapy Visit)    INR 3.7 He was recently in [place] for his son's wedding. No ETOH. Diet was variable while he was travelling but he tried to eat salads. No known infections. Denies diarrhea. Denies abnormal bleeding or bruising. Will hold  today's dose and eat an extra salad then resume usual diet and dosing of 5 mg Tuesdays and 7.5 mg all other days with recheck in 3 weeks (he was not available at the 2 week timeframe). He is scheduled for right knee replacement on 11/6/19. He will ask Dr Ohara how many days he has to hold warfarin for surgery (Dr Malhotra has allowed him to hold warfarin for up to 5 days without bridging in the past). Dosage adjustment made based on physician directed care plan.    Gladys Herrera RN

## 2019-10-07 ENCOUNTER — HOSPITAL ENCOUNTER (OUTPATIENT)
Dept: LAB | Facility: CLINIC | Age: 79
Discharge: HOME | End: 2019-10-07
Attending: ORTHOPAEDIC SURGERY | Admitting: ORTHOPAEDIC SURGERY
Payer: MEDICARE

## 2019-10-07 DIAGNOSIS — Z01.812 PRE-OPERATIVE LABORATORY EXAMINATION: ICD-10-CM

## 2019-10-07 LAB
MRSA DNA SPEC QL NAA+PROBE: NEGATIVE
SPECIMEN SOURCE: NORMAL

## 2019-10-07 PROCEDURE — 40000829 ZZHCL STATISTIC STAPH AUREUS SUSCEPT SCREEN PCR: Performed by: ORTHOPAEDIC SURGERY

## 2019-10-07 PROCEDURE — 87640 STAPH A DNA AMP PROBE: CPT | Performed by: ORTHOPAEDIC SURGERY

## 2019-10-07 PROCEDURE — 87641 MR-STAPH DNA AMP PROBE: CPT | Performed by: ORTHOPAEDIC SURGERY

## 2019-10-07 PROCEDURE — 40000830 ZZHCL STATISTIC STAPH AUREUS METH RESIST SCREEN PCR: Performed by: ORTHOPAEDIC SURGERY

## 2019-10-14 ENCOUNTER — ANTICOAGULATION THERAPY VISIT (OUTPATIENT)
Dept: CARDIOLOGY | Facility: CLINIC | Age: 79
End: 2019-10-14
Payer: MEDICARE

## 2019-10-14 DIAGNOSIS — Z79.01 LONG TERM CURRENT USE OF ANTICOAGULANTS WITH INR GOAL OF 2.0-3.0: ICD-10-CM

## 2019-10-14 DIAGNOSIS — I48.91 ATRIAL FIBRILLATION, UNSPECIFIED TYPE (H): ICD-10-CM

## 2019-10-14 LAB — INR POINT OF CARE: 2.9 (ref 0.86–1.14)

## 2019-10-14 PROCEDURE — 36416 COLLJ CAPILLARY BLOOD SPEC: CPT | Performed by: INTERNAL MEDICINE

## 2019-10-14 PROCEDURE — 85610 PROTHROMBIN TIME: CPT | Mod: QW | Performed by: INTERNAL MEDICINE

## 2019-10-14 NOTE — PROGRESS NOTES
ANTICOAGULATION FOLLOW-UP CLINIC VISIT    Patient Name:  Nigel Rodarte Jr.  Date:  10/14/2019  Contact Type:  Face to Face    SUBJECTIVE:  Patient Findings     Comments:   The patient was assessed for diet, medication, and activity level changes, missed or extra doses, bruising or bleeding, with no problem findings.        Clinical Outcomes     Negatives:   Major bleeding event, Thromboembolic event, Anticoagulation-related hospital admission, Anticoagulation-related ED visit, Anticoagulation-related fatality    Comments:   The patient was assessed for diet, medication, and activity level changes, missed or extra doses, bruising or bleeding, with no problem findings.           OBJECTIVE    INR Protime   Date Value Ref Range Status   10/14/2019 2.9 (A) 0.86 - 1.14 Final       ASSESSMENT / PLAN  INR assessment THER    Recheck INR In: 4 WEEKS    INR Location Clinic      Anticoagulation Summary  As of 10/14/2019    INR goal:   2.0-3.0   TTR:   62.5 % (2.4 y)   INR used for dosin.9 (10/14/2019)   Warfarin maintenance plan:   5 mg (5 mg x 1) every Tue; 7.5 mg (5 mg x 1.5) all other days   Full warfarin instructions:   11/2: Hold; 11/3: Hold; 11/4: Hold; 11/5: Hold; Otherwise 5 mg every Tue; 7.5 mg all other days   Weekly warfarin total:   50 mg   Plan last modified:   Gladys Herrera RN (2019)   Next INR check:   2019   Target end date:   Indefinite    Indications    Atrial fibrillation (H) [I48.91] [I48.91]  Long term current use of anticoagulants with INR goal of 2.0-3.0 [Z79.01]             Anticoagulation Episode Summary     INR check location:       Preferred lab:       Send INR reminders to:   [facility] INR NURSE    Comments:         Anticoagulation Care Providers     Provider Role Specialty Phone number    Grzegorz Malhotra MD Responsible Cardiology [phone]            See the Encounter Report to view Anticoagulation Flowsheet and Dosing Calendar (Go to Encounters tab in chart review,  and find the Anticoagulation Therapy Visit)    INR 2.9 INR back in range after one time hold (INR was previously elevated after a  vacation). No change in meds or diet.Denies abnormal bleeding or bruising. Will continue current dosing of 5 mg Tuesdays and 7.5 mg all other days. He is scheduled for right knee replacement on 11/6/19. Dr Ohara did not specify how many days he should hold warfarin for surgery but his office told him that his PMD would let him know. Will plan that he hold Warfarin for 4 days prior to surgery (start the hold Saturday 11/2) unless PMD requests that he hold 5 days (Dr Malhotra has allowed him to hold warfarin for up to 5 days without bridging in the past). Will resume Warfarin evening of surgery and recheck INR in 6 days on 11/12 because he will be taking pain meds that he usually doesn't take after surgery. Dosage adjustment made based on physician directed care plan.    Gladys Herrera RN

## 2019-11-06 ENCOUNTER — APPOINTMENT (OUTPATIENT)
Dept: GENERAL RADIOLOGY | Facility: CLINIC | Age: 79
DRG: 470 | End: 2019-11-06
Attending: ORTHOPAEDIC SURGERY
Payer: MEDICARE

## 2019-11-06 ENCOUNTER — APPOINTMENT (OUTPATIENT)
Dept: PHYSICAL THERAPY | Facility: CLINIC | Age: 79
DRG: 470 | End: 2019-11-06
Attending: ORTHOPAEDIC SURGERY
Payer: MEDICARE

## 2019-11-06 ENCOUNTER — ANESTHESIA EVENT (OUTPATIENT)
Dept: SURGERY | Facility: CLINIC | Age: 79
DRG: 470 | End: 2019-11-06
Payer: MEDICARE

## 2019-11-06 ENCOUNTER — HOSPITAL ENCOUNTER (INPATIENT)
Facility: CLINIC | Age: 79
LOS: 1 days | Discharge: HOME | DRG: 470 | End: 2019-11-08
Attending: ORTHOPAEDIC SURGERY | Admitting: ORTHOPAEDIC SURGERY
Payer: MEDICARE

## 2019-11-06 ENCOUNTER — ANESTHESIA (OUTPATIENT)
Dept: SURGERY | Facility: CLINIC | Age: 79
DRG: 470 | End: 2019-11-06
Payer: MEDICARE

## 2019-11-06 DIAGNOSIS — Z96.651 S/P TOTAL KNEE ARTHROPLASTY, RIGHT: Primary | ICD-10-CM

## 2019-11-06 LAB
ABO + RH BLD: NORMAL
ABO + RH BLD: NORMAL
BLD GP AB SCN SERPL QL: NORMAL
BLOOD BANK CMNT PATIENT-IMP: NORMAL
CREAT SERPL-MCNC: 0.89 MG/DL (ref 0.66–1.25)
GFR SERPL CREATININE-BSD FRML MDRD: 82 ML/MIN/{1.73_M2}
INR PPP: 1.05 (ref 0.86–1.14)
SPECIMEN EXP DATE BLD: NORMAL

## 2019-11-06 PROCEDURE — 25000128 H RX IP 250 OP 636: Performed by: ORTHOPAEDIC SURGERY

## 2019-11-06 PROCEDURE — 25000125 ZZHC RX 250: Performed by: ORTHOPAEDIC SURGERY

## 2019-11-06 PROCEDURE — 40000170 ZZH STATISTIC PRE-PROCEDURE ASSESSMENT II: Performed by: ORTHOPAEDIC SURGERY

## 2019-11-06 PROCEDURE — 25000128 H RX IP 250 OP 636: Performed by: ANESTHESIOLOGY

## 2019-11-06 PROCEDURE — 25800030 ZZH RX IP 258 OP 636: Performed by: ORTHOPAEDIC SURGERY

## 2019-11-06 PROCEDURE — 40000985 XR KNEE PORT RT 1/2 VW: Mod: RT

## 2019-11-06 PROCEDURE — 25000128 H RX IP 250 OP 636: Performed by: NURSE ANESTHETIST, CERTIFIED REGISTERED

## 2019-11-06 PROCEDURE — 27210794 ZZH OR GENERAL SUPPLY STERILE: Performed by: ORTHOPAEDIC SURGERY

## 2019-11-06 PROCEDURE — 27110028 ZZH OR GENERAL SUPPLY NON-STERILE: Performed by: ORTHOPAEDIC SURGERY

## 2019-11-06 PROCEDURE — 25800030 ZZH RX IP 258 OP 636: Performed by: ANESTHESIOLOGY

## 2019-11-06 PROCEDURE — 25000125 ZZHC RX 250: Performed by: NURSE ANESTHETIST, CERTIFIED REGISTERED

## 2019-11-06 PROCEDURE — 37000009 ZZH ANESTHESIA TECHNICAL FEE, EACH ADDTL 15 MIN: Performed by: ORTHOPAEDIC SURGERY

## 2019-11-06 PROCEDURE — 36000093 ZZH SURGERY LEVEL 4 1ST 30 MIN: Performed by: ORTHOPAEDIC SURGERY

## 2019-11-06 PROCEDURE — 0SRC0J9 REPLACEMENT OF RIGHT KNEE JOINT WITH SYNTHETIC SUBSTITUTE, CEMENTED, OPEN APPROACH: ICD-10-PCS | Performed by: ORTHOPAEDIC SURGERY

## 2019-11-06 PROCEDURE — C1776 JOINT DEVICE (IMPLANTABLE): HCPCS | Performed by: ORTHOPAEDIC SURGERY

## 2019-11-06 PROCEDURE — 25800030 ZZH RX IP 258 OP 636: Performed by: NURSE ANESTHETIST, CERTIFIED REGISTERED

## 2019-11-06 PROCEDURE — 85610 PROTHROMBIN TIME: CPT | Performed by: ORTHOPAEDIC SURGERY

## 2019-11-06 PROCEDURE — 36000063 ZZH SURGERY LEVEL 4 EA 15 ADDTL MIN: Performed by: ORTHOPAEDIC SURGERY

## 2019-11-06 PROCEDURE — 82565 ASSAY OF CREATININE: CPT | Performed by: ORTHOPAEDIC SURGERY

## 2019-11-06 PROCEDURE — 25000132 ZZH RX MED GY IP 250 OP 250 PS 637: Mod: GY | Performed by: ORTHOPAEDIC SURGERY

## 2019-11-06 PROCEDURE — 71000012 ZZH RECOVERY PHASE 1 LEVEL 1 FIRST HR: Performed by: ORTHOPAEDIC SURGERY

## 2019-11-06 PROCEDURE — 86850 RBC ANTIBODY SCREEN: CPT | Performed by: ORTHOPAEDIC SURGERY

## 2019-11-06 PROCEDURE — 97530 THERAPEUTIC ACTIVITIES: CPT | Mod: GP

## 2019-11-06 PROCEDURE — 25000566 ZZH SEVOFLURANE, EA 15 MIN: Performed by: ORTHOPAEDIC SURGERY

## 2019-11-06 PROCEDURE — 37000008 ZZH ANESTHESIA TECHNICAL FEE, 1ST 30 MIN: Performed by: ORTHOPAEDIC SURGERY

## 2019-11-06 PROCEDURE — 97110 THERAPEUTIC EXERCISES: CPT | Mod: GP

## 2019-11-06 PROCEDURE — 86900 BLOOD TYPING SEROLOGIC ABO: CPT | Performed by: ORTHOPAEDIC SURGERY

## 2019-11-06 PROCEDURE — 25800025 ZZH RX 258: Performed by: ORTHOPAEDIC SURGERY

## 2019-11-06 PROCEDURE — 97161 PT EVAL LOW COMPLEX 20 MIN: CPT | Mod: GP

## 2019-11-06 PROCEDURE — 36415 COLL VENOUS BLD VENIPUNCTURE: CPT | Performed by: ORTHOPAEDIC SURGERY

## 2019-11-06 PROCEDURE — 27810169 ZZH OR IMPLANT GENERAL: Performed by: ORTHOPAEDIC SURGERY

## 2019-11-06 PROCEDURE — 86901 BLOOD TYPING SEROLOGIC RH(D): CPT | Performed by: ORTHOPAEDIC SURGERY

## 2019-11-06 DEVICE — IMPLANTABLE DEVICE: Type: IMPLANTABLE DEVICE | Status: FUNCTIONAL

## 2019-11-06 DEVICE — BONE CEMENT RADIOPAQUE SIMPLEX HV FULL DOSE 6194-1-001: Type: IMPLANTABLE DEVICE | Status: FUNCTIONAL

## 2019-11-06 RX ORDER — NALOXONE HYDROCHLORIDE 0.4 MG/ML
.1-.4 INJECTION, SOLUTION INTRAMUSCULAR; INTRAVENOUS; SUBCUTANEOUS
Status: DISCONTINUED | OUTPATIENT
Start: 2019-11-06 | End: 2019-11-08 | Stop reason: HOSPADM

## 2019-11-06 RX ORDER — FLECAINIDE ACETATE 50 MG/1
50 TABLET ORAL 2 TIMES DAILY
Status: DISCONTINUED | OUTPATIENT
Start: 2019-11-06 | End: 2019-11-06

## 2019-11-06 RX ORDER — ONDANSETRON 2 MG/ML
INJECTION INTRAMUSCULAR; INTRAVENOUS PRN
Status: DISCONTINUED | OUTPATIENT
Start: 2019-11-06 | End: 2019-11-06

## 2019-11-06 RX ORDER — GLYCOPYRROLATE 0.2 MG/ML
INJECTION, SOLUTION INTRAMUSCULAR; INTRAVENOUS PRN
Status: DISCONTINUED | OUTPATIENT
Start: 2019-11-06 | End: 2019-11-06

## 2019-11-06 RX ORDER — SODIUM CHLORIDE 9 MG/ML
INJECTION, SOLUTION INTRAVENOUS CONTINUOUS
Status: DISCONTINUED | OUTPATIENT
Start: 2019-11-06 | End: 2019-11-08 | Stop reason: HOSPADM

## 2019-11-06 RX ORDER — BUPIVACAINE HYDROCHLORIDE AND EPINEPHRINE 2.5; 5 MG/ML; UG/ML
INJECTION, SOLUTION EPIDURAL; INFILTRATION; INTRACAUDAL; PERINEURAL PRN
Status: DISCONTINUED | OUTPATIENT
Start: 2019-11-06 | End: 2019-11-06 | Stop reason: HOSPADM

## 2019-11-06 RX ORDER — OXYCODONE HYDROCHLORIDE 5 MG/1
5-10 TABLET ORAL
Status: DISCONTINUED | OUTPATIENT
Start: 2019-11-06 | End: 2019-11-08 | Stop reason: HOSPADM

## 2019-11-06 RX ORDER — FENTANYL CITRATE 50 UG/ML
INJECTION, SOLUTION INTRAMUSCULAR; INTRAVENOUS PRN
Status: DISCONTINUED | OUTPATIENT
Start: 2019-11-06 | End: 2019-11-06

## 2019-11-06 RX ORDER — NALOXONE HYDROCHLORIDE 0.4 MG/ML
.1-.4 INJECTION, SOLUTION INTRAMUSCULAR; INTRAVENOUS; SUBCUTANEOUS
Status: DISCONTINUED | OUTPATIENT
Start: 2019-11-06 | End: 2019-11-06

## 2019-11-06 RX ORDER — ONDANSETRON 2 MG/ML
4 INJECTION INTRAMUSCULAR; INTRAVENOUS EVERY 30 MIN PRN
Status: DISCONTINUED | OUTPATIENT
Start: 2019-11-06 | End: 2019-11-06 | Stop reason: HOSPADM

## 2019-11-06 RX ORDER — VANCOMYCIN HYDROCHLORIDE 1 G/20ML
INJECTION, POWDER, LYOPHILIZED, FOR SOLUTION INTRAVENOUS PRN
Status: DISCONTINUED | OUTPATIENT
Start: 2019-11-06 | End: 2019-11-06 | Stop reason: HOSPADM

## 2019-11-06 RX ORDER — CEFAZOLIN SODIUM 1 G/3ML
1 INJECTION, POWDER, FOR SOLUTION INTRAMUSCULAR; INTRAVENOUS EVERY 8 HOURS
Status: COMPLETED | OUTPATIENT
Start: 2019-11-06 | End: 2019-11-07

## 2019-11-06 RX ORDER — PROCHLORPERAZINE MALEATE 5 MG
5 TABLET ORAL EVERY 6 HOURS PRN
Status: DISCONTINUED | OUTPATIENT
Start: 2019-11-06 | End: 2019-11-08 | Stop reason: HOSPADM

## 2019-11-06 RX ORDER — SODIUM CHLORIDE, SODIUM LACTATE, POTASSIUM CHLORIDE, CALCIUM CHLORIDE 600; 310; 30; 20 MG/100ML; MG/100ML; MG/100ML; MG/100ML
INJECTION, SOLUTION INTRAVENOUS CONTINUOUS
Status: DISCONTINUED | OUTPATIENT
Start: 2019-11-06 | End: 2019-11-06 | Stop reason: HOSPADM

## 2019-11-06 RX ORDER — FENTANYL CITRATE 50 UG/ML
25-50 INJECTION, SOLUTION INTRAMUSCULAR; INTRAVENOUS
Status: DISCONTINUED | OUTPATIENT
Start: 2019-11-06 | End: 2019-11-06 | Stop reason: HOSPADM

## 2019-11-06 RX ORDER — MAGNESIUM HYDROXIDE 1200 MG/15ML
LIQUID ORAL PRN
Status: DISCONTINUED | OUTPATIENT
Start: 2019-11-06 | End: 2019-11-06 | Stop reason: HOSPADM

## 2019-11-06 RX ORDER — PROPOFOL 10 MG/ML
INJECTION, EMULSION INTRAVENOUS PRN
Status: DISCONTINUED | OUTPATIENT
Start: 2019-11-06 | End: 2019-11-06

## 2019-11-06 RX ORDER — CEFAZOLIN SODIUM 2 G/100ML
2 INJECTION, SOLUTION INTRAVENOUS
Status: COMPLETED | OUTPATIENT
Start: 2019-11-06 | End: 2019-11-06

## 2019-11-06 RX ORDER — CALCIUM CARBONATE 500 MG/1
1000 TABLET, CHEWABLE ORAL 4 TIMES DAILY PRN
Status: DISCONTINUED | OUTPATIENT
Start: 2019-11-06 | End: 2019-11-08 | Stop reason: HOSPADM

## 2019-11-06 RX ORDER — DUTASTERIDE 0.5 MG/1
0.5 CAPSULE, LIQUID FILLED ORAL DAILY
Status: DISCONTINUED | OUTPATIENT
Start: 2019-11-06 | End: 2019-11-08 | Stop reason: HOSPADM

## 2019-11-06 RX ORDER — ONDANSETRON 2 MG/ML
4 INJECTION INTRAMUSCULAR; INTRAVENOUS EVERY 6 HOURS PRN
Status: DISCONTINUED | OUTPATIENT
Start: 2019-11-06 | End: 2019-11-08 | Stop reason: HOSPADM

## 2019-11-06 RX ORDER — EPHEDRINE SULFATE 50 MG/ML
INJECTION, SOLUTION INTRAMUSCULAR; INTRAVENOUS; SUBCUTANEOUS PRN
Status: DISCONTINUED | OUTPATIENT
Start: 2019-11-06 | End: 2019-11-06

## 2019-11-06 RX ORDER — CEFAZOLIN SODIUM 1 G/3ML
1 INJECTION, POWDER, FOR SOLUTION INTRAMUSCULAR; INTRAVENOUS SEE ADMIN INSTRUCTIONS
Status: DISCONTINUED | OUTPATIENT
Start: 2019-11-06 | End: 2019-11-06 | Stop reason: HOSPADM

## 2019-11-06 RX ORDER — DUTASTERIDE 0.5 MG/1
0.5 CAPSULE, LIQUID FILLED ORAL DAILY
COMMUNITY

## 2019-11-06 RX ORDER — WARFARIN SODIUM 2.5 MG/1
2.5 TABLET ORAL
Status: COMPLETED | OUTPATIENT
Start: 2019-11-06 | End: 2019-11-06

## 2019-11-06 RX ORDER — FLECAINIDE ACETATE 50 MG/1
50 TABLET ORAL 2 TIMES DAILY
Status: DISCONTINUED | OUTPATIENT
Start: 2019-11-06 | End: 2019-11-08 | Stop reason: HOSPADM

## 2019-11-06 RX ORDER — HYDROMORPHONE HYDROCHLORIDE 1 MG/ML
.2-.4 INJECTION, SOLUTION INTRAMUSCULAR; INTRAVENOUS; SUBCUTANEOUS
Status: DISCONTINUED | OUTPATIENT
Start: 2019-11-06 | End: 2019-11-08 | Stop reason: HOSPADM

## 2019-11-06 RX ORDER — HYDROMORPHONE HYDROCHLORIDE 1 MG/ML
.3-.5 INJECTION, SOLUTION INTRAMUSCULAR; INTRAVENOUS; SUBCUTANEOUS EVERY 5 MIN PRN
Status: DISCONTINUED | OUTPATIENT
Start: 2019-11-06 | End: 2019-11-06 | Stop reason: HOSPADM

## 2019-11-06 RX ORDER — BUPIVACAINE HYDROCHLORIDE 2.5 MG/ML
INJECTION, SOLUTION EPIDURAL; INFILTRATION; INTRACAUDAL PRN
Status: DISCONTINUED | OUTPATIENT
Start: 2019-11-06 | End: 2019-11-06 | Stop reason: HOSPADM

## 2019-11-06 RX ORDER — KETOROLAC TROMETHAMINE 15 MG/ML
15 INJECTION, SOLUTION INTRAMUSCULAR; INTRAVENOUS EVERY 6 HOURS PRN
Status: DISCONTINUED | OUTPATIENT
Start: 2019-11-06 | End: 2019-11-08 | Stop reason: HOSPADM

## 2019-11-06 RX ORDER — ONDANSETRON 4 MG/1
4 TABLET, ORALLY DISINTEGRATING ORAL EVERY 6 HOURS PRN
Status: DISCONTINUED | OUTPATIENT
Start: 2019-11-06 | End: 2019-11-08 | Stop reason: HOSPADM

## 2019-11-06 RX ORDER — DEXAMETHASONE SODIUM PHOSPHATE 4 MG/ML
INJECTION, SOLUTION INTRA-ARTICULAR; INTRALESIONAL; INTRAMUSCULAR; INTRAVENOUS; SOFT TISSUE PRN
Status: DISCONTINUED | OUTPATIENT
Start: 2019-11-06 | End: 2019-11-06

## 2019-11-06 RX ORDER — LIDOCAINE HYDROCHLORIDE 20 MG/ML
INJECTION, SOLUTION INFILTRATION; PERINEURAL PRN
Status: DISCONTINUED | OUTPATIENT
Start: 2019-11-06 | End: 2019-11-06

## 2019-11-06 RX ORDER — SODIUM PHOSPHATE,MONO-DIBASIC 19G-7G/118
1 ENEMA (ML) RECTAL DAILY
COMMUNITY
End: 2021-04-06

## 2019-11-06 RX ORDER — ACETAMINOPHEN 325 MG/1
975 TABLET ORAL EVERY 8 HOURS
Status: DISCONTINUED | OUTPATIENT
Start: 2019-11-06 | End: 2019-11-08 | Stop reason: HOSPADM

## 2019-11-06 RX ORDER — FENTANYL CITRATE 50 UG/ML
50 INJECTION, SOLUTION INTRAMUSCULAR; INTRAVENOUS
Status: COMPLETED | OUTPATIENT
Start: 2019-11-06 | End: 2019-11-06

## 2019-11-06 RX ORDER — ONDANSETRON 4 MG/1
4 TABLET, ORALLY DISINTEGRATING ORAL EVERY 30 MIN PRN
Status: DISCONTINUED | OUTPATIENT
Start: 2019-11-06 | End: 2019-11-06 | Stop reason: HOSPADM

## 2019-11-06 RX ORDER — LIDOCAINE 40 MG/G
CREAM TOPICAL
Status: DISCONTINUED | OUTPATIENT
Start: 2019-11-06 | End: 2019-11-08 | Stop reason: HOSPADM

## 2019-11-06 RX ORDER — METOPROLOL SUCCINATE 25 MG/1
25 TABLET, EXTENDED RELEASE ORAL DAILY
Status: DISCONTINUED | OUTPATIENT
Start: 2019-11-07 | End: 2019-11-08 | Stop reason: HOSPADM

## 2019-11-06 RX ADMIN — KETOROLAC TROMETHAMINE 15 MG: 15 INJECTION, SOLUTION INTRAMUSCULAR; INTRAVENOUS at 17:07

## 2019-11-06 RX ADMIN — FENTANYL CITRATE 25 MCG: 50 INJECTION, SOLUTION INTRAMUSCULAR; INTRAVENOUS at 09:55

## 2019-11-06 RX ADMIN — PHENYLEPHRINE HYDROCHLORIDE 100 MCG: 10 INJECTION INTRAVENOUS at 07:42

## 2019-11-06 RX ADMIN — SODIUM CHLORIDE, POTASSIUM CHLORIDE, SODIUM LACTATE AND CALCIUM CHLORIDE: 600; 310; 30; 20 INJECTION, SOLUTION INTRAVENOUS at 06:57

## 2019-11-06 RX ADMIN — FENTANYL CITRATE 25 MCG: 50 INJECTION, SOLUTION INTRAMUSCULAR; INTRAVENOUS at 07:37

## 2019-11-06 RX ADMIN — CEFAZOLIN SODIUM 2 G: 2 INJECTION, SOLUTION INTRAVENOUS at 07:47

## 2019-11-06 RX ADMIN — FENTANYL CITRATE 25 MCG: 50 INJECTION, SOLUTION INTRAMUSCULAR; INTRAVENOUS at 09:36

## 2019-11-06 RX ADMIN — ONDANSETRON 4 MG: 2 INJECTION INTRAMUSCULAR; INTRAVENOUS at 07:29

## 2019-11-06 RX ADMIN — DUTASTERIDE 0.5 MG: 0.5 CAPSULE, LIQUID FILLED ORAL at 12:24

## 2019-11-06 RX ADMIN — HYDROMORPHONE HYDROCHLORIDE 0.5 MG: 1 INJECTION, SOLUTION INTRAMUSCULAR; INTRAVENOUS; SUBCUTANEOUS at 10:16

## 2019-11-06 RX ADMIN — DEXAMETHASONE SODIUM PHOSPHATE 4 MG: 4 INJECTION, SOLUTION INTRA-ARTICULAR; INTRALESIONAL; INTRAMUSCULAR; INTRAVENOUS; SOFT TISSUE at 08:07

## 2019-11-06 RX ADMIN — SODIUM CHLORIDE, POTASSIUM CHLORIDE, SODIUM LACTATE AND CALCIUM CHLORIDE: 600; 310; 30; 20 INJECTION, SOLUTION INTRAVENOUS at 08:06

## 2019-11-06 RX ADMIN — OXYCODONE HYDROCHLORIDE 5 MG: 5 TABLET ORAL at 19:59

## 2019-11-06 RX ADMIN — HYDROMORPHONE HYDROCHLORIDE 0.5 MG: 1 INJECTION, SOLUTION INTRAMUSCULAR; INTRAVENOUS; SUBCUTANEOUS at 08:17

## 2019-11-06 RX ADMIN — LIDOCAINE HYDROCHLORIDE 100 MG: 20 INJECTION, SOLUTION INFILTRATION; PERINEURAL at 07:37

## 2019-11-06 RX ADMIN — FENTANYL CITRATE 50 MCG: 50 INJECTION, SOLUTION INTRAMUSCULAR; INTRAVENOUS at 07:19

## 2019-11-06 RX ADMIN — FENTANYL CITRATE 50 MCG: 50 INJECTION, SOLUTION INTRAMUSCULAR; INTRAVENOUS at 08:01

## 2019-11-06 RX ADMIN — GLYCOPYRROLATE 0.2 MG: 0.2 INJECTION, SOLUTION INTRAMUSCULAR; INTRAVENOUS at 07:57

## 2019-11-06 RX ADMIN — CEFAZOLIN 1 G: 1 INJECTION, POWDER, FOR SOLUTION INTRAMUSCULAR; INTRAVENOUS at 23:56

## 2019-11-06 RX ADMIN — CEFAZOLIN 1 G: 1 INJECTION, POWDER, FOR SOLUTION INTRAMUSCULAR; INTRAVENOUS at 15:42

## 2019-11-06 RX ADMIN — PHENYLEPHRINE HYDROCHLORIDE 100 MCG: 10 INJECTION INTRAVENOUS at 07:58

## 2019-11-06 RX ADMIN — PROPOFOL 200 MG: 10 INJECTION, EMULSION INTRAVENOUS at 07:37

## 2019-11-06 RX ADMIN — FENTANYL CITRATE 25 MCG: 50 INJECTION, SOLUTION INTRAMUSCULAR; INTRAVENOUS at 10:00

## 2019-11-06 RX ADMIN — MIDAZOLAM HYDROCHLORIDE 1 MG: 1 INJECTION, SOLUTION INTRAMUSCULAR; INTRAVENOUS at 07:18

## 2019-11-06 RX ADMIN — SODIUM CHLORIDE: 9 INJECTION, SOLUTION INTRAVENOUS at 11:28

## 2019-11-06 RX ADMIN — ACETAMINOPHEN 975 MG: 325 TABLET, FILM COATED ORAL at 14:45

## 2019-11-06 RX ADMIN — WARFARIN SODIUM 2.5 MG: 2.5 TABLET ORAL at 18:42

## 2019-11-06 RX ADMIN — Medication 15 MG: at 07:41

## 2019-11-06 RX ADMIN — OXYCODONE HYDROCHLORIDE 5 MG: 5 TABLET ORAL at 14:45

## 2019-11-06 ASSESSMENT — LIFESTYLE VARIABLES: TOBACCO_USE: 0

## 2019-11-06 ASSESSMENT — ACTIVITIES OF DAILY LIVING (ADL): WHICH_OF_THE_ABOVE_FUNCTIONAL_RISKS_HAD_A_RECENT_ONSET_OR_CHANGE?: AMBULATION;TRANSFERRING;TOILETING;BATHING;DRESSING

## 2019-11-06 ASSESSMENT — MIFFLIN-ST. JEOR: SCORE: 1432.51

## 2019-11-06 ASSESSMENT — ENCOUNTER SYMPTOMS
SEIZURES: 0
DYSRHYTHMIAS: 1

## 2019-11-06 NOTE — PLAN OF CARE
PT:  Patient plan: Per [facility] care plan - return home with spouse and OPPT on Fri 11/8. Pt has all needed equipment.  Current status: Orders received, eval completed, treatment initiated. Pt is POD 0 R TKA. Prior to admit pt was living with his spouse in a multistory home, no AD use and independent with mobility, is active at baseline. Currently requires SBA for supine to sit, CGA sit to stand with FWW and KI, CGA for gait of 5 ftx2 with FWW and KI donned. Pt became lightheaded and returned to bed with min A, vitals stable. Pt demonstrates pain, dec strength, ROM, balance, activity tolerance and difficulty ambulating and transferring and would benefit from skilled PT services in order to improve this. AAROM R knee 13-50 deg.  Anticipated status at discharge: Pt will need to be able to transfer and ambulate with FWW and assist of 1 or less, climb 2 stairs to enter the home with no railing with assist of 1 or less.

## 2019-11-06 NOTE — OP NOTE
Procedure Date: 11/06/2019      PREOPERATIVE DIAGNOSES:     1.  End-stage arthrosis of the right knee.     2.  Medial femoral condyle secondary subacute fracture.     3.  Osteochondral lesion.      POSTOPERATIVE DIAGNOSES:     1.  End-stage arthrosis of the right knee.     2.  Medial femoral condyle secondary subacute fracture.     3.  Osteochondral lesion.      PROCEDURE:     1.  Removal of multiple loose bodies.   2.  Right total knee arthroplasty.      SURGEON:  Jamie Ohara MD      ASSISTANT:  Magan Araan.      ANESTHESIA:  General.      ESTIMATED BLOOD LOSS:  Minimal.      IMPLANT:  Biomet Vanguard knee, DePuy mobile bearing knee.  Size 6 femur, size 7 tibia.  Insert was a 6 x 14 for posterior stabilized knee.  Rotating platform where the button was a 31 mm, all poly button.      ANTIBIOTICS:  Given preoperative, 2 grams of Ancef, 24 hours dosing postop      DEEP VENOUS THROMBOSIS PROPHYLAXIS:  The patient is on chronic Coumadin.  We will resume that tomorrow, low-dose tonight.      DESCRIPTION OF PROCEDURE:  Identification was made, brought into the operating room, prepped and draped in the usual fashion.  Midline incision made after timeout with the tourniquet at 350.  Opened up, copious fluid.  Multiple cartilaginous loose bodies and debris.  Washed those out thoroughly.  Inspected for more.  Main problem was that his knee had a very large crater.  Weightbearing aspect medial femoral condyle, compatible with the area where the OCD lesion had fallen out and dislodged itself this past summer.  Following this, intramedullary guide was used to produce a 5 degree valgus cut, distal femur, followed by anteroposterior chamfers.  Box opening placed.  Put the bone plug in the distal femur.  Irrigated copiously and debrided away the meniscus tissue, the remnants of the ligaments.  Using extramedullary guiding system, referenced that with a 3-degree posterior tilt.      Sized it to the 7.  Went through a series  of trials.  Drilled cement anchoring holes.  Cemented all components simultaneously.  Irrigated copiously.  Double and triple checked for cement debris.  Thirty mL of Marcaine placed in the posterior capsule.  The rest eventually was placed in the muscle tendon junctions and then deep subQ.  Washed another 1000 mL of antibiotic solution through it once everything was locked in place and stable.  Excellent balance soft tissue, flexion and extension space.      Retinaculum reapproximated with 0 Ethibond followed by 0 running #1 Ethibond, 0 Vicryl, 2-0 Vicryl, and staples.  Hemovac brought out and placed on suction for 3 hours while the Marcaine was flushed in there, as well as the tranexamic acid for bleeding reasons.      Pressure wrap applied from toes to groin after closure of the skin with sutured staples.  The patient transferred awake and alert to the recovery room in satisfactory condition.  Treatment was right total knee arthroplasty secondary to large OCD lesion involving over half of the medial femoral condyle weightbearing surface.  Postoperatively, sponge and needle counts were correct x 2.  He had a Biomet posterior stabilized knee, a DePuy mobile bearing knee with excellent results.  Good balance.  Cemented with a gram of vancomycin inside 2 batches of high-viscosity cement.         CRISTAL MERCADO MD             D: 2019   T: 2019   MT: RANJIT      Name:     BEN KO   MRN:      [id number]        Account:        [id number]   :      1940           Procedure Date: 2019      Document: [id number]

## 2019-11-06 NOTE — PROGRESS NOTES
Patient has returned to baseline mental status - yes  Patient vital signs are at baseline - yes  Patient able to ambulate as they were prior to admission or with assist devices provided by therapies during their stay - no, PT at 1600  Patient voiding  - no, due to void, bladder scanned, refused straight cath at this time, denies urge to void or bladder discomfort  Patient able to tolerate oral intake - yes  Patient has adequate pain control using Oral analgesics - no, declines pain med at this time, will pre-medicate w/ oral pain meds for PT

## 2019-11-06 NOTE — PROGRESS NOTES
11/06/19 1611   Quick Adds   Quick Adds Certification   Type of Visit Initial PT Evaluation   Living Environment   Lives With spouse   Living Arrangements house   Home Accessibility stairs to enter home;stairs within home   Number of Stairs, Main Entrance 3   Stair Railings, Main Entrance none   Self-Care   Usual Activity Tolerance good   Current Activity Tolerance fair   Regular Exercise Yes   Activity/Exercise Type strength training;biking   Exercise Amount/Frequency 3-5 times/wk   Equipment Currently Used at Home none  (owns walker, cane, raised toilet seat, sock aide)   Functional Level Prior   Ambulation 0-->independent   Transferring 0-->independent   Fall history within last six months no   General Information   Onset of Illness/Injury or Date of Surgery - Date 11/06/19   Referring Physician Jamie Ohara MD   Patient/Family Goals Statement Per [facility] care plan - Return home with OPPT likely Friday   Pertinent History of Current Problem (include personal factors and/or comorbidities that impact the POC) Pt is POD 0 R TKA. PMH: afib, sensorineural hearing loss R.   Precautions/Limitations fall precautions   Weight-Bearing Status - LLE full weight-bearing   Weight-Bearing Status - RLE weight-bearing as tolerated   General Observations Spouse present   Cognitive Status Examination   Orientation orientation to person, place and time   Level of Consciousness alert   Follows Commands and Answers Questions 100% of the time;able to follow single-step instructions   Personal Safety and Judgment intact   Pain Assessment   Patient Currently in Pain Yes, see Vital Sign flowsheet  (7/10 at rest)   Posture    Posture Not impaired   Range of Motion (ROM)   ROM Comment R LE limited by pain and surgery, L LE WFL   Strength   Strength Comments R LE functionally weak, L LE WFL   Bed Mobility   Bed Mobility Comments Supine to sit wtih SBA   Transfer Skills   Transfer Comments Sit to stand with CGA and FWW with KI donned   Gait  "  Gait Comments Pt amb 5 ft with FWW and CGA with TANA mai, became lightheaded and returned to bed   Balance   Balance Comments Requires FWW for steady balance   Sensory Examination   Sensory Perception Comments Denies numbness or tingling   General Therapy Interventions   Planned Therapy Interventions bed mobility training;gait training;ROM;strengthening;transfer training   Clinical Impression   Criteria for Skilled Therapeutic Intervention yes, treatment indicated   PT Diagnosis Difficulty ambulating   Influenced by the following impairments Pain, dec strength, ROM, balance, activity tolerance   Functional limitations due to impairments Difficulty ambulating and transferring   Clinical Presentation Stable/Uncomplicated   Clinical Presentation Rationale medically stable post-op   Clinical Decision Making (Complexity) Low complexity   Therapy Frequency 2x/day   Predicted Duration of Therapy Intervention (days/wks) 3 days   Anticipated Discharge Disposition Other (see comments)  (defer to ortho surgeon/PA)   Risk & Benefits of therapy have been explained Yes   Patient, Family & other staff in agreement with plan of care Yes   Therapy Certification   Start of care date 11/06/19   Certification date from 11/06/19   Certification date to 11/08/19   Medical Diagnosis R TKA   [facility] AM-PAC TM \"6 Clicks\"   2016, Trustees of [facility], under license to [employer].  All rights reserved.   6 Clicks Short Forms Basic Mobility Inpatient Short Form   [facility] AM-PAC  \"6 Clicks\" V.2 Basic Mobility Inpatient Short Form   1. Turning from your back to your side while in a flat bed without using bedrails? 4 - None   2. Moving from lying on your back to sitting on the side of a flat bed without using bedrails? 4 - None   3. Moving to and from a bed to a chair (including a wheelchair)? 3 - A Little   4. Standing up from a chair using your arms (e.g., wheelchair, or bedside chair)? 3 - A Little   5. To " walk in hospital room? 3 - A Little   6. Climbing 3-5 steps with a railing? 2 - A Lot   Basic Mobility Raw Score (Score out of 24.Lower scores equate to lower levels of function) 19   Total Evaluation Time   Total Evaluation Time (Minutes) 15

## 2019-11-06 NOTE — PROGRESS NOTES
Admission medication history interview status for the 11/6/2019  admission is complete. See EPIC admission navigator for prior to admission medications     Medication history source reliability:Good    Medication history interview source(s):Patient    Medication history resources (including written lists, pill bottles, clinic record):None    Primary pharmacy.[facility], [place]    Additional medication history information not noted on PTA med list :None    Time spent in this activity: 15 minutes    Prior to Admission medications    Medication Sig Last Dose Taking? Auth Provider   dutasteride (AVODART) 0.5 MG capsule Take 0.5 mg by mouth daily 11/5/2019 at am Yes Reported, Patient   flecainide (TAMBOCOR) 50 MG tablet Take 1 tablet (50 mg) by mouth 2 times daily 11/6/2019 at 0500 Yes Grzegorz Malhotra MD   glucosamine-chondroitin 500-400 MG CAPS per capsule Take 1 capsule by mouth daily 11/4/2019 at am Yes Reported, Patient   metoprolol succinate ER (TOPROL-XL) 25 MG 24 hr tablet Take 1 tab daily 11/6/2019 at 0500 Yes Grzegorz Malhotra MD   warfarin ANTICOAGULANT (COUMADIN) 5 MG tablet Take 1 tab on Tuesdays and 1 1/2 tabs on all other days or as directed per INR clinic 11/2/2019 at pm Yes Grzegorz Malhotra MD

## 2019-11-06 NOTE — BRIEF OP NOTE
[facility]    Brief Operative Note    Pre-operative diagnosis: END STAGE OSTEOARTHRITIS RIGHT KNEE  Post-operative diagnosis Same as pre-operative diagnosis    Procedure: Procedure(s):  RIGHT TOTAL KNEE ARTHROPLASTY (DEPUY)^  Surgeon: Surgeon(s) and Role:     * Jamie Ohara MD - Primary  Anesthesia: General   Estimated blood loss: Less than 50 ml  Drains: Hemovac  Specimens: nine  Findings:   None.  Complications: None.  Implants:   Implant Name Type Inv. Item Serial No.  Lot No. LRB No. Used   BONE CEMENT RADIOPAQUE SIMPLEX HV FULL DOSE 6194-1-001 Cement, Bone BONE CEMENT RADIOPAQUE SIMPLEX HV FULL DOSE 6194-1-001 6194-1-001 [employer] [id number] Right 2   IMP COMP FEM JJ ATTUNE POST STAB RT BLAINE SZ6 [id number] Total Joint Component/Insert IMP COMP FEM JJ ATTUNE POST STAB RT BLAINE SZ6 [id number] [employer] [id number] Right 1   IMP FEM BASEPLATE JJ ATTUNE POST RP SZ 7 [id number] Total Joint Component/Insert IMP FEM BASEPLATE JJ ATTUNE POST RP SZ 7 [id number] Right 1   IMP PATELLA JJ ATTUNE DOME 41MM [id number] Total Joint Component/Insert IMP PATELLA JJ ATTUNE DOME 41MM [id number] [employer] [id number] Right 1   IMP INSERT JJ ATTUNE PS RP SZ6 7MM [id number] Total Joint Component/Insert IMP INSERT JJ ATTUNE PS RP SZ6 7MM [id number] [employer]-   [id number] Right 1

## 2019-11-06 NOTE — ANESTHESIA PREPROCEDURE EVALUATION
Anesthesia Pre-Procedure Evaluation    Patient: Nigel Rodarte Jr.   MRN: [id number] : 1940          Preoperative Diagnosis: END STAGE OSTEOARTHRITIS RIGHT KNEE    Procedure(s):  RIGHT TOTAL KNEE ARTHROPLASTY (DEPUY)^    Past Medical History:   Diagnosis Date     Arthritis     osteoarthrosis     Elevated PSA      Fatigue      Palpitations      Paroxysmal atrial fibrillation (H) 2017     Renal disease     kidney calculus     SOB (shortness of breath)      Past Surgical History:   Procedure Laterality Date     APPENDECTOMY       ARTHROPLASTY HIP  2013    Procedure: ARTHROPLASTY HIP;  RIGHT TOTAL HIP ARTHROPLASTY ;  Surgeon: Jamie Ohara MD;  Location:  OR     CARDIOVERSION       COLONOSCOPY N/A 2016    Procedure: COLONOSCOPY;  Surgeon: Astrid Vital MD;  Location:  GI     CYSTOSCOPY, RETROGRADES, EXTRACT STONE, COMBINED  2013    Procedure: COMBINED CYSTOSCOPY, RETROGRADES, EXTRACT STONE;  CYSTOSCOPY, RIGHT RETROGRADE, STONE EXTRACTION;  Surgeon: Carlos Mcknight MD;  Location:  OR     ENT SURGERY      right ear surgery     GENITOURINARY SURGERY      cystoscopy for stone removal     HERNIA REPAIR       ORTHOPEDIC SURGERY      shoulder surgeries,bilat carpel tunnel       Anesthesia Evaluation     . Pt has had prior anesthetic.     No history of anesthetic complications          ROS/MED HX    ENT/Pulmonary:      (-) tobacco use, asthma and sleep apnea   Neurologic:      (-) seizures and CVA   Cardiovascular:     (+) ----. : . . . :. dysrhythmias a-fib, .       METS/Exercise Tolerance:     Hematologic:         Musculoskeletal:   (+) arthritis,  -       GI/Hepatic:        (-) GERD   Renal/Genitourinary:     (+) chronic renal disease,       Endo:         Psychiatric:         Infectious Disease:         Malignancy:         Other:                          Physical Exam  Normal systems: dental    Airway   Mallampati: II  TM distance: >3 FB  Neck ROM: full    Dental  "    Cardiovascular   Rhythm and rate: regular and normal      Pulmonary    breath sounds clear to auscultation            Lab Results   Component Value Date    WBC 4.8 05/20/2018    HGB 15.5 05/20/2018    HCT 45.1 05/20/2018     05/20/2018     05/20/2018    POTASSIUM 4.5 05/20/2018    CHLORIDE 111 (H) 05/20/2018    CO2 27 05/20/2018    BUN 23 05/20/2018    CR 1.18 05/20/2018    GLC 97 05/20/2018    CHERI 8.8 05/20/2018    MAG 2.3 07/18/2015    ALBUMIN 3.8 05/16/2017    PROTTOTAL 7.0 05/16/2017    ALT 30 05/16/2017    AST 26 05/16/2017    ALKPHOS 74 05/16/2017    BILITOTAL 0.5 05/16/2017    INR 2.9 (A) 10/14/2019    TSH 2.34 05/16/2017       Preop Vitals  BP Readings from Last 3 Encounters:   11/06/19 (!) 135/95   02/25/19 120/70   05/25/18 124/79    Pulse Readings from Last 3 Encounters:   02/25/19 56   05/25/18 62   05/20/18 79      Resp Readings from Last 3 Encounters:   11/06/19 16   05/20/18 16   08/17/17 16    SpO2 Readings from Last 3 Encounters:   11/06/19 99%   05/20/18 95%   08/17/17 98%      Temp Readings from Last 1 Encounters:   05/20/18 36.2  C (97.1  F) (Temporal)    Ht Readings from Last 1 Encounters:   11/06/19 1.753 m (5' 9\")      Wt Readings from Last 1 Encounters:   11/06/19 72.2 kg (159 lb 3.2 oz)    Estimated body mass index is 23.51 kg/m  as calculated from the following:    Height as of this encounter: 1.753 m (5' 9\").    Weight as of this encounter: 72.2 kg (159 lb 3.2 oz).       Anesthesia Plan      History & Physical Review  History and physical reviewed and following examination; no interval change.    ASA Status:  2 .    NPO Status:  > 8 hours    Plan for Peripheral Nerve Block, For Post-op pain in coordination with surgeon, General and LMA with Intravenous induction. Maintenance will be Balanced.    PONV prophylaxis:  Ondansetron (or other 5HT-3) and Dexamethasone or Solumedrol  Patient refuses spinal anesthetic following discussion of risk/benefit of GA vs spinal.  "     Postoperative Care  Postoperative pain management:  IV analgesics, Oral pain medications and Neuraxial analgesia.      Consents  Anesthetic plan, risks, benefits and alternatives discussed with:  Patient..                 Maribell Patel

## 2019-11-06 NOTE — PROGRESS NOTES
[facility]      OUTPATIENT PHYSICAL THERAPY EVALUATION  PLAN OF TREATMENT FOR OUTPATIENT REHABILITATION  (COMPLETE FOR INITIAL CLAIMS ONLY)  Patient's Last Name, First Name, M.I.  YOB: 1940  Nigel Rodarte                        Provider's Name  [facility] Medical Record No.  [id number]                               Onset Date:  11/06/19   Start of Care Date:  11/06/19      Type:     _X_PT   ___OT   ___SLP Medical Diagnosis:  R TKA                        PT Diagnosis:  Difficulty ambulating   Visits from SOC:  1   _________________________________________________________________________________  Plan of Treatment/Functional Goals    Planned Interventions:  ,    bed mobility training, gait training, ROM, strengthening, transfer training,       Goals: See Physical Therapy Goals on Care Plan in [employer] electronic health record.    Therapy Frequency: 2x/day  Predicted Duration of Therapy Intervention: 3 days  _________________________________________________________________________________    I CERTIFY THE NEED FOR THESE SERVICES FURNISHED UNDER        THIS PLAN OF TREATMENT AND WHILE UNDER MY CARE     (Physician co-signature of this document indicates review and certification of the therapy plan).                Certification date from: 11/06/19, Certification date to: 11/08/19    Referring Physician: Jamie Ohara MD            Initial Assessment        See Physical Therapy evaluation dated 11/06/19 in Epic electronic health record.

## 2019-11-06 NOTE — ANESTHESIA CARE TRANSFER NOTE
Patient: Nigel Rodarte Jr.    Procedure(s):  RIGHT TOTAL KNEE ARTHROPLASTY (DEPUY)^    Diagnosis: END STAGE OSTEOARTHRITIS RIGHT KNEE  Diagnosis Additional Information: No value filed.    Anesthesia Type:   Peripheral Nerve Block, For Post-op pain in coordination with surgeon, General, LMA     Note:  Airway :Nasal Cannula  Patient transferred to:PACU  Handoff Report: Identifed the Patient, Identified the Reponsible Provider, Reviewed the pertinent medical history, Discussed the surgical course, Reviewed Intra-OP anesthesia mangement and issues during anesthesia, Set expectations for post-procedure period and Allowed opportunity for questions and acknowledgement of understanding      Vitals: (Last set prior to Anesthesia Care Transfer)    CRNA VITALS  11/6/2019 0913 - 11/6/2019 0949      11/6/2019             Resp Rate (observed):  9                Electronically Signed By: BRENDA Bettencourt CRNA  November 6, 2019  9:49 AM

## 2019-11-06 NOTE — ANESTHESIA POSTPROCEDURE EVALUATION
Patient: Nigel Rodarte Jr.    Procedure(s):  RIGHT TOTAL KNEE ARTHROPLASTY (DEPUY)^    Diagnosis:END STAGE OSTEOARTHRITIS RIGHT KNEE  Diagnosis Additional Information: No value filed.    Anesthesia Type:  Peripheral Nerve Block, For Post-op pain in coordination with surgeon, General, LMA    Note:  Anesthesia Post Evaluation    Patient location during evaluation: PACU  Patient participation: Able to fully participate in evaluation  Level of consciousness: awake  Pain management: adequate  Airway patency: patent  Cardiovascular status: acceptable  Respiratory status: acceptable  Hydration status: acceptable     Anesthetic complications: None          Last vitals:  Vitals:    11/06/19 1020 11/06/19 1030 11/06/19 1040   BP: 129/75 129/73 130/71   Pulse: 61 61 63   Resp: 20 10 8   Temp: 36.6  C (97.9  F) 36.6  C (97.9  F) 36.7  C (98.1  F)   SpO2: 97% 99% 97%         Electronically Signed By: Maribell Patel  November 6, 2019  10:58 AM

## 2019-11-07 ENCOUNTER — APPOINTMENT (OUTPATIENT)
Dept: PHYSICAL THERAPY | Facility: CLINIC | Age: 79
DRG: 470 | End: 2019-11-07
Attending: ORTHOPAEDIC SURGERY
Payer: MEDICARE

## 2019-11-07 ENCOUNTER — APPOINTMENT (OUTPATIENT)
Dept: OCCUPATIONAL THERAPY | Facility: CLINIC | Age: 79
DRG: 470 | End: 2019-11-07
Attending: ORTHOPAEDIC SURGERY
Payer: MEDICARE

## 2019-11-07 LAB
ANION GAP SERPL CALCULATED.3IONS-SCNC: 6 MMOL/L (ref 3–14)
BUN SERPL-MCNC: 18 MG/DL (ref 7–30)
CALCIUM SERPL-MCNC: 8 MG/DL (ref 8.5–10.1)
CHLORIDE SERPL-SCNC: 107 MMOL/L (ref 94–109)
CO2 SERPL-SCNC: 26 MMOL/L (ref 20–32)
CREAT SERPL-MCNC: 0.98 MG/DL (ref 0.66–1.25)
GFR SERPL CREATININE-BSD FRML MDRD: 73 ML/MIN/{1.73_M2}
GLUCOSE SERPL-MCNC: 114 MG/DL (ref 70–99)
HGB BLD-MCNC: 12.8 G/DL (ref 13.3–17.7)
INR PPP: 1.21 (ref 0.86–1.14)
POTASSIUM SERPL-SCNC: 4.5 MMOL/L (ref 3.4–5.3)
SODIUM SERPL-SCNC: 139 MMOL/L (ref 133–144)

## 2019-11-07 PROCEDURE — 85018 HEMOGLOBIN: CPT | Performed by: ORTHOPAEDIC SURGERY

## 2019-11-07 PROCEDURE — 36415 COLL VENOUS BLD VENIPUNCTURE: CPT | Performed by: ORTHOPAEDIC SURGERY

## 2019-11-07 PROCEDURE — 80048 BASIC METABOLIC PNL TOTAL CA: CPT | Performed by: ORTHOPAEDIC SURGERY

## 2019-11-07 PROCEDURE — 25000128 H RX IP 250 OP 636: Performed by: ORTHOPAEDIC SURGERY

## 2019-11-07 PROCEDURE — 97165 OT EVAL LOW COMPLEX 30 MIN: CPT | Mod: GO

## 2019-11-07 PROCEDURE — 97535 SELF CARE MNGMENT TRAINING: CPT | Mod: GO

## 2019-11-07 PROCEDURE — 97530 THERAPEUTIC ACTIVITIES: CPT | Mod: GO

## 2019-11-07 PROCEDURE — 97116 GAIT TRAINING THERAPY: CPT | Mod: GP

## 2019-11-07 PROCEDURE — 97530 THERAPEUTIC ACTIVITIES: CPT | Mod: GP

## 2019-11-07 PROCEDURE — 85610 PROTHROMBIN TIME: CPT | Performed by: ORTHOPAEDIC SURGERY

## 2019-11-07 PROCEDURE — 97110 THERAPEUTIC EXERCISES: CPT | Mod: GP

## 2019-11-07 PROCEDURE — 25000132 ZZH RX MED GY IP 250 OP 250 PS 637: Mod: GY | Performed by: ORTHOPAEDIC SURGERY

## 2019-11-07 RX ORDER — WARFARIN SODIUM 2.5 MG/1
2.5 TABLET ORAL
Status: COMPLETED | OUTPATIENT
Start: 2019-11-07 | End: 2019-11-07

## 2019-11-07 RX ORDER — ACETAMINOPHEN 325 MG/1
975 TABLET ORAL EVERY 8 HOURS
Qty: 100 TABLET | Refills: 0 | Status: SHIPPED | OUTPATIENT
Start: 2019-11-07 | End: 2020-10-30

## 2019-11-07 RX ORDER — OXYCODONE HYDROCHLORIDE 5 MG/1
5 TABLET ORAL EVERY 6 HOURS PRN
Qty: 60 TABLET | Refills: 0 | Status: ON HOLD | OUTPATIENT
Start: 2019-11-07 | End: 2020-05-18

## 2019-11-07 RX ADMIN — KETOROLAC TROMETHAMINE 15 MG: 15 INJECTION, SOLUTION INTRAMUSCULAR; INTRAVENOUS at 00:06

## 2019-11-07 RX ADMIN — KETOROLAC TROMETHAMINE 15 MG: 15 INJECTION, SOLUTION INTRAMUSCULAR; INTRAVENOUS at 13:08

## 2019-11-07 RX ADMIN — OXYCODONE HYDROCHLORIDE 5 MG: 5 TABLET ORAL at 10:59

## 2019-11-07 RX ADMIN — WARFARIN SODIUM 2.5 MG: 2.5 TABLET ORAL at 17:36

## 2019-11-07 RX ADMIN — DUTASTERIDE 0.5 MG: 0.5 CAPSULE, LIQUID FILLED ORAL at 09:11

## 2019-11-07 RX ADMIN — FLECAINIDE ACETATE 50 MG: 50 TABLET ORAL at 17:36

## 2019-11-07 RX ADMIN — OXYCODONE HYDROCHLORIDE 5 MG: 5 TABLET ORAL at 14:10

## 2019-11-07 RX ADMIN — FLECAINIDE ACETATE 50 MG: 50 TABLET ORAL at 09:11

## 2019-11-07 RX ADMIN — METOPROLOL SUCCINATE 25 MG: 25 TABLET, EXTENDED RELEASE ORAL at 09:11

## 2019-11-07 RX ADMIN — ACETAMINOPHEN 975 MG: 325 TABLET, FILM COATED ORAL at 13:08

## 2019-11-07 RX ADMIN — OXYCODONE HYDROCHLORIDE 5 MG: 5 TABLET ORAL at 22:56

## 2019-11-07 NOTE — PLAN OF CARE
Pt POD 0-1 R TKA. A&Ox4. VSS on RA.  Tolerating Regular diet, BS+, flatus--.  Up with Ax1. R knee pain managed with toradol x1. Dressing CDI, Hemovac patent, CMS intact as numbness from anesthesia resolved. Discharge Today likely.

## 2019-11-07 NOTE — PROGRESS NOTES
Patient vital signs are at baseline: Yes  Patient able to ambulate as they were prior to admission or with assist devices provided by therapies during their stay:  Yes  Patient MUST void prior to discharge:  Yes  Patient able to tolerate oral intake:  Yes  Pain has adequate pain control using Oral analgesics:  Yes

## 2019-11-07 NOTE — PROGRESS NOTES
Ortho    With hx  Of  afib  Will watch  tioll tomorrow  Am    ck inr   And  Help  Arrange  To get in  2 /  Week  For inr  cks.

## 2019-11-07 NOTE — PROGRESS NOTES
[facility]      OUTPATIENT OCCUPATIONAL THERAPY  EVALUATION  PLAN OF TREATMENT FOR OUTPATIENT REHABILITATION  (COMPLETE FOR INITIAL CLAIMS ONLY)  Patient's Last Name, First Name, M.I.  YOB: 1940  Nigel Rodarte                          Provider's Name  [facility] Medical Record No.  [id number]                               Onset Date:  11/06/19   Start of Care Date:        Type:     ___PT   _X_OT   ___SLP Medical Diagnosis:                           OT Diagnosis:  Decreased I with ADLs   Visits from SOC:  1   _________________________________________________________________________________  Plan of Treatment/Functional Goals    Planned Interventions: ADL retraining, transfer training,       Goals: See Occupational Therapy Goals on Care Plan in [employer] electronic health record.    Therapy Frequency:    Predicted Duration of Therapy Intervention: Eval and 1 Tx only  _________________________________________________________________________________    I CERTIFY THE NEED FOR THESE SERVICES FURNISHED UNDER        THIS PLAN OF TREATMENT AND WHILE UNDER MY CARE     (Physician co-signature of this document indicates review and certification of the therapy plan).                 ,      Referring Physician: Dr DEE Ohara            Initial Assessment        See Occupational Therapy evaluation dated   in Epic electronic health record.

## 2019-11-07 NOTE — PROGRESS NOTES
[facility]      OUTPATIENT PHYSICAL THERAPY EVALUATION  PLAN OF TREATMENT FOR OUTPATIENT REHABILITATION  (COMPLETE FOR INITIAL CLAIMS ONLY)  Patient's Last Name, First Name, M.I.  YOB: 1940  Nigel Rodarte                        Provider's Name  [facility] Medical Record No.  [id number]                               Onset Date:  11/06/19   Start of Care Date:  11/06/19      Type:     _X_PT   ___OT   ___SLP Medical Diagnosis:  R TKA                        PT Diagnosis:  Difficulty ambulating   Visits from SOC:  1   _________________________________________________________________________________  Plan of Treatment/Functional Goals    Planned Interventions:  ,    bed mobility training, gait training, ROM, strengthening, transfer training,       Goals: See Physical Therapy Goals on Care Plan in [employer] electronic health record.    Therapy Frequency: 2x/day  Predicted Duration of Therapy Intervention: 3 days  _________________________________________________________________________________    I CERTIFY THE NEED FOR THESE SERVICES FURNISHED UNDER        THIS PLAN OF TREATMENT AND WHILE UNDER MY CARE     (Physician co-signature of this document indicates review and certification of the therapy plan).                Certification date from: 11/06/19, Certification date to: 11/08/19    Referring Physician: Jamie Ohara MD            Initial Assessment        See Physical Therapy evaluation dated 11/06/19 in Epic electronic health record.

## 2019-11-07 NOTE — PROGRESS NOTES
Nigel Rodarte Jr.  2019  POD # 1 right total Knee arthropalsty    Doing well.  Clean wound without signs of infection.  Normal healing wound.  No immediate surgical complications identified.  Tolerating physical therapy and rehabilitation well.  Dressing change today, will keep Hemovac in one more day,  125cc past 6 hr.  Neg kourtney sign  Patient scheduled to have INR drawn on 19, will set up for every 3-4 days.  Patient does have OP PT in [place] on same date.  Butch Patel  Temperatures:  Current - Temp: 98.3  F (36.8  C); Max - Temp  Av.1  F (36.7  C)  Min: 97.4  F (36.3  C)  Max: 99.5  F (37.5  C)  Pulse range: Pulse  Av  Min: 61  Max: 61  Blood pressure range: Systolic (24hrs), Av , Min:100 , Max:123   ; Diastolic (24hrs), Av, Min:48, Max:74    CMS: intact pierce le  Labs:   Results for orders placed or performed during the hospital encounter of 19 (from the past 24 hour(s))   Hemoglobin   Result Value Ref Range    Hemoglobin 12.8 (L) 13.3 - 17.7 g/dL   Basic metabolic panel   Result Value Ref Range    Sodium 139 133 - 144 mmol/L    Potassium 4.5 3.4 - 5.3 mmol/L    Chloride 107 94 - 109 mmol/L    Carbon Dioxide 26 20 - 32 mmol/L    Anion Gap 6 3 - 14 mmol/L    Glucose 114 (H) 70 - 99 mg/dL    Urea Nitrogen 18 7 - 30 mg/dL    Creatinine 0.98 0.66 - 1.25 mg/dL    GFR Estimate 73 >60 mL/min/[1.73_m2]    GFR Estimate If Black 85 >60 mL/min/[1.73_m2]    Calcium 8.0 (L) 8.5 - 10.1 mg/dL   INR   Result Value Ref Range    INR 1.21 (H) 0.86 - 1.14       PLAN: Home tomorrow    Warfarin  2.5 mg  today

## 2019-11-07 NOTE — PLAN OF CARE
.Patient has returned to baseline mental status YES  Patient vital signs are at baseline YES  Patient able to ambulate as they were prior to admission or with assist devices provided by therapies during their stay YES  Patient voiding  YES - 125 and 350 ml output  Patient able to tolerate oral intake YES  Patient has adequate pain control using Oral analgesics YES - oxy 5 mg

## 2019-11-07 NOTE — PLAN OF CARE
OT: Eval and Tx complete.  Pt admitted under outpatient status and seen POD #1 of R TKA. Prior to surgery, pt lives in house with wife and reports I with ADL/IADLs.     Patient plan: Per [facility] care plan, home     Current status: Pt demonstrated LE dressing task. Pt completed functional transfers with SBA and FWW.     Anticipated status at discharge: A for dressing and IADLs; DME for toileting    Occupational Therapy Discharge Summary    Reason for therapy discharge:    All goals and outcomes met, no further needs identified.    Progress towards therapy goal(s). See goals on Care Plan in [facility] electronic health record for goal details.  Goals met    Therapy recommendation(s):    No further therapy is recommended.       No Color consistent with ethnicity/race, warm, dry intact, resilient.

## 2019-11-08 ENCOUNTER — APPOINTMENT (OUTPATIENT)
Dept: PHYSICAL THERAPY | Facility: CLINIC | Age: 79
DRG: 470 | End: 2019-11-08
Attending: ORTHOPAEDIC SURGERY
Payer: MEDICARE

## 2019-11-08 VITALS
OXYGEN SATURATION: 95 % | HEIGHT: 69 IN | DIASTOLIC BLOOD PRESSURE: 67 MMHG | TEMPERATURE: 100 F | HEART RATE: 60 BPM | SYSTOLIC BLOOD PRESSURE: 122 MMHG | RESPIRATION RATE: 16 BRPM | WEIGHT: 159.2 LBS | BODY MASS INDEX: 23.58 KG/M2

## 2019-11-08 LAB — INR PPP: 1.13 (ref 0.86–1.14)

## 2019-11-08 PROCEDURE — 36415 COLL VENOUS BLD VENIPUNCTURE: CPT | Performed by: ORTHOPAEDIC SURGERY

## 2019-11-08 PROCEDURE — 25000132 ZZH RX MED GY IP 250 OP 250 PS 637: Mod: GY | Performed by: ORTHOPAEDIC SURGERY

## 2019-11-08 PROCEDURE — 85610 PROTHROMBIN TIME: CPT | Performed by: ORTHOPAEDIC SURGERY

## 2019-11-08 PROCEDURE — 97530 THERAPEUTIC ACTIVITIES: CPT | Mod: GP | Performed by: PHYSICAL THERAPY ASSISTANT

## 2019-11-08 PROCEDURE — 12000000 ZZH R&B MED SURG/OB

## 2019-11-08 RX ORDER — WARFARIN SODIUM 5 MG/1
5 TABLET ORAL
Status: DISCONTINUED | OUTPATIENT
Start: 2019-11-08 | End: 2019-11-08 | Stop reason: HOSPADM

## 2019-11-08 RX ADMIN — METOPROLOL SUCCINATE 25 MG: 25 TABLET, EXTENDED RELEASE ORAL at 07:56

## 2019-11-08 RX ADMIN — OXYCODONE HYDROCHLORIDE 5 MG: 5 TABLET ORAL at 12:17

## 2019-11-08 RX ADMIN — OXYCODONE HYDROCHLORIDE 5 MG: 5 TABLET ORAL at 03:39

## 2019-11-08 RX ADMIN — ACETAMINOPHEN 975 MG: 325 TABLET, FILM COATED ORAL at 06:54

## 2019-11-08 RX ADMIN — DUTASTERIDE 0.5 MG: 0.5 CAPSULE, LIQUID FILLED ORAL at 07:56

## 2019-11-08 RX ADMIN — OXYCODONE HYDROCHLORIDE 10 MG: 5 TABLET ORAL at 07:55

## 2019-11-08 RX ADMIN — FLECAINIDE ACETATE 50 MG: 50 TABLET ORAL at 07:56

## 2019-11-08 ASSESSMENT — ACTIVITIES OF DAILY LIVING (ADL): ADLS_ACUITY_SCORE: 13

## 2019-11-08 NOTE — PLAN OF CARE
A&Ox4. VSS. CMS intact. Dressing CDI. Hemovac patent. Up with 1, gait belt and walker. Voiding adequately. PO prn oxy for pain. Room air. IV saline locked. Will continue to monitor.

## 2019-11-08 NOTE — PLAN OF CARE
A/OX4,cms intact.Pain control with oxycodone.Up with 1 assist/walker.Dreg changed,HV removed.Voiding well.Discharging home today.Wife at bedside.

## 2019-11-08 NOTE — PROGRESS NOTES
Nigel Rodarte Jr.  2019  POD # 2 Right total Knee Arthroplasty    Doing well.  Clean wound without signs of infection.  Normal healing wound.  No excessive bleeding  Tolerating physical therapy and rehabilitation well.  Hemovac pulled today, wound good, instructed on proper use of teds,  Neg kourtney sign   will have INR on Tuesday   Temperatures:  Current - Temp: 100  F (37.8  C); Max - Temp  Av.6  F (37  C)  Min: 98  F (36.7  C)  Max: 100  F (37.8  C)  Pulse range: Pulse  Av.7  Min: 55  Max: 67  Blood pressure range: Systolic (24hrs), Av , Min:95 , Max:122   ; Diastolic (24hrs), Av, Min:57, Max:67    CMS: intact pierce le  Labs:   Results for orders placed or performed during the hospital encounter of 19 (from the past 24 hour(s))   INR   Result Value Ref Range    INR 1.13 0.86 - 1.14       PLAN: Home today

## 2019-11-08 NOTE — PROGRESS NOTES
.Patient has returned to baseline mental status YES   Patient vital signs are at baseline YES  Patient able to ambulate as they were prior to admission or with assist devices provided by therapies during their stay YES   Patient voiding  YES   Patient able to tolerate oral intake YES   Patient has adequate pain control using Oral analgesics YES, po prn oxy 5 mg

## 2019-11-08 NOTE — PROGRESS NOTES
Patient vital signs are at baseline: Yes  Patient able to ambulate as they were prior to admission or with assist devices provided by therapies during their stay:  Yes  Patient MUST void prior to discharge:  Yes  Patient able to tolerate oral intake:  Yes  Pain has adequate pain control using Oral analgesics:  Yes   Pt on pathway. HVC still intact. Dressing on (R) knee changed. Pain managed with 5 mg oxycodone, tylenol, and toradol. Planning to discharge to home tomorrow.

## 2019-11-08 NOTE — PLAN OF CARE
Patient plan: Per [facility] care plan - return home with spouse and OPPT.   Current status: Pt attempted to go to PT appt this AM but became dizzy when getting up to the chair.  Therapist arrived pt was sitting in w/c.  Pt clammy and c/o dizziness.  Requesting to return to bed.  Pt transferred sit to stand with min assist.  Amb a few small steps from chair to bed using wheeled walker.  Pt unsteady when backing up to bed and required assist for balance.  Pt transferred sit to supine with mod assist.  Supine BP was 113/61 and HR 52 bpm.   Anticipated status at discharge: Assist of 1 for transfers, gait, and stairs.

## 2019-11-11 ENCOUNTER — TELEPHONE (OUTPATIENT)
Dept: CARDIOLOGY | Facility: CLINIC | Age: 79
End: 2019-11-11

## 2019-11-11 NOTE — DISCHARGE SUMMARY
[facility] Discharge Summary    Nigel Rodarte Jr. MRN# [id number]   Age: 79 year old YOB: 1940     Date of Admission:  11/6/2019  Date of Discharge::  11/8/2019  1:30 PM  Admitting Physician:  Jamie Ohara MD  Discharge Physician:  Jamie Ohara MD     Home clinic:   Dr pérez          Admission Diagnoses:   END STAGE OSTEOARTHRITIS RIGHT KNEE  S/P total knee arthroplasty, right  S/P total knee arthroplasty, right          Discharge Diagnosis:   END STAGE OSTEOARTHRITIS RIGHT KNEE  S/P total knee arthroplasty, right  S/P total knee arthroplasty, right          Procedures:   Procedure(s): Total knee arthoplasty (Right)       No other significant procedures performed during this admission           Medications Prior to Admission:     No medications prior to admission.             Discharge Medications:     Discharge Medication List as of 11/8/2019  1:04 PM      START taking these medications    Details   acetaminophen (TYLENOL) 325 MG tablet Take 3 tablets (975 mg) by mouth every 8 hours, Disp-100 tablet, R-0, Local Print      oxyCODONE (ROXICODONE) 5 MG tablet Take 1 tablet (5 mg) by mouth every 6 hours as needed for pain or moderate to severe pain, Disp-60 tablet, R-0, Local Print         CONTINUE these medications which have NOT CHANGED    Details   dutasteride (AVODART) 0.5 MG capsule Take 0.5 mg by mouth daily, Historical      flecainide (TAMBOCOR) 50 MG tablet Take 1 tablet (50 mg) by mouth 2 times daily, Disp-180 tablet, R-2, E-Prescribe      glucosamine-chondroitin 500-400 MG CAPS per capsule Take 1 capsule by mouth daily, Historical      metoprolol succinate ER (TOPROL-XL) 25 MG 24 hr tablet Take 1 tab daily, Disp-90 tablet, R-2, E-Prescribe      warfarin ANTICOAGULANT (COUMADIN) 5 MG tablet Take 1 tab on Tuesdays and 1 1/2 tabs on all other days or as directed per INR clinic, Disp-135 tablet, R-1, E-Prescribe                   Consultations:   Consultation during this  admission received from internal medicine          Brief History of Illness:   This patient was a 79 year old male with a known history of   .  He underwent a period of non-operative treatment which only provided mild and intermittent relief.  After a lengthy discussion and consultation with Dr. arguello, the patient chose to undergo a right side knee arthroplasty.  He was explained the risks,complications, and benefits of the procedure and elected to have the surgical procedure.  Patient was cleared by his primary care physician for joint replacement.           Hospital Course:   The patient tolerated the procedure well and was taken to postop recovery in stable condition.  Please refer to the full operative note for complete details.   In recovery, he had a post-operative hemoglobin of see  epic and was noted to be motor and neurovascular intact.  Post-operative films show components in excellent position.     On post-operative day 1, patient's wound was checked and noted to be healing well..  The drain output was within normal limits..  Patient had adequate pain control and was prescribed physical therapy.  He was given 24 hrs of perioperative antibiotics.  Patient had motor strength of 5/5 on the both sides.  Patient was neurovascularly intact in the both sides lower extremity. There were no complications throughout the hospital course as the patient passed physical therapy/occupational therapy on post-operative day #1.  The drain was pulled on post-operative day #1.  His discharge hemoglobin was see epic and the patient did not require a blood transfusion.  He was followed by the hospitalist during this hospital visit to manage his medical problems.     Upon discharge, the patient was seen by Dr. arguello and all questions were answered.  He was discharged on home medications as outlined in medication reconciliation list outlined below.  The patient has instructions that if he has increased pain, fever, erythema,  swelling or drainage to immediately call.          Discharge Instructions and Follow-Up:   Discharge diet: Regular   Discharge activity: Activity as tolerated   Discharge follow-up: Follow up with me in 14 days   Wound care: Apply bandage daily  Keep wound clean and dry  May get incision wet in shower but do not soak or scrub           Discharge Disposition:   Discharged to home      Attestation:      Jamie Ohara MD

## 2019-11-11 NOTE — TELEPHONE ENCOUNTER
Overhead page from scheduling.  Spoke to wife Denise who reports her  had RTKA on Wednesday and has had a rough weekend.  Patient went into afib approximately 15 minutes and needs to know what to do.  HR ranging 58-96.  Tolerating symptoms at this time.  Patient already took his evening flecainide and an additional dose (100mg total).  Informed patient this needs some time to work.  Wife did give phone to  who was very anxious and started talking about his recent INR that was done on 11/8.  Reports it was 1.1. Patient was confused as to how to take his coumadin.  Informed patient that all instruction were provided at discharge and all written on AVS provided to him.  Instructed patient that if he is still in AF 2 hours after taking the flecainide and he does not feel he needs ED intervention to call on call cardiology for advise.  Recommended that patient try using some relaxation techniques as being anxious is not helpful in his current circumstance.  Instructed patient to not take any additional medication until he discuss with a provider.  Patient provided verbal understanding regarding above.  Will follow up on patient tomorrow in am.  TAMMY Mcneal

## 2019-11-12 ENCOUNTER — ANTICOAGULATION THERAPY VISIT (OUTPATIENT)
Dept: CARDIOLOGY | Facility: CLINIC | Age: 79
End: 2019-11-12
Payer: MEDICARE

## 2019-11-12 DIAGNOSIS — I48.91 ATRIAL FIBRILLATION, UNSPECIFIED TYPE (H): ICD-10-CM

## 2019-11-12 DIAGNOSIS — Z79.01 LONG TERM CURRENT USE OF ANTICOAGULANTS WITH INR GOAL OF 2.0-3.0: ICD-10-CM

## 2019-11-12 LAB — INR POINT OF CARE: 1.3 (ref 0.86–1.14)

## 2019-11-12 PROCEDURE — 36416 COLLJ CAPILLARY BLOOD SPEC: CPT | Performed by: INTERNAL MEDICINE

## 2019-11-12 PROCEDURE — 85610 PROTHROMBIN TIME: CPT | Mod: QW | Performed by: INTERNAL MEDICINE

## 2019-11-12 NOTE — UTILIZATION REVIEW
"  Admission Status; Secondary Review Determination       As part of the [place] Utilization review plan, a self-audit is done on Medicare inpatient admission with less than 2 midnights stay. The 2014 IPPS Final Rule allows outpatient billing in the event that a hospital determines that an inpatient admission was not medically necessary under utilization review process.          (x) Outpatient status would be Appropriate- Short Stay- Post discharge review.     RATIONALE FOR DETERMINATION   Patient had an elective TKA on 11/6.  No complications documented. Ordered as Outpatient.  Progress note on 11/7: With hx  Of  afib  Will watch  till tomorrow. Per progress note 111/7:\"Tolerating physical therapy and rehabilitation well.\"   OP order Jamie Ohara MD 11/06/19 1021 S/P total knee arthroplasty, right. Discharge order Jamie Ohara MD 11/08/19 0621.  IP order Jamie Ohara MD 11/08/19 0847 < 2 midnights S/P total knee arthroplasty, right.   This account and medical record number for a Medicare beneficiary was an inpatient short stay (<2 midnights) and didn't meet medical necessity for inpatient admission at the time of the order. Inpatient admission order entered after discharge order, inpatient order cannot be retro.  We recommend billing outpatient services based on the severity of illness, intensity of service provided and the inpatient length of stay.  Please contact me within one week of receiving this letter only if you disagree with this determination. If you concur, no further action is needed.          The information on this document is developed by the utilization review team in order for the business office to ensure compliance.  This only denotes the appropriateness of proper admission status and does not reflect the quality of care rendered.         The definitions of Inpatient Status and Observation Status used in making the determination above are those provided in the CMS Coverage Manual, " Chapter 1 and Chapter 6, section 70.4.      Sincerely,       HERMAN GUTIERREZ MD    System Medical Director  Utilization  Management  [facility].

## 2019-11-12 NOTE — TELEPHONE ENCOUNTER
Patient here for INR check this am.  Spoke to patient who reports he converted back to NSR at approximately 8pm last evening.  Feeling better today.  Was asking what to do if he should have another episode of AF.  Advised patient to call clinic if during working hours or after hour number for recommendations.  Patient provided verbal understanding.  TAMMY Mcneal

## 2019-11-12 NOTE — PROGRESS NOTES
ANTICOAGULATION FOLLOW-UP CLINIC VISIT    Patient Name:  Nigel Rodarte Jr.  Date:  2019  Contact Type:  Face to Face    SUBJECTIVE:  Patient Findings     Positives:   Missed doses (held 4 doses prior to knee surgery then he was given smaller doses), Change in medications (taking Tylenol 975 mg every 8 hours and taking Oxycodone 1 tab a day recently), Change in diet/appetite (appetite is poor - eating protein bars), Hospital admission (hospitalized last week for knee replacement), Other complaints (episode of afib yesterday -- took extra dose of Flecainide then he thinks he converted back to a regular rhythm within 4 hours)        Clinical Outcomes     Negatives:   Major bleeding event, Thromboembolic event, Anticoagulation-related hospital admission, Anticoagulation-related ED visit, Anticoagulation-related fatality           OBJECTIVE    INR Protime   Date Value Ref Range Status   2019 1.3 (A) 0.86 - 1.14 Final       ASSESSMENT / PLAN  INR assessment SUB    Recheck INR In: 6 DAYS    INR Location Clinic      Anticoagulation Summary  As of 2019    INR goal:   2.0-3.0   TTR:   61.8 % (2.5 y)   INR used for dosin.3! (2019)   Warfarin maintenance plan:   5 mg (5 mg x 1) every Tue; 7.5 mg (5 mg x 1.5) all other days   Full warfarin instructions:   : 7.5 mg; : 5 mg; Otherwise 5 mg every Tue; 7.5 mg all other days   Weekly warfarin total:   50 mg   Plan last modified:   Gladys Herrera RN (2019)   Next INR check:   2019   Target end date:   Indefinite    Indications    Atrial fibrillation (H) [I48.91] [I48.91]  Long term current use of anticoagulants with INR goal of 2.0-3.0 [Z79.01]             Anticoagulation Episode Summary     INR check location:       Preferred lab:       Send INR reminders to:   [facility] HEART INR NURSE    Comments:         Anticoagulation Care Providers     Provider Role Specialty Phone number    Grzegorz Malhotra MD Responsible Cardiology  [phone]            See the Encounter Report to view Anticoagulation Flowsheet and Dosing Calendar (Go to Encounters tab in chart review, and find the Anticoagulation Therapy Visit)    INR 1.3 He had held warfarin for 4 days prior to knee replacement last week then he took smaller doses of 2.5 mg warfarin on next 2 days per surgeon then he isn't sure if he missed Friday's dose then he took his usual 7.5 mg on Sat and Sun. He is taking Tylenol 975 mg every 8 hours and taking Oxycodone 1 tab a day for pain control. He had episode of afib yesterday and took an extra dose of Flecainide with conversion back to regular rhythm within 4 hours. Appetite is poor but he is eating a protein bar daily. Denies abnormal bleeding. He states that the knee incision isn't very bruised. Will dose with 7.5 mg TuWThFSu and 5 mg Sa with recheck of INR in 6 days. Dosage adjustment made based on physician directed care plan.    Gladys Herrera RN

## 2019-11-15 DIAGNOSIS — I48.91 ATRIAL FIBRILLATION, UNSPECIFIED TYPE (H): ICD-10-CM

## 2019-11-15 DIAGNOSIS — I48.91 ATRIAL FIBRILLATION (H): ICD-10-CM

## 2019-11-15 RX ORDER — METOPROLOL SUCCINATE 25 MG/1
TABLET, EXTENDED RELEASE ORAL
Qty: 90 TABLET | Refills: 2 | Status: SHIPPED | OUTPATIENT
Start: 2019-11-15 | End: 2020-04-30

## 2019-11-18 ENCOUNTER — HOSPITAL ENCOUNTER (EMERGENCY)
Facility: CLINIC | Age: 79
Discharge: HOME | End: 2019-11-18
Attending: EMERGENCY MEDICINE | Admitting: EMERGENCY MEDICINE
Payer: MEDICARE

## 2019-11-18 ENCOUNTER — ANTICOAGULATION THERAPY VISIT (OUTPATIENT)
Dept: CARDIOLOGY | Facility: CLINIC | Age: 79
End: 2019-11-18

## 2019-11-18 VITALS
BODY MASS INDEX: 22.66 KG/M2 | HEIGHT: 69 IN | TEMPERATURE: 98.1 F | WEIGHT: 153 LBS | HEART RATE: 66 BPM | DIASTOLIC BLOOD PRESSURE: 69 MMHG | OXYGEN SATURATION: 97 % | RESPIRATION RATE: 18 BRPM | SYSTOLIC BLOOD PRESSURE: 125 MMHG

## 2019-11-18 DIAGNOSIS — L50.9 URTICARIA: ICD-10-CM

## 2019-11-18 DIAGNOSIS — Z79.01 LONG TERM CURRENT USE OF ANTICOAGULANTS WITH INR GOAL OF 2.0-3.0: ICD-10-CM

## 2019-11-18 DIAGNOSIS — I48.91 ATRIAL FIBRILLATION, UNSPECIFIED TYPE (H): ICD-10-CM

## 2019-11-18 LAB — INR PPP: 2.5 (ref 0.86–1.14)

## 2019-11-18 PROCEDURE — 99284 EMERGENCY DEPT VISIT MOD MDM: CPT | Mod: 25

## 2019-11-18 PROCEDURE — 25000132 ZZH RX MED GY IP 250 OP 250 PS 637: Mod: GY | Performed by: EMERGENCY MEDICINE

## 2019-11-18 PROCEDURE — 85610 PROTHROMBIN TIME: CPT | Performed by: EMERGENCY MEDICINE

## 2019-11-18 PROCEDURE — 25000128 H RX IP 250 OP 636

## 2019-11-18 PROCEDURE — 96374 THER/PROPH/DIAG INJ IV PUSH: CPT

## 2019-11-18 PROCEDURE — 99207 ZZC NO CHARGE NURSE ONLY: CPT

## 2019-11-18 RX ORDER — CETIRIZINE HYDROCHLORIDE 10 MG/1
10 TABLET ORAL DAILY
Status: DISCONTINUED | OUTPATIENT
Start: 2019-11-18 | End: 2019-11-18

## 2019-11-18 RX ORDER — CETIRIZINE HYDROCHLORIDE 10 MG/1
10 TABLET ORAL DAILY
Status: DISCONTINUED | OUTPATIENT
Start: 2019-11-18 | End: 2019-11-18 | Stop reason: HOSPADM

## 2019-11-18 RX ORDER — PREDNISONE 20 MG/1
TABLET ORAL
Qty: 10 TABLET | Refills: 0 | Status: ON HOLD | OUTPATIENT
Start: 2019-11-18 | End: 2020-05-18

## 2019-11-18 RX ORDER — TRAMADOL HYDROCHLORIDE 50 MG/1
50 TABLET ORAL EVERY 6 HOURS PRN
Qty: 10 TABLET | Refills: 0 | Status: ON HOLD | OUTPATIENT
Start: 2019-11-18 | End: 2020-05-18

## 2019-11-18 RX ORDER — DIPHENHYDRAMINE HYDROCHLORIDE 50 MG/ML
INJECTION INTRAMUSCULAR; INTRAVENOUS
Status: COMPLETED
Start: 2019-11-18 | End: 2019-11-18

## 2019-11-18 RX ORDER — CETIRIZINE HYDROCHLORIDE 10 MG/1
10 TABLET ORAL 2 TIMES DAILY PRN
Qty: 20 TABLET | Refills: 0 | Status: ON HOLD | OUTPATIENT
Start: 2019-11-18 | End: 2020-05-19

## 2019-11-18 RX ORDER — DIPHENHYDRAMINE HYDROCHLORIDE 50 MG/ML
25 INJECTION INTRAMUSCULAR; INTRAVENOUS ONCE
Status: COMPLETED | OUTPATIENT
Start: 2019-11-18 | End: 2019-11-18

## 2019-11-18 RX ADMIN — DIPHENHYDRAMINE HYDROCHLORIDE 25 MG: 50 INJECTION, SOLUTION INTRAMUSCULAR; INTRAVENOUS at 01:09

## 2019-11-18 RX ADMIN — CETIRIZINE HYDROCHLORIDE 10 MG: 10 TABLET, FILM COATED ORAL at 01:19

## 2019-11-18 RX ADMIN — DIPHENHYDRAMINE HYDROCHLORIDE 25 MG: 50 INJECTION INTRAMUSCULAR; INTRAVENOUS at 01:09

## 2019-11-18 ASSESSMENT — MIFFLIN-ST. JEOR: SCORE: 1404.38

## 2019-11-18 NOTE — DISCHARGE INSTRUCTIONS
Please start Zyrtec daily, may increase to twice daily if needed.  Use Benadryl only if needed for extreme itching, this may make you very sleepy and he should use this with caution along with the tramadol and/or oxycodone.    You may stop the oxycodone and try tramadol instead in case the oxycodone is causing your hives.  Your INR needs to be rechecked and watch carefully with tramadol and warfarin.  Do not use tramadol and oxycodone.    If your hives are still quite bad in the morning, still having a lot of discomfort, you may call the surgeon to see if prednisone will be okay to take as well.  This will also affect your INR is also be cautious and have this rechecked closely.    May also try topical hydrocortisone to the areas of hives.    Regardless have the primary doctor recheck you on Tuesday Wednesday to ensure that you are improving.    Discharge Instructions  Hives or Urticaria    Hives are a rash with raised, swollen, red, itchy spots on the skin. They may look like mosquito bites. Hives change in size, shape and location over time.  Hives can be caused by an infection (usually a virus) or an allergic reaction (to medicine, food, insect stings, or many other things). They can also come because of your own body s reaction to things like body temperature changes or pressure on the skin. Sometimes hives are caused by an inherited problem. Hives are not contagious.    Generally, every Emergency Department visit should have a follow-up clinic visit with either a primary or a specialty clinic/provider. Please follow-up as instructed by your emergency provider today.    Return to the Emergency Department if:  You have trouble breathing.  Your lips or tongue swell up, or you have swelling or tightness in the throat.  You have stomach pain or vomiting (throwing up).  You pass out.    What can I do to help myself?  Fill any prescriptions the provider gave you and take them right away.  Antihistamines (H1 blockers)  are the primary treatment for hives. You may be given a prescription for an antihistamine, or your provider may tell you to use one available without a prescription, such as Benadryl  (diphenhydramine). These medicines relieve the itching and can help make the hives go away.  You may be given a prescription for a steroid. Used long-term, these can have side effects, but are in the short-term. You may notice restlessness or increased appetite. Be sure to take all of the medicine as prescribed.   Sometimes your provider will recommend an H2 antihistamine, such as Zantac  (ranitidine) or Pepcid  (famotidine). These are usually used for stomach problems, but also can help with hives.   Avoid scratching! This makes the hives get worse. You may want to put socks on your hands (or on your child s hands) when sleeping.  Avoid tight clothes, or clothes that rub on your skin.  If the itching is too bad, try cool compresses or cool baths. Avoid hot baths and showers, because they can make hives worse.  If your hives were felt to be related to a serious allergic reaction, you may be given an epinephrine auto-injector (such as EpiPen ) to use at home. Use this if you have a serious allergic reaction and call an ambulance right away. This medicine is used to buy time to get to a hospital, but not to replace hospital care.   If you were given a prescription for medicine here today, be sure to read all of the information (including the package insert) that comes with your prescription.  This will include important information about the medicine, its side effects, and any warnings that you need to know about.  The pharmacist who fills the prescription can provide more information and answer questions you may have about the medicine.  If you have questions or concerns that the pharmacist cannot address, please call or return to the Emergency Department.   Remember that you can always come back to the Emergency Department if you are  not able to see your regular provider in the amount of time listed above, if you get any new symptoms, or if there is anything that worries you.

## 2019-11-18 NOTE — PROGRESS NOTES
ANTICOAGULATION FOLLOW-UP CLINIC VISIT    Patient Name:  Nigel Rodarte Jr.  Date:  2019  Contact Type:  Telephone/Denise, pt's wife    SUBJECTIVE:  Patient Findings     Positives:   Emergency department visit (ER visit last night - urticaria), Change in medications (was taking Oxycodone 1 tab every 6 hours and Tylenol every 8 hours)        Clinical Outcomes     Negatives:   Major bleeding event, Thromboembolic event, Anticoagulation-related hospital admission, Anticoagulation-related ED visit, Anticoagulation-related fatality           OBJECTIVE    INR   Date Value Ref Range Status   2019 2.50 (H) 0.86 - 1.14 Final       ASSESSMENT / PLAN  INR assessment THER    Recheck INR In: 8 DAYS    INR Location Outside lab      Anticoagulation Summary  As of 2019    INR goal:   2.0-3.0   TTR:   61.6 % (2.5 y)   INR used for dosin.50 (2019)   Warfarin maintenance plan:   5 mg (5 mg x 1) every Tue; 7.5 mg (5 mg x 1.5) all other days   Full warfarin instructions:   5 mg every Tue; 7.5 mg all other days   Weekly warfarin total:   50 mg   No change documented:   Gladys Herrera, RN   Plan last modified:   Gladys Herrera RN (2019)   Next INR check:   2019   Target end date:   Indefinite    Indications    Atrial fibrillation (H) [I48.91] [I48.91]  Long term current use of anticoagulants with INR goal of 2.0-3.0 [Z79.01]             Anticoagulation Episode Summary     INR check location:       Preferred lab:       Send INR reminders to:   [facility] HEART INR NURSE    Comments:         Anticoagulation Care Providers     Provider Role Specialty Phone number    Grzegorz Malhotra MD [facility] Cardiology [phone]            See the Encounter Report to view Anticoagulation Flowsheet and Dosing Calendar (Go to Encounters tab in chart review, and find the Anticoagulation Therapy Visit)    INR 2.50 (done at ER visit). Pt's wife calling to report that pt had to be evaluated in ER last night  because of hives felt to be due to Oxycodone. He was still taking Oxy every 6 hours and Tylenol every 8 hours for pain control (s/p knee replacement). He was given benadryl and zyrtec in ER and was given a Rx for Zyrtec to take as needed along with a Rx for Prednisone to take if the hives are still intense. He has not started Prednisone. Instructed pt's wife to call us if pt does start Prednisone because we would need to check his INR 2 days after starting Prednisone. Oxycodone was stopped and he will start to take Tramadol for severe pain. Will continue current dosing of 5 mg Tuesdays and 7.5 mg all other days with recheck in 8 days (sooner if he starts Prednisone).    Gladys Herrera RN

## 2019-11-18 NOTE — ED AVS SNAPSHOT
Emergency Department  [address] [place] [id number]  Phone:  [phone]  Fax:  [phone]                                    Nigel Rodarte Jr.   MRN: [id number]    Department:   Emergency Department   Date of Visit:  11/18/2019           After Visit Summary Signature Page    I have received my discharge instructions, and my questions have been answered. I have discussed any challenges I see with this plan with the nurse or doctor.    ..........................................................................................................................................  Patient/Patient Representative Signature      ..........................................................................................................................................  Patient Representative Print Name and Relationship to Patient    ..................................................               ................................................  Date                                   Time    ..........................................................................................................................................  Reviewed by Signature/Title    ...................................................              ..............................................  Date                                               Time          22EPIC Rev 08/18

## 2019-11-18 NOTE — ED TRIAGE NOTES
Rash to back and buttox the last few days. +right knee replacement 11/6/2019. +itching. No resp distress or diff. swollowing.

## 2019-11-18 NOTE — ED PROVIDER NOTES
History     Chief Complaint:  Rash       HPI   Nigel Rodarte Jr. is a 79 year old male with a history of paroxysmal atrial fibrillation anticoagulated on Warfarin s/p right knee replacement on 11/6/2019 who presents with a rash. The patient says that the rash started on his back the first day he got home after his surgery. He says that the only medication change he had has been 1 Oxycodone every 6 hours. The patient says that the rash had spread first down his legs, and then continued to spread to the rest of his upper body, he notes that any skin that touched his bed developed the rash. The patient's significant other notes that the patient was sweaty at the onset of the rash. She also says that she had washed the bed sheets with their usual detergent. The patient says that the itching and pain has worsened over the past 18 hours, he says that any irritation to the areas make the itching and pain worse. The patient says that he has felt hot, but never measured a fever. He denies any chest pain, abdominal pain, nausea or vomiting, shortness of breath, swelling of lips, or any new soaps.     Allergies:  No Known Drug Allergies     Medications:    Tylenol  Avodart  Tambocor  Glucosamine- chondroitin  Toprol  Roxicodone  Warfarin      Past Medical History:    Renal disease  paroxysmal atrial fibrillation  Palpitations  Fatigue  Arthritis   Ureteral stone     Past Surgical History:    Shoulder surgeries  Hernia repair  Cystoscopy  ENT surgery   Colonoscopy  Cardioversion  Arthroplasty knee  Arthroplasty  hip  Appendectomy      Family History:    Lung cancer     Social History:  The patient was accompanied to the ED by significant other.  Smoking Status: Never Smoker  Smokeless Tobacco: Never Used  Alcohol Use: Negative   Drug Use: Negative  PCP: Osmar Espinoza   Marital Status:          Review of Systems   All other systems reviewed and are negative.      Physical Exam     Patient Vitals for the past  "24 hrs:   BP Temp Temp src Heart Rate Resp SpO2 Height Weight   19 0047 (!) 162/111 -- -- -- -- -- -- --   196 -- 98.1  F (36.7  C) Temporal 81 18 98 % 1.753 m (5' 9\") 69.4 kg (153 lb)        Physical Exam  General: Resting on the bed.  Head: No obvious trauma to head.  Ears, Nose, Throat:  External ears normal.  Nose normal.  No pharyngeal erythema, swelling or exudate.  Midline uvula.  No angioedema noted.  Eyes:  Conjunctivae clear.    CV: Regular rate and rhythm.  No murmurs.      Respiratory: Effort normal and breath sounds normal.  No wheezing or crackles.   Gastrointestinal: Soft.  No distension. There is no tenderness.    Musculoskeletal: Right knee postoperative, clean dry and intact incision over the right anterior knee  Skin: Skin is warm and dry.  Urticaria in various stages noted throughout the back, buttocks, posterior lower extremities, suprapubic area, right axilla, less in the left axilla.  Blanching, nontender, warm, pruritic.  No discharge or drainage.    Emergency Department Course     Laboratory:  Laboratory findings were communicated with the patient who voiced understanding of the findings.    INR: 2.50 (H)    Interventions:  0109 Benadryl 25 mg IV  0119 Zyrtec 10 mg Oral     Emergency Department Course:    0038 Nursing notes and vitals reviewed.    0044 I performed an exam of the patient as documented above.     0104 IV was inserted and blood was drawn for laboratory testing, results above.     0207 Patient rechecked and updated.       The patient is discharged to home.     Impression & Plan    Medical Decision Makin-year-old male presents with urticaria.  Vital signs mildly hypertensive but this was done while standing.  Otherwise unremarkable.  Broad differential was pursued including but not limited to urticaria, cellulitis, petechiae purpura, contact dermatitis, dermatitis, etc.  Overall patient's well-appearing nontoxic.  Patient is afebrile.  Rash does not appear " consistent with urticaria.  Rash does not appear consistent with shingles.  There is been known recent changes in lotions or detergents or other environmental exposures.  The rash does not follow a clear contact pattern to suggest a contact dermatitis.  Patient has these large urticarial welts that are red, warm, blanching.  They are nontender.  They are very pruritic.  He seems most consistent with urticaria.  There is no evidence of systemic involvement such as allergic reaction or angioedema or anaphylaxis.  Patient was given Benadryl and Zyrtec in the emergency department.  avoided prednisone due to recent procedure and warfarin.  Hives are improving.  I did give a prescription for prednisone if hives were still intense in the morning.  I advised that they call with the orthopedic surgeon to ensure that they are okay with prednisone prior to initiating this.  I explained that help to watch his INR closely.  We also discussed that the oxycodone may be causing the hives, we opted to write a prescription for tramadol given that he tolerated this in the hospital without any reactions.  I advised that he stop the oxycodone and start the tramadol.  If the tramadol works he may follow-up with orthopedics for further refills.  Also explained INR can be affected by tramadol.  Encouraged follow up in clinic Tue or Wed.  Advised caution with Benadryl and narcotics.  Encouraged use Benadryl only as needed for extreme itching.  Encouraged to try topical hydrocortisone cream as needed as well.    Of note patient also need an INR check in the morning therefore an INR was ordered here to help facilitate patient's morning. INR therapeutic 2.5.          Diagnosis:    ICD-10-CM    1. Urticaria L50.9         Disposition:   Findings and plan explained to the Patient. Patient discharged home with instructions regarding supportive care, medications, and reasons to return. The importance of close follow-up was reviewed.    Discharge  Medications:  New Prescriptions    CETIRIZINE (ZYRTEC) 10 MG TABLET    Take 1 tablet (10 mg) by mouth 2 times daily as needed for allergies (1 tab up to twice a day as needed for itch, rash, hives, allergy)    PREDNISONE (DELTASONE) 20 MG TABLET    Take two tablets (= 40mg) each day for 5 (five) days    TRAMADOL (ULTRAM) 50 MG TABLET    Take 1 tablet (50 mg) by mouth every 6 hours as needed for severe pain       Scribe Disclosure:  I, Gordon Jacques, am serving as a scribe at 12:39 AM on 11/18/2019 to document services personally performed by Idalia Haile MD based on my observations and the provider's statements to me.       EMERGENCY DEPARTMENT       Idalia Haile MD  11/18/19 [id number]

## 2019-11-26 ENCOUNTER — ANTICOAGULATION THERAPY VISIT (OUTPATIENT)
Dept: CARDIOLOGY | Facility: CLINIC | Age: 79
End: 2019-11-26
Payer: MEDICARE

## 2019-11-26 DIAGNOSIS — Z79.01 LONG TERM CURRENT USE OF ANTICOAGULANTS WITH INR GOAL OF 2.0-3.0: ICD-10-CM

## 2019-11-26 DIAGNOSIS — I48.91 ATRIAL FIBRILLATION, UNSPECIFIED TYPE (H): ICD-10-CM

## 2019-11-26 LAB — INR POINT OF CARE: 3.1 (ref 0.86–1.14)

## 2019-11-26 PROCEDURE — 85610 PROTHROMBIN TIME: CPT | Mod: QW | Performed by: INTERNAL MEDICINE

## 2019-11-26 PROCEDURE — 36416 COLLJ CAPILLARY BLOOD SPEC: CPT | Performed by: INTERNAL MEDICINE

## 2019-11-26 NOTE — PROGRESS NOTES
ANTICOAGULATION FOLLOW-UP CLINIC VISIT    Patient Name:  Nigel Rodarte Jr.  Date:  11/26/2019  Contact Type:  Face to Face    SUBJECTIVE:  Patient Findings     Positives:   Change in medications (no longer taking Oxycodone (had hives); taking Zyrtec; taking Tylenol every 8-9 hours)        Clinical Outcomes     Negatives:   Major bleeding event, Thromboembolic event, Anticoagulation-related hospital admission, Anticoagulation-related ED visit, Anticoagulation-related fatality           OBJECTIVE    INR Protime   Date Value Ref Range Status   11/26/2019 3.1 (A) 0.86 - 1.14 Final       ASSESSMENT / PLAN  INR assessment SUPRA    Recheck INR In: 2 WEEKS    INR Location Clinic      Anticoagulation Summary  As of 11/26/2019    INR goal:   2.0-3.0   TTR:   56.9 % (1 y)   INR used for dosing:   3.1! (11/26/2019)   Warfarin maintenance plan:   5 mg (5 mg x 1) every Tue, Fri; 7.5 mg (5 mg x 1.5) all other days   Full warfarin instructions:   5 mg every Tue, Fri; 7.5 mg all other days   Weekly warfarin total:   47.5 mg   Plan last modified:   Gladys Herrera RN (11/26/2019)   Next INR check:   12/9/2019   Target end date:   Indefinite    Indications    Atrial fibrillation (H) [I48.91] [I48.91]  Long term current use of anticoagulants with INR goal of 2.0-3.0 [Z79.01]             Anticoagulation Episode Summary     INR check location:       Preferred lab:       Send INR reminders to:   [facility] HEART INR NURSE    Comments:         Anticoagulation Care Providers     Provider Role Specialty Phone number    Grzegorz Malhotra MD [facility] Cardiology [phone]            See the Encounter Report to view Anticoagulation Flowsheet and Dosing Calendar (Go to Encounters tab in chart review, and find the Anticoagulation Therapy Visit)    INR 3.1 He was seen in ER on 11/18 for hives. Oxycodone was stopped. He is taking Tylenol every 8-9 hours for pain (not taking Tramadol). He is still taking Zyrtec. He did not take Prednisone  after the ER visit. No further hives. No change in diet. Denies abnormal bleeding or bruising. He plans to continue to take the Tylenol for pain control so will decrease dosing to 5 mg TuF and 7.5 mg all other days with recheck in 2 weeks. Dosage adjustment made based on physician directed care plan.    Gladys Herrera RN

## 2019-12-09 ENCOUNTER — ANTICOAGULATION THERAPY VISIT (OUTPATIENT)
Dept: CARDIOLOGY | Facility: CLINIC | Age: 79
End: 2019-12-09
Payer: MEDICARE

## 2019-12-09 DIAGNOSIS — I48.91 ATRIAL FIBRILLATION, UNSPECIFIED TYPE (H): ICD-10-CM

## 2019-12-09 DIAGNOSIS — Z79.01 LONG TERM CURRENT USE OF ANTICOAGULANTS WITH INR GOAL OF 2.0-3.0: ICD-10-CM

## 2019-12-09 LAB — INR POINT OF CARE: 3.5 (ref 0.86–1.14)

## 2019-12-09 PROCEDURE — 36416 COLLJ CAPILLARY BLOOD SPEC: CPT | Performed by: INTERNAL MEDICINE

## 2019-12-09 PROCEDURE — 85610 PROTHROMBIN TIME: CPT | Mod: QW | Performed by: INTERNAL MEDICINE

## 2019-12-09 NOTE — PROGRESS NOTES
ANTICOAGULATION FOLLOW-UP CLINIC VISIT    Patient Name:  Nigel Rodarte Jr.  Date:  12/9/2019  Contact Type:  Face to Face    SUBJECTIVE:  Patient Findings     Positives:   Change in medications (taking less Tylenol (usually just at bedtime)), Change in diet/appetite (hasn't eaten any green veggies or salads recently)        Clinical Outcomes     Negatives:   Major bleeding event, Thromboembolic event, Anticoagulation-related hospital admission, Anticoagulation-related ED visit, Anticoagulation-related fatality           OBJECTIVE    INR Protime   Date Value Ref Range Status   12/09/2019 3.5 (A) 0.86 - 1.14 Final       ASSESSMENT / PLAN  INR assessment SUPRA    Recheck INR In: 2 WEEKS    INR Location Clinic      Anticoagulation Summary  As of 12/9/2019    INR goal:   2.0-3.0   TTR:   53.3 % (1 y)   INR used for dosing:   3.5! (12/9/2019)   Warfarin maintenance plan:   5 mg (5 mg x 1) every Tue, Fri; 7.5 mg (5 mg x 1.5) all other days   Full warfarin instructions:   12/9: Hold; Otherwise 5 mg every Tue, Fri; 7.5 mg all other days   Weekly warfarin total:   47.5 mg   Plan last modified:   Gladys Herrera RN (11/26/2019)   Next INR check:   12/23/2019   Target end date:   Indefinite    Indications    Atrial fibrillation (H) [I48.91] [I48.91]  Long term current use of anticoagulants with INR goal of 2.0-3.0 [Z79.01]             Anticoagulation Episode Summary     INR check location:       Preferred lab:       Send INR reminders to:   [facility] HEART INR NURSE    Comments:         Anticoagulation Care Providers     Provider Role Specialty Phone number    Grzegorz Malhotra MD [facility] Cardiology [phone]            See the Encounter Report to view Anticoagulation Flowsheet and Dosing Calendar (Go to Encounters tab in chart review, and find the Anticoagulation Therapy Visit)    INR 3.5 INR clark even with decrease in dosing. He is taking less Tylenol for post op knee pain (usually takes Tylenol only at bedtime). He  hasn't eaten any greens or green veggies recently. He usually eats them 2-3x/wk. Denies abnormal bleeding or bruising. No further hives since stopping Oxycodone. Will hold today's dose then resume the lower dosing schedule of 5 mg TuF and 7.5 mg all other days and resume eating green veggies/salads 2-3x/wk. He plans to eat bruBagels and Beanel Ullinkts tonight. Recheck in 2 weeks. Dosage adjustment made based on physician directed care plan.    Gladys Herrera RN

## 2019-12-23 ENCOUNTER — ANTICOAGULATION THERAPY VISIT (OUTPATIENT)
Dept: CARDIOLOGY | Facility: CLINIC | Age: 79
End: 2019-12-23
Payer: MEDICARE

## 2019-12-23 DIAGNOSIS — I48.0 PAROXYSMAL ATRIAL FIBRILLATION (H): ICD-10-CM

## 2019-12-23 DIAGNOSIS — Z79.01 LONG TERM CURRENT USE OF ANTICOAGULANTS WITH INR GOAL OF 2.0-3.0: ICD-10-CM

## 2019-12-23 LAB — INR POINT OF CARE: 2.5 (ref 0.86–1.14)

## 2019-12-23 PROCEDURE — 85610 PROTHROMBIN TIME: CPT | Mod: QW | Performed by: INTERNAL MEDICINE

## 2019-12-23 PROCEDURE — 36416 COLLJ CAPILLARY BLOOD SPEC: CPT | Performed by: INTERNAL MEDICINE

## 2019-12-23 NOTE — PROGRESS NOTES
ANTICOAGULATION FOLLOW-UP CLINIC VISIT    Patient Name:  Nigel Rodarte Jr.  Date:  2019  Contact Type:  Face to Face    SUBJECTIVE:  Patient Findings         Clinical Outcomes     Negatives:   Major bleeding event, Thromboembolic event, Anticoagulation-related hospital admission, Anticoagulation-related ED visit, Anticoagulation-related fatality           OBJECTIVE    INR Protime   Date Value Ref Range Status   2019 2.5 (A) 0.86 - 1.14 Final       ASSESSMENT / PLAN  INR assessment THER    Recheck INR In: 2 WEEKS    INR Location Clinic      Anticoagulation Summary  As of 2019    INR goal:   2.0-3.0   TTR:   51.4 % (1 y)   INR used for dosin.5 (2019)   Warfarin maintenance plan:   5 mg (5 mg x 1) every Tue, Fri; 7.5 mg (5 mg x 1.5) all other days   Full warfarin instructions:   5 mg every Tue, Fri; 7.5 mg all other days   Weekly warfarin total:   47.5 mg   No change documented:   Gilma Anton RN   Plan last modified:   Gladys Herrera RN (2019)   Next INR check:   2020   Target end date:   Indefinite    Indications    Atrial fibrillation (H) [I48.91] [I48.91]  Long term current use of anticoagulants with INR goal of 2.0-3.0 [Z79.01]             Anticoagulation Episode Summary     INR check location:       Preferred lab:       Send INR reminders to:   [facility] HEART INR NURSE    Comments:         Anticoagulation Care Providers     Provider Role Specialty Phone number    Grzegorz Malhotra MD [facility] Cardiology [phone]            See the Encounter Report to view Anticoagulation Flowsheet and Dosing Calendar (Go to Encounters tab in chart review, and find the Anticoagulation Therapy Visit)    INR 2.5.  Continue same schedule and recheck in 2 weeks.  Bay Anton RN

## 2020-01-06 ENCOUNTER — ANTICOAGULATION THERAPY VISIT (OUTPATIENT)
Dept: CARDIOLOGY | Facility: CLINIC | Age: 80
End: 2020-01-06
Payer: MEDICARE

## 2020-01-06 DIAGNOSIS — I48.0 PAROXYSMAL ATRIAL FIBRILLATION (H): ICD-10-CM

## 2020-01-06 DIAGNOSIS — Z79.01 LONG TERM CURRENT USE OF ANTICOAGULANTS WITH INR GOAL OF 2.0-3.0: ICD-10-CM

## 2020-01-06 LAB — INR POINT OF CARE: 2.5 (ref 0.86–1.14)

## 2020-01-06 PROCEDURE — 85610 PROTHROMBIN TIME: CPT | Mod: QW | Performed by: INTERNAL MEDICINE

## 2020-01-06 PROCEDURE — 36416 COLLJ CAPILLARY BLOOD SPEC: CPT | Performed by: INTERNAL MEDICINE

## 2020-01-06 NOTE — PROGRESS NOTES
ANTICOAGULATION FOLLOW-UP CLINIC VISIT    Patient Name:  Nigel Rodarte Jr.  Date:  2020  Contact Type:  Face to Face    SUBJECTIVE:  Patient Findings     Positives:   Change in medications (taking less Tylenol -- only as needed (a couple of times a week))        Clinical Outcomes     Negatives:   Major bleeding event, Thromboembolic event, Anticoagulation-related hospital admission, Anticoagulation-related ED visit, Anticoagulation-related fatality           OBJECTIVE    INR Protime   Date Value Ref Range Status   2020 2.5 (A) 0.86 - 1.14 Final       ASSESSMENT / PLAN  INR assessment THER    Recheck INR In: 3 WEEKS    INR Location Clinic      Anticoagulation Summary  As of 2020    INR goal:   2.0-3.0   TTR:   51.4 % (1 y)   INR used for dosin.5 (2020)   Warfarin maintenance plan:   5 mg (5 mg x 1) every Tue, Fri; 7.5 mg (5 mg x 1.5) all other days   Full warfarin instructions:   5 mg every Tue, Fri; 7.5 mg all other days   Weekly warfarin total:   47.5 mg   No change documented:   Gladys Herrera RN   Plan last modified:   Gladys Herrera RN (2019)   Next INR check:   2020   Priority:   Maintenance   Target end date:   Indefinite    Indications    Atrial fibrillation (H) [I48.91] [I48.91]  Long term current use of anticoagulants with INR goal of 2.0-3.0 [Z79.01]             Anticoagulation Episode Summary     INR check location:       Preferred lab:       Send INR reminders to:   [facility] HEART INR NURSE    Comments:         Anticoagulation Care Providers     Provider Role Specialty Phone number    Grzegorz Malhotra MD Responsible Cardiology [phone]            See the Encounter Report to view Anticoagulation Flowsheet and Dosing Calendar (Go to Encounters tab in chart review, and find the Anticoagulation Therapy Visit)    INR 2.5 INR remains in range. He has decreased his use of Tylenol to as needed (about 2x/wk). Still has swelling of the surgical knee. No change in diet  (greens/veggies 2-3x/wk). Denies abnormal bleeding or bruising. Will continue current dosing of 5 mg TuF and 7.5 mg all other days with recheck in 3 weeks.    Gladys Herrera RN

## 2020-01-29 ENCOUNTER — ANTICOAGULATION THERAPY VISIT (OUTPATIENT)
Dept: CARDIOLOGY | Facility: CLINIC | Age: 80
End: 2020-01-29
Payer: MEDICARE

## 2020-01-29 DIAGNOSIS — Z79.01 LONG TERM CURRENT USE OF ANTICOAGULANTS WITH INR GOAL OF 2.0-3.0: ICD-10-CM

## 2020-01-29 DIAGNOSIS — I48.0 PAROXYSMAL ATRIAL FIBRILLATION (H): ICD-10-CM

## 2020-01-29 LAB — INR POINT OF CARE: 2.2 (ref 0.86–1.14)

## 2020-01-29 PROCEDURE — 85610 PROTHROMBIN TIME: CPT | Mod: QW | Performed by: INTERNAL MEDICINE

## 2020-01-29 PROCEDURE — 36416 COLLJ CAPILLARY BLOOD SPEC: CPT | Performed by: INTERNAL MEDICINE

## 2020-01-29 NOTE — PROGRESS NOTES
ANTICOAGULATION FOLLOW-UP CLINIC VISIT    Patient Name:  Nigel Rodarte Jr.  Date:  2020  Contact Type:  Face to Face    SUBJECTIVE:  Patient Findings         Clinical Outcomes     Negatives:   Major bleeding event, Thromboembolic event, Anticoagulation-related hospital admission, Anticoagulation-related ED visit, Anticoagulation-related fatality           OBJECTIVE    INR Protime   Date Value Ref Range Status   2020 2.2 (A) 0.86 - 1.14 Final       ASSESSMENT / PLAN  INR assessment THER    Recheck INR In: 3 WEEKS    INR Location Clinic      Anticoagulation Summary  As of 2020    INR goal:   2.0-3.0   TTR:   51.4 % (1 y)   INR used for dosin.2 (2020)   Warfarin maintenance plan:   5 mg (5 mg x 1) every Tue, Fri; 7.5 mg (5 mg x 1.5) all other days   Full warfarin instructions:   5 mg every Tue, Fri; 7.5 mg all other days   Weekly warfarin total:   47.5 mg   No change documented:   Gladys Herrera RN   Plan last modified:   Gladys Herrera RN (2019)   Next INR check:   2020   Priority:   Maintenance   Target end date:   Indefinite    Indications    Atrial fibrillation (H) [I48.91] [I48.91]  Long term current use of anticoagulants with INR goal of 2.0-3.0 [Z79.01]             Anticoagulation Episode Summary     INR check location:       Preferred lab:       Send INR reminders to:   [facility] HEART INR NURSE    Comments:         Anticoagulation Care Providers     Provider Role Specialty Phone number    Grzegorz Malhotra MD [facility] Cardiology [phone]            See the Encounter Report to view Anticoagulation Flowsheet and Dosing Calendar (Go to Encounters tab in chart review, and find the Anticoagulation Therapy Visit)    INR 2.2 No recent Tylenol. No change in other meds or diet. Denies abnormal bleeding or bruising. Will continue current dosing of 5 mg TuF and 7.5 mg all other days with recheck in 3 weeks before his OV with [place].    Gladys Herrera RN

## 2020-02-06 ENCOUNTER — TELEPHONE (OUTPATIENT)
Dept: CARDIOLOGY | Facility: CLINIC | Age: 80
End: 2020-02-06

## 2020-02-06 NOTE — TELEPHONE ENCOUNTER
Patient transferred from scheduling d/t reported symptoms of AF.  Spoke to patient who reports having 3 episodes of AF in the last month with the most recent episode started last night into this morning.  In all three cases he reports he took two extra 50mg tablets of flecainide and self converted on own.  Also reports each episode has been longer in duration.  First episode lasted 6 hours, second episode lasted 8 hours and this last episode lasted 13 hours.  HR remained <100 bpm.  Reports feeling tired.   Patient does not know what the triggers are that are causing him to have these events.  Patient has scheduled appt with SHELBI Avina for his annual follow up on 2/17.  Offered earlier appt and patient declined at this time.  He will call if he has any further issue prior to appt.  Will update Brooklyn Meyers regarding above to see if she has recommendations prior to appt.  TAMMY Mcneal

## 2020-02-06 NOTE — TELEPHONE ENCOUNTER
Spoke to pt about Alma recommendation and pt states that he will stay at this time taking the 50 mg bid, but will call if having more episodes and feeling that an increase is needed. Dara

## 2020-02-16 NOTE — PROGRESS NOTES
"Reviewed at appt February 17, 2020    HPI:   I had the pleasure of seeing Nigel when he came for follow up of atrial fibrillation.  This 79 year old sees Dr. Malhotra for his history of:    1. Paroxysmal AFib first dx'd 5/2017, with ER visit for \"unwellness\" and palpitations. Underwent DCCV and has been on metoprolol, warfarin and flecainide  2. CHADSVASc 2 (age)      Nigel saw Dr. Malhotra 2/2019 at which time he was doing well. He described 2 breakthroughs of AFib since he had last been seen, with an extra dose of flecainide successfully chemically converting him each time. No changes were made and annual follow-up was recommended.    He called us in 7/2019 with recurrent AFib brought on by minimal alcohol use.    In 11/2019, he underwent R TKA. It does not appear he had any recurrent arrhythmia while hospitalized, but did call our office a few days later with recurrent AFib, which again converted after taking additional flecainide.    Finally, he called again 2/6 with c/o AFib. Earlier follow-up or increasing dose of flecainide was offered, but he opted to continue flecainide 50 mg BID and metoprolol XL 25 mg daily until his annual appt today.    Interval History:  Can't think of anything that's triggering the episode of atrial fibrillation. Last week, had an episode that lasted 13 hours. Since the beginning of the year, thinks he's had 3 episodes of AFib, but this was the longest. No trigger that he can think of.    No complaints of exertional chest pain, pressure or tightness.  Works out at least 3 days per week (weights and cardio) and then walks daily.  Has never gotten chest discomfort with this, though does note some L sided upper chest discomfort and right lower ribcage at random. These discomforts last 1-2 hours and does not radiate nor associated with nausea, lightheadedness, palpitations or SOB.    No orthopnea, PND or edema. No change in exercise tolerance or breathing. Overall, he is feeling well. "     Recent Diagnostic Testing:  EKG today, which I overread, showed SB 48 bpm with 1st degree AV Block. 1 PAC. QRS duration 92 ms  Proarrhythmia Stress Echocardiogam 2/2018 showed no proarrhythmia. No ischemia.    Assessment & Plan:    1. Paroxysmal AFib    Has had recurrence episodes, all of which have been successfully converted with additional flecainide 50 mg x 1.    EKG, as above, showed significant SB on Toprol XL 25 mg daily and flecainide 50 mg BID    Continues warfarin    PLAN:    Discussed options - could decrease metoprolol to 12.5 mg daily at night and increase flecainide to 75 mg BID to help prevent recurrent AFib but limit underlying bradycardia.    At this time, he'd prefer to continue as is with PRN flecainide 50 mg x1 for AFib lasting >30 minutes    Asked him to call us if AFib >24 hours despite extra flecainide    See Dr. Malhotra in 6 months. Could potentially discuss ablation at that time.       2. Atypical CP    Describes a  L sided upper chest pain and R lower rib cage discomfort. Never exertional. No pleuritic discomfort and no reproducibility.    Stress echo 2018 was fine    PLAN:    Continue to monitor. Appears atypical for ischemia. Call if becomes exertional or associated with significant breathing issues    Brooklyn Meyers PA-C, MSPAS      Orders Placed This Encounter   Procedures     Follow-Up with Electrophysiologist     EKG 12-lead complete w/read - Clinics (performed today)     EKG 12-lead complete w/read - Clinics     No orders of the defined types were placed in this encounter.    There are no discontinued medications.      Encounter Diagnosis   Name Primary?     Paroxysmal atrial fibrillation (H) Yes       CURRENT MEDICATIONS:  Current Outpatient Medications   Medication Sig Dispense Refill     acetaminophen (TYLENOL) 325 MG tablet Take 3 tablets (975 mg) by mouth every 8 hours 100 tablet 0     dutasteride (AVODART) 0.5 MG capsule Take 0.5 mg by mouth daily       flecainide (TAMBOCOR) 50  MG tablet Take 1 tablet (50 mg) by mouth 2 times daily 180 tablet 2     glucosamine-chondroitin 500-400 MG CAPS per capsule Take 1 capsule by mouth daily       metoprolol succinate ER (TOPROL-XL) 25 MG 24 hr tablet Take 1 tab daily 90 tablet 2     oxyCODONE (ROXICODONE) 5 MG tablet Take 1 tablet (5 mg) by mouth every 6 hours as needed for pain or moderate to severe pain 60 tablet 0     predniSONE (DELTASONE) 20 MG tablet Take two tablets (= 40mg) each day for 5 (five) days 10 tablet 0     warfarin ANTICOAGULANT (COUMADIN) 5 MG tablet Take 1 tab on Tuesdays and 1 1/2 tabs on all other days or as directed per INR clinic 135 tablet 1       ALLERGIES   No Known Allergies    PAST MEDICAL HISTORY:  Past Medical History:   Diagnosis Date     Arthritis     osteoarthrosis     Elevated PSA      Fatigue      Palpitations      Paroxysmal atrial fibrillation (H) 05/16/2017     Renal disease     kidney calculus     SOB (shortness of breath)        PAST SURGICAL HISTORY:  Past Surgical History:   Procedure Laterality Date     APPENDECTOMY       ARTHROPLASTY HIP  1/14/2013    Procedure: ARTHROPLASTY HIP;  RIGHT TOTAL HIP ARTHROPLASTY ;  Surgeon: Jamie Ohara MD;  Location:  OR     ARTHROPLASTY KNEE Right 11/6/2019    Procedure: RIGHT TOTAL KNEE ARTHROPLASTY (DEPUY)^;  Surgeon: Jamie Ohara MD;  Location:  OR     CARDIOVERSION       COLONOSCOPY N/A 2/9/2016    Procedure: COLONOSCOPY;  Surgeon: Astrid Vital MD;  Location:  GI     CYSTOSCOPY, RETROGRADES, EXTRACT STONE, COMBINED  9/12/2013    Procedure: COMBINED CYSTOSCOPY, RETROGRADES, EXTRACT STONE;  CYSTOSCOPY, RIGHT RETROGRADE, STONE EXTRACTION;  Surgeon: Carlos Mcknight MD;  Location:  OR     ENT SURGERY      right ear surgery     GENITOURINARY SURGERY      cystoscopy for stone removal     HERNIA REPAIR       ORTHOPEDIC SURGERY      shoulder surgeries,bilat carpel tunnel       FAMILY HISTORY:  Family History   Problem Relation Age of Onset     Lung  "Cancer Brother        SOCIAL HISTORY:  Social History     Socioeconomic History     Marital status:      Spouse name: None     Number of children: None     Years of education: None     Highest education level: None   Occupational History     None   Social Needs     Financial resource strain: None     Food insecurity:     Worry: None     Inability: None     Transportation needs:     Medical: None     Non-medical: None   Tobacco Use     Smoking status: Never Smoker     Smokeless tobacco: Never Used   Substance and Sexual Activity     Alcohol use: No     Drug use: No     Sexual activity: None   Lifestyle     Physical activity:     Days per week: None     Minutes per session: None     Stress: None   Relationships     Social connections:     Talks on phone: None     Gets together: None     Attends Mandaen service: None     Active member of club or organization: None     Attends meetings of clubs or organizations: None     Relationship status: None     Intimate partner violence:     Fear of current or ex partner: None     Emotionally abused: None     Physically abused: None     Forced sexual activity: None   Other Topics Concern     Parent/sibling w/ CABG, MI or angioplasty before 65F 55M? Not Asked   Social History Narrative     None       Review of Systems:  Skin:  Negative     Eyes:  Positive for glasses  ENT:  Negative    Respiratory:  Negative for shortness of breath;dyspnea on exertion;cough  Cardiovascular:  Negative for;chest pain;edema;exercise intolerance;fatigue;lightheadedness;dizziness Positive for;palpitations  Gastroenterology: Negative for melena;hematochezia  Genitourinary:  Negative    Musculoskeletal:  Positive for arthritis  Neurologic:  Negative    Psychiatric:  Negative    Heme/Lymph/Imm:  Negative    Endocrine:  Negative      Physical Exam:  Vitals: /71   Pulse 53   Ht 1.753 m (5' 9\")   Wt 70.3 kg (155 lb)   BMI 22.89 kg/m      Constitutional:  cooperative, alert and oriented, " well developed, well nourished, in no acute distress        Skin:  warm and dry to the touch, no apparent skin lesions or masses noted        Head:  normocephalic, no masses or lesions        Eyes:  pupils equal and round;conjunctivae and lids unremarkable;sclera white;no xanthalasma        ENT:  no pallor or cyanosis, dentition good        Neck:  JVP normal;no carotid bruit        Chest:  normal breath sounds, clear to auscultation, normal A-P diameter, normal symmetry, normal respiratory excursion, no use of accessory muscles        Cardiac: regular rhythm;no S3 or S4 bradycardic                Abdomen:  abdomen soft        Vascular: pulses full and equal                                      Extremities and Back:  no deformities, clubbing, cyanosis, erythema observed;no edema        Neurological:  no gross motor deficits        Recent Lab Results:  LIPID RESULTS:  No results found for: CHOL, HDL, LDL, TRIG, CHOLHDLRATIO    LIVER ENZYME RESULTS:  Lab Results   Component Value Date    AST 26 05/16/2017    ALT 30 05/16/2017       CBC RESULTS:  Lab Results   Component Value Date    WBC 4.8 05/20/2018    RBC 5.09 05/20/2018    HGB 12.8 (L) 11/07/2019    HCT 45.1 05/20/2018    MCV 89 05/20/2018    MCH 30.5 05/20/2018    MCHC 34.4 05/20/2018    RDW 13.1 05/20/2018     05/20/2018       BMP RESULTS:  Lab Results   Component Value Date     11/07/2019    POTASSIUM 4.5 11/07/2019    CHLORIDE 107 11/07/2019    CO2 26 11/07/2019    ANIONGAP 6 11/07/2019     (H) 11/07/2019    BUN 18 11/07/2019    CR 0.98 11/07/2019    GFRESTIMATED 73 11/07/2019    GFRESTBLACK 85 11/07/2019    CHERI 8.0 (L) 11/07/2019

## 2020-02-17 ENCOUNTER — OFFICE VISIT (OUTPATIENT)
Dept: CARDIOLOGY | Facility: CLINIC | Age: 80
End: 2020-02-17
Attending: INTERNAL MEDICINE
Payer: MEDICARE

## 2020-02-17 ENCOUNTER — ANTICOAGULATION THERAPY VISIT (OUTPATIENT)
Dept: CARDIOLOGY | Facility: CLINIC | Age: 80
End: 2020-02-17
Payer: MEDICARE

## 2020-02-17 VITALS
WEIGHT: 155 LBS | DIASTOLIC BLOOD PRESSURE: 71 MMHG | HEIGHT: 69 IN | SYSTOLIC BLOOD PRESSURE: 112 MMHG | HEART RATE: 53 BPM | BODY MASS INDEX: 22.96 KG/M2

## 2020-02-17 DIAGNOSIS — I48.0 PAROXYSMAL ATRIAL FIBRILLATION (H): ICD-10-CM

## 2020-02-17 DIAGNOSIS — Z79.01 LONG TERM CURRENT USE OF ANTICOAGULANTS WITH INR GOAL OF 2.0-3.0: ICD-10-CM

## 2020-02-17 DIAGNOSIS — I48.0 PAROXYSMAL ATRIAL FIBRILLATION (H): Primary | ICD-10-CM

## 2020-02-17 LAB — INR POINT OF CARE: 2 (ref 0.86–1.14)

## 2020-02-17 PROCEDURE — 36416 COLLJ CAPILLARY BLOOD SPEC: CPT | Performed by: INTERNAL MEDICINE

## 2020-02-17 PROCEDURE — 99207 ZZC NO CHARGE NURSE ONLY: CPT | Performed by: INTERNAL MEDICINE

## 2020-02-17 PROCEDURE — 85610 PROTHROMBIN TIME: CPT | Mod: QW | Performed by: INTERNAL MEDICINE

## 2020-02-17 PROCEDURE — 93000 ELECTROCARDIOGRAM COMPLETE: CPT | Performed by: PHYSICIAN ASSISTANT

## 2020-02-17 PROCEDURE — 99214 OFFICE O/P EST MOD 30 MIN: CPT | Performed by: PHYSICIAN ASSISTANT

## 2020-02-17 ASSESSMENT — MIFFLIN-ST. JEOR: SCORE: 1413.46

## 2020-02-17 NOTE — PATIENT INSTRUCTIONS
1. EKG today shows normal rhythm but slow.    2. Discussed options for treatment of AFib as you're having some more episodes of this requiring extra flecainide.  We discussed ablation or increasing the daily dose of flecainide (with reduction in Metoprolol b/c HR is slow)    3. Decided that you'd continue flecainide 50 mg every 12 hours (7 AM and 7 PM) and take an extra flecainide 50 mg if AFib comes on and lasts >30 minutes    4. My nurses are Fifi Jimenez and Cindy: [phone]    5. See Dr. Malhotra 6 m but call if issues earlier!

## 2020-02-17 NOTE — LETTER
"2/17/2020    Osmar Espinoza MD  [employer] [address]  [facility] [address]    RE: Nigel TAE Rodarte Jr.       Dear Colleague,    I had the pleasure of seeing Nigel STRONG Cayden Silva in the [facility].    Reviewed at appt February 17, 2020    HPI:   I had the pleasure of seeing Nigel when he came for follow up of atrial fibrillation.  This 79 year old sees Dr. Malhotra for his history of:    1. Paroxysmal AFib first dx'd 5/2017, with ER visit for \"unwellness\" and palpitations. Underwent DCCV and has been on metoprolol, warfarin and flecainide  2. CHADSVASc 2 (age)      Nigel saw Dr. Malhotra 2/2019 at which time he was doing well. He described 2 breakthroughs of AFib since he had last been seen, with an extra dose of flecainide successfully chemically converting him each time. No changes were made and annual follow-up was recommended.    He called us in 7/2019 with recurrent AFib brought on by minimal alcohol use.    In 11/2019, he underwent R TKA. It does not appear he had any recurrent arrhythmia while hospitalized, but did call our office a few days later with recurrent AFib, which again converted after taking additional flecainide.    Finally, he called again 2/6 with c/o AFib. Earlier follow-up or increasing dose of flecainide was offered, but he opted to continue flecainide 50 mg BID and metoprolol XL 25 mg daily until his annual appt today.    Interval History:  Can't think of anything that's triggering the episode of atrial fibrillation. Last week, had an episode that lasted 13 hours. Since the beginning of the year, thinks he's had 3 episodes of AFib, but this was the longest. No trigger that he can think of.    No complaints of exertional chest pain, pressure or tightness.  Works out at least 3 days per week (weights and cardio) and then walks daily.  Has never gotten chest discomfort with this, though does note some L sided upper chest discomfort and right lower ribcage at " random. These discomforts last 1-2 hours and does not radiate nor associated with nausea, lightheadedness, palpitations or SOB.    No orthopnea, PND or edema. No change in exercise tolerance or breathing. Overall, he is feeling well.     Recent Diagnostic Testing:  EKG today, which I overread, showed SB 48 bpm with 1st degree AV Block. 1 PAC. QRS duration 92 ms  Proarrhythmia Stress Echocardiogam 2/2018 showed no proarrhythmia. No ischemia.    Assessment & Plan:    1. Paroxysmal AFib    Has had recurrence episodes, all of which have been successfully converted with additional flecainide 50 mg x 1.    EKG, as above, showed significant SB on Toprol XL 25 mg daily and flecainide 50 mg BID    Continues warfarin    PLAN:    Discussed options - could decrease metoprolol to 12.5 mg daily at night and increase flecainide to 75 mg BID to help prevent recurrent AFib but limit underlying bradycardia.    At this time, he'd prefer to continue as is with PRN flecainide 50 mg x1 for AFib lasting >30 minutes    Asked him to call us if AFib >24 hours despite extra flecainide    See Dr. Malhotra in 6 months. Could potentially discuss ablation at that time.       2. Atypical CP    Describes a  L sided upper chest pain and R lower rib cage discomfort. Never exertional. No pleuritic discomfort and no reproducibility.    Stress echo 2018 was fine    PLAN:    Continue to monitor. Appears atypical for ischemia. Call if becomes exertional or associated with significant breathing issues    Brooklyn Meyers PA-C, MSPAS      Orders Placed This Encounter   Procedures     Follow-Up with Electrophysiologist     EKG 12-lead complete w/read - Clinics (performed today)     EKG 12-lead complete w/read - Clinics     No orders of the defined types were placed in this encounter.    There are no discontinued medications.      Encounter Diagnosis   Name Primary?     Paroxysmal atrial fibrillation (H) Yes       CURRENT MEDICATIONS:  Current Outpatient Medications    Medication Sig Dispense Refill     acetaminophen (TYLENOL) 325 MG tablet Take 3 tablets (975 mg) by mouth every 8 hours 100 tablet 0     dutasteride (AVODART) 0.5 MG capsule Take 0.5 mg by mouth daily       flecainide (TAMBOCOR) 50 MG tablet Take 1 tablet (50 mg) by mouth 2 times daily 180 tablet 2     glucosamine-chondroitin 500-400 MG CAPS per capsule Take 1 capsule by mouth daily       metoprolol succinate ER (TOPROL-XL) 25 MG 24 hr tablet Take 1 tab daily 90 tablet 2     oxyCODONE (ROXICODONE) 5 MG tablet Take 1 tablet (5 mg) by mouth every 6 hours as needed for pain or moderate to severe pain 60 tablet 0     predniSONE (DELTASONE) 20 MG tablet Take two tablets (= 40mg) each day for 5 (five) days 10 tablet 0     warfarin ANTICOAGULANT (COUMADIN) 5 MG tablet Take 1 tab on Tuesdays and 1 1/2 tabs on all other days or as directed per INR clinic 135 tablet 1       ALLERGIES   No Known Allergies    PAST MEDICAL HISTORY:  Past Medical History:   Diagnosis Date     Arthritis     osteoarthrosis     Elevated PSA      Fatigue      Palpitations      Paroxysmal atrial fibrillation (H) 05/16/2017     Renal disease     kidney calculus     SOB (shortness of breath)        PAST SURGICAL HISTORY:  Past Surgical History:   Procedure Laterality Date     APPENDECTOMY       ARTHROPLASTY HIP  1/14/2013    Procedure: ARTHROPLASTY HIP;  RIGHT TOTAL HIP ARTHROPLASTY ;  Surgeon: Jamie Ohara MD;  Location:  OR     ARTHROPLASTY KNEE Right 11/6/2019    Procedure: RIGHT TOTAL KNEE ARTHROPLASTY (DEPUY)^;  Surgeon: Jamie Ohara MD;  Location:  OR     CARDIOVERSION       COLONOSCOPY N/A 2/9/2016    Procedure: COLONOSCOPY;  Surgeon: Astrid Vital MD;  Location:  GI     CYSTOSCOPY, RETROGRADES, EXTRACT STONE, COMBINED  9/12/2013    Procedure: COMBINED CYSTOSCOPY, RETROGRADES, EXTRACT STONE;  CYSTOSCOPY, RIGHT RETROGRADE, STONE EXTRACTION;  Surgeon: Carlos Mcknight MD;  Location:  OR     ENT SURGERY      right ear  surgery     GENITOURINARY SURGERY      cystoscopy for stone removal     HERNIA REPAIR       ORTHOPEDIC SURGERY      shoulder surgeries,bilat carpel tunnel       FAMILY HISTORY:  Family History   Problem Relation Age of Onset     Lung Cancer Brother        SOCIAL HISTORY:  Social History     Socioeconomic History     Marital status:      Spouse name: None     Number of children: None     Years of education: None     Highest education level: None   Occupational History     None   Social Needs     Financial resource strain: None     Food insecurity:     Worry: None     Inability: None     Transportation needs:     Medical: None     Non-medical: None   Tobacco Use     Smoking status: Never Smoker     Smokeless tobacco: Never Used   Substance and Sexual Activity     Alcohol use: No     Drug use: No     Sexual activity: None   Lifestyle     Physical activity:     Days per week: None     Minutes per session: None     Stress: None   Relationships     Social connections:     Talks on phone: None     Gets together: None     Attends Mormonism service: None     Active member of club or organization: None     Attends meetings of clubs or organizations: None     Relationship status: None     Intimate partner violence:     Fear of current or ex partner: None     Emotionally abused: None     Physically abused: None     Forced sexual activity: None   Other Topics Concern     Parent/sibling w/ CABG, MI or angioplasty before 65F 55M? Not Asked   Social History Narrative     None       Review of Systems:  Skin:  Negative     Eyes:  Positive for glasses  ENT:  Negative    Respiratory:  Negative for shortness of breath;dyspnea on exertion;cough  Cardiovascular:  Negative for;chest pain;edema;exercise intolerance;fatigue;lightheadedness;dizziness Positive for;palpitations  Gastroenterology: Negative for melena;hematochezia  Genitourinary:  Negative    Musculoskeletal:  Positive for arthritis  Neurologic:  Negative    Psychiatric:   "Negative    Heme/Lymph/Imm:  Negative    Endocrine:  Negative      Physical Exam:  Vitals: /71   Pulse 53   Ht 1.753 m (5' 9\")   Wt 70.3 kg (155 lb)   BMI 22.89 kg/m       Constitutional:  cooperative, alert and oriented, well developed, well nourished, in no acute distress        Skin:  warm and dry to the touch, no apparent skin lesions or masses noted        Head:  normocephalic, no masses or lesions        Eyes:  pupils equal and round;conjunctivae and lids unremarkable;sclera white;no xanthalasma        ENT:  no pallor or cyanosis, dentition good        Neck:  JVP normal;no carotid bruit        Chest:  normal breath sounds, clear to auscultation, normal A-P diameter, normal symmetry, normal respiratory excursion, no use of accessory muscles        Cardiac: regular rhythm;no S3 or S4 bradycardic                Abdomen:  abdomen soft        Vascular: pulses full and equal                                      Extremities and Back:  no deformities, clubbing, cyanosis, erythema observed;no edema        Neurological:  no gross motor deficits        Recent Lab Results:  LIPID RESULTS:  No results found for: CHOL, HDL, LDL, TRIG, CHOLHDLRATIO    LIVER ENZYME RESULTS:  Lab Results   Component Value Date    AST 26 05/16/2017    ALT 30 05/16/2017       CBC RESULTS:  Lab Results   Component Value Date    WBC 4.8 05/20/2018    RBC 5.09 05/20/2018    HGB 12.8 (L) 11/07/2019    HCT 45.1 05/20/2018    MCV 89 05/20/2018    MCH 30.5 05/20/2018    MCHC 34.4 05/20/2018    RDW 13.1 05/20/2018     05/20/2018       BMP RESULTS:  Lab Results   Component Value Date     11/07/2019    POTASSIUM 4.5 11/07/2019    CHLORIDE 107 11/07/2019    CO2 26 11/07/2019    ANIONGAP 6 11/07/2019     (H) 11/07/2019    BUN 18 11/07/2019    CR 0.98 11/07/2019    GFRESTIMATED 73 11/07/2019    GFRESTBLACK 85 11/07/2019    CHERI 8.0 (L) 11/07/2019        Thank you for allowing me to participate in the care of your " patient.    Sincerely,     Briseyda Meyers PA-C     [facility]

## 2020-02-17 NOTE — PROGRESS NOTES
ANTICOAGULATION FOLLOW-UP CLINIC VISIT    Patient Name:  Nigel Rodarte Jr.  Date:  2020  Contact Type:  Face to Face    SUBJECTIVE:  Patient Findings     Positives:   Change in diet/appetite (ate more greens)             OBJECTIVE    INR Protime   Date Value Ref Range Status   2020 2.0 (A) 0.86 - 1.14 Final       ASSESSMENT / PLAN  INR assessment THER    Recheck INR In: 4 WEEKS    INR Location Clinic      Anticoagulation Summary  As of 2020    INR goal:   2.0-3.0   TTR:   51.4 % (1 y)   INR used for dosin.0 (2020)   Warfarin maintenance plan:   5 mg (5 mg x 1) every Tue, Fri; 7.5 mg (5 mg x 1.5) all other days   Full warfarin instructions:   5 mg every Tue, Fri; 7.5 mg all other days   Weekly warfarin total:   47.5 mg   No change documented:   Gladys Herrera RN   Plan last modified:   Gladys Herrera RN (2019)   Next INR check:   3/16/2020   Priority:   Maintenance   Target end date:   Indefinite    Indications    Atrial fibrillation (H) [I48.91] [I48.91]  Long term current use of anticoagulants with INR goal of 2.0-3.0 [Z79.01]             Anticoagulation Episode Summary     INR check location:       Preferred lab:       Send INR reminders to:   [facility] HEART INR NURSE    Comments:         Anticoagulation Care Providers     Provider Role Specialty Phone number    Grzegorz Malhotra MD Responsible Cardiology [phone]            See the Encounter Report to view Anticoagulation Flowsheet and Dosing Calendar (Go to Encounters tab in chart review, and find the Anticoagulation Therapy Visit)    INR 2.0 He ate more greens recently. No change in meds. Denies abnormal bleeding or bruising. Will continue current dosing of 5 mg TuF and 7.5 mg all other days and resume his usual diet. Recheck in 4 weeks. He has had more episodes of afib recently and has an OV with [place] today.    Gladys Herrera RN

## 2020-02-17 NOTE — LETTER
"2/17/2020    Osmar Espinoza MD  [address]  [facility] [address]    RE: Nigel TAE Rodarte Jr.       Dear Colleague,    I had the pleasure of seeing Nigel STRONG Cayden Silva in the [facility].    Reviewed at appt February 17, 2020    HPI:   I had the pleasure of seeing Nigel when he came for follow up of atrial fibrillation.  This 79 year old sees Dr. Malhotra for his history of:    1. Paroxysmal AFib first dx'd 5/2017, with ER visit for \"unwellness\" and palpitations. Underwent DCCV and has been on metoprolol, warfarin and flecainide  2. CHADSVASc 2 (age)      Nigel saw Dr. Malhotra 2/2019 at which time he was doing well. He described 2 breakthroughs of AFib since he had last been seen, with an extra dose of flecainide successfully chemically converting him each time. No changes were made and annual follow-up was recommended.    He called us in 7/2019 with recurrent AFib brought on by minimal alcohol use.    In 11/2019, he underwent R TKA. It does not appear he had any recurrent arrhythmia while hospitalized, but did call our office a few days later with recurrent AFib, which again converted after taking additional flecainide.    Finally, he called again 2/6 with c/o AFib. Earlier follow-up or increasing dose of flecainide was offered, but he opted to continue flecainide 50 mg BID and metoprolol XL 25 mg daily until his annual appt today.    Interval History:  Can't think of anything that's triggering the episode of atrial fibrillation. Last week, had an episode that lasted 13 hours. Since the beginning of the year, thinks he's had 3 episodes of AFib, but this was the longest. No trigger that he can think of.    No complaints of exertional chest pain, pressure or tightness.  Works out at least 3 days per week (weights and cardio) and then walks daily.  Has never gotten chest discomfort with this, though does note some L sided upper chest discomfort and right lower ribcage at " random. These discomforts last 1-2 hours and does not radiate nor associated with nausea, lightheadedness, palpitations or SOB.    No orthopnea, PND or edema. No change in exercise tolerance or breathing. Overall, he is feeling well.     Recent Diagnostic Testing:  EKG today, which I overread, showed SB 48 bpm with 1st degree AV Block. 1 PAC. QRS duration 92 ms  Proarrhythmia Stress Echocardiogam 2/2018 showed no proarrhythmia. No ischemia.    Assessment & Plan:    1. Paroxysmal AFib    Has had recurrence episodes, all of which have been successfully converted with additional flecainide 50 mg x 1.    EKG, as above, showed significant SB on Toprol XL 25 mg daily and flecainide 50 mg BID    Continues warfarin    PLAN:    Discussed options - could decrease metoprolol to 12.5 mg daily at night and increase flecainide to 75 mg BID to help prevent recurrent AFib but limit underlying bradycardia.    At this time, he'd prefer to continue as is with PRN flecainide 50 mg x1 for AFib lasting >30 minutes    Asked him to call us if AFib >24 hours despite extra flecainide    See Dr. Malhotra in 6 months. Could potentially discuss ablation at that time.       2. Atypical CP    Describes a  L sided upper chest pain and R lower rib cage discomfort. Never exertional. No pleuritic discomfort and no reproducibility.    Stress echo 2018 was fine    PLAN:    Continue to monitor. Appears atypical for ischemia. Call if becomes exertional or associated with significant breathing issues    Brooklyn Meyers PA-C, MSPAS      Orders Placed This Encounter   Procedures     Follow-Up with Electrophysiologist     EKG 12-lead complete w/read - Clinics (performed today)     EKG 12-lead complete w/read - Clinics     No orders of the defined types were placed in this encounter.    There are no discontinued medications.      Encounter Diagnosis   Name Primary?     Paroxysmal atrial fibrillation (H) Yes       CURRENT MEDICATIONS:  Current Outpatient Medications    Medication Sig Dispense Refill     acetaminophen (TYLENOL) 325 MG tablet Take 3 tablets (975 mg) by mouth every 8 hours 100 tablet 0     dutasteride (AVODART) 0.5 MG capsule Take 0.5 mg by mouth daily       flecainide (TAMBOCOR) 50 MG tablet Take 1 tablet (50 mg) by mouth 2 times daily 180 tablet 2     glucosamine-chondroitin 500-400 MG CAPS per capsule Take 1 capsule by mouth daily       metoprolol succinate ER (TOPROL-XL) 25 MG 24 hr tablet Take 1 tab daily 90 tablet 2     oxyCODONE (ROXICODONE) 5 MG tablet Take 1 tablet (5 mg) by mouth every 6 hours as needed for pain or moderate to severe pain 60 tablet 0     predniSONE (DELTASONE) 20 MG tablet Take two tablets (= 40mg) each day for 5 (five) days 10 tablet 0     warfarin ANTICOAGULANT (COUMADIN) 5 MG tablet Take 1 tab on Tuesdays and 1 1/2 tabs on all other days or as directed per INR clinic 135 tablet 1       ALLERGIES   No Known Allergies    PAST MEDICAL HISTORY:  Past Medical History:   Diagnosis Date     Arthritis     osteoarthrosis     Elevated PSA      Fatigue      Palpitations      Paroxysmal atrial fibrillation (H) 05/16/2017     Renal disease     kidney calculus     SOB (shortness of breath)        PAST SURGICAL HISTORY:  Past Surgical History:   Procedure Laterality Date     APPENDECTOMY       ARTHROPLASTY HIP  1/14/2013    Procedure: ARTHROPLASTY HIP;  RIGHT TOTAL HIP ARTHROPLASTY ;  Surgeon: Jamie Ohara MD;  Location:  OR     ARTHROPLASTY KNEE Right 11/6/2019    Procedure: RIGHT TOTAL KNEE ARTHROPLASTY (DEPUY)^;  Surgeon: Jamie Ohara MD;  Location:  OR     CARDIOVERSION       COLONOSCOPY N/A 2/9/2016    Procedure: COLONOSCOPY;  Surgeon: Astrid Vital MD;  Location:  GI     CYSTOSCOPY, RETROGRADES, EXTRACT STONE, COMBINED  9/12/2013    Procedure: COMBINED CYSTOSCOPY, RETROGRADES, EXTRACT STONE;  CYSTOSCOPY, RIGHT RETROGRADE, STONE EXTRACTION;  Surgeon: Carlos Mcknight MD;  Location:  OR     ENT SURGERY      right ear  surgery     GENITOURINARY SURGERY      cystoscopy for stone removal     HERNIA REPAIR       ORTHOPEDIC SURGERY      shoulder surgeries,bilat carpel tunnel       FAMILY HISTORY:  Family History   Problem Relation Age of Onset     Lung Cancer Brother        SOCIAL HISTORY:  Social History     Socioeconomic History     Marital status:      Spouse name: None     Number of children: None     Years of education: None     Highest education level: None   Occupational History     None   Social Needs     Financial resource strain: None     Food insecurity:     Worry: None     Inability: None     Transportation needs:     Medical: None     Non-medical: None   Tobacco Use     Smoking status: Never Smoker     Smokeless tobacco: Never Used   Substance and Sexual Activity     Alcohol use: No     Drug use: No     Sexual activity: None   Lifestyle     Physical activity:     Days per week: None     Minutes per session: None     Stress: None   Relationships     Social connections:     Talks on phone: None     Gets together: None     Attends Cheondo[employer] service: None     Active member of club or organization: None     Attends meetings of clubs or organizations: None     Relationship status: None     Intimate partner violence:     Fear of current or ex partner: None     Emotionally abused: None     Physically abused: None     Forced sexual activity: None   Other Topics Concern     Parent/sibling w/ CABG, MI or angioplasty before 65F 55M? Not Asked   Social History Narrative     None       Review of Systems:  Skin:  Negative     Eyes:  Positive for glasses  ENT:  Negative    Respiratory:  Negative for shortness of breath;dyspnea on exertion;cough  Cardiovascular:  Negative for;chest pain;edema;exercise intolerance;fatigue;lightheadedness;dizziness Positive for;palpitations  Gastroenterology: Negative for melena;hematochezia  Genitourinary:  Negative    Musculoskeletal:  Positive for arthritis  Neurologic:  Negative    Psychiatric:   "Negative    Heme/Lymph/Imm:  Negative    Endocrine:  Negative      Physical Exam:  Vitals: /71   Pulse 53   Ht 1.753 m (5' 9\")   Wt 70.3 kg (155 lb)   BMI 22.89 kg/m       Constitutional:  cooperative, alert and oriented, well developed, well nourished, in no acute distress        Skin:  warm and dry to the touch, no apparent skin lesions or masses noted        Head:  normocephalic, no masses or lesions        Eyes:  pupils equal and round;conjunctivae and lids unremarkable;sclera white;no xanthalasma        ENT:  no pallor or cyanosis, dentition good        Neck:  JVP normal;no carotid bruit        Chest:  normal breath sounds, clear to auscultation, normal A-P diameter, normal symmetry, normal respiratory excursion, no use of accessory muscles        Cardiac: regular rhythm;no S3 or S4 bradycardic                Abdomen:  abdomen soft        Vascular: pulses full and equal                                      Extremities and Back:  no deformities, clubbing, cyanosis, erythema observed;no edema        Neurological:  no gross motor deficits        Recent Lab Results:  LIPID RESULTS:  No results found for: CHOL, HDL, LDL, TRIG, CHOLHDLRATIO    LIVER ENZYME RESULTS:  Lab Results   Component Value Date    AST 26 05/16/2017    ALT 30 05/16/2017       CBC RESULTS:  Lab Results   Component Value Date    WBC 4.8 05/20/2018    RBC 5.09 05/20/2018    HGB 12.8 (L) 11/07/2019    HCT 45.1 05/20/2018    MCV 89 05/20/2018    MCH 30.5 05/20/2018    MCHC 34.4 05/20/2018    RDW 13.1 05/20/2018     05/20/2018       BMP RESULTS:  Lab Results   Component Value Date     11/07/2019    POTASSIUM 4.5 11/07/2019    CHLORIDE 107 11/07/2019    CO2 26 11/07/2019    ANIONGAP 6 11/07/2019     (H) 11/07/2019    BUN 18 11/07/2019    CR 0.98 11/07/2019    GFRESTIMATED 73 11/07/2019    GFRESTBLACK 85 11/07/2019    CHERI 8.0 (L) 11/07/2019                  Thank you for allowing me to participate in the care of your " patient.      Sincerely,     Briseyda Meyers PA-C     [facility]    cc:   Grzegorz Malhotra MD  [address]  [place] [address]

## 2020-02-20 LAB
ALT SERPL-CCNC: 21 IU/L (ref 8–45)
AST SERPL-CCNC: 27 IU/L (ref 2–40)
CREATININE (EXTERNAL): 1.06 MG/DL (ref 0.72–1.25)
GFR ESTIMATED (EXTERNAL): >60 ML/MIN/1.73M2
GFR ESTIMATED (IF AFRICAN AMERICAN) (EXTERNAL): >60 ML/MIN/1.73M2
GLUCOSE (EXTERNAL): 91 MG/DL (ref 65–99)
POTASSIUM (EXTERNAL): 4.6 MMOL/L (ref 3.5–5)

## 2020-03-02 DIAGNOSIS — I48.0 PAROXYSMAL ATRIAL FIBRILLATION (H): ICD-10-CM

## 2020-03-02 RX ORDER — FLECAINIDE ACETATE 50 MG/1
50 TABLET ORAL 2 TIMES DAILY
Qty: 180 TABLET | Refills: 1 | Status: SHIPPED | OUTPATIENT
Start: 2020-03-02 | End: 2020-04-30

## 2020-03-13 ENCOUNTER — TELEPHONE (OUTPATIENT)
Dept: CARDIOLOGY | Facility: CLINIC | Age: 80
End: 2020-03-13

## 2020-03-13 NOTE — TELEPHONE ENCOUNTER
Spoke with pt for travel and wellness screening. He just returned from [place]. Denies fever, cough, shortness of breath or rash or known exposure to Covid 19. INR's have been in range so postponed pt's appt to 3/30/20. Tamar

## 2020-03-17 DIAGNOSIS — I48.91 ATRIAL FIBRILLATION, UNSPECIFIED TYPE (H): ICD-10-CM

## 2020-03-17 DIAGNOSIS — Z79.01 LONG TERM CURRENT USE OF ANTICOAGULANT THERAPY: ICD-10-CM

## 2020-03-17 RX ORDER — WARFARIN SODIUM 5 MG/1
TABLET ORAL
Qty: 135 TABLET | Refills: 1 | Status: SHIPPED | OUTPATIENT
Start: 2020-03-17 | End: 2020-10-20

## 2020-03-25 ENCOUNTER — TELEPHONE (OUTPATIENT)
Dept: CARDIOLOGY | Facility: CLINIC | Age: 80
End: 2020-03-25

## 2020-03-25 DIAGNOSIS — I48.0 PAROXYSMAL ATRIAL FIBRILLATION (H): Primary | ICD-10-CM

## 2020-03-25 DIAGNOSIS — Z79.01 LONG TERM CURRENT USE OF ANTICOAGULANTS WITH INR GOAL OF 2.0-3.0: ICD-10-CM

## 2020-03-25 NOTE — TELEPHONE ENCOUNTER
Spoke to patient to discuss changing in-clinic INR appointment to outside clinic lab INR POCT check. Pt will have INR checked at [facility] on 3/30. Clinic address and phone number for scheduling provided to patient. Patient will schedule INR check in the morning at their convenience on the day they are due. INR RN will review anticoagulation management and follow up with patient via telephone pending INR result. Standing orders entered. Tamar

## 2020-03-31 ENCOUNTER — ANTICOAGULATION THERAPY VISIT (OUTPATIENT)
Dept: CARDIOLOGY | Facility: CLINIC | Age: 80
End: 2020-03-31
Payer: MEDICARE

## 2020-03-31 DIAGNOSIS — I48.0 PAROXYSMAL ATRIAL FIBRILLATION (H): ICD-10-CM

## 2020-03-31 DIAGNOSIS — Z79.01 LONG TERM CURRENT USE OF ANTICOAGULANTS WITH INR GOAL OF 2.0-3.0: ICD-10-CM

## 2020-03-31 LAB
CAPILLARY BLOOD COLLECTION: NORMAL
INR BLD: 3.3 (ref 0.86–1.14)

## 2020-03-31 PROCEDURE — 99207 ZZC NO CHARGE NURSE ONLY: CPT

## 2020-03-31 PROCEDURE — 85610 PROTHROMBIN TIME: CPT | Mod: QW | Performed by: INTERNAL MEDICINE

## 2020-03-31 PROCEDURE — 36416 COLLJ CAPILLARY BLOOD SPEC: CPT | Performed by: INTERNAL MEDICINE

## 2020-03-31 NOTE — PROGRESS NOTES
ANTICOAGULATION FOLLOW-UP CLINIC VISIT    Patient Name:  Nigel Rodarte Jr.  Date:  3/31/2020  Contact Type:  Telephone    SUBJECTIVE:  Patient Findings     Positives:   Extra doses (he mistakenly took extra 2.5 mg/wk for at least 2 weeks), Change in diet/appetite (less greens)        Clinical Outcomes     Negatives:   Major bleeding event, Thromboembolic event, Anticoagulation-related hospital admission, Anticoagulation-related ED visit, Anticoagulation-related fatality           OBJECTIVE    INR Point of Care   Date Value Ref Range Status   03/31/2020 3.3 (H) 0.86 - 1.14 Final     Comment:     This test is intended for monitoring Coumadin therapy.  Results are not   accurate in patients with prolonged INR due to factor deficiency.         ASSESSMENT / PLAN  INR assessment SUPRA    Recheck INR In: 2 WEEKS    INR Location Outside lab      Anticoagulation Summary  As of 3/31/2020    INR goal:   2.0-3.0   TTR:   53.9 % (1 y)   INR used for dosing:   3.3! (3/31/2020)   Warfarin maintenance plan:   5 mg (5 mg x 1) every Tue, Fri; 7.5 mg (5 mg x 1.5) all other days   Full warfarin instructions:   3/31: 2.5 mg; Otherwise 5 mg every Tue, Fri; 7.5 mg all other days   Weekly warfarin total:   47.5 mg   Plan last modified:   Gladys Herrera RN (11/26/2019)   Next INR check:   4/14/2020   Priority:   Maintenance   Target end date:   Indefinite    Indications    Atrial fibrillation (H) [I48.91] [I48.91]  Long term current use of anticoagulants with INR goal of 2.0-3.0 [Z79.01]             Anticoagulation Episode Summary     INR check location:       Preferred lab:       Send INR reminders to:   [facility] HEART INR NURSE    Comments:         Anticoagulation Care Providers     Provider Role Specialty Phone number    Grzegorz Malhotra MD Responsible Cardiology [phone]            See the Encounter Report to view Anticoagulation Flowsheet and Dosing Calendar (Go to Encounters tab in chart review, and find the Anticoagulation  Therapy Visit)    INR 3.3 (done at a [facility] lab). He has mistakenly taken 7.5 mg on Fridays for at least 2 weeks. No change in other meds. Eating less greens recently.Denies abnormal bleeding or bruising. Will decrease today's dose to 2.5 mg then resume taking 5 mg TuF and 7.5 mg all other days with recheck in 2 weeks (he will schedule lab appt and we will call him to review results). Dosage adjustment made based on physician directed care plan.    Gladys Herrera RN

## 2020-04-07 ENCOUNTER — TELEPHONE (OUTPATIENT)
Dept: CARDIOLOGY | Facility: CLINIC | Age: 80
End: 2020-04-07

## 2020-04-07 DIAGNOSIS — I48.0 PAROXYSMAL ATRIAL FIBRILLATION (H): Primary | ICD-10-CM

## 2020-04-07 NOTE — TELEPHONE ENCOUNTER
Patient left message asking for SHELBI Avina to call him with no details.  Called patient to get more information.  Patient was not able to talk long as he was in a meeting.  He reports he has been in AF for >24 hours and wanted to discuss options on what to do.  HR ranging 55-75.  Requesting to speak with Brooklyn Meyers.  Informed patient that I would message her.  He is asking for call back after 4:30pm.  TAMMY Mcneal

## 2020-04-07 NOTE — TELEPHONE ENCOUNTER
Dr. Malhotra - I will give him a call later this afternoon. Currently on flecainide 50 mg BID and metoprolol XL 25 mg daily. Has significant sinus bradycardia (40s). Remains on warfarin.    Unsure if he's taken extra flecainide 50 mg x 1 for AFib >30 minutes    1. I will find out if he's taken the extra flecainide. IF HE HAS and it didn't work, what would you prefer next?  IF HE HASN'T TAKEN extra flecainide, I'll have him do that first ...      Discussed options - could decrease metoprolol to 12.5 mg daily at night and increase flecainide to 75 mg BID to help prevent recurrent AFib but limit underlying bradycardia.    At this time, he'd prefer to continue as is with PRN flecainide 50 mg x1 for AFib lasting >30 minutes    Asked him to call us if AFib >24 hours despite extra flecainide    See Dr. Malhotra in 6 months. Could potentially discuss ablation at that time.     Component INR INR Point of Care   Latest Ref Rng & Units 0.86 - 1.14 0.86 - 1.14   12/23/2019 2.5 (A)    1/6/2020 2.5 (A)    1/29/2020 2.2 (A)    2/17/2020 2.0 (A)    3/31/2020  3.3 (H)

## 2020-04-07 NOTE — TELEPHONE ENCOUNTER
"Called pt back.  He spontaneously converted to SR ~1 hour ago.    He went into AFib ~1500 yesterday (felt more winded/weak).  Took extra flecainide 50 and metoprolol 25, and then took normal dose of flecainide 2000 last night    OK sleep but still woke up in it. Took normal flecainide 50 mg ~0600 as well as extra flecainide 50 mg.    Spontaneously converted ~1600 and could tell \"right away.\"    Will update Dr. Malhotra and come up with plans for future arrhythmias before his follow-up appt.  "

## 2020-04-08 NOTE — TELEPHONE ENCOUNTER
Pls set Nigel up for phone or virtual visit in 2-3 weeks with LA. He's going to discuss ablation. I have ordered    Until then, continue plan for extra flecainide 50 mg x 1 if AFib lasts >30 m.    Mka Barahona

## 2020-04-09 NOTE — TELEPHONE ENCOUNTER
Spoke to patient regarding recommendations per SHELBI Avina   ----Pls set Nigel up for phone or virtual visit in 2-3 weeks with LA. He's going to discuss ablation. I have ordered  Scheduled telephone visit on 4/27 at 10:15am.  Declined virtual visit  ----Until then, continue plan for extra flecainide 50 mg x 1 if AFib lasts >30 m.  Patient provided verbal understanding regarding above.  TAMMY Mcneal

## 2020-04-27 ENCOUNTER — VIRTUAL VISIT (OUTPATIENT)
Dept: CARDIOLOGY | Facility: CLINIC | Age: 80
End: 2020-04-27
Attending: PHYSICIAN ASSISTANT
Payer: MEDICARE

## 2020-04-27 VITALS — WEIGHT: 152 LBS | BODY MASS INDEX: 22.45 KG/M2

## 2020-04-27 DIAGNOSIS — I48.0 PAROXYSMAL ATRIAL FIBRILLATION (H): ICD-10-CM

## 2020-04-27 PROCEDURE — 99443 ZZC PHYSICIAN TELEPHONE EVALUATION 21-30 MIN: CPT | Performed by: INTERNAL MEDICINE

## 2020-04-27 NOTE — LETTER
4/27/2020      RE: Nigel Rodarte Jr.  [address] [place] [address]       Dear Colleague,    Thank you for the opportunity to participate in the care of your patient, Nigel Rodarte Jr., at the [facility]. Please see a copy of my visit note below.    Electrophysiology/ Phone Call Clinic Note         H&P and Plan:     Patient opted to conduct today's visit via telephone due to ongoing issues related to ongoing COVID-19 virus pandemic and to minimize social interaction (based on CDC guidelines).      Visit details:  Patient has given verbal consent for this visit.    Interview was done talking to patient over the phone.  Visit start time: 10:05 AM  Visit stop time: 10:26 AM  Provider location: Home.  Patient location: Home.     REASON FOR VISIT: Electrophysiology evaluation.      HISTORY OF PRESENT ILLNESS:  Mr. Rodarte is a delightful 79-year-old gentleman (former  of [email or web address] team), who here for evaluation of paroxysmal atrial fibrillation.     I last saw patient back in February of last year.  At that time, he was doing well both beta-blocker therapy and was using flecainide as needed for breakthrough episodes.  In the last year, he had more episodes of atrial fibrillation and was started on a combination of flecainide and metoprolol.  He recently contacted our office as he continues to have episodes of paroxysmal A. fib despite of medical therapy.    Today, he informs he is doing well, however he is having episodes of A. fib every 2-3 weeks.  Some of the episodes may last for 24 hours and he seems to be symptomatic with episodes complaining of fatigue and shortness of breath).  He denies any other symptoms such as chest pain, shortness of breath, lightheadedness, near-syncope or syncope.      Of note, pharmacotherapy has been limited due to baseline bradycardia. EKG obtained 02/17/2020 showed sinus  bradycardia (heart rate at 48 bpm) and QRS measuring 98 milliseconds.       Previous studies:  - Stress Echo (2018): He was able to exercise for 7 minutes and 20 seconds. . Negative for ischemia, arrthyhmia or QRS widening.      PLAN:   1.  Paroxysmal atrial fibrillation.  He continues to have breakthroughs of A. fib despite of medical therapy.  Pharmacotherapy has been limited due to baseline bradycardia.      We discussed options including catheter ablation procedure or pacemaker implantation to facilitate therapy.  We went the pros and cons for each option.  He understands the ablation has a 70% success rate and a 3% risk of complication.  He also understands that there is a 1% risk risk of complication with pacemaker implantation.    After our discussion, he would like to think about the options.  There is no urgency to have any of this procedure is done at this time unless his symptoms get worse.  He will let us know if symptoms get worse.  Otherwise we will plan to see him again in 6 months.    2.  Embolic prevention.  CHADS-VASc score of 2. Continue anticoagulation with Coumadin indefinitely.    Please do not hesitate to contact me if you have any questions/concerns.     Sincerely,     Grzegorz Malhotra MD

## 2020-04-27 NOTE — PROGRESS NOTES
Electrophysiology/ Phone Call Clinic Note         H&P and Plan:     Patient opted to conduct today's visit via telephone due to ongoing issues related to ongoing COVID-19 virus pandemic and to minimize social interaction (based on CDC guidelines).      Visit details:  Patient has given verbal consent for this visit.    Interview was done talking to patient over the phone.  Visit start time: 10:05 AM  Visit stop time: 10:26 AM  Provider location: Home.  Patient location: Home.     REASON FOR VISIT: Electrophysiology evaluation.      HISTORY OF PRESENT ILLNESS:  Mr. Rodarte is a delightful 79-year-old gentleman (former  of [employer] team), who here for evaluation of paroxysmal atrial fibrillation.     I last saw patient back in February of last year.  At that time, he was doing well both beta-blocker therapy and was using flecainide as needed for breakthrough episodes.  In the last year, he had more episodes of atrial fibrillation and was started on a combination of flecainide and metoprolol.  He recently contacted our office as he continues to have episodes of paroxysmal A. fib despite of medical therapy.    Today, he informs he is doing well, however he is having episodes of A. fib every 2-3 weeks.  Some of the episodes may last for 24 hours and he seems to be symptomatic with episodes complaining of fatigue and shortness of breath).  He denies any other symptoms such as chest pain, shortness of breath, lightheadedness, near-syncope or syncope.      Of note, pharmacotherapy has been limited due to baseline bradycardia. EKG obtained 02/17/2020 showed sinus bradycardia (heart rate at 48 bpm) and QRS measuring 98 milliseconds.       Previous studies:  - Stress Echo (2018): He was able to exercise for 7 minutes and 20 seconds. . Negative for ischemia, arrthyhmia or QRS widening.      PLAN:   1.  Paroxysmal atrial fibrillation.  He continues to have breakthroughs of A. fib despite of medical therapy.   Pharmacotherapy has been limited due to baseline bradycardia.      We discussed options including catheter ablation procedure or pacemaker implantation to facilitate therapy.  We went the pros and cons for each option.  He understands the ablation has a 70% success rate and a 3% risk of complication.  He also understands that there is a 1% risk risk of complication with pacemaker implantation.    After our discussion, he would like to think about the options.  There is no urgency to have any of this procedure is done at this time unless his symptoms get worse.  He will let us know if symptoms get worse.  Otherwise we will plan to see him again in 6 months.    2.  Embolic prevention.  CHADS-VASc score of 2. Continue anticoagulation with Coumadin indefinitely.      Grzegorz Malhotra MD    Physical Exam:  Vitals: Wt 68.9 kg (152 lb)   BMI 22.45 kg/m      Physical exam not performed as this was a phone call visit.      CURRENT MEDICATIONS:  Current Outpatient Medications   Medication Sig Dispense Refill     dutasteride (AVODART) 0.5 MG capsule Take 0.5 mg by mouth daily       flecainide (TAMBOCOR) 50 MG tablet Take 1 tablet (50 mg) by mouth 2 times daily 180 tablet 1     glucosamine-chondroitin 500-400 MG CAPS per capsule Take 1 capsule by mouth daily       metoprolol succinate ER (TOPROL-XL) 25 MG 24 hr tablet Take 1 tab daily 90 tablet 2     oxyCODONE (ROXICODONE) 5 MG tablet Take 1 tablet (5 mg) by mouth every 6 hours as needed for pain or moderate to severe pain 60 tablet 0     predniSONE (DELTASONE) 20 MG tablet Take two tablets (= 40mg) each day for 5 (five) days 10 tablet 0     warfarin ANTICOAGULANT (COUMADIN) 5 MG tablet Take 1 tab on Tuesdays and Fridays and 1 1/2 tabs on all other days or as directed per INR clinic 135 tablet 1     acetaminophen (TYLENOL) 325 MG tablet Take 3 tablets (975 mg) by mouth every 8 hours (Patient not taking: Reported on 4/27/2020) 100 tablet 0       ALLERGIES   No Known  Allergies    PAST MEDICAL HISTORY:  Past Medical History:   Diagnosis Date     Arthritis     osteoarthrosis     Elevated PSA      Fatigue      Palpitations      Paroxysmal atrial fibrillation (H) 05/16/2017     Renal disease     kidney calculus     SOB (shortness of breath)        PAST SURGICAL HISTORY:  Past Surgical History:   Procedure Laterality Date     APPENDECTOMY       ARTHROPLASTY HIP  1/14/2013    Procedure: ARTHROPLASTY HIP;  RIGHT TOTAL HIP ARTHROPLASTY ;  Surgeon: Jamie Ohara MD;  Location:  OR     ARTHROPLASTY KNEE Right 11/6/2019    Procedure: RIGHT TOTAL KNEE ARTHROPLASTY (DEPUY)^;  Surgeon: Jamie Ohara MD;  Location:  OR     CARDIOVERSION       COLONOSCOPY N/A 2/9/2016    Procedure: COLONOSCOPY;  Surgeon: Astrid Vital MD;  Location:  GI     CYSTOSCOPY, RETROGRADES, EXTRACT STONE, COMBINED  9/12/2013    Procedure: COMBINED CYSTOSCOPY, RETROGRADES, EXTRACT STONE;  CYSTOSCOPY, RIGHT RETROGRADE, STONE EXTRACTION;  Surgeon: Carlos Mcknight MD;  Location:  OR     ENT SURGERY      right ear surgery     GENITOURINARY SURGERY      cystoscopy for stone removal     HERNIA REPAIR       ORTHOPEDIC SURGERY      shoulder surgeries,bilat carpel tunnel       FAMILY HISTORY:  Family History   Problem Relation Age of Onset     Lung Cancer Brother        SOCIAL HISTORY:  Social History     Socioeconomic History     Marital status:      Spouse name: None     Number of children: None     Years of education: None     Highest education level: None   Occupational History     None   Social Needs     Financial resource strain: None     Food insecurity     Worry: None     Inability: None     Transportation needs     Medical: None     Non-medical: None   Tobacco Use     Smoking status: Never Smoker     Smokeless tobacco: Never Used   Substance and Sexual Activity     Alcohol use: No     Drug use: No     Sexual activity: None   Lifestyle     Physical activity     Days per week: None      Minutes per session: None     Stress: None   Relationships     Social connections     Talks on phone: None     Gets together: None     Attends Mandaen service: None     Active member of club or organization: None     Attends meetings of clubs or organizations: None     Relationship status: None     Intimate partner violence     Fear of current or ex partner: None     Emotionally abused: None     Physically abused: None     Forced sexual activity: None   Other Topics Concern     Parent/sibling w/ CABG, MI or angioplasty before 65F 55M? Not Asked   Social History Narrative     None       Review of Systems:  Skin:        Eyes:       ENT:       Respiratory:       Cardiovascular:       Gastroenterology:      Genitourinary:       Musculoskeletal:       Neurologic:       Psychiatric:       Heme/Lymph/Imm:       Endocrine:           Recent Lab Results:  LIPID RESULTS:  No results found for: CHOL, HDL, LDL, TRIG, CHOLHDLRATIO    LIVER ENZYME RESULTS:  Lab Results   Component Value Date    AST 26 05/16/2017    ALT 30 05/16/2017       CBC RESULTS:  Lab Results   Component Value Date    WBC 4.8 05/20/2018    RBC 5.09 05/20/2018    HGB 12.8 (L) 11/07/2019    HCT 45.1 05/20/2018    MCV 89 05/20/2018    MCH 30.5 05/20/2018    MCHC 34.4 05/20/2018    RDW 13.1 05/20/2018     05/20/2018       BMP RESULTS:  Lab Results   Component Value Date     11/07/2019    POTASSIUM 4.5 11/07/2019    CHLORIDE 107 11/07/2019    CO2 26 11/07/2019    ANIONGAP 6 11/07/2019     (H) 11/07/2019    BUN 18 11/07/2019    CR 0.98 11/07/2019    GFRESTIMATED 73 11/07/2019    GFRESTBLACK 85 11/07/2019    CHERI 8.0 (L) 11/07/2019        A1C RESULTS:  No results found for: A1C    INR RESULTS:  Lab Results   Component Value Date    INR 3.3 (H) 03/31/2020    INR 2.0 (A) 02/17/2020    INR 2.2 (A) 01/29/2020    INR 2.50 (H) 11/18/2019    INR 1.13 11/08/2019         ECHOCARDIOGRAM  No results found for this or any previous visit (from the past 8760  "hour(s)).      No orders of the defined types were placed in this encounter.    No orders of the defined types were placed in this encounter.    There are no discontinued medications.      Encounter Diagnosis   Name Primary?     Paroxysmal atrial fibrillation (H)          CC  Briseyda Meyers PA-C  [address]  [place] [address]                      Nigel Rodarte Jr. is a 79 year old male who is being evaluated via a billable telephone visit.      The patient has been notified of following:     \"This telephone visit will be conducted via a call between you and your physician/provider. We have found that certain health care needs can be provided without the need for a physical exam.  This service lets us provide the care you need with a short phone conversation.  If a prescription is necessary we can send it directly to your pharmacy.  If lab work is needed we can place an order for that and you can then stop by our lab to have the test done at a later time.    Telephone visits are billed at different rates depending on your insurance coverage. During this emergency period, for some insurers they may be billed the same as an in-person visit.  Please reach out to your insurance provider with any questions.    If during the course of the call the physician/provider feels a telephone visit is not appropriate, you will not be charged for this service.\"    Patient has given verbal consent for Telephone visit?  Yes    How would you like to obtain your AVS? Mail a copy      [phone]  Pt reported wt today 152#  Leanne [place], RMA      Subjective     Nigel Rodarte Jr. is a 79 year old male who presents to clinic today for the following health issues:    HPI                 Reviewed and updated as needed this visit by Provider         Review of Systems          Objective   Reported vitals:  There were no vitals taken for this visit.     PSYCH: Alert and oriented times 3; coherent speech, normal   rate " and volume, able to articulate logical thoughts, able   to abstract reason, no tangential thoughts, no hallucinations   or delusions  His affect is   RESP: No cough, no audible wheezing, able to talk in full sentences  Remainder of exam unable to be completed due to telephone visits            Assessment/Plan:      No follow-ups on file.      Phone call duration:   minutes

## 2020-04-30 ENCOUNTER — TELEPHONE (OUTPATIENT)
Dept: CARDIOLOGY | Facility: CLINIC | Age: 80
End: 2020-04-30

## 2020-04-30 ENCOUNTER — DOCUMENTATION ONLY (OUTPATIENT)
Dept: CARDIOLOGY | Facility: CLINIC | Age: 80
End: 2020-04-30

## 2020-04-30 ENCOUNTER — VIRTUAL VISIT (OUTPATIENT)
Dept: CARDIOLOGY | Facility: CLINIC | Age: 80
End: 2020-04-30
Payer: MEDICARE

## 2020-04-30 DIAGNOSIS — I48.0 PAROXYSMAL ATRIAL FIBRILLATION (H): Primary | ICD-10-CM

## 2020-04-30 DIAGNOSIS — I48.91 ATRIAL FIBRILLATION, UNSPECIFIED TYPE (H): ICD-10-CM

## 2020-04-30 PROCEDURE — 99443 ZZC PHYSICIAN TELEPHONE EVALUATION 21-30 MIN: CPT | Performed by: PHYSICIAN ASSISTANT

## 2020-04-30 RX ORDER — METOPROLOL SUCCINATE 25 MG/1
TABLET, EXTENDED RELEASE ORAL
Qty: 90 TABLET | Refills: 2 | Status: SHIPPED | OUTPATIENT
Start: 2020-04-30 | End: 2021-05-03

## 2020-04-30 RX ORDER — FLECAINIDE ACETATE 50 MG/1
50 TABLET ORAL 2 TIMES DAILY
Qty: 180 TABLET | Refills: 3 | Status: SHIPPED | OUTPATIENT
Start: 2020-04-30 | End: 2020-10-30

## 2020-04-30 NOTE — TELEPHONE ENCOUNTER
04/30/20 Pt called stating he is having another Afib episode which started last night about 2 am. He took an extra Flecainide 50 mg at that time. He woke up this am and is still in it. HR 70-85 and irregular. Feels palpitations and sl SOB. Verified he is taking Warfarin.  Wanting to talk w [place] Meyers PA-C to discuss questions re Ablation vs PPM which was discussed 4/27 with visit w Dr Malhotra. Pt put on Brooklyn schedule this afternoon to discuss. Pt voiced understanding and agreement brodie Moeller 1015 am

## 2020-04-30 NOTE — PROGRESS NOTES
Dr. Malhotra - spoke with Leonardo today - he's back in AFib as of 0200. Took an extra flecainide but remains in it.    He would like to proceed with AFib ablation (you had discussed this vs PPM to facilitate medical therapy at your 4/27 appt).     Given he's having more AFib, he'd like to do this sooner rather than later. Episodes are lasting longer and longer each time - last time was ~24 hours and he was about to come to ER for DCCV before it broke.     If you're OK with proceeding with ablation:    1. OK to do now or do we need to wait months?    2. Do you want BEN? His iNRs have been therapeutic but INRs only checked q5-6 w    Component INR INR Point of Care   Latest Ref Rng & Units 0.86 - 1.14 0.86 - 1.14   1/6/2020 2.5 (A)    1/29/2020 2.2 (A)    2/17/2020 2.0 (A)    3/31/2020  3.3 (H)     3. We will get CT scan    Let me know and I'll have Yumiko contact him.    Mak Barahona

## 2020-04-30 NOTE — PROGRESS NOTES
"Nigel Rodarte Jr. is a 79 year old male who is being evaluated via a billable telephone visit.      The patient has been notified of following:     \"This telephone visit will be conducted via a call between you and your physician/provider. We have found that certain health care needs can be provided without the need for a physical exam.  This service lets us provide the care you need with a short phone conversation.  If a prescription is necessary we can send it directly to your pharmacy.  If lab work is needed we can place an order for that and you can then stop by our lab to have the test done at a later time.    Telephone visits are billed at different rates depending on your insurance coverage. During this emergency period, for some insurers they may be billed the same as an in-person visit.  Please reach out to your insurance provider with any questions.    If during the course of the call the physician/provider feels a telephone visit is not appropriate, you will not be charged for this service.\"  Unable to obtain blood pressure at home  HR: 85  Weight: 152lb    Review Of Systems  Skin: NEGATIVE  Eyes:Ears/Nose/Throat: NEGATIVE  Respiratory:SOB with afib  Cardiovascular:palpitations in bed, energy level decreased with afib  Gastrointestinal: NEGATIVE  Genitourinary:NEGATIVE   Musculoskeletal: NEGATIVE  Neurologic: NEGATIVE  Psychiatric: NEGATIVE  Hematologic/Lymphatic/Immunologic: NEGATIVE  Endocrine:  NEGATIVE    Beth Dickinson LPN  Patient has given verbal consent for Telephone visit?  Yes  How would you like to obtain your AVS? Mail a copy    CC:  Paroxysmal AFib    VITALS:  HR: 85  Weight: 152lb    BRIEF HPI:  78 y/o M who sees Dr. Malhotra for his h/o:    1. Sx'c Paroxysmal AFib first dx'd 5/2017, with ER visit for \"unwellness\" and palpitations. Underwent DCCV and has been on metoprolol, warfarin and flecainide. Has had increasing breakthroughs of AFib requiring extra flecainide.  2. CHADSVASc 2 " (age)    Dr. Malhotra spoke with Nigel just 4/27 at which time they discussed his recurrent episodes of AFib every 2-3 weeks requiring extra flecainide. Episodes were lasting >24 hours and with this, would get fatigue and STOCK. Given underlying bradycardia, Dr. Malhotra discussed proceeding with PPM implantation to facilitate medication use VS AFib ablation. Warfarin was continued and 6 m follow-up recommended.    He called in today, 3 days later, noting he was having another episode of AFib that started 04/29 @ 0200. Took extra flecainide 50 mg and has remained in AFib this AM.  HR controlled 77-85 bpm.  He was placed on my schedule per his request due to questions.    INTERVAL HISTORY:  Despite taking extra flecainide @ 0200 and took his normal dose this AM he remains in AFib with a controlled response.  He's always quite sx'c with this, even at a controlled rate.     He's been talking to people about the ablation as both his brother and sister-in-law had it. He has a few questions about the procedure that we discussed today.    Has another INR coming up on Monday.    DIAGNOSTICS:  EKG 2/2020, showed SB 48 bpm with 1st degree AV Block. 1 PAC. QRS duration 92 ms  Proarrhythmia Stress Echo 2/2018 showed no proarrhythmia. No ischemia.    REVIEW OF SYSTEMS:  Negative except as noted above     PHYSICAL EXAM:  Deferred, telephone    ASSESSMENT/PLAN:    1. Recurrent paroxysmal AFib    Currently on flecainide 50 mg BID and metoprolol XL 25 mg daily. Pharmacotherapy therapy limited by underlying bradycardia in the 40s.    Remains on warfarin    He'd like to proceed with ablation    Component INR INR Point of Care   Latest Ref Rng & Units 0.86 - 1.14 0.86 - 1.14   1/29/2020 2.2 (A)    2/17/2020 2.0 (A)    3/31/2020  3.3 (H)      PLAN:    Will let Dr. Malhotra know that he'd like to proceed with ablation. His AFib is now happening more frequently and for longer periods of time and he'd like to get this done sooner rather than  later.    Dr. Malhotra reviewed risks of this with him at their visit on Monday 4/27    I will reach out to Dr. Malhotra to see if he'd like to proceed with BEN prior given he's on warfarin. We did discuss risks and benefits of BEN including sore throat and esophageal injury with use of sedation.    He'll take another extra flecainide this evening as he typically does and will plan to proceed to ER for DCCV if he's getting close to 36 hours in AFib given his sxs.    ADDENDUM @ 1138 ON 5/1/2020:  [place] back from Dr. Malhotra  - agreed Leonardo should go ahead with AFib ablation. BEN/CT done same day and may not stay overnight to help minimize risk.    All ordered. Leonardo and I discussed R/B/I of both procedures (BEN and ablation) yesterday - Dr. Malhotra had previously reviewed the ablation risk/benefit on 4/27 and Leonardo had no additional questions.       I have reviewed the note as documented above.  This accurately captures the substance of my conversation with the patient.        Phone call contact time  Call Started at 1432  Call Ended at [phone]       RAJAN KaiserAS

## 2020-04-30 NOTE — LETTER
"4/30/2020      RE: Nigel Rodarte Jr.  [address] [place] [address]       Dear Colleague,    Thank you for the opportunity to participate in the care of your patient, Nigel Rodarte Jr., at the [facility]. Please see a copy of my visit note below.    BRIEF HPI:  78 y/o M who sees Dr. Malhotra for his h/o:    1. Sx'c Paroxysmal AFib first dx'd 5/2017, with ER visit for \"unwellness\" and palpitations. Underwent DCCV and has been on metoprolol, warfarin and flecainide. Has had increasing breakthroughs of AFib requiring extra flecainide.  2. CHADSVASc 2 (age)    Dr. Malhotra spoke with Nigel just 4/27 at which time they discussed his recurrent episodes of AFib every 2-3 weeks requiring extra flecainide. Episodes were lasting >24 hours and with this, would get fatigue and STOCK. Given underlying bradycardia, Dr. Malhotra discussed proceeding with PPM implantation to facilitate medication use VS AFib ablation. Warfarin was continued and 6 m follow-up recommended.    He called in today, 3 days later, noting he was having another episode of AFib that started 04/29 @ 0200. Took extra flecainide 50 mg and has remained in AFib this AM.  HR controlled 77-85 bpm.  He was placed on my schedule per his request due to questions.    INTERVAL HISTORY:  Despite taking extra flecainide @ 0200 and took his normal dose this AM he remains in AFib with a controlled response.  He's always quite sx'c with this, even at a controlled rate.     He's been talking to people about the ablation as both his brother and sister-in-law had it. He has a few questions about the procedure that we discussed today.    Has another INR coming up on Monday.    DIAGNOSTICS:  EKG 2/2020, showed SB 48 bpm with 1st degree AV Block. 1 PAC. QRS duration 92 ms  Proarrhythmia Stress Echo 2/2018 showed no proarrhythmia. No ischemia.    REVIEW OF SYSTEMS:  Negative except as " noted above     PHYSICAL EXAM:  Deferred, telephone    ASSESSMENT/PLAN:    1. Recurrent paroxysmal AFib    Currently on flecainide 50 mg BID and metoprolol XL 25 mg daily. Pharmacotherapy therapy limited by underlying bradycardia in the 40s.    Remains on warfarin    He'd like to proceed with ablation    Component INR INR Point of Care   Latest Ref Rng & Units 0.86 - 1.14 0.86 - 1.14   1/29/2020 2.2 (A)    2/17/2020 2.0 (A)    3/31/2020  3.3 (H)      PLAN:    Will let Dr. Malhotra know that he'd like to proceed with ablation. His AFib is now happening more frequently and for longer periods of time and he'd like to get this done sooner rather than later.    Dr. Malhotra reviewed risks of this with him at their visit on Monday 4/27    I will reach out to Dr. Malhotra to see if he'd like to proceed with BEN prior given he's on warfarin. We did discuss risks and benefits of BEN including sore throat and esophageal injury with use of sedation.    He'll take another extra flecainide this evening as he typically does and will plan to proceed to ER for DCCV if he's getting close to 36 hours in AFib given his sxs.    ADDENDUM @ 3272 ON 5/1/2020:  [place] back from Dr. Malhotra  - agreed Leonardo should go ahead with AFib ablation. BEN/CT done same day and may not stay overnight to help minimize risk.    All ordered. Leonardo and I discussed R/B/I of both procedures (BEN and ablation) yesterday - Dr. Malhotra had previously reviewed the ablation risk/benefit on 4/27 and Leonardo had no additional questions.       I have reviewed the note as documented above.  This accurately captures the substance of my conversation with the patient.       Please do not hesitate to contact me if you have any questions/concerns.     Sincerely,     Briseyda Meyers PA-C

## 2020-05-01 NOTE — PROGRESS NOTES
Spoke to patient in follow up to virtual visit 4/30 with treatment recommendations.   1. Is he still in AFib?  If so, can we get him cardioverted today instead of having him go to ER?   * he's on warfarin  Patient converted back to NSR last evening around 7:30pm.    2. Pls let him know that I heard from Dr. Malhotra and ask Yumiko/Kelsie to call him to set up procedures (all in 1 day).  (See below from Dr. Malhotra) - I have ordered AFib ablation, BEN and CT scan.  Pls have him arrange INR 2 days prior to ablation.  H/P for BEN/ablation already done yesterday  I am ok with ablation.  If he is symptomatic we can schedule him to have done.  We can try to have same day discharge to minimize staying in the hospital. He will need BEN and CT.     Grzegorz       Patient agreed with procedure.  Unable to set up all procedures in one day as ablations at this time are only being done in the morning.  All orders are placed for procedure.  Patient aware he will need to have INR checked 2 days prior to ablation.     Wellness Screening Tool     Symptom Screening:     Do you have one of the following new symptoms:       Fever or reported chills?  NO    A new cough (started within the past 14 days)? NO    Shortness of breath (started within the past 14 days)?NO     Nausea, vomiting or diarrhea?NO      Within the past 3 weeks, have you been exposed to someone with a known positive illness below?       COVID - 19 (known or suspected) NO    Chicken pox?  NO    Measles? NO    Pertussis? NO              Patient notified of visitor restriction: YES  Patient informed to wear a mask: YES     Patient's appointment status: Patient to be scheduled for BEN/CT and ablation in the next two weeks by scheduling.   patient provided verbal understanding regarding above.  TAMMY Mcneal

## 2020-05-01 NOTE — PROGRESS NOTES
Pls call Leonardo -     1. Is he still in AFib?  If so, can we get him cardioverted today instead of having him go to ER?   * he's on warfarin    2. Pls let him know that I heard from Dr. Malhotra and ask Yumiko/Kelsie to call him to set up procedures (all in 1 day).  (See below from Dr. Malhotra) - I have ordered AFib ablation, BEN and CT scan.  Pls have him arrange INR 2 days prior to ablation.  H/P for BEN/ablation already done yesterday  I am ok with ablation.  If he is symptomatic we can schedule him to have done.  We can try to have same day discharge to minimize staying in the hospital. He will need BEN and CT.     Grzegorz    Message text

## 2020-05-04 ENCOUNTER — ANTICOAGULATION THERAPY VISIT (OUTPATIENT)
Dept: CARDIOLOGY | Facility: CLINIC | Age: 80
End: 2020-05-04
Payer: MEDICARE

## 2020-05-04 DIAGNOSIS — I48.0 PAROXYSMAL ATRIAL FIBRILLATION (H): ICD-10-CM

## 2020-05-04 DIAGNOSIS — Z79.01 LONG TERM CURRENT USE OF ANTICOAGULANTS WITH INR GOAL OF 2.0-3.0: ICD-10-CM

## 2020-05-04 DIAGNOSIS — Z11.59 ENCOUNTER FOR SCREENING FOR OTHER VIRAL DISEASES: Primary | ICD-10-CM

## 2020-05-04 LAB
CAPILLARY BLOOD COLLECTION: NORMAL
INR BLD: 2.6 (ref 0.86–1.14)

## 2020-05-04 PROCEDURE — 36416 COLLJ CAPILLARY BLOOD SPEC: CPT | Performed by: INTERNAL MEDICINE

## 2020-05-04 PROCEDURE — 85610 PROTHROMBIN TIME: CPT | Mod: QW | Performed by: INTERNAL MEDICINE

## 2020-05-04 PROCEDURE — 99207 ZZC NO CHARGE NURSE ONLY: CPT

## 2020-05-04 NOTE — PROGRESS NOTES
ANTICOAGULATION FOLLOW-UP CLINIC VISIT    Patient Name:  Nigel Rodarte Jr.  Date:  2020  Contact Type:  Telephone    SUBJECTIVE:  Patient Findings         Clinical Outcomes     Negatives:   Major bleeding event, Thromboembolic event, Anticoagulation-related hospital admission, Anticoagulation-related ED visit, Anticoagulation-related fatality           OBJECTIVE    INR Point of Care   Date Value Ref Range Status   2020 2.6 (H) 0.86 - 1.14 Final     Comment:     This test is intended for monitoring Coumadin therapy.  Results are not   accurate in patients with prolonged INR due to factor deficiency.         ASSESSMENT / PLAN  INR assessment THER    Recheck INR In: 2 WEEKS    INR Location Outside lab      Anticoagulation Summary  As of 2020    INR goal:   2.0-3.0   TTR:   57.5 % (1 y)   INR used for dosin.6 (2020)   Warfarin maintenance plan:   5 mg (5 mg x 1) every Tue, Fri; 7.5 mg (5 mg x 1.5) all other days   Full warfarin instructions:   5 mg every Tue, Fri; 7.5 mg all other days   Weekly warfarin total:   47.5 mg   No change documented:   Gilma Anton RN   Plan last modified:   Gladys Herrera RN (2019)   Next INR check:   2020   Priority:   Maintenance   Target end date:   Indefinite    Indications    Atrial fibrillation (H) [I48.91] [I48.91]  Long term current use of anticoagulants with INR goal of 2.0-3.0 [Z79.01]             Anticoagulation Episode Summary     INR check location:       Preferred lab:       Send INR reminders to:   [facility] INR NURSE    Comments:         Anticoagulation Care Providers     Provider Role Specialty Phone number    Grzegorz Malhotra MD [facility] Card[facility] [phone]            See the Encounter Report to view Anticoagulation Flowsheet and Dosing Calendar (Go to Encounters tab in chart review, and find the Anticoagulation Therapy Visit)    INR 2.6.  Called and spoke to the patient.  He is scheduled for ablation on  and  will need an INR 2 days prior on 5/18 and he will schedule that lab appt.  Continue same schedule and recheck in 2 weeks.  Bay Anton RN

## 2020-05-11 ENCOUNTER — HOSPITAL ENCOUNTER (OUTPATIENT)
Dept: CARDIOLOGY | Facility: CLINIC | Age: 80
Discharge: HOME | End: 2020-05-11
Attending: PHYSICIAN ASSISTANT | Admitting: PHYSICIAN ASSISTANT
Payer: MEDICARE

## 2020-05-11 VITALS — DIASTOLIC BLOOD PRESSURE: 75 MMHG | SYSTOLIC BLOOD PRESSURE: 111 MMHG

## 2020-05-11 DIAGNOSIS — I48.0 PAROXYSMAL ATRIAL FIBRILLATION (H): ICD-10-CM

## 2020-05-11 LAB
CREAT BLD-MCNC: 1.1 MG/DL (ref 0.66–1.25)
GFR SERPL CREATININE-BSD FRML MDRD: 65 ML/MIN/{1.73_M2}

## 2020-05-11 PROCEDURE — 25800030 ZZH RX IP 258 OP 636: Performed by: PHYSICIAN ASSISTANT

## 2020-05-11 PROCEDURE — 25000128 H RX IP 250 OP 636: Performed by: PHYSICIAN ASSISTANT

## 2020-05-11 PROCEDURE — 82565 ASSAY OF CREATININE: CPT

## 2020-05-11 PROCEDURE — 75572 CT HRT W/3D IMAGE: CPT

## 2020-05-11 PROCEDURE — 75572 CT HRT W/3D IMAGE: CPT | Mod: 26 | Performed by: INTERNAL MEDICINE

## 2020-05-11 RX ORDER — DIPHENHYDRAMINE HCL 25 MG
25 CAPSULE ORAL
Status: DISCONTINUED | OUTPATIENT
Start: 2020-05-11 | End: 2020-05-12 | Stop reason: HOSPADM

## 2020-05-11 RX ORDER — ONDANSETRON 2 MG/ML
4 INJECTION INTRAMUSCULAR; INTRAVENOUS
Status: DISCONTINUED | OUTPATIENT
Start: 2020-05-11 | End: 2020-05-12 | Stop reason: HOSPADM

## 2020-05-11 RX ORDER — DIPHENHYDRAMINE HYDROCHLORIDE 50 MG/ML
25-50 INJECTION INTRAMUSCULAR; INTRAVENOUS
Status: DISCONTINUED | OUTPATIENT
Start: 2020-05-11 | End: 2020-05-12 | Stop reason: HOSPADM

## 2020-05-11 RX ORDER — IOPAMIDOL 755 MG/ML
500 INJECTION, SOLUTION INTRAVASCULAR ONCE
Status: COMPLETED | OUTPATIENT
Start: 2020-05-11 | End: 2020-05-11

## 2020-05-11 RX ORDER — ACYCLOVIR 200 MG/1
0-1 CAPSULE ORAL
Status: DISCONTINUED | OUTPATIENT
Start: 2020-05-11 | End: 2020-05-12 | Stop reason: HOSPADM

## 2020-05-11 RX ORDER — METHYLPREDNISOLONE SODIUM SUCCINATE 125 MG/2ML
125 INJECTION, POWDER, LYOPHILIZED, FOR SOLUTION INTRAMUSCULAR; INTRAVENOUS
Status: DISCONTINUED | OUTPATIENT
Start: 2020-05-11 | End: 2020-05-12 | Stop reason: HOSPADM

## 2020-05-11 RX ADMIN — IOPAMIDOL 100 ML: 755 INJECTION, SOLUTION INTRAVENOUS at 11:00

## 2020-05-11 RX ADMIN — SODIUM CHLORIDE 100 ML: 9 INJECTION, SOLUTION INTRAVENOUS at 11:00

## 2020-05-15 ENCOUNTER — OFFICE VISIT (OUTPATIENT)
Dept: URGENT CARE | Facility: URGENT CARE | Age: 80
End: 2020-05-15
Attending: PHYSICIAN ASSISTANT
Payer: MEDICARE

## 2020-05-15 ENCOUNTER — ANTICOAGULATION THERAPY VISIT (OUTPATIENT)
Dept: CARDIOLOGY | Facility: CLINIC | Age: 80
End: 2020-05-15
Payer: MEDICARE

## 2020-05-15 ENCOUNTER — TELEPHONE (OUTPATIENT)
Dept: CARDIOLOGY | Facility: CLINIC | Age: 80
End: 2020-05-15

## 2020-05-15 DIAGNOSIS — I48.0 PAROXYSMAL ATRIAL FIBRILLATION (H): ICD-10-CM

## 2020-05-15 DIAGNOSIS — Z11.59 SCREENING EXAMINATION FOR POLIOMYELITIS: Primary | ICD-10-CM

## 2020-05-15 DIAGNOSIS — Z79.01 LONG TERM CURRENT USE OF ANTICOAGULANTS WITH INR GOAL OF 2.0-3.0: ICD-10-CM

## 2020-05-15 DIAGNOSIS — I48.0 PAROXYSMAL ATRIAL FIBRILLATION (H): Primary | ICD-10-CM

## 2020-05-15 DIAGNOSIS — Z11.59 ENCOUNTER FOR SCREENING FOR OTHER VIRAL DISEASES: ICD-10-CM

## 2020-05-15 LAB
CAPILLARY BLOOD COLLECTION: NORMAL
INR BLD: 2.4 (ref 0.86–1.14)

## 2020-05-15 PROCEDURE — 99000 SPECIMEN HANDLING OFFICE-LAB: CPT | Performed by: PHYSICIAN ASSISTANT

## 2020-05-15 PROCEDURE — 85610 PROTHROMBIN TIME: CPT | Mod: QW | Performed by: INTERNAL MEDICINE

## 2020-05-15 PROCEDURE — 36416 COLLJ CAPILLARY BLOOD SPEC: CPT | Performed by: INTERNAL MEDICINE

## 2020-05-15 PROCEDURE — 99207 ZZC NO BILLABLE SERVICE THIS VISIT: CPT

## 2020-05-15 PROCEDURE — 87635 SARS-COV-2 COVID-19 AMP PRB: CPT | Performed by: PHYSICIAN ASSISTANT

## 2020-05-15 PROCEDURE — 99207 ZZC NO CHARGE NURSE ONLY: CPT

## 2020-05-15 RX ORDER — SODIUM CHLORIDE, SODIUM LACTATE, POTASSIUM CHLORIDE, CALCIUM CHLORIDE 600; 310; 30; 20 MG/100ML; MG/100ML; MG/100ML; MG/100ML
INJECTION, SOLUTION INTRAVENOUS CONTINUOUS
Status: CANCELLED | OUTPATIENT
Start: 2020-05-15

## 2020-05-15 RX ORDER — LIDOCAINE 40 MG/G
CREAM TOPICAL
Status: CANCELLED | OUTPATIENT
Start: 2020-05-15

## 2020-05-15 NOTE — TELEPHONE ENCOUNTER
Spoke to pt who had COVID testing completed after the debacle trying to find the place. Pt is aware to be NPO after midnight and may have sips of water in the AM with his AM meds. Pt is not on a diuretic or diabetic medication.  Pt wife will be  to and from procedure and pt is aware that if all goes well that he will go home the same day and will get a call the following day. Pt has no questions or concerns. [facility]       Wellness Screening Tool    Symptom Screening:    Do you have one of the following new symptoms:      Fever or reported chills? No    A new cough (started within the past 14 days)?  No    Shortness of breath (started within the past 14 days)?  No    Nausea, vomiting or diarrhea? No    Within the past 3 weeks, have you been exposed to someone with a known positive illness below?      COVID - 19 (known or suspected)  no    Chicken pox?  no    Measles? no    Pertussis?  No          Patient notified of visitor restriction: YES  Patient informed to wear a mask: YES    Patient's appointment status: [facility] Fib Ablation.

## 2020-05-15 NOTE — PROGRESS NOTES
ANTICOAGULATION FOLLOW-UP CLINIC VISIT    Patient Name:  Nigel Rodarte Jr.  Date:  5/15/2020  Contact Type:  Telephone    SUBJECTIVE:  Patient Findings     Positives:   Upcoming invasive procedure (afib ablation on 20), Change in medications (occasional extra dose of Flecainide because of afib)        Clinical Outcomes     Negatives:   Major bleeding event, Thromboembolic event, Anticoagulation-related hospital admission, Anticoagulation-related ED visit, Anticoagulation-related fatality           OBJECTIVE    Recent labs: (last 7 days)     05/15/20  0935   INR 2.4*       ASSESSMENT / PLAN  INR assessment THER    Recheck INR In: 6 WEEKS    INR Location Outside lab      Anticoagulation Summary  As of 5/15/2020    INR goal:   2.0-3.0   TTR:   60.5 % (1 y)   INR used for dosin.4 (5/15/2020)   Warfarin maintenance plan:   5 mg (5 mg x 1) every Tue, Fri; 7.5 mg (5 mg x 1.5) all other days   Full warfarin instructions:   5 mg every Tue, Fri; 7.5 mg all other days   Weekly warfarin total:   47.5 mg   No change documented:   Gladys Herrera RN   Plan last modified:   Gladys Herrera RN (2019)   Next INR check:   2020   Priority:   Maintenance   Target end date:   Indefinite    Indications    Atrial fibrillation (H) [I48.91] [I48.91]  Long term current use of anticoagulants with INR goal of 2.0-3.0 [Z79.01]             Anticoagulation Episode Summary     INR check location:       Preferred lab:       Send INR reminders to:   [facility] HEART INR NURSE    Comments:         Anticoagulation Care Providers     Provider Role Specialty Phone number    Grzegorz Malhotra MD [facility] Cardiology [phone]            See the Encounter Report to view Anticoagulation Flowsheet and Dosing Calendar (Go to Encounters tab in chart review, and find the Anticoagulation Therapy Visit)    INR 2.40 No change in diet (greens every other day). Recurrent episodes of afib so has been taking an occasional extra dose of  Flecainide.Denies abnormal bleeding or bruising. Will continue current dosing of 5 mg TuF and 7.5 mg all other days with recheck in 6 weeks. He is scheduled for an afib ablation on Monday 5/18/20. Message sent for afib team to review pre procedure INR result.    Gladys Herrera RN

## 2020-05-16 LAB
SARS-COV-2 RNA SPEC QL NAA+PROBE: NOT DETECTED
SPECIMEN SOURCE: NORMAL

## 2020-05-18 ENCOUNTER — HOSPITAL ENCOUNTER (OUTPATIENT)
Facility: CLINIC | Age: 80
Discharge: HOME | End: 2020-05-19
Admitting: INTERNAL MEDICINE
Payer: MEDICARE

## 2020-05-18 ENCOUNTER — SURGERY (OUTPATIENT)
Age: 80
End: 2020-05-18
Payer: MEDICARE

## 2020-05-18 ENCOUNTER — ANESTHESIA EVENT (OUTPATIENT)
Dept: CARDIOLOGY | Facility: CLINIC | Age: 80
End: 2020-05-18
Payer: MEDICARE

## 2020-05-18 ENCOUNTER — HOSPITAL ENCOUNTER (OUTPATIENT)
Dept: CARDIOLOGY | Facility: CLINIC | Age: 80
End: 2020-05-18
Attending: PHYSICIAN ASSISTANT
Payer: MEDICARE

## 2020-05-18 ENCOUNTER — ANESTHESIA (OUTPATIENT)
Dept: CARDIOLOGY | Facility: CLINIC | Age: 80
End: 2020-05-18
Payer: MEDICARE

## 2020-05-18 DIAGNOSIS — I48.0 PAROXYSMAL ATRIAL FIBRILLATION (H): ICD-10-CM

## 2020-05-18 LAB
ANION GAP SERPL CALCULATED.3IONS-SCNC: 5 MMOL/L (ref 3–14)
BUN SERPL-MCNC: 14 MG/DL (ref 7–30)
CALCIUM SERPL-MCNC: 9.2 MG/DL (ref 8.5–10.1)
CHLORIDE SERPL-SCNC: 108 MMOL/L (ref 94–109)
CO2 SERPL-SCNC: 29 MMOL/L (ref 20–32)
CREAT SERPL-MCNC: 1.11 MG/DL (ref 0.66–1.25)
ERYTHROCYTE [DISTWIDTH] IN BLOOD BY AUTOMATED COUNT: 13.6 % (ref 10–15)
GFR SERPL CREATININE-BSD FRML MDRD: 63 ML/MIN/{1.73_M2}
GLUCOSE SERPL-MCNC: 89 MG/DL (ref 70–99)
HCT VFR BLD AUTO: 48.3 % (ref 40–53)
HGB BLD-MCNC: 16.2 G/DL (ref 13.3–17.7)
INR PPP: 2.58 (ref 0.86–1.14)
KCT BLD-ACNC: 163 SEC (ref 75–150)
KCT BLD-ACNC: 163 SEC (ref 75–150)
KCT BLD-ACNC: 263 SEC (ref 75–150)
KCT BLD-ACNC: 344 SEC (ref 75–150)
KCT BLD-ACNC: 368 SEC (ref 75–150)
MCH RBC QN AUTO: 30 PG (ref 26.5–33)
MCHC RBC AUTO-ENTMCNC: 33.5 G/DL (ref 31.5–36.5)
MCV RBC AUTO: 89 FL (ref 78–100)
PLATELET # BLD AUTO: 205 10E9/L (ref 150–450)
POTASSIUM SERPL-SCNC: 4.2 MMOL/L (ref 3.4–5.3)
RBC # BLD AUTO: 5.4 10E12/L (ref 4.4–5.9)
SODIUM SERPL-SCNC: 142 MMOL/L (ref 133–144)
WBC # BLD AUTO: 6 10E9/L (ref 4–11)

## 2020-05-18 PROCEDURE — 37000008 ZZH ANESTHESIA TECHNICAL FEE, 1ST 30 MIN: Performed by: INTERNAL MEDICINE

## 2020-05-18 PROCEDURE — 99152 MOD SED SAME PHYS/QHP 5/>YRS: CPT | Mod: 59 | Performed by: INTERNAL MEDICINE

## 2020-05-18 PROCEDURE — 93613 INTRACARDIAC EPHYS 3D MAPG: CPT | Performed by: INTERNAL MEDICINE

## 2020-05-18 PROCEDURE — C1730 CATH, EP, 19 OR FEW ELECT: HCPCS | Performed by: INTERNAL MEDICINE

## 2020-05-18 PROCEDURE — C1733 CATH, EP, OTHR THAN COOL-TIP: HCPCS | Performed by: INTERNAL MEDICINE

## 2020-05-18 PROCEDURE — 93656 COMPRE EP EVAL ABLTJ ATR FIB: CPT | Performed by: INTERNAL MEDICINE

## 2020-05-18 PROCEDURE — 25000125 ZZHC RX 250: Performed by: INTERNAL MEDICINE

## 2020-05-18 PROCEDURE — 93621 COMP EP EVL L PAC&REC C SINS: CPT | Performed by: INTERNAL MEDICINE

## 2020-05-18 PROCEDURE — 25800030 ZZH RX IP 258 OP 636: Performed by: INTERNAL MEDICINE

## 2020-05-18 PROCEDURE — 93005 ELECTROCARDIOGRAM TRACING: CPT

## 2020-05-18 PROCEDURE — 93662 INTRACARDIAC ECG (ICE): CPT | Mod: 26 | Performed by: INTERNAL MEDICINE

## 2020-05-18 PROCEDURE — 25000128 H RX IP 250 OP 636: Performed by: INTERNAL MEDICINE

## 2020-05-18 PROCEDURE — 25000132 ZZH RX MED GY IP 250 OP 250 PS 637: Mod: GY | Performed by: INTERNAL MEDICINE

## 2020-05-18 PROCEDURE — 93325 DOPPLER ECHO COLOR FLOW MAPG: CPT | Mod: 26 | Performed by: INTERNAL MEDICINE

## 2020-05-18 PROCEDURE — 80048 BASIC METABOLIC PNL TOTAL CA: CPT | Performed by: INTERNAL MEDICINE

## 2020-05-18 PROCEDURE — 25000128 H RX IP 250 OP 636: Performed by: NURSE ANESTHETIST, CERTIFIED REGISTERED

## 2020-05-18 PROCEDURE — 36415 COLL VENOUS BLD VENIPUNCTURE: CPT

## 2020-05-18 PROCEDURE — 85610 PROTHROMBIN TIME: CPT

## 2020-05-18 PROCEDURE — 27210805 ZZH SHEATH CR4

## 2020-05-18 PROCEDURE — 93312 ECHO TRANSESOPHAGEAL: CPT | Mod: 26 | Performed by: INTERNAL MEDICINE

## 2020-05-18 PROCEDURE — 93655 ICAR CATH ABLTJ DSCRT ARRHYT: CPT | Performed by: INTERNAL MEDICINE

## 2020-05-18 PROCEDURE — C1732 CATH, EP, DIAG/ABL, 3D/VECT: HCPCS | Performed by: INTERNAL MEDICINE

## 2020-05-18 PROCEDURE — 85347 COAGULATION TIME ACTIVATED: CPT

## 2020-05-18 PROCEDURE — 25000566 ZZH SEVOFLURANE, EA 15 MIN: Performed by: INTERNAL MEDICINE

## 2020-05-18 PROCEDURE — 25000125 ZZHC RX 250: Performed by: NURSE ANESTHETIST, CERTIFIED REGISTERED

## 2020-05-18 PROCEDURE — 40000852 ZZH STATISTIC HEART CATH LAB OR EP LAB

## 2020-05-18 PROCEDURE — 27210794 ZZH OR GENERAL SUPPLY STERILE: Performed by: INTERNAL MEDICINE

## 2020-05-18 PROCEDURE — 93325 DOPPLER ECHO COLOR FLOW MAPG: CPT

## 2020-05-18 PROCEDURE — C1769 GUIDE WIRE: HCPCS | Performed by: INTERNAL MEDICINE

## 2020-05-18 PROCEDURE — 93320 DOPPLER ECHO COMPLETE: CPT | Mod: 26 | Performed by: INTERNAL MEDICINE

## 2020-05-18 PROCEDURE — C1759 CATH, INTRA ECHOCARDIOGRAPHY: HCPCS | Performed by: INTERNAL MEDICINE

## 2020-05-18 PROCEDURE — 37000009 ZZH ANESTHESIA TECHNICAL FEE, EACH ADDTL 15 MIN: Performed by: INTERNAL MEDICINE

## 2020-05-18 PROCEDURE — 85027 COMPLETE CBC AUTOMATED: CPT | Performed by: INTERNAL MEDICINE

## 2020-05-18 PROCEDURE — 40000065 ZZH STATISTIC EKG NON-CHARGEABLE

## 2020-05-18 PROCEDURE — 25800030 ZZH RX IP 258 OP 636: Performed by: NURSE ANESTHETIST, CERTIFIED REGISTERED

## 2020-05-18 PROCEDURE — 93010 ELECTROCARDIOGRAM REPORT: CPT | Mod: 76 | Performed by: INTERNAL MEDICINE

## 2020-05-18 PROCEDURE — 93653 COMPRE EP EVAL TX SVT: CPT | Performed by: INTERNAL MEDICINE

## 2020-05-18 PROCEDURE — C1894 INTRO/SHEATH, NON-LASER: HCPCS | Performed by: INTERNAL MEDICINE

## 2020-05-18 PROCEDURE — 93662 INTRACARDIAC ECG (ICE): CPT | Performed by: INTERNAL MEDICINE

## 2020-05-18 RX ORDER — ALBUTEROL SULFATE 0.83 MG/ML
2.5 SOLUTION RESPIRATORY (INHALATION) EVERY 4 HOURS PRN
Status: DISCONTINUED | OUTPATIENT
Start: 2020-05-18 | End: 2020-05-19 | Stop reason: HOSPADM

## 2020-05-18 RX ORDER — PROPOFOL 10 MG/ML
INJECTION, EMULSION INTRAVENOUS PRN
Status: DISCONTINUED | OUTPATIENT
Start: 2020-05-18 | End: 2020-05-18

## 2020-05-18 RX ORDER — NALOXONE HYDROCHLORIDE 0.4 MG/ML
.1-.4 INJECTION, SOLUTION INTRAMUSCULAR; INTRAVENOUS; SUBCUTANEOUS
Status: DISCONTINUED | OUTPATIENT
Start: 2020-05-18 | End: 2020-05-19 | Stop reason: HOSPADM

## 2020-05-18 RX ORDER — OXYCODONE AND ACETAMINOPHEN 5; 325 MG/1; MG/1
1 TABLET ORAL EVERY 4 HOURS PRN
Status: DISCONTINUED | OUTPATIENT
Start: 2020-05-18 | End: 2020-05-18

## 2020-05-18 RX ORDER — HYDROMORPHONE HYDROCHLORIDE 1 MG/ML
.3-.5 INJECTION, SOLUTION INTRAMUSCULAR; INTRAVENOUS; SUBCUTANEOUS EVERY 5 MIN PRN
Status: DISCONTINUED | OUTPATIENT
Start: 2020-05-18 | End: 2020-05-19 | Stop reason: HOSPADM

## 2020-05-18 RX ORDER — PROTAMINE SULFATE 10 MG/ML
INJECTION, SOLUTION INTRAVENOUS
Status: DISCONTINUED | OUTPATIENT
Start: 2020-05-18 | End: 2020-05-18 | Stop reason: HOSPADM

## 2020-05-18 RX ORDER — KETOROLAC TROMETHAMINE 15 MG/ML
INJECTION, SOLUTION INTRAMUSCULAR; INTRAVENOUS
Status: DISCONTINUED | OUTPATIENT
Start: 2020-05-18 | End: 2020-05-18 | Stop reason: HOSPADM

## 2020-05-18 RX ORDER — FENTANYL CITRATE 50 UG/ML
INJECTION, SOLUTION INTRAMUSCULAR; INTRAVENOUS PRN
Status: DISCONTINUED | OUTPATIENT
Start: 2020-05-18 | End: 2020-05-18

## 2020-05-18 RX ORDER — ONDANSETRON 2 MG/ML
INJECTION INTRAMUSCULAR; INTRAVENOUS PRN
Status: DISCONTINUED | OUTPATIENT
Start: 2020-05-18 | End: 2020-05-18

## 2020-05-18 RX ORDER — HYDRALAZINE HYDROCHLORIDE 20 MG/ML
2.5-5 INJECTION INTRAMUSCULAR; INTRAVENOUS EVERY 10 MIN PRN
Status: DISCONTINUED | OUTPATIENT
Start: 2020-05-18 | End: 2020-05-19 | Stop reason: HOSPADM

## 2020-05-18 RX ORDER — ONDANSETRON 2 MG/ML
4 INJECTION INTRAMUSCULAR; INTRAVENOUS EVERY 30 MIN PRN
Status: DISCONTINUED | OUTPATIENT
Start: 2020-05-18 | End: 2020-05-19 | Stop reason: HOSPADM

## 2020-05-18 RX ORDER — GLYCOPYRROLATE 0.2 MG/ML
INJECTION, SOLUTION INTRAMUSCULAR; INTRAVENOUS PRN
Status: DISCONTINUED | OUTPATIENT
Start: 2020-05-18 | End: 2020-05-18

## 2020-05-18 RX ORDER — LIDOCAINE HYDROCHLORIDE 20 MG/ML
INJECTION, SOLUTION INFILTRATION; PERINEURAL PRN
Status: DISCONTINUED | OUTPATIENT
Start: 2020-05-18 | End: 2020-05-18

## 2020-05-18 RX ORDER — MEPERIDINE HYDROCHLORIDE 25 MG/ML
12.5 INJECTION INTRAMUSCULAR; INTRAVENOUS; SUBCUTANEOUS EVERY 5 MIN PRN
Status: DISCONTINUED | OUTPATIENT
Start: 2020-05-18 | End: 2020-05-18

## 2020-05-18 RX ORDER — HEPARIN SODIUM 200 [USP'U]/100ML
100-500 INJECTION, SOLUTION INTRAVENOUS CONTINUOUS PRN
Status: DISCONTINUED | OUTPATIENT
Start: 2020-05-18 | End: 2020-05-18 | Stop reason: HOSPADM

## 2020-05-18 RX ORDER — METOPROLOL SUCCINATE 25 MG/1
25 TABLET, EXTENDED RELEASE ORAL DAILY
Status: DISCONTINUED | OUTPATIENT
Start: 2020-05-18 | End: 2020-05-19 | Stop reason: HOSPADM

## 2020-05-18 RX ORDER — CEFAZOLIN SODIUM 1 G/3ML
1 INJECTION, POWDER, FOR SOLUTION INTRAMUSCULAR; INTRAVENOUS
Status: DISCONTINUED | OUTPATIENT
Start: 2020-05-18 | End: 2020-05-18 | Stop reason: HOSPADM

## 2020-05-18 RX ORDER — ONDANSETRON 4 MG/1
4 TABLET, ORALLY DISINTEGRATING ORAL EVERY 30 MIN PRN
Status: DISCONTINUED | OUTPATIENT
Start: 2020-05-18 | End: 2020-05-19 | Stop reason: HOSPADM

## 2020-05-18 RX ORDER — FLECAINIDE ACETATE 50 MG/1
50 TABLET ORAL 2 TIMES DAILY
Status: DISCONTINUED | OUTPATIENT
Start: 2020-05-18 | End: 2020-05-19 | Stop reason: HOSPADM

## 2020-05-18 RX ORDER — SODIUM CHLORIDE, SODIUM LACTATE, POTASSIUM CHLORIDE, CALCIUM CHLORIDE 600; 310; 30; 20 MG/100ML; MG/100ML; MG/100ML; MG/100ML
INJECTION, SOLUTION INTRAVENOUS CONTINUOUS
Status: DISCONTINUED | OUTPATIENT
Start: 2020-05-18 | End: 2020-05-18 | Stop reason: HOSPADM

## 2020-05-18 RX ORDER — HEPARIN SODIUM 200 [USP'U]/100ML
100-600 INJECTION, SOLUTION INTRAVENOUS CONTINUOUS PRN
Status: DISCONTINUED | OUTPATIENT
Start: 2020-05-18 | End: 2020-05-18 | Stop reason: HOSPADM

## 2020-05-18 RX ORDER — NALOXONE HYDROCHLORIDE 0.4 MG/ML
.1-.4 INJECTION, SOLUTION INTRAMUSCULAR; INTRAVENOUS; SUBCUTANEOUS
Status: DISCONTINUED | OUTPATIENT
Start: 2020-05-18 | End: 2020-05-18

## 2020-05-18 RX ORDER — DOBUTAMINE HYDROCHLORIDE 200 MG/100ML
5-40 INJECTION INTRAVENOUS CONTINUOUS PRN
Status: DISCONTINUED | OUTPATIENT
Start: 2020-05-18 | End: 2020-05-18 | Stop reason: HOSPADM

## 2020-05-18 RX ORDER — ACETAMINOPHEN 325 MG/1
975 TABLET ORAL EVERY 8 HOURS
Status: DISCONTINUED | OUTPATIENT
Start: 2020-05-18 | End: 2020-05-19 | Stop reason: HOSPADM

## 2020-05-18 RX ORDER — NEOSTIGMINE METHYLSULFATE 1 MG/ML
VIAL (ML) INJECTION PRN
Status: DISCONTINUED | OUTPATIENT
Start: 2020-05-18 | End: 2020-05-18

## 2020-05-18 RX ORDER — CETIRIZINE HYDROCHLORIDE 10 MG/1
10 TABLET ORAL 2 TIMES DAILY PRN
Status: DISCONTINUED | OUTPATIENT
Start: 2020-05-18 | End: 2020-05-19 | Stop reason: HOSPADM

## 2020-05-18 RX ORDER — OXYCODONE AND ACETAMINOPHEN 5; 325 MG/1; MG/1
1 TABLET ORAL EVERY 4 HOURS PRN
Status: DISCONTINUED | OUTPATIENT
Start: 2020-05-18 | End: 2020-05-19 | Stop reason: HOSPADM

## 2020-05-18 RX ORDER — DEXAMETHASONE SODIUM PHOSPHATE 4 MG/ML
INJECTION, SOLUTION INTRA-ARTICULAR; INTRALESIONAL; INTRAMUSCULAR; INTRAVENOUS; SOFT TISSUE PRN
Status: DISCONTINUED | OUTPATIENT
Start: 2020-05-18 | End: 2020-05-18

## 2020-05-18 RX ORDER — SODIUM CHLORIDE, SODIUM LACTATE, POTASSIUM CHLORIDE, CALCIUM CHLORIDE 600; 310; 30; 20 MG/100ML; MG/100ML; MG/100ML; MG/100ML
INJECTION, SOLUTION INTRAVENOUS CONTINUOUS
Status: DISCONTINUED | OUTPATIENT
Start: 2020-05-18 | End: 2020-05-19 | Stop reason: HOSPADM

## 2020-05-18 RX ORDER — FUROSEMIDE 10 MG/ML
INJECTION INTRAMUSCULAR; INTRAVENOUS
Status: DISCONTINUED | OUTPATIENT
Start: 2020-05-18 | End: 2020-05-18 | Stop reason: HOSPADM

## 2020-05-18 RX ORDER — EPHEDRINE SULFATE 50 MG/ML
INJECTION, SOLUTION INTRAMUSCULAR; INTRAVENOUS; SUBCUTANEOUS PRN
Status: DISCONTINUED | OUTPATIENT
Start: 2020-05-18 | End: 2020-05-18

## 2020-05-18 RX ORDER — LIDOCAINE 40 MG/G
CREAM TOPICAL
Status: DISCONTINUED | OUTPATIENT
Start: 2020-05-18 | End: 2020-05-18 | Stop reason: HOSPADM

## 2020-05-18 RX ORDER — DUTASTERIDE 0.5 MG/1
0.5 CAPSULE, LIQUID FILLED ORAL DAILY
Status: DISCONTINUED | OUTPATIENT
Start: 2020-05-19 | End: 2020-05-19 | Stop reason: HOSPADM

## 2020-05-18 RX ORDER — HEPARIN SODIUM 1000 [USP'U]/ML
INJECTION, SOLUTION INTRAVENOUS; SUBCUTANEOUS
Status: DISCONTINUED | OUTPATIENT
Start: 2020-05-18 | End: 2020-05-18 | Stop reason: HOSPADM

## 2020-05-18 RX ORDER — FENTANYL CITRATE 50 UG/ML
25-50 INJECTION, SOLUTION INTRAMUSCULAR; INTRAVENOUS
Status: DISCONTINUED | OUTPATIENT
Start: 2020-05-18 | End: 2020-05-18

## 2020-05-18 RX ADMIN — FLECAINIDE ACETATE 50 MG: 50 TABLET ORAL at 21:34

## 2020-05-18 RX ADMIN — SODIUM CHLORIDE, POTASSIUM CHLORIDE, SODIUM LACTATE AND CALCIUM CHLORIDE: 600; 310; 30; 20 INJECTION, SOLUTION INTRAVENOUS at 07:27

## 2020-05-18 RX ADMIN — Medication 3.5 MG: at 11:33

## 2020-05-18 RX ADMIN — Medication 10 MG: at 08:58

## 2020-05-18 RX ADMIN — Medication 5 MG: at 10:00

## 2020-05-18 RX ADMIN — LIDOCAINE HYDROCHLORIDE 80 MG: 20 INJECTION, SOLUTION INFILTRATION; PERINEURAL at 08:46

## 2020-05-18 RX ADMIN — FENTANYL CITRATE 25 MCG: 50 INJECTION, SOLUTION INTRAMUSCULAR; INTRAVENOUS at 10:12

## 2020-05-18 RX ADMIN — PROTAMINE SULFATE 40 MG: 10 INJECTION, SOLUTION INTRAVENOUS at 11:21

## 2020-05-18 RX ADMIN — FENTANYL CITRATE 50 MCG: 50 INJECTION, SOLUTION INTRAMUSCULAR; INTRAVENOUS at 08:46

## 2020-05-18 RX ADMIN — Medication 5 MG: at 11:36

## 2020-05-18 RX ADMIN — ROCURONIUM BROMIDE 50 MG: 10 INJECTION INTRAVENOUS at 08:46

## 2020-05-18 RX ADMIN — PROPOFOL 40 MG: 10 INJECTION, EMULSION INTRAVENOUS at 10:03

## 2020-05-18 RX ADMIN — DEXAMETHASONE SODIUM PHOSPHATE 4 MG: 4 INJECTION, SOLUTION INTRA-ARTICULAR; INTRALESIONAL; INTRAMUSCULAR; INTRAVENOUS; SOFT TISSUE at 08:55

## 2020-05-18 RX ADMIN — PHENYLEPHRINE HYDROCHLORIDE 0.25 MCG/KG/MIN: 10 INJECTION INTRAVENOUS at 08:55

## 2020-05-18 RX ADMIN — GLYCOPYRROLATE 0.2 MG: 0.2 INJECTION, SOLUTION INTRAMUSCULAR; INTRAVENOUS at 08:55

## 2020-05-18 RX ADMIN — LIDOCAINE HYDROCHLORIDE 10 ML: 10 INJECTION, SOLUTION EPIDURAL; INFILTRATION; INTRACAUDAL; PERINEURAL at 09:06

## 2020-05-18 RX ADMIN — PROPOFOL 160 MG: 10 INJECTION, EMULSION INTRAVENOUS at 08:46

## 2020-05-18 RX ADMIN — HEPARIN SODIUM 3000 UNITS: 1000 INJECTION INTRAVENOUS; SUBCUTANEOUS at 10:02

## 2020-05-18 RX ADMIN — GLYCOPYRROLATE 0.4 MG: 0.2 INJECTION, SOLUTION INTRAMUSCULAR; INTRAVENOUS at 11:33

## 2020-05-18 RX ADMIN — FUROSEMIDE 40 MG: 10 INJECTION, SOLUTION INTRAVENOUS at 11:36

## 2020-05-18 RX ADMIN — Medication 5 MG: at 09:32

## 2020-05-18 RX ADMIN — WARFARIN SODIUM 7.5 MG: 2.5 TABLET ORAL at 20:59

## 2020-05-18 RX ADMIN — ONDANSETRON 4 MG: 2 INJECTION INTRAMUSCULAR; INTRAVENOUS at 11:22

## 2020-05-18 RX ADMIN — FENTANYL CITRATE 25 MCG: 50 INJECTION, SOLUTION INTRAMUSCULAR; INTRAVENOUS at 11:48

## 2020-05-18 RX ADMIN — METOPROLOL SUCCINATE 25 MG: 25 TABLET, EXTENDED RELEASE ORAL at 20:59

## 2020-05-18 RX ADMIN — HEPARIN SODIUM 7000 UNITS: 1000 INJECTION INTRAVENOUS; SUBCUTANEOUS at 09:37

## 2020-05-18 RX ADMIN — KETOROLAC TROMETHAMINE 15 MG: 15 INJECTION, SOLUTION INTRAMUSCULAR; INTRAVENOUS at 11:36

## 2020-05-18 ASSESSMENT — ENCOUNTER SYMPTOMS: DYSRHYTHMIAS: 1

## 2020-05-18 ASSESSMENT — MIFFLIN-ST. JEOR: SCORE: 1432.51

## 2020-05-18 NOTE — PROGRESS NOTES
Care Suites Admission Nursing Note    Patient Information  Name: Nigel Rodarte Jr.  Age: 79 year old  Reason for admission: afib ablation  Care Suites arrival time: 0645    Patient Admission/Assessment   Pre-procedure assessment complete: Yes  If abnormal assessment/labs, provider notified: N/A  NPO: Yes  Medications held per instructions/orders: Yes  Consent: obtained  If applicable, pregnancy test status: n/a  Patient oriented to room: Yes  Education/questions answered: Yes  Plan/other: proceed    Discharge Planning  Accompanied by: self  Discharge name/phone number: Denise-spouse   [phone]  Overnight post sedation caregiver: spouse  Discharge location: home  PATIENT WELLNESS SCREENING    Step 1: Answer all screening questions 1-3.    1. In the last month, have you been in contact with someone who was confirmed or suspected to have Coronavirus/COVID-19? No    2. Do you have the following symptoms?   Fever? No   Cough? No   Shortness of breath? No   Skin rash? No    Step 2: Refer to logic grid below for actions    NO SYMPTOM(S)    ACTIONS:  1. Standard rooming process  2. Provider to assess per normal protocol    POSITIVE SYMPTOM(S)  If positive for ANY of the following symptoms: fever, cough, shortness of breath, rash    ACTIONS  1. Mask patient  2. Room patient as soon as possible  3. Don appropriate PPE when entering room  4. Provider evaluation    Cristiano Glass RN

## 2020-05-18 NOTE — PRE-PROCEDURE
GENERAL PRE-PROCEDURE:   Procedure:  Afib ablation  Date/Time:  5/18/2020 8:11 AM    Written consent obtained?: Yes    Risks and benefits: Risks, benefits and alternatives were discussed    Consent given by:  Patient  Patient states understanding of procedure being performed: Yes    Patient's understanding of procedure matches consent: Yes    Procedure consent matches procedure scheduled: Yes    Expected level of sedation:  Moderate  Appropriately NPO:  Yes  ASA Class:  Class 2- mild systemic disease, no acute problems, no functional limitations  Mallampati  :  Grade 2- soft palate, base of uvula, tonsillar pillars, and portion of posterior pharyngeal wall visible  Lungs:  Lungs clear with good breath sounds bilaterally  Heart:  Normal heart sounds and rate  History & Physical reviewed:  History and physical reviewed and no updates needed  Statement of review:  I have reviewed the lab findings, diagnostic data, medications, and the plan for sedation

## 2020-05-18 NOTE — ANESTHESIA POSTPROCEDURE EVALUATION
Patient: Nigel Rodarte JrMagdalena    Procedure(s):  EP Ablation Focal AFIB    Diagnosis:paroxysmal sx'c AFib; Roscoe to do. INR 2 days prior  Diagnosis Additional Information: No value filed.    Anesthesia Type:  General    Note:  Anesthesia Post Evaluation    Patient location during evaluation: PACU  Patient participation: Able to fully participate in evaluation  Level of consciousness: awake  Pain management: adequate  Airway patency: patent  Cardiovascular status: acceptable  Respiratory status: acceptable  Hydration status: acceptable  PONV: none     Anesthetic complications: None          Last vitals:  Vitals:    05/18/20 1245 05/18/20 1315 05/18/20 1330   BP: 98/69 117/79 112/75   Pulse:   73   Resp: 10 16 16   Temp:      SpO2: 99% 99% 98%         Electronically Signed By: Gilma Sanchez MD, MD  May 18, 2020  1:58 PM

## 2020-05-18 NOTE — DISCHARGE INSTRUCTIONS
A Fib Ablation Discharge Instructions    After you go home:    Have an adult stay with you until tomorrow.    You may eat your normal diet, unless your doctor tells you otherwise.    RELAX and take it easy for 3 days.        For 24-48 hours (due to the sedation you received):    DO NOT DRIVE FOR 2 DAYS!     Do NOT make any important or legal decisions.    Do NOT drive or operate machines at home or at work.    Do NOT drink alcohol.    Care of Puncture Site:    Check the puncture site every 1-2 hours while awake.    For 2-3 days, when you cough, sneeze, laugh or move your bowels, hold your hand over the puncture site and press firmly.    Change band aid daily for at least 3 days. If there is minor oozing, apply another band aid and remove it after 12 hours.     It is normal to have a small bruise or pea size lump at the site.    You may shower. Do NOT take a bath, or use a hot tub or pool until groin site heals, which may take up to a week.  Do NOT scrub the site. Do not use lotion or powder near the puncture site.    Activity:    Do NOT lift, push or pull more than 10 pounds (equal to a gallon of milk) for 3 days.    NO repetitive motions such as loading , vacuuming, raking, shoveling.     Bleeding:    If you start bleeding from the groin site, lie down flat and press firmly on the site for 10 minutes or until bleeding stops.     Once bleeding stops, lay flat for 1-2 hours.    Call your A Fib nurse if bleeding does not stop or after hours will need to go to ER.       Go to ER or Call 911 right away if you have heavy bleeding or bleeding that does not stop.    Medications:    Take your medications, including blood thinners, unless your doctor tells you not to.    If you have stopped any other medicines, check with your nurse or provider about when to restart them.    If you have pain, you may takeTylenol (acetaminophen) and if this does not help may take Advil (ibuprofen-400 mg with food).    Call the A Fib  RN if:    Chest pain not relieved by tylenol or ibuprofen    Difficulty swallowing and/or coughing up blood    Shortness of breath    Increased groin pain or a large or growing hard lump around the site.    Groin site is red, swollen, hot or tender.    Blood or fluid is draining from the groin site.    You have chills or a fever greater than 101 F (38 C).    Your leg feels numb, cool or changes color.    If groin pain is not relieved by Tylenol or Ibuprofen.    Recurrent irregular or fast heart rate lasting over 2-3 hours.    Any questions or concerns.    Heart rhythms:  You may have some irregular heartbeats. These feel very strong. They may make you feel that the A Fib is going to start again.  Give it time. The irregular beats should occur less often. Call if they last >2-3 hours    Follow Up Appointments:    CALL Destini Runnels and set up INR for Friday 5/22 or next Tuesday 5/26/2020    Virtual Visit with SHELBI Avina on Tuesday 5/26 @ 12:30     Dr Malhotra 8/12 at 11:15 with an EKG     [facility]   A Fib clinic RN's Fifi Jimenez [phone] (Mon-Fri, 8:00-5:00)   [phone] Option 2 (7 days a week) after hours for on call Cardiologist.

## 2020-05-18 NOTE — PROGRESS NOTES
Mr. Rodarte is a delightful 79-year-old gentleman with a long history of paroxysmal atrial fibrillation, who failed rhythm control strategy with flecainide/ metoprolol and was referred for atrial fibrillation ablation.    Patient is CHADS-Vasc score of 2.  He on chronic therapy with Coumadin and is schedule have BEN done today prior to ablation.    Chest CT revealed normal pulmonary venous anatomy with all pulmonary veins draining into the left atrium. Soft tissues appear unremarkable on radiology report.    Options were discussed including do nothing, pharmacotherapy or catheter ablation procedure.  Procedure was explained in details.  Patient understands the pro/cons of each option.  He understands there is 3-4% risk of complication associated with procedure.  He would like to pursue ablation and consent was signed.    Of note, case was initially delayed due to ongoing COVID-19 pandemic issues. Due to development of frequent symptoms, case was deemed to be TIER- 2 level of priority based on current government guidelines, and ablation was scheduled.    Criteria for Procedure  o Tier 1 - time sensitive in less than one week - schedule now  o Tier 2 - time sensitive w/in 1 month - schedule now  o Tier 3 - time sensitive w/in 2 months  o Tier 4 - Elective        Grzegorz Malhotra MD

## 2020-05-18 NOTE — ANESTHESIA PREPROCEDURE EVALUATION
Anesthesia Pre-Procedure Evaluation    Patient: Nigel Rodarte Jr.   MRN: [id number] : 1940          Preoperative Diagnosis: paroxysmal sx'c AFib; Roscoe to do. INR 2 days prior    Procedure(s):  EP Ablation Focal AFIB    Past Medical History:   Diagnosis Date     Arthritis     osteoarthrosis     Elevated PSA      Fatigue      Palpitations      Paroxysmal atrial fibrillation (H) 2017     Renal disease     kidney calculus     SOB (shortness of breath)      Past Surgical History:   Procedure Laterality Date     APPENDECTOMY       ARTHROPLASTY HIP  2013    Procedure: ARTHROPLASTY HIP;  RIGHT TOTAL HIP ARTHROPLASTY ;  Surgeon: Jamie Ohara MD;  Location:  OR     ARTHROPLASTY KNEE Right 2019    Procedure: RIGHT TOTAL KNEE ARTHROPLASTY (DEPUY)^;  Surgeon: Jamie Ohara MD;  Location:  OR     CARDIOVERSION       COLONOSCOPY N/A 2016    Procedure: COLONOSCOPY;  Surgeon: Astrid Vital MD;  Location:  GI     CYSTOSCOPY, RETROGRADES, EXTRACT STONE, COMBINED  2013    Procedure: COMBINED CYSTOSCOPY, RETROGRADES, EXTRACT STONE;  CYSTOSCOPY, RIGHT RETROGRADE, STONE EXTRACTION;  Surgeon: Carlos Mcknight MD;  Location:  OR     ENT SURGERY      right ear surgery     GENITOURINARY SURGERY      cystoscopy for stone removal     HERNIA REPAIR       ORTHOPEDIC SURGERY      shoulder surgeries,bilat carpel tunnel       Anesthesia Evaluation     .             ROS/MED HX    ENT/Pulmonary:      (-) sleep apnea   Neurologic:       Cardiovascular:     (+) ----. : . . . :. dysrhythmias a-fib, .       METS/Exercise Tolerance:     Hematologic:         Musculoskeletal:   (+) arthritis,  -       GI/Hepatic:        (-) GERD   Renal/Genitourinary:         Endo:         Psychiatric:         Infectious Disease:         Malignancy:         Other:                          Physical Exam  Normal systems: cardiovascular, pulmonary and dental    Airway   Mallampati: II  TM distance: >3 FB  Neck ROM:  "full    Dental     Cardiovascular   Rhythm and rate: regular and normal      Pulmonary    breath sounds clear to auscultation            Lab Results   Component Value Date    WBC 4.8 05/20/2018    HGB 12.8 (L) 11/07/2019    HCT 45.1 05/20/2018     05/20/2018     11/07/2019    POTASSIUM 4.5 11/07/2019    CHLORIDE 107 11/07/2019    CO2 26 11/07/2019    BUN 18 11/07/2019    CR 0.98 11/07/2019     (H) 11/07/2019    CHERI 8.0 (L) 11/07/2019    MAG 2.3 07/18/2015    ALBUMIN 3.8 05/16/2017    PROTTOTAL 7.0 05/16/2017    ALT 30 05/16/2017    AST 26 05/16/2017    ALKPHOS 74 05/16/2017    BILITOTAL 0.5 05/16/2017    INR 2.4 (H) 05/15/2020    TSH 2.34 05/16/2017       Preop Vitals  BP Readings from Last 3 Encounters:   05/18/20 112/80   05/11/20 111/75   02/17/20 112/71    Pulse Readings from Last 3 Encounters:   05/18/20 55   02/17/20 53   11/18/19 66      Resp Readings from Last 3 Encounters:   05/18/20 16   11/18/19 18   11/08/19 16    SpO2 Readings from Last 3 Encounters:   05/18/20 98%   11/18/19 97%   11/08/19 95%      Temp Readings from Last 1 Encounters:   05/18/20 36.9  C (98.5  F) (Oral)    Ht Readings from Last 1 Encounters:   05/18/20 1.753 m (5' 9\")      Wt Readings from Last 1 Encounters:   05/18/20 72.2 kg (159 lb 3.2 oz)    Estimated body mass index is 23.51 kg/m  as calculated from the following:    Height as of this encounter: 1.753 m (5' 9\").    Weight as of this encounter: 72.2 kg (159 lb 3.2 oz).       Anesthesia Plan      History & Physical Review  History and physical reviewed and following examination; no interval change.    ASA Status:  3 .    NPO Status:  > 8 hours    Plan for General with Intravenous induction. Maintenance will be Balanced.    PONV prophylaxis:  Ondansetron (or other 5HT-3) and Dexamethasone or Solumedrol         Postoperative Care  Postoperative pain management:  IV analgesics and Oral pain medications.      Consents  Anesthetic plan, risks, benefits and " alternatives discussed with:  Patient..                 Gilma Sanchez MD, MD

## 2020-05-18 NOTE — PROGRESS NOTES
1315: Pt returned from PACU. Bandaid CDI to right groin puncture sites and left groin puncture sites. No oozing or hematoma noted. Area soft & flat. Pt denies pain. Pt instructed on activity restrictions while on bedrest. Verbal understanding received from pt.    1340: Pt c/o bladder pain upon arrival to care suites from PACU. Bladder scanner reveals greater than 700 ml of urine in bladder. Order from MD to straight cath Pt now. Removal of 800 ml of clear yellow urine. Pt is feeling much better. Denies pain now, VSS.

## 2020-05-18 NOTE — ANESTHESIA CARE TRANSFER NOTE
Patient: Nigel Rodarte Jr.    Procedure(s):  EP Ablation Focal AFIB    Diagnosis: paroxysmal sx'c AFib; Roscoe to do. INR 2 days prior  Diagnosis Additional Information: No value filed.    Anesthesia Type:   General     Note:  Airway :Face Mask  Patient transferred to:PACU  Comments: Neuromuscular blockade reversed after TOF 4/4, spontaneous respirations, adequate tidal volumes, followed commands to voice, oropharynx suctioned with soft flexible catheter, extubated atraumatically, extubated with suction, airway patent after extubation.  Oxygen via facemask at 6 liters per minute to PACU. After extubation, patient remained in OR for 14 minutes until transport to PACU due to standard hospital COVID-19 precautions, Oxygen tubing connected to wall O2 in PACU, SpO2, NiBP, and EKG monitors and alarms on and functioning, report on patient's clinical status given to PACU RN, RN questions answered.   Handoff Report: Identifed the Patient, Identified the Reponsible Provider, Reviewed the pertinent medical history, Discussed the surgical course, Reviewed Intra-OP anesthesia mangement and issues during anesthesia, Set expectations for post-procedure period and Allowed opportunity for questions and acknowledgement of understanding      Vitals: (Last set prior to Anesthesia Care Transfer)    CRNA VITALS  5/18/2020 1122 - 5/18/2020 1208      5/18/2020             Resp Rate (set):  10                Electronically Signed By: BRENDA Weber CRNA  May 18, 2020  12:08 PM

## 2020-05-18 NOTE — PROGRESS NOTES
"Patient up to ambulate and void after bedrest complete.  Patient states he wasn't able to void and \"only a few drops came out\".  Encouraged PO and attempted to void again.  At this time patient is unable to void and was bladder scanned for 550 ml @ 1735.  He states he has no urge or pressure to urinate.  Dr Malhotra notified and plan to attempt to wait for patient to void so he can be discharged.  If unable to void will place indwelling cheema catheter and patient will stay the night.    Bilateral groin sites are CDI without hematoma or swelling.  Left sight is slightly ecchymotic, but soft.  AVS reviewed with patient and he verbalizes understanding.  VS WNL, SR,   Continue to monitor.  "

## 2020-05-19 ENCOUNTER — TELEPHONE (OUTPATIENT)
Dept: CARDIOLOGY | Facility: CLINIC | Age: 80
End: 2020-05-19

## 2020-05-19 VITALS
TEMPERATURE: 96.1 F | BODY MASS INDEX: 23.46 KG/M2 | SYSTOLIC BLOOD PRESSURE: 104 MMHG | HEART RATE: 67 BPM | OXYGEN SATURATION: 95 % | WEIGHT: 158.38 LBS | RESPIRATION RATE: 16 BRPM | HEIGHT: 69 IN | DIASTOLIC BLOOD PRESSURE: 63 MMHG

## 2020-05-19 VITALS — SYSTOLIC BLOOD PRESSURE: 104 MMHG | DIASTOLIC BLOOD PRESSURE: 63 MMHG

## 2020-05-19 PROCEDURE — 25000132 ZZH RX MED GY IP 250 OP 250 PS 637: Mod: GY | Performed by: INTERNAL MEDICINE

## 2020-05-19 PROCEDURE — 99215 OFFICE O/P EST HI 40 MIN: CPT | Mod: 25 | Performed by: PHYSICIAN ASSISTANT

## 2020-05-19 RX ORDER — DUTASTERIDE 0.5 MG/1
0.5 CAPSULE, LIQUID FILLED ORAL DAILY
Start: 2020-05-20 | End: 2020-10-30

## 2020-05-19 RX ORDER — FLECAINIDE ACETATE 50 MG/1
50 TABLET ORAL 2 TIMES DAILY
Start: 2020-05-19 | End: 2020-10-30

## 2020-05-19 RX ORDER — ACETAMINOPHEN 325 MG/1
975 TABLET ORAL EVERY 8 HOURS
Start: 2020-05-19 | End: 2020-10-30

## 2020-05-19 RX ORDER — TAMSULOSIN HYDROCHLORIDE 0.4 MG/1
0.4 CAPSULE ORAL DAILY
Status: DISCONTINUED | OUTPATIENT
Start: 2020-05-19 | End: 2020-05-19

## 2020-05-19 RX ORDER — CETIRIZINE HYDROCHLORIDE 10 MG/1
10 TABLET ORAL 2 TIMES DAILY PRN
Qty: 20 TABLET | Refills: 0 | Status: SHIPPED | OUTPATIENT
Start: 2020-05-19 | End: 2021-04-06

## 2020-05-19 RX ORDER — METOPROLOL SUCCINATE 25 MG/1
25 TABLET, EXTENDED RELEASE ORAL DAILY
Start: 2020-05-20 | End: 2020-10-20

## 2020-05-19 RX ADMIN — FLECAINIDE ACETATE 50 MG: 50 TABLET ORAL at 08:26

## 2020-05-19 RX ADMIN — DUTASTERIDE 0.5 MG: 0.5 CAPSULE, LIQUID FILLED ORAL at 08:26

## 2020-05-19 RX ADMIN — METOPROLOL SUCCINATE 25 MG: 25 TABLET, EXTENDED RELEASE ORAL at 08:26

## 2020-05-19 ASSESSMENT — MIFFLIN-ST. JEOR: SCORE: 1428.76

## 2020-05-19 NOTE — PLAN OF CARE
VSS on RA. Pt voiding adequately. Discharge instructions reviewed w/ pt, pt verbalized understanding. Pt discharged home via wife as transport.

## 2020-05-19 NOTE — PROGRESS NOTES
"At 1835 patient re-attempted to urinate.  Patient states \"not much is coming out\".  Bladder scanned for 537ml @ 1845.  Indwelling cheema catheter placed without difficulty at 1900- 525ml clear urine returned in bag.  Report called to Dion MUNOZ and bedside handoff given.  Message left for wife Denise of plan for patient to spend the night.   "

## 2020-05-19 NOTE — PROGRESS NOTES
"[facility]    EP Progress Note    Date of Service (when I saw the patient): 05/19/2020     Assessment & Plan   Nigel Rodarte Jr. is a 79 year old male with h/o recurrent symptomatic paroxysmal AFib and bradycardia  who was admitted on 5/18/2020 for AFib ablation. Had increasing episodes requiring additional flecainide therapy and opted for ablation more urgently given increasing length and severity of episodes.    1. Sx'c paroxysmal AFib -   - First dx'd 5/2017  - Had been on flecainide 50 mg BID and metoprolol XL 25 mg daily. Has underlying bradycardia in 40s    - Now s/p AFib ablation/PVI x 4 and empiric CTI ablation 5/18/2020 with Dr. Malhotra    - Tele has shown SR/SB  - EKG SR 71 bpm with QRS duration 84 ms    - EF wnl on BEN; Got 40 mg IV Lasix following procedure: I/Os are down 1.7L and weight is down 0.4 kg  - On exam, no evidence of fluid overload. Lungs are clear    - Remains on warfarin for CHADSVASc 3 (aortic plaquing, age)  - BEN prior showed no clot     PLAN:   * Continue flecainide 50 mg BID   * Continue metoprolol Xl 25 mg daily   * Continue warfarin; INR 2.58 at time of procedure; Asked him to call and set up INR early next week    2. Difficulty urinating -   - Straight cathed @ 1340; attempted to void at 1800 and \"only a few drops\" came out. Had Ye placed @ 2000  - Got Lasix 40 mg post-op  - PTA on Avodart - just got this AM @ 0830 (didn't take yesterday before procedure)     PLAN:   * Has now gotten Avodart; will remove Ye for voiding trial    3. Pre-op testing -   - BEN showed EF 55-60%; mild plaque in aortic arch. No sig valve abnls  - CTA Heart showed normal aorta; coronary calcifications noted. No incidental findings requiring follow-up    - Stress Echo done 1/2018 without stress-induced WMA/ischemia.   - Lipids 2/2020 showed , trigs 88, HDL 40,  mg/dL     PLAN:   * Consider statin as OP given discovery of coronary and aortic plaquing. Discussed dietary " "changes      Briseyda Meyers PA-C    Interval History   Feeling \"good\" and hopeful he can get the Ye out soon  No c/o CP, SOB. Notes L groin bruising but no pain  No trouble swallowing    Physical Exam   Temp: 96.3  F (35.7  C) Temp src: Oral BP: 104/57 Pulse: 76 Heart Rate: 70 Resp: 16 SpO2: 99 % O2 Device: None (Room air)    Vitals:    05/18/20 0700 05/19/20 0641   Weight: 72.2 kg (159 lb 3.2 oz) 71.8 kg (158 lb 6 oz)     Vital Signs with Ranges  Temp:  [96.3  F (35.7  C)-97.2  F (36.2  C)] 96.3  F (35.7  C)  Pulse:  [67-84] 76  Heart Rate:  [69-78] 70  Resp:  [8-20] 16  BP: ()/(57-85) 104/57  SpO2:  [94 %-99 %] 99 %  I/O last 3 completed shifts:  In: 800 [I.V.:800]  Out: 2525 [Urine:2525]    Telemetry: SR    Constitutional: awake, alert, cooperative, no apparent distress, and appears stated age  Respiratory: CTA B  Cardiovascular: Regular. No RMG  Musculoskeletal: R groin soft without hematoma or bruit. L groin with mild ecchymosis and 2x2 cm hematoma. Nontender. No bruit. DPs 1+ bilaterally    Medications     lactated ringers Stopped (05/18/20 1525)     Warfarin Therapy Reminder         acetaminophen  975 mg Oral Q8H     dutasteride  0.5 mg Oral Daily     flecainide  50 mg Oral BID     metoprolol succinate ER  25 mg Oral Daily       Data   I personally reviewed the EKG tracing showing SR.  Results for orders placed or performed during the hospital encounter of 05/18/20 (from the past 24 hour(s))   EKG 12-lead, tracing only   Result Value Ref Range    Interpretation ECG Click View Image link to view waveform and result    EP Ablation    Narrative    PROCEDURES PERFORMED:  1. General anesthesia.  2. Cardiac fluoroscopy.  3. Intra-cardiac echocardiogram (ICE).  4. Transseptal approach.  5. Electrophysiology study with left atrium recording/pacing via coronary   sinus catheter.  6. 3D-electroanatomic mapping.  7. Atrial flutter ablation (cavo-tricuspid isthmus).  8. Atrial fibrillation ablation " (pulmonary vein isolation).    INDICATION: Symptomatic persistent atrial fibrillation and flutter.    HISTORY OF PRESENT ILLNESS:  79-year-old gentleman with a long history of   paroxysmal atrial fibrillation/ flutter, who failed rhythm control   strategy with flecainide/ metoprolol and was referred for atrial   fibrillation ablation.      Of note, patient is on coumadin and INR is therapeutic today.  BEN prior   to ablation ruled out intraatrial clots.    Risks and benefits of the procedure were reviewed including (but not   limited to) arrhythmias,  pain, infection, bleeding, skin damage from   ionizing radiation, kidney damage or allergic reaction to contrast dye,   esophageal injury, phrenic nerve paralysis, pulmonary vein stenosis,   injury to neighboring vascular structures, need for emergent   cardiopulmonary resuscitation, heart attack, stroke, death. Alternatives   were discussed including doing nothing. All questions were answered to the   patient's satisfaction. Informed consent was signed and the patient wished   to proceed.    Timeout was performed in the presence of the staff members listed above.    PROCEDURE DESCRIPTION: After obtaining informed consent and the usual   sterile prep and drape, femoral sites were locally anesthetized and venous   vascular sheaths (right: 6Fr x1 and 8.5Fr x2; left: 9 Fr x1) were inserted   via modified Seldinger technique.    With routine hemodynamic and electrocardiographic monitoring,   electrophysiology catheters were advanced under fluoroscopic guidance to   the right ventricle, right atrium and coronary sinus. Intracardiac   electrocardiograms and conduction were measured at rest and after   ablation.    A guide wire and trans-septal sheath were advanced to the superior vena   cava under fluoroscopic guidance. The guide wire was withdrawn, the sheath   was vigorously flushed and the needle assembly was advanced to the end of   the sheath. Using intra-cardiac  ultrasound and fluoroscopic guidance, the   sheath was withdrawn until its tip engaged the fossa ovalis of the   inter-atrial septum. The fossa ovalis was crossed with the needle.   Localization within the left atrium was confirmed. The needle and sheath   assembly then was advanced as a unit a short distance into the left atrium   and the needle was withdrawn. This procedure was repeated for the second   trans-septal puncture.  Of note, immediately after the transseptal   puncture, anticoagulation with Heparin (I.V.) was started.  Activated   clotting time (ACT) was kept between 350 to 400 seconds throughout the   procedure.    An endocardial map of the left atrium was created via the Vehrity   system and superimposed upon the pre-existing CT image of the chamber.    With routine hemodynamic and electrocardiographic monitoring, a tipped   irrigated ablation catheter (3.5 mm tip; F/J curve) was advanced under   fluoroscopic guidance to the left atrium. Circumferential radio-frequency   lesions were applied surrounding the pulmonary veins until complete   isolation was achieved.  High output pacing technique was used to identify   phrenic location and avoid injury of the phrenic nerve.  Esophageal   temperature was monitored throughout the procedure. After ablation, a   PentaRay catheter was used for 3D-anatomical and voltage mapping.    Additional lesions were applied until all 4 pulmonary veins were isolated.   Adequacy of ablation lines was confirmed by presence of scar and local   electrogram abatement.    After pulmonary vein isolation, due to previous history of atrial flutter,   a decision was made to pursue empiric cavo-tricuspid isthmus (CTI)   ablation. Electrophysiology catheters were withdrawn under fluoroscopic   guidance to the right atrium.  Using the HBCS mapping system, an   endocardial mapping of the right atrium was created, and the cavo   tricuspid isthmus was localized.  Using  the irrigated tip radiofrequency   ablation catheter, the isthmus was ablated.   Bi-directional block across   the cavo-tricuspid isthmus was confirmed after ablation.    At the end of the procedure,  using intra-cardiac ultrasound, images were   obtained and ruled out the presence of a pericardial effusion.   Anticoagulation was reversed with Protamine sulfate and sheaths were   removed in the lab.  Hemostasis was obtained with manual pressure.    FLUOROSCOPY DATA:  Fluoro time:  5 minutes and 36 seconds.  Radiation dose (AK): 24 mGy.  Dose-area product (DAP): 2,848 Gy.cm2.    MEASURED DATA:  Pre-ablation:  Normal sinus rhythm.  milliseconds,    milliseconds,  milliseconds,  milliseconds.  Post-ablation:  Normal sinus rhythm.   milliseconds,    milliseconds, QRS 93 milliseconds,  milliseconds. AH 67 milliseconds   and HV 39 milliseconds. Antegrade AV Wenckebach 400 milliseconds.    Antegrade AV node /320.  No sustained atrial arrhythmia was induced   despite of aggressive atrial burst pacing and program stimulation.    Cavo-Tricuspid Isthmus Time:  Pre-ablation:  95 milliseconds.  Post-ablation:  140 milliseconds.    IMPRESSION:  1. Baseline rhythm: sinus rhythm.  2. A trans-septal puncture was performed without complication.  3. A 3-D left atrial map was created using the CARTO-merge mapping system   and a pre-acquired CT image.  4. Using a 3.5 mm tipped irrigated ablation catheter, circumferential   linear ablation lesions were made around the left and right sided   pulmonary veins.  5. PentaRay catheter was used for creation of 3D-anatomical and voltage   mapping after ablation.   Additional lesions were applied until all 4   pulmonary veins were isolated.  6. Adequacy of ablation lines was confirmed by presence of scar and local   electrogram abatement.  7. After pulmonary vein isolation, the cavo-tricuspid isthmus (CTI) was   empirically ablated. Bi-directional  block across the CTI line was   confirmed  8. Final rhythm: sinus rhythm.    SUMMARY:  - Successful atrial fibrillation ablation with electrical isolation of 4   pulmonary veins.  - Successful empiric cavo-tricuspid isthmus ablation.    RECOMMENDATIONS:  1. Remain flat in bed for at least 4 hours after procedure. May elevate   head of bed 30 degrees after 4 hours.  2. Continue oral anticoagulation.  May start Pradaxa 4-6 hours after   procedure.  3. Restart all other medications.  4. Continue antiarrhythmic therapy for at least 3 months after procedure.  5. Nexium or Protonix for at least one month after procedure.  6. No vigorous activity for 5-7 days.  7. We anticipate early recurrences of atrial fibrillation during the   healing phase following ablation. This healing phase may last 3 months.   The efficacy of the procedure will be assessed beginning 3 months post   ablation. If recurrent paroxysmal AF occurs, we would advise adjusting   medications to achieve adequate rate control. If recurrent persistent AF   occurs, cardioversion should be considered if the patient is highly   symptomatic. If the patient is asymptomatic or minimally symptomatic,   cardioversion can be deferred until two to three months following   ablation.    No immediate complications apparent.    Grzegorz Malhotra MD         EKG 12-lead, tracing only   Result Value Ref Range    Interpretation ECG Click View Image link to view waveform and result

## 2020-05-19 NOTE — PROGRESS NOTES
A&O. VSS. Denies Pain, SOB, dizziness, palpitations. Declined scheduled tylenol. Tele SR. CMS intact. Groin sites unchanged, soft, nontender. Ecchymosis to L groin.. Ye catheter in place for rtn. Up SBA. Plan for voiding trial this AM. Discharge pending.

## 2020-05-19 NOTE — TELEPHONE ENCOUNTER
Received Hospital ADT message. Pt was discharged after Afib and Aflutter ablation. Warfarin was continued at discharge. Recommendation from Brooklyn Meyers for pt to have INR rechecked early next week. Spoke with pt to review that he will need to call the [facility] to schedule the lab only INR appt for early next week. Pt verbalized understanding. Tamar

## 2020-05-23 LAB
INTERPRETATION ECG - MUSE: NORMAL
INTERPRETATION ECG - MUSE: NORMAL

## 2020-05-25 NOTE — PROGRESS NOTES
"Nigel Rodarte Jr. is a 80 year old male who is being evaluated via a billable video visit.      The patient has been notified of following:     \"This video visit will be conducted via a call between you and your physician/provider. We have found that certain health care needs can be provided without the need for an in-person physical exam.  This service lets us provide the care you need with a video conversation.  If a prescription is necessary we can send it directly to your pharmacy.  If lab work is needed we can place an order for that and you can then stop by our lab to have the test done at a later time.    Video visits are billed at different rates depending on your insurance coverage.  Please reach out to your insurance provider with any questions.    If during the course of the call the physician/provider feels a video visit is not appropriate, you will not be charged for this service.\"    Patient has given verbal consent for Video visit? Yes    How would you like to obtain your AVS? Mail a copy    Patient would like the video invitation sent by: Text to cell phone: [phone]    Will anyone else be joining your video visit? No      Spoke with pt on the phone; pt reported the following:  BP: 111/75  Pulse:  65 bpm  Wt: 152 lbs    Reviewed by NOLA Cheung, 5/26/20    CC:  Post AFib ablation    VITALS:  111/75  HR 65 bpm  Weight 152#    BRIEF HPI:   78 y/o M who sees Dr. Malhotra for his h/o:     1. Sx'c Paroxysmal AFib first dx'd 5/2017, with ER visit for \"unwellness\" and palpitations, treated with DCCV. Had increasing breakthroughs of AFib despite flecainide therapy, now s/p AFib ablation (PVIx4) with empiric CTI ablation 5/18/2020  2. CHADSVASc 2 (age)    I had a virtual visit with Leonardo 4/30 at which time we discussed options for recurrent AFib. He opted for PVI, which was done 5/18/2020 by Dr. Malhotra following a BEN and CT scan.  Dr. Malhotra ablated all 4 PVs and empirically ablated CTI.  He was " "discharged the following day after a slight delay for difficulty urinating. He had not taken his Avodart the morning of the procedure. No med changes were made.    INTERVAL HISTORY:  Overall he's felt well. Just mowed the lawn and felt \"out of shape\" but it took the same amount of time it would have prior to his ablation.  No significant STOCK. No exertional CP or pleuritic-type discomfort. No edema, orthopnea or PND.    Has noted HRs typically in 60s. No recurrent AFib. Groin sites with improving L>R bruising. Pea-sized lump at L groin; none on R.    Still with mildly scratchy throat. No trouble swallowing. No fever/chills.    No trouble urinating. Was constipated x 4 days and took what sound like laxatives; now with diarrhea x 3 days.  Mild nausea but no vomiting. No bleeding.     DIAGNOSTICS:  EKG 5/19/2020 showed SR 71 bpm.   BEN 5/18/2020 EF 55-60%. Normal RV size/function. PFO suspected. Mild atherosclerotic plaque in aortic arch  CT A Heart 5/11/2020 showed aortic and coronary plaquing. Noncardiac portion showed no findings requiring follow up  Proarrhythmia Stress Echo 2/2018 showed no proarrhythmia. No ischemia.    REVIEW OF SYSTEMS:  Negative except as noted above    PHYSICAL EXAM:  GEN: NAD. Upright. Thin body habitus  ENT/Mouth: Atraumatic and normocephalic  Eyes: No scleral icterus; normal conjunctivae  Neck: No observed thyromegaly  Chest/Lungs: No audible wheezing or cough; equal chest wall expansion. Non-labored breathing  CV: No evidence of elevated JVP. No evidence of pitting edema  Abdomen: No observed jaundice. No evidence of abdominal distention  Extremities: No cyanosis or clubbing observed  Skin: No visible rashes. Normal skin color  Neuro: Normal Arm motion bilaterally. No tremors. No visible evidence of focal defects  Psychiatric: A/O. Calm demeanor    ASSESSMENT/PLAN:    1. Paroxysmal AFib    Now s/p PVI and empiric CTI 5/18. He remained in SR/SB following the procedure     No AFib that he's " aware of (previously always very sx'c despite rate control)    Remains on BB, flecainide and warfarin     PLAN:    Continue current meds    See Dr. Malhotra 8/2020 as planned    Call with recurrent AFib      2. Coronary Calcification    CTA Heart pre-procedure showed aortic and coronary calcification    Stress echo 2018 negative for ischemia; remains active and no c/o chest discomfort    Last cholesterol panel 2/2020 ([facility]) , trigs 88, HDL 40,  mg/dL     PLAN:  He prefers to avoid statins if possible; will limit saturated fat to <17 grams/day for now and will recheck fasting labs before Dr. Malhotra appt 8/2020    I have reviewed the note as documented above.  This accurately captures the substance of my conversation with the patient.      Video-Visit Details    Type of service:  Video Visit     Video Start Time: 1235  Video End Time: 1255  20 minutes    Originating Location (pt. Location): Home    Distant Location (provider location):  Home Office    Platform used for Video Visit: Gamaliel Meyers PA-C

## 2020-05-26 ENCOUNTER — VIRTUAL VISIT (OUTPATIENT)
Dept: CARDIOLOGY | Facility: CLINIC | Age: 80
End: 2020-05-26
Payer: MEDICARE

## 2020-05-26 VITALS
HEIGHT: 69 IN | WEIGHT: 152 LBS | BODY MASS INDEX: 22.51 KG/M2 | DIASTOLIC BLOOD PRESSURE: 75 MMHG | HEART RATE: 65 BPM | SYSTOLIC BLOOD PRESSURE: 111 MMHG

## 2020-05-26 DIAGNOSIS — I48.91 ATRIAL FIBRILLATION, UNSPECIFIED TYPE (H): ICD-10-CM

## 2020-05-26 DIAGNOSIS — I48.0 PAROXYSMAL ATRIAL FIBRILLATION (H): Primary | ICD-10-CM

## 2020-05-26 DIAGNOSIS — E78.5 DYSLIPIDEMIA: ICD-10-CM

## 2020-05-26 PROCEDURE — 99213 OFFICE O/P EST LOW 20 MIN: CPT | Mod: 95 | Performed by: PHYSICIAN ASSISTANT

## 2020-05-26 ASSESSMENT — MIFFLIN-ST. JEOR: SCORE: 1394.85

## 2020-05-26 NOTE — LETTER
"5/26/2020    Osmar Espinoza MD  [place] [address]  [facility] [address]    RE: Nigel Rodarte Jr.       Dear Colleague,    I had the pleasure of seeing Nigel Rodarte Jr. in the [facility].    BRIEF HPI:   78 y/o M who sees Dr. Malhotra for his h/o:     1. Sx'c Paroxysmal AFib first dx'd 5/2017, with ER visit for \"unwellness\" and palpitations, treated with DCCV. Had increasing breakthroughs of AFib despite flecainide therapy, now s/p AFib ablation (PVIx4) with empiric CTI ablation 5/18/2020  2. CHADSVASc 2 (age)    I had a virtual visit with Leonardo 4/30 at which time we discussed options for recurrent AFib. He opted for PVI, which was done 5/18/2020 by Dr. Malhotra following a BEN and CT scan.  Dr. Malhotra ablated all 4 PVs and empirically ablated CTI.  He was discharged the following day after a slight delay for difficulty urinating. He had not taken his Avodart the morning of the procedure. No med changes were made.    INTERVAL HISTORY:  Overall he's felt well. Just mowed the lawn and felt \"out of shape\" but it took the same amount of time it would have prior to his ablation.  No significant STOCK. No exertional CP or pleuritic-type discomfort. No edema, orthopnea or PND.    Has noted HRs typically in 60s. No recurrent AFib. Groin sites with improving L>R bruising. Pea-sized lump at L groin; none on R.    Still with mildly scratchy throat. No trouble swallowing. No fever/chills.    No trouble urinating. Was constipated x 4 days and took what sound like laxatives; now with diarrhea x 3 days.  Mild nausea but no vomiting. No bleeding.     DIAGNOSTICS:  EKG 5/19/2020 showed SR 71 bpm.   BEN 5/18/2020 EF 55-60%. Normal RV size/function. PFO suspected. Mild atherosclerotic plaque in aortic arch  CT A Heart 5/11/2020 showed aortic and coronary plaquing. Noncardiac portion showed no findings requiring follow up  Proarrhythmia Stress Echo 2/2018 showed no proarrhythmia. No " ischemia.    REVIEW OF SYSTEMS:  Negative except as noted above    PHYSICAL EXAM:  GEN: NAD. Upright. Thin body habitus  ENT/Mouth: Atraumatic and normocephalic  Eyes: No scleral icterus; normal conjunctivae  Neck: No observed thyromegaly  Chest/Lungs: No audible wheezing or cough; equal chest wall expansion. Non-labored breathing  CV: No evidence of elevated JVP. No evidence of pitting edema  Abdomen: No observed jaundice. No evidence of abdominal distention  Extremities: No cyanosis or clubbing observed  Skin: No visible rashes. Normal skin color  Neuro: Normal Arm motion bilaterally. No tremors. No visible evidence of focal defects  Psychiatric: A/O. Calm demeanor    ASSESSMENT/PLAN:    1. Paroxysmal AFib    Now s/p PVI and empiric CTI 5/18. He remained in SR/SB following the procedure     No AFib that he's aware of (previously always very sx'c despite rate control)    Remains on BB, flecainide and warfarin     PLAN:    Continue current meds    See Dr. Malhotra 8/2020 as planned    Call with recurrent AFib      2. Coronary Calcification    CTA Heart pre-procedure showed aortic and coronary calcification    Stress echo 2018 negative for ischemia; remains active and no c/o chest discomfort    Last cholesterol panel 2/2020 ([facility]) , trigs 88, HDL 40,  mg/dL     PLAN:  He prefers to avoid statins if possible; will limit saturated fat to <17 grams/day for now and will recheck fasting labs before Dr. Malhotra appt 8/2020    I have reviewed the note as documented above.  This accurately captures the substance of my conversation with the patient.          Thank you for allowing me to participate in the care of your patient.    Sincerely,     Briseyda Meyers PA-C     [facility]

## 2020-05-27 ENCOUNTER — ANTICOAGULATION THERAPY VISIT (OUTPATIENT)
Dept: CARDIOLOGY | Facility: CLINIC | Age: 80
End: 2020-05-27
Payer: MEDICARE

## 2020-05-27 DIAGNOSIS — Z79.01 LONG TERM CURRENT USE OF ANTICOAGULANTS WITH INR GOAL OF 2.0-3.0: ICD-10-CM

## 2020-05-27 DIAGNOSIS — I48.0 PAROXYSMAL ATRIAL FIBRILLATION (H): ICD-10-CM

## 2020-05-27 LAB
CAPILLARY BLOOD COLLECTION: NORMAL
INR BLD: 2.2 (ref 0.86–1.14)

## 2020-05-27 PROCEDURE — 85610 PROTHROMBIN TIME: CPT | Mod: QW | Performed by: INTERNAL MEDICINE

## 2020-05-27 PROCEDURE — 36416 COLLJ CAPILLARY BLOOD SPEC: CPT | Performed by: INTERNAL MEDICINE

## 2020-05-27 PROCEDURE — 99207 ZZC NO CHARGE NURSE ONLY: CPT

## 2020-05-27 NOTE — PROGRESS NOTES
ANTICOAGULATION FOLLOW-UP CLINIC VISIT    Patient Name:  Nigel Rodarte Jr.  Date:  2020  Contact Type:  Telephone    SUBJECTIVE:  Patient Findings     Positives:   Change in health (recent loose stools after taking 2 stool softeners last week)        Clinical Outcomes     Negatives:   Major bleeding event, Thromboembolic event, Anticoagulation-related hospital admission, Anticoagulation-related ED visit, Anticoagulation-related fatality           OBJECTIVE    Recent labs: (last 7 days)     20  1019   INR 2.2*       ASSESSMENT / PLAN  INR assessment THER    Recheck INR In: 3 WEEKS    INR Location Outside lab      Anticoagulation Summary  As of 2020    INR goal:   2.0-3.0   TTR:   63.8 % (1 y)   INR used for dosin.2 (2020)   Warfarin maintenance plan:   5 mg (5 mg x 1) every Tue, Fri; 7.5 mg (5 mg x 1.5) all other days   Full warfarin instructions:   5 mg every Tue, Fri; 7.5 mg all other days   Weekly warfarin total:   47.5 mg   No change documented:   Gladys Herrera RN   Plan last modified:   Gladys Herrera RN (2019)   Next INR check:   2020   Priority:   Maintenance   Target end date:   Indefinite    Indications    Atrial fibrillation (H) [I48.91] [I48.91]  Long term current use of anticoagulants with INR goal of 2.0-3.0 [Z79.01]             Anticoagulation Episode Summary     INR check location:       Preferred lab:       Send INR reminders to:   MA[facility] HEART INR NURSE    Comments:         Anticoagulation Care Providers     Provider Role Specialty Phone number    Grzegorz Malhotra MD [facility] [phone]            See the Encounter Report to view Anticoagulation Flowsheet and Dosing Calendar (Go to Encounters tab in chart review, and find the Anticoagulation Therapy Visit)    INR 2.20 He had an afib ablation on . He ate a few more veggies around the time of the procedure but back to normal diet now. He was constipated last week so he took 2 stool  softeners and has had loose stools since that time. He has a call out to his PMD to discuss the loose stools. Denies abnormal bleeding or bruising. Will continue current dosing of 5 mg TuF and 7.5 mg all other days with recheck in 3 weeks.     Gladys Herrera RN

## 2020-06-15 ENCOUNTER — TELEPHONE (OUTPATIENT)
Dept: CARDIOLOGY | Facility: CLINIC | Age: 80
End: 2020-06-15

## 2020-06-15 NOTE — TELEPHONE ENCOUNTER
6/15/20 Pt called asking if he should continue taking Flecainide, Metoprolol and Warfarin. Informed pt that yes, he needs to continue all as he is still in the 3 mo healing period following ablation. He has a follow up with Dr Malhotra in August and discussion about stopping meds will occur at that time. Pt voiced understanding and agreement w franc Moeller 445 pm

## 2020-06-18 ENCOUNTER — TELEPHONE (OUTPATIENT)
Dept: CARDIOLOGY | Facility: CLINIC | Age: 80
End: 2020-06-18

## 2020-06-18 NOTE — TELEPHONE ENCOUNTER
Left a message to remind pt that he is due for an INR lab test. Asked that he schedule the lab at a [facility]. Tamar

## 2020-06-29 ENCOUNTER — ANTICOAGULATION THERAPY VISIT (OUTPATIENT)
Dept: CARDIOLOGY | Facility: CLINIC | Age: 80
End: 2020-06-29
Payer: MEDICARE

## 2020-06-29 DIAGNOSIS — I48.0 PAROXYSMAL ATRIAL FIBRILLATION (H): ICD-10-CM

## 2020-06-29 DIAGNOSIS — Z79.01 LONG TERM CURRENT USE OF ANTICOAGULANTS WITH INR GOAL OF 2.0-3.0: ICD-10-CM

## 2020-06-29 LAB
CAPILLARY BLOOD COLLECTION: NORMAL
INR BLD: 2.1 (ref 0.86–1.14)

## 2020-06-29 PROCEDURE — 99207 ZZC NO CHARGE NURSE ONLY: CPT | Performed by: INTERNAL MEDICINE

## 2020-06-29 PROCEDURE — 85610 PROTHROMBIN TIME: CPT | Mod: QW | Performed by: INTERNAL MEDICINE

## 2020-06-29 PROCEDURE — 36416 COLLJ CAPILLARY BLOOD SPEC: CPT | Performed by: INTERNAL MEDICINE

## 2020-06-29 NOTE — PROGRESS NOTES
ANTICOAGULATION FOLLOW-UP CLINIC VISIT    Patient Name:  Nigel Rodarte Jr.  Date:  2020  Contact Type:  Telephone    SUBJECTIVE:         OBJECTIVE    Recent labs: (last 7 days)     20  0741   INR 2.1*       ASSESSMENT / PLAN  INR assessment THER    Recheck INR In: 4 WEEKS    INR Location Outside lab      Anticoagulation Summary  As of 2020    INR goal:   2.0-3.0   TTR:   64.0 % (1 y)   INR used for dosin.1 (2020)   Warfarin maintenance plan:   5 mg (5 mg x 1) every Tue, Fri; 7.5 mg (5 mg x 1.5) all other days   Full warfarin instructions:   5 mg every Tue, Fri; 7.5 mg all other days   Weekly warfarin total:   47.5 mg   No change documented:   Teresa Gardner RN   Plan last modified:   Gladys Herrera RN (2019)   Next INR check:   2020   Priority:   Maintenance   Target end date:   Indefinite    Indications    Atrial fibrillation (H) [I48.91] [I48.91]  Long term current use of anticoagulants with INR goal of 2.0-3.0 [Z79.01]             Anticoagulation Episode Summary     INR check location:       Preferred lab:       Send INR reminders to:   [facility] HEART INR NURSE    Comments:         Anticoagulation Care Providers     Provider Role Specialty Phone number    Grzegorz Malhotra MD Responsible Cardiology [phone]            See the Encounter Report to view Anticoagulation Flowsheet and Dosing Calendar (Go to Encounters tab in chart review, and find the Anticoagulation Therapy Visit)    INR 2.10 today at outside clinic. Spoke to patient, no abnormal bleeding/bruising. No med changes or missed doses. No changes to diet, ate some broccoli last night, otherwise eats salads/broccoli every other day. Diarrhea has subsided since last visit. Will continue on current maintenance dosing of 5mg Tues/Fri and 7.5mg all other days. Recheck in 4wks.     Has the patient previously taken warfarin? yes  If yes, for what indication? afib    Does the patient have any of the following  indications for a higher range of 2.5-3.5:    Mitral position mechanical valve? no    Abelardo-Shiley, Ball and Cage or Monoleaflet valve (regardless of position) no    Other (if yes, please explain) no      Teresa Gardner, RN

## 2020-07-28 ENCOUNTER — TELEPHONE (OUTPATIENT)
Dept: CARDIOLOGY | Facility: CLINIC | Age: 80
End: 2020-07-28

## 2020-07-28 NOTE — TELEPHONE ENCOUNTER
Spoke with pt to remind him to schedule the overdue INR lab appt. He will call to schedule when he gets home from work. Tamar

## 2020-08-09 ENCOUNTER — HOSPITAL ENCOUNTER (EMERGENCY)
Facility: CLINIC | Age: 80
Discharge: HOME | End: 2020-08-09
Attending: EMERGENCY MEDICINE | Admitting: EMERGENCY MEDICINE
Payer: MEDICARE

## 2020-08-09 ENCOUNTER — NURSE TRIAGE (OUTPATIENT)
Dept: NURSING | Facility: CLINIC | Age: 80
End: 2020-08-09

## 2020-08-09 VITALS
WEIGHT: 148 LBS | RESPIRATION RATE: 12 BRPM | DIASTOLIC BLOOD PRESSURE: 78 MMHG | SYSTOLIC BLOOD PRESSURE: 138 MMHG | TEMPERATURE: 97.6 F | BODY MASS INDEX: 21.86 KG/M2 | OXYGEN SATURATION: 100 %

## 2020-08-09 DIAGNOSIS — H61.22 IMPACTED CERUMEN OF LEFT EAR: ICD-10-CM

## 2020-08-09 PROCEDURE — 99282 EMERGENCY DEPT VISIT SF MDM: CPT | Mod: 25

## 2020-08-09 PROCEDURE — 69209 REMOVE IMPACTED EAR WAX UNI: CPT | Mod: LT

## 2020-08-09 ASSESSMENT — ENCOUNTER SYMPTOMS
SHORTNESS OF BREATH: 0
FEVER: 0

## 2020-08-09 NOTE — TELEPHONE ENCOUNTER
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  On Friday Nigel is loosing hearing in left ear.  Today wife is calling and states that Nigel is very panicky about losing his hearing.      COVID 19 Nurse Triage Plan/Patient Instructions    Please be aware that novel coronavirus (COVID-19) may be circulating in the community. If you develop symptoms such as fever, cough, or SOB or if you have concerns about the presence of another infection including coronavirus (COVID-19), please contact your health care provider or visit [email or web address].     Disposition/Instructions    In-Person Visit with provider recommended. Reference Visit Selection Guide.    Thank you for taking steps to prevent the spread of this virus.  o Limit your contact with others.  o Wear a simple mask to cover your cough.  o Wash your hands well and often.    Resources    [facility]: About COVID-19: [email or web address]    CDC: What to Do If You're Sick: www.cdc.gov/coronavirus/2019-ncov/about/steps-when-sick.html    CDC: Ending Home Isolation: www.cdc.gov/coronavirus/2019-ncov/hcp/disposition-in-home-patients.html     CDC: Caring for Someone: www.cdc.gov/coronavirus/2019-ncov/if-you-are-sick/care-for-someone.html     [facility]: Interim Guidance for Hospital Discharge to Home: www.health.[facility].[place]/diseases/coronavirus/hcp/hospdischarge.pdf    [facility] clinical trials (COVID-19 research studies): clinicalaffairs.[facility]/n-clinical-trials     Below are the COVID-19 hotlines at the [place] Department of Health ([facility]). Interpreters are available.   o For health questions: Call 675-387-4047 or 1-264.243.4639 (7 a.m. to 7 p.m.)  o For questions about schools and childcare: Call 192-414-3712 or 1-106.305.1123 (7 a.m. to 7 p.m.)                       Additional Information    Negative: [1]  Ringing in the ears (tinnitus) AND [2] taking aspirin AND [3] dosage sounds high (i.e., > 1500 mg/day)    Negative: Patient sounds very sick or weak to the triager    [1] Hearing loss in one or both ears AND [2] sudden onset AND [3] present now    Protocols used: HEARING LOSS OR CHANGE-A-AH

## 2020-08-09 NOTE — DISCHARGE INSTRUCTIONS
Recommend Debrox as needed for earwax buildup; Available over-the-counter    Wear hearing protection when in noisy environments    Follow-up with ENT as needed for further evaluation of hearing loss

## 2020-08-09 NOTE — ED PROVIDER NOTES
History     Chief Complaint:  Gradual Hearing Loss       HPI   Nigel Rodarte Jr. 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 The patient states that he has had gradually decreasing hearing in his left ear since yesterday. He denies any chest pain, shortness of breath, fevers, or any illness.  No history of trauma or tinnitus.  Has concern for earwax buildup.    Allergies:  No Known Allergies     Medications:    dutasteride   flecainide   metoprolol succinate ER   warfarin    Past Medical History:    Paroxysmal a-fib   Arthritis     Past Surgical History:    Appendectomy   Arthroplasty hip  Arthroplasty knee  Right ear surgery   EP ablation    surgery   Hernia repair  Orthopedic surgery      Family History:    Lung cancer     Social History:  Smoking Status: Never Smoker  Smokeless Tobacco: Never Used  Alcohol Use: No  Drug Use: No  PCP: Osmar Espinoza     Review of Systems   Constitutional: Negative for fever.   HENT: Positive for hearing loss (left).    Respiratory: Negative for shortness of breath.    Cardiovascular: Negative for chest pain.   All other systems reviewed and are negative.      Physical Exam     Patient Vitals for the past 24 hrs:   BP Temp Heart Rate Resp SpO2 Weight   08/09/20 1014 138/78 97.6  F (36.4  C) 63 12 100 % 67.1 kg (148 lb)        Physical Exam  General: Alert and cooperative with exam. Patient in mild distress. Normal mentation.  Head:  Scalp is NC/AT  Eyes:  No scleral icterus, PERRL  ENT:  The external nose and ears are normal. The oropharynx is normal and without erythema; mucus membranes are moist. Uvula midline, no evidence of deep space infection. Right TM normal, left TM with cerumen impaction.   Neck:  Normal range of motion without rigidity.  CV:  Regular rate and rhythm    No pathologic murmur   Resp:  Breath sounds are clear bilaterally    Non-labored,  no retractions or accessory muscle use  GI:  Abdomen is soft, no distension, no tenderness. No peritoneal signs  MS:  No lower extremity edema   Skin:  Warm and dry, No rash or lesions noted.  Neuro: Oriented x 3. No gross motor deficits.     Emergency Department Course     Procedures    Cerumen Disimpaction   PERFORMED BY: Dr. Sandoval  SITE: left ear.  INDICATION:  Decreased hearing; cerumen impaction.    Using direct visualization the cerumen was slowly removed piecemeal from the ear.  Repeat otoscopic examination demonstrated normal ear canal.  I was able to get all wax removed. The TM was normal.  Patient tolerated well without complication.    Emergency Department Course:  Past medical records, nursing notes, and vitals reviewed.    1012 I performed an exam of the patient as documented above.      Findings and plan explained to the Patient. Patient discharged home with instructions regarding supportive care, medications, and reasons to return. The importance of close follow-up was reviewed.       Impression & Plan     Medical Decision Making:  Nigel Rodarte Jr. is a 80 year old male presents for evaluation of hearing loss in the left ear.  The cerumen was removed as above.  The patient felt much better after cerumen removal with return to baseline hearing.  Evaluation of the canal showed the TM is normal.  The patient was instructed not to insert anything into the ear canal. The patient notes total hearing loss in the right ear since an early age and would like referral to ENT for additional evaluation of his hearing; referral information provided. Additionally recommended hearing protection when in noisy environments and Debrox as needed.  Patient discharged home    Discharge Diagnosis:    ICD-10-CM    1. Impacted cerumen of left ear  H61.22        Disposition:  The patient is discharged to home.    Scribe Disclosure:  Kofi MORSE am serving as a scribe at 10:12 AM on 8/9/2020 to document services  personally performed by Mamadou Sandoval DO based on my observations and the provider's statements to me.      8/9/2020   Mamadou Sandoval DO O'Neill, Christopher Warren, DO  08/09/20 1156

## 2020-08-09 NOTE — ED AVS SNAPSHOT
Emergency Department  [address] [place] [id number]  Phone:  [phone]  Fax:  [phone]                                    Nigel Rodarte Jr.   MRN: [id number]    Department:   Emergency Department   Date of Visit:  8/9/2020           After Visit Summary Signature Page    I have received my discharge instructions, and my questions have been answered. I have discussed any challenges I see with this plan with the nurse or doctor.    ..........................................................................................................................................  Patient/Patient Representative Signature      ..........................................................................................................................................  Patient Representative Print Name and Relationship to Patient    ..................................................               ................................................  Date                                   Time    ..........................................................................................................................................  Reviewed by Signature/Title    ...................................................              ..............................................  Date                                               Time          22EPIC Rev 08/18

## 2020-08-10 ENCOUNTER — ANTICOAGULATION THERAPY VISIT (OUTPATIENT)
Dept: CARDIOLOGY | Facility: CLINIC | Age: 80
End: 2020-08-10
Payer: MEDICARE

## 2020-08-10 DIAGNOSIS — Z79.01 LONG TERM CURRENT USE OF ANTICOAGULANTS WITH INR GOAL OF 2.0-3.0: ICD-10-CM

## 2020-08-10 DIAGNOSIS — I48.0 PAROXYSMAL ATRIAL FIBRILLATION (H): ICD-10-CM

## 2020-08-10 LAB
CAPILLARY BLOOD COLLECTION: NORMAL
INR BLD: 2.4 (ref 0.86–1.14)

## 2020-08-10 PROCEDURE — 36416 COLLJ CAPILLARY BLOOD SPEC: CPT | Performed by: INTERNAL MEDICINE

## 2020-08-10 PROCEDURE — 85610 PROTHROMBIN TIME: CPT | Mod: QW | Performed by: INTERNAL MEDICINE

## 2020-08-10 PROCEDURE — 99207 ZZC NO CHARGE LOS: CPT

## 2020-08-10 NOTE — PROGRESS NOTES
ANTICOAGULATION FOLLOW-UP CLINIC VISIT    Patient Name:  Nigel Rodarte Jr.  Date:  8/10/2020  Contact Type:  Telephone    SUBJECTIVE:         OBJECTIVE    Recent labs: (last 7 days)     08/10/20  0727   INR 2.4*       ASSESSMENT / PLAN  INR assessment THER    Recheck INR In: 6 WEEKS    INR Location Outside lab      Anticoagulation Summary  As of 8/10/2020    INR goal:   2.0-3.0   TTR:   69.2 % (1 y)   INR used for dosin.4 (8/10/2020)   Warfarin maintenance plan:   5 mg (5 mg x 1) every Tue, Fri; 7.5 mg (5 mg x 1.5) all other days   Full warfarin instructions:   5 mg every Tue, Fri; 7.5 mg all other days   Weekly warfarin total:   47.5 mg   No change documented:   Gladys Herrera RN   Plan last modified:   Gladys Herrera RN (2019)   Next INR check:   2020   Target end date:   Indefinite    Indications    Atrial fibrillation (H) [I48.91] [I48.91]  Long term current use of anticoagulants with INR goal of 2.0-3.0 [Z79.01]  Paroxysmal atrial fibrillation (H) [I48.0]             Anticoagulation Episode Summary     INR check location:       Preferred lab:       Send INR reminders to:   [facility] HEART INR NURSE    Comments:         Anticoagulation Care Providers     Provider Role Specialty Phone number    Grzegorz Malhotra MD Referring Cardiology [phone]            See the Encounter Report to view Anticoagulation Flowsheet and Dosing Calendar (Go to Encounters tab in chart review, and find the Anticoagulation Therapy Visit)    INR 2.4 No change in meds or diet. Denies abnormal bleeding or bruising. Will continue current dosing of 5 mg TuF and 7.5 mg all other days with recheck in 6 weeks.    Gladys Herrera RN

## 2020-08-11 ENCOUNTER — TELEPHONE (OUTPATIENT)
Dept: CARDIOLOGY | Facility: CLINIC | Age: 80
End: 2020-08-11

## 2020-08-12 ENCOUNTER — OFFICE VISIT (OUTPATIENT)
Dept: CARDIOLOGY | Facility: CLINIC | Age: 80
End: 2020-08-12
Payer: MEDICARE

## 2020-08-12 VITALS
BODY MASS INDEX: 22 KG/M2 | HEART RATE: 64 BPM | WEIGHT: 148.5 LBS | TEMPERATURE: 97.8 F | SYSTOLIC BLOOD PRESSURE: 117 MMHG | DIASTOLIC BLOOD PRESSURE: 74 MMHG | HEIGHT: 69 IN

## 2020-08-12 DIAGNOSIS — R79.9 ABNORMAL FINDING OF BLOOD CHEMISTRY, UNSPECIFIED: ICD-10-CM

## 2020-08-12 DIAGNOSIS — E78.5 DYSLIPIDEMIA: ICD-10-CM

## 2020-08-12 DIAGNOSIS — I48.0 PAROXYSMAL ATRIAL FIBRILLATION (H): Primary | ICD-10-CM

## 2020-08-12 LAB
CHOLEST SERPL-MCNC: 190 MG/DL
HDLC SERPL-MCNC: 51 MG/DL
LDLC SERPL CALC-MCNC: 127 MG/DL
NONHDLC SERPL-MCNC: 139 MG/DL
TRIGL SERPL-MCNC: 61 MG/DL

## 2020-08-12 PROCEDURE — 80061 LIPID PANEL: CPT | Performed by: PHYSICIAN ASSISTANT

## 2020-08-12 PROCEDURE — 93000 ELECTROCARDIOGRAM COMPLETE: CPT | Performed by: INTERNAL MEDICINE

## 2020-08-12 PROCEDURE — 99214 OFFICE O/P EST MOD 30 MIN: CPT | Performed by: INTERNAL MEDICINE

## 2020-08-12 PROCEDURE — 36415 COLL VENOUS BLD VENIPUNCTURE: CPT | Performed by: PHYSICIAN ASSISTANT

## 2020-08-12 ASSESSMENT — MIFFLIN-ST. JEOR: SCORE: 1378.97

## 2020-08-12 NOTE — PROGRESS NOTES
Electrophysiology/ Clinic Note         H&P and Plan:     REASON FOR VISIT: Electrophysiology evaluation.      HISTORY OF PRESENT ILLNESS:  Mr. Rodarte is a delightful 80-year-old gentleman (former  of [employer] team) with a long history of paroxysmal atrial fibrillation/ flutter, who failed rhythm control strategy with flecainide/ metoprolol and underwent atrial fibrillation/flutter ablation 05/18/2020.  He is here today for routine follow-up.    She underwent ablation in May of this year.  Today he informs he has done well.  Her ablation he was having frequent episodes of A. fib despite of medical therapy.  He now denies any recurrence of atrial fibrillation. He denies any other symptoms such as chest pain, shortness of breath, lightheadedness, near-syncope or syncope.       EKG today showed normal sinus rhythm with QRS measuring 100 ms.  Lipid profile was obtained just today showed mild elevated LDL (127).    Previous studies:  - Stress Echo (2018): He was able to exercise for 7 minutes and 20 seconds. . Negative for ischemia, arrthyhmia or QRS widening.       PLAN:   1.  Paroxysmal atrial fibrillation.  He responded well to ablation.  We discussed the possibility of stopping medications.  However he informs he is doing so well that he would like to avoid any change in medical therapy.  Therefore, we will continue therapy with metoprolol and flecainide.  2.  Embolic prevention.  CHADS-VASc score of 2. Continue anticoagulation with Coumadin indefinitely.  3.  Hyperlipidemia.  LDL is mild elevated.  CT prior to ablation review of calcified coronaries, therefore favor initiation of statin (Lipitor 10 mg daily).  After discussing plans with patient, he would like to avoid initiation of new medications.  He request to have a lipid profile repeated in 3 months and reevaluate need for a statin.  We will follow-up with one of our Apps in 3 months.      Grzegorz Malhotra MD    Physical Exam:  Vitals: /74    "Pulse 64   Temp 97.8  F (36.6  C)   Ht 1.753 m (5' 9\")   Wt 67.4 kg (148 lb 8 oz)   BMI 21.93 kg/m      Constitutional:  AAO x3.  Pt is in NAD.  HEAD: normocephalic.  SKIN: Skin normal color, texture and turgor with no lesions or eruptions.  Eyes: PERRL, EOMI.  ENT:  Supple, normal JVP. No lymphadenopathy or thyroid enlargement.  Chest:  CTAB.  Cardiac:  RRR, normal  S1 and S2.  No murmurs rubs or gallop.    Abdomen:  Normal BS.  Soft, non-tender and non-distended.  No rebound or guarding.    Extremities:  Pedious pulses palpable B/L.  No LE edema noticed.   Neurological: Strength and sensation grossly symmetric and intact throughout.       CURRENT MEDICATIONS:  Current Outpatient Medications   Medication Sig Dispense Refill     acetaminophen (TYLENOL) 325 MG tablet Take 3 tablets (975 mg) by mouth every 8 hours (Patient not taking: Reported on 5/26/2020)       acetaminophen (TYLENOL) 325 MG tablet Take 3 tablets (975 mg) by mouth every 8 hours (Patient not taking: Reported on 5/26/2020) 100 tablet 0     cetirizine (ZYRTEC) 10 MG tablet Take 1 tablet (10 mg) by mouth 2 times daily as needed for allergies (1 tab up to twice a day as needed for itch, rash, hives, allergy) 20 tablet 0     dutasteride (AVODART) 0.5 MG capsule Take 1 capsule (0.5 mg) by mouth daily (Patient not taking: Reported on 5/26/2020)       dutasteride (AVODART) 0.5 MG capsule Take 0.5 mg by mouth daily       flecainide (TAMBOCOR) 50 MG tablet Take 1 tablet (50 mg) by mouth 2 times daily (Patient not taking: Reported on 5/26/2020)       flecainide (TAMBOCOR) 50 MG tablet Take 1 tablet (50 mg) by mouth 2 times daily 180 tablet 3     glucosamine-chondroitin 500-400 MG CAPS per capsule Take 1 capsule by mouth daily       metoprolol succinate ER (TOPROL-XL) 25 MG 24 hr tablet Take 1 tablet (25 mg) by mouth daily (Patient not taking: Reported on 5/26/2020)       metoprolol succinate ER (TOPROL-XL) 25 MG 24 hr tablet Take 1 tab daily 90 tablet 2     " warfarin ANTICOAGULANT (COUMADIN) 5 MG tablet Take 1 tab on Tuesdays and Fridays and 1 1/2 tabs on all other days or as directed per INR clinic 135 tablet 1       ALLERGIES   No Known Allergies    PAST MEDICAL HISTORY:  Past Medical History:   Diagnosis Date     Arthritis     osteoarthrosis     Elevated PSA      Fatigue      Palpitations      Paroxysmal atrial fibrillation (H) 05/16/2017     Renal disease     kidney calculus     SOB (shortness of breath)        PAST SURGICAL HISTORY:  Past Surgical History:   Procedure Laterality Date     APPENDECTOMY       ARTHROPLASTY HIP  1/14/2013    Procedure: ARTHROPLASTY HIP;  RIGHT TOTAL HIP ARTHROPLASTY ;  Surgeon: Jamie Ohara MD;  Location:  OR     ARTHROPLASTY KNEE Right 11/6/2019    Procedure: RIGHT TOTAL KNEE ARTHROPLASTY (DEPUY)^;  Surgeon: Jamie Ohara MD;  Location:  OR     CARDIOVERSION       COLONOSCOPY N/A 2/9/2016    Procedure: COLONOSCOPY;  Surgeon: Astrid Vital MD;  Location:  GI     CYSTOSCOPY, RETROGRADES, EXTRACT STONE, COMBINED  9/12/2013    Procedure: COMBINED CYSTOSCOPY, RETROGRADES, EXTRACT STONE;  CYSTOSCOPY, RIGHT RETROGRADE, STONE EXTRACTION;  Surgeon: Carlos Mcknight MD;  Location:  OR     ENT SURGERY      right ear surgery     EP ABLATION FOCAL AFIB N/A 5/18/2020    Procedure: EP Ablation Focal AFIB;  Surgeon: Grzegorz Malhotra MD;  Location:  HEART CARDIAC CATH LAB     GENITOURINARY SURGERY      cystoscopy for stone removal     HERNIA REPAIR       ORTHOPEDIC SURGERY      shoulder surgeries,bilat carpel tunnel       FAMILY HISTORY:  Family History   Problem Relation Age of Onset     Lung Cancer Brother        SOCIAL HISTORY:  Social History     Socioeconomic History     Marital status:      Spouse name: Not on file     Number of children: Not on file     Years of education: Not on file     Highest education level: Not on file   Occupational History     Not on file   Social Needs     Financial resource strain:  Not on file     Food insecurity     Worry: Not on file     Inability: Not on file     Transportation needs     Medical: Not on file     Non-medical: Not on file   Tobacco Use     Smoking status: Never Smoker     Smokeless tobacco: Never Used   Substance and Sexual Activity     Alcohol use: No     Drug use: No     Sexual activity: Not on file   Lifestyle     Physical activity     Days per week: Not on file     Minutes per session: Not on file     Stress: Not on file   Relationships     Social connections     Talks on phone: Not on file     Gets together: Not on file     Attends [employer] service: Not on file     Active member of club or organization: Not on file     Attends meetings of clubs or organizations: Not on file     Relationship status: Not on file     Intimate partner violence     Fear of current or ex partner: Not on file     Emotionally abused: Not on file     Physically abused: Not on file     Forced sexual activity: Not on file   Other Topics Concern     Parent/sibling w/ CABG, MI or angioplasty before 65F 55M? Not Asked   Social History Narrative     Not on file       Review of Systems:  Skin:        Eyes:       ENT:       Respiratory:       Cardiovascular:       Gastroenterology:      Genitourinary:       Musculoskeletal:       Neurologic:       Psychiatric:       Heme/Lymph/Imm:       Endocrine:           Recent Lab Results:  LIPID RESULTS:  No results found for: CHOL, HDL, LDL, TRIG, CHOLHDLRATIO    LIVER ENZYME RESULTS:  Lab Results   Component Value Date    AST 26 05/16/2017    ALT 30 05/16/2017       CBC RESULTS:  Lab Results   Component Value Date    WBC 6.0 05/18/2020    RBC 5.40 05/18/2020    HGB 16.2 05/18/2020    HCT 48.3 05/18/2020    MCV 89 05/18/2020    MCH 30.0 05/18/2020    MCHC 33.5 05/18/2020    RDW 13.6 05/18/2020     05/18/2020       BMP RESULTS:  Lab Results   Component Value Date     05/18/2020    POTASSIUM 4.2 05/18/2020    CHLORIDE 108 05/18/2020    CO2 29  05/18/2020    ANIONGAP 5 05/18/2020    GLC 89 05/18/2020    BUN 14 05/18/2020    CR 1.11 05/18/2020    GFRESTIMATED 63 05/18/2020    GFRESTBLACK 73 05/18/2020    CHERI 9.2 05/18/2020        A1C RESULTS:  No results found for: A1C    INR RESULTS:  Lab Results   Component Value Date    INR 2.4 (H) 08/10/2020    INR 2.1 (H) 06/29/2020    INR 2.58 (H) 05/18/2020    INR 2.0 (A) 02/17/2020    INR 2.2 (A) 01/29/2020    INR 2.50 (H) 11/18/2019         ECHOCARDIOGRAM  No results found for this or any previous visit (from the past 8760 hour(s)).      No orders of the defined types were placed in this encounter.    No orders of the defined types were placed in this encounter.    There are no discontinued medications.      No diagnosis found.      CC  No referring provider defined for this encounter.

## 2020-08-12 NOTE — LETTER
8/12/2020    Osmar Espinoza MD  [place] [address]  [facility] [address]    RE: Nigel STRONG Cayden Duran.       Dear Colleague,    I had the pleasure of seeing Nigel Rodarte  in the [facility].    Electrophysiology/ Clinic Note         H&P and Plan:     REASON FOR VISIT: Electrophysiology evaluation.      HISTORY OF PRESENT ILLNESS:  Mr. Rodarte is a delightful 80-year-old gentleman (former  of [employer] team) with a long history of paroxysmal atrial fibrillation/ flutter, who failed rhythm control strategy with flecainide/ metoprolol and underwent atrial fibrillation/flutter ablation 05/18/2020.  He is here today for routine follow-up.    She underwent ablation in May of this year.  Today he informs he has done well.  Her ablation he was having frequent episodes of A. fib despite of medical therapy.  He now denies any recurrence of atrial fibrillation. He denies any other symptoms such as chest pain, shortness of breath, lightheadedness, near-syncope or syncope.       EKG today showed normal sinus rhythm with QRS measuring 100 ms.  Lipid profile was obtained just today showed mild elevated LDL (127).    Previous studies:  - Stress Echo (2018): He was able to exercise for 7 minutes and 20 seconds. . Negative for ischemia, arrthyhmia or QRS widening.       PLAN:   1.  Paroxysmal atrial fibrillation.  He responded well to ablation.  We discussed the possibility of stopping medications.  However he informs he is doing so well that he would like to avoid any change in medical therapy.  Therefore, we will continue therapy with metoprolol and flecainide.  2.  Embolic prevention.  CHADS-VASc score of 2. Continue anticoagulation with Coumadin indefinitely.  3.  Hyperlipidemia.  LDL is mild elevated.  CT prior to ablation review of calcified coronaries, therefore favor initiation of statin (Lipitor 10 mg daily).  After discussing plans with patient, he would like to avoid  "initiation of new medications.  He request to have a lipid profile repeated in 3 months and reevaluate need for a statin.  We will follow-up with one of our Apps in 3 months.      Grzegorz Malhotra MD    Physical Exam:  Vitals: /74   Pulse 64   Temp 97.8  F (36.6  C)   Ht 1.753 m (5' 9\")   Wt 67.4 kg (148 lb 8 oz)   BMI 21.93 kg/m      Constitutional:  AAO x3.  Pt is in NAD.  HEAD: normocephalic.  SKIN: Skin normal color, texture and turgor with no lesions or eruptions.  Eyes: PERRL, EOMI.  ENT:  Supple, normal JVP. No lymphadenopathy or thyroid enlargement.  Chest:  CTAB.  Cardiac:  RRR, normal  S1 and S2.  No murmurs rubs or gallop.    Abdomen:  Normal BS.  Soft, non-tender and non-distended.  No rebound or guarding.    Extremities:  Pedious pulses palpable B/L.  No LE edema noticed.   Neurological: Strength and sensation grossly symmetric and intact throughout.       CURRENT MEDICATIONS:  Current Outpatient Medications   Medication Sig Dispense Refill     acetaminophen (TYLENOL) 325 MG tablet Take 3 tablets (975 mg) by mouth every 8 hours (Patient not taking: Reported on 5/26/2020)       acetaminophen (TYLENOL) 325 MG tablet Take 3 tablets (975 mg) by mouth every 8 hours (Patient not taking: Reported on 5/26/2020) 100 tablet 0     cetirizine (ZYRTEC) 10 MG tablet Take 1 tablet (10 mg) by mouth 2 times daily as needed for allergies (1 tab up to twice a day as needed for itch, rash, hives, allergy) 20 tablet 0     dutasteride (AVODART) 0.5 MG capsule Take 1 capsule (0.5 mg) by mouth daily (Patient not taking: Reported on 5/26/2020)       dutasteride (AVODART) 0.5 MG capsule Take 0.5 mg by mouth daily       flecainide (TAMBOCOR) 50 MG tablet Take 1 tablet (50 mg) by mouth 2 times daily (Patient not taking: Reported on 5/26/2020)       flecainide (TAMBOCOR) 50 MG tablet Take 1 tablet (50 mg) by mouth 2 times daily 180 tablet 3     glucosamine-chondroitin 500-400 MG CAPS per capsule Take 1 capsule by mouth " daily       metoprolol succinate ER (TOPROL-XL) 25 MG 24 hr tablet Take 1 tablet (25 mg) by mouth daily (Patient not taking: Reported on 5/26/2020)       metoprolol succinate ER (TOPROL-XL) 25 MG 24 hr tablet Take 1 tab daily 90 tablet 2     warfarin ANTICOAGULANT (COUMADIN) 5 MG tablet Take 1 tab on Tuesdays and Fridays and 1 1/2 tabs on all other days or as directed per INR clinic 135 tablet 1       ALLERGIES   No Known Allergies    PAST MEDICAL HISTORY:  Past Medical History:   Diagnosis Date     Arthritis     osteoarthrosis     Elevated PSA      Fatigue      Palpitations      Paroxysmal atrial fibrillation (H) 05/16/2017     Renal disease     kidney calculus     SOB (shortness of breath)        PAST SURGICAL HISTORY:  Past Surgical History:   Procedure Laterality Date     APPENDECTOMY       ARTHROPLASTY HIP  1/14/2013    Procedure: ARTHROPLASTY HIP;  RIGHT TOTAL HIP ARTHROPLASTY ;  Surgeon: Jamie Ohara MD;  Location:  OR     ARTHROPLASTY KNEE Right 11/6/2019    Procedure: RIGHT TOTAL KNEE ARTHROPLASTY (DEPUY)^;  Surgeon: Jamie Ohara MD;  Location:  OR     CARDIOVERSION       COLONOSCOPY N/A 2/9/2016    Procedure: COLONOSCOPY;  Surgeon: Astrdi Vital MD;  Location:  GI     CYSTOSCOPY, RETROGRADES, EXTRACT STONE, COMBINED  9/12/2013    Procedure: COMBINED CYSTOSCOPY, RETROGRADES, EXTRACT STONE;  CYSTOSCOPY, RIGHT RETROGRADE, STONE EXTRACTION;  Surgeon: Carlos Mcknight MD;  Location:  OR     ENT SURGERY      right ear surgery     EP ABLATION FOCAL AFIB N/A 5/18/2020    Procedure: EP Ablation Focal AFIB;  Surgeon: Grzegorz Malhotra MD;  Location:  HEART CARDIAC CATH LAB     GENITOURINARY SURGERY      cystoscopy for stone removal     HERNIA REPAIR       ORTHOPEDIC SURGERY      shoulder surgeries,bilat carpel tunnel       FAMILY HISTORY:  Family History   Problem Relation Age of Onset     Lung Cancer Brother        SOCIAL HISTORY:  Social History     Socioeconomic History     Marital  status:      Spouse name: Not on file     Number of children: Not on file     Years of education: Not on file     Highest education level: Not on file   Occupational History     Not on file   Social Needs     Financial resource strain: Not on file     Food insecurity     Worry: Not on file     Inability: Not on file     Transportation needs     Medical: Not on file     Non-medical: Not on file   Tobacco Use     Smoking status: Never Smoker     Smokeless tobacco: Never Used   Substance and Sexual Activity     Alcohol use: No     Drug use: No     Sexual activity: Not on file   Lifestyle     Physical activity     Days per week: Not on file     Minutes per session: Not on file     Stress: Not on file   Relationships     Social connections     Talks on phone: Not on file     Gets together: Not on file     Attends Scientologist service: Not on file     Active member of club or organization: Not on file     Attends meetings of clubs or organizations: Not on file     Relationship status: Not on file     Intimate partner violence     Fear of current or ex partner: Not on file     Emotionally abused: Not on file     Physically abused: Not on file     Forced sexual activity: Not on file   Other Topics Concern     Parent/sibling w/ CABG, MI or angioplasty before 65F 55M? Not Asked   Social History Narrative     Not on file       Review of Systems:  Skin:        Eyes:       ENT:       Respiratory:       Cardiovascular:       Gastroenterology:      Genitourinary:       Musculoskeletal:       Neurologic:       Psychiatric:       Heme/Lymph/Imm:       Endocrine:           Recent Lab Results:  LIPID RESULTS:  No results found for: CHOL, HDL, LDL, TRIG, CHOLHDLRATIO    LIVER ENZYME RESULTS:  Lab Results   Component Value Date    AST 26 05/16/2017    ALT 30 05/16/2017       CBC RESULTS:  Lab Results   Component Value Date    WBC 6.0 05/18/2020    RBC 5.40 05/18/2020    HGB 16.2 05/18/2020    HCT 48.3 05/18/2020    MCV 89 05/18/2020     MCH 30.0 05/18/2020    MCHC 33.5 05/18/2020    RDW 13.6 05/18/2020     05/18/2020       BMP RESULTS:  Lab Results   Component Value Date     05/18/2020    POTASSIUM 4.2 05/18/2020    CHLORIDE 108 05/18/2020    CO2 29 05/18/2020    ANIONGAP 5 05/18/2020    GLC 89 05/18/2020    BUN 14 05/18/2020    CR 1.11 05/18/2020    GFRESTIMATED 63 05/18/2020    GFRESTBLACK 73 05/18/2020    CHERI 9.2 05/18/2020        A1C RESULTS:  No results found for: A1C    INR RESULTS:  Lab Results   Component Value Date    INR 2.4 (H) 08/10/2020    INR 2.1 (H) 06/29/2020    INR 2.58 (H) 05/18/2020    INR 2.0 (A) 02/17/2020    INR 2.2 (A) 01/29/2020    INR 2.50 (H) 11/18/2019         ECHOCARDIOGRAM  No results found for this or any previous visit (from the past 8760 hour(s)).      No orders of the defined types were placed in this encounter.    No orders of the defined types were placed in this encounter.    There are no discontinued medications.      No diagnosis found.      CC  No referring provider defined for this encounter.                Thank you for allowing me to participate in the care of your patient.      Sincerely,     Grzegorz Malhotra MD     [facility]    cc:   No referring provider defined for this encounter.

## 2020-08-12 NOTE — LETTER
8/12/2020    Osmar Espinoza MD  [employer] [address]  [facility] [address]    RE: Nigel STRONG Cayden Duran.       Dear Colleague,    I had the pleasure of seeing Nigel Rodarte  in the [facility].    Electrophysiology/ Clinic Note         H&P and Plan:     REASON FOR VISIT: Electrophysiology evaluation.      HISTORY OF PRESENT ILLNESS:  Mr. Rodarte is a delightful 80-year-old gentleman (former  of [employer] team) with a long history of paroxysmal atrial fibrillation/ flutter, who failed rhythm control strategy with flecainide/ metoprolol and underwent atrial fibrillation/flutter ablation 05/18/2020.  He is here today for routine follow-up.    She underwent ablation in May of this year.  Today he informs he has done well.  Her ablation he was having frequent episodes of A. fib despite of medical therapy.  He now denies any recurrence of atrial fibrillation. He denies any other symptoms such as chest pain, shortness of breath, lightheadedness, near-syncope or syncope.       EKG today showed normal sinus rhythm with QRS measuring 100 ms.  Lipid profile was obtained just today showed mild elevated LDL (127).    Previous studies:  - Stress Echo (2018): He was able to exercise for 7 minutes and 20 seconds. . Negative for ischemia, arrthyhmia or QRS widening.       PLAN:   1.  Paroxysmal atrial fibrillation.  He responded well to ablation.  We discussed the possibility of stopping medications.  However he informs he is doing so well that he would like to avoid any change in medical therapy.  Therefore, we will continue therapy with metoprolol and flecainide.  2.  Embolic prevention.  CHADS-VASc score of 2. Continue anticoagulation with Coumadin indefinitely.  3.  Hyperlipidemia.  LDL is mild elevated.  CT prior to ablation review of calcified coronaries, therefore favor initiation of statin (Lipitor 10 mg daily).  After discussing plans with patient, he would like to avoid  "initiation of new medications.  He request to have a lipid profile repeated in 3 months and reevaluate need for a statin.  We will follow-up with one of our Apps in 3 months.      Grzegorz Malhotra MD    Physical Exam:  Vitals: /74   Pulse 64   Temp 97.8  F (36.6  C)   Ht 1.753 m (5' 9\")   Wt 67.4 kg (148 lb 8 oz)   BMI 21.93 kg/m      Constitutional:  AAO x3.  Pt is in NAD.  HEAD: normocephalic.  SKIN: Skin normal color, texture and turgor with no lesions or eruptions.  Eyes: PERRL, EOMI.  ENT:  Supple, normal JVP. No lymphadenopathy or thyroid enlargement.  Chest:  CTAB.  Cardiac:  RRR, normal  S1 and S2.  No murmurs rubs or gallop.    Abdomen:  Normal BS.  Soft, non-tender and non-distended.  No rebound or guarding.    Extremities:  Pedious pulses palpable B/L.  No LE edema noticed.   Neurological: Strength and sensation grossly symmetric and intact throughout.       CURRENT MEDICATIONS:  Current Outpatient Medications   Medication Sig Dispense Refill     acetaminophen (TYLENOL) 325 MG tablet Take 3 tablets (975 mg) by mouth every 8 hours (Patient not taking: Reported on 5/26/2020)       acetaminophen (TYLENOL) 325 MG tablet Take 3 tablets (975 mg) by mouth every 8 hours (Patient not taking: Reported on 5/26/2020) 100 tablet 0     cetirizine (ZYRTEC) 10 MG tablet Take 1 tablet (10 mg) by mouth 2 times daily as needed for allergies (1 tab up to twice a day as needed for itch, rash, hives, allergy) 20 tablet 0     dutasteride (AVODART) 0.5 MG capsule Take 1 capsule (0.5 mg) by mouth daily (Patient not taking: Reported on 5/26/2020)       dutasteride (AVODART) 0.5 MG capsule Take 0.5 mg by mouth daily       flecainide (TAMBOCOR) 50 MG tablet Take 1 tablet (50 mg) by mouth 2 times daily (Patient not taking: Reported on 5/26/2020)       flecainide (TAMBOCOR) 50 MG tablet Take 1 tablet (50 mg) by mouth 2 times daily 180 tablet 3     glucosamine-chondroitin 500-400 MG CAPS per capsule Take 1 capsule by mouth " daily       metoprolol succinate ER (TOPROL-XL) 25 MG 24 hr tablet Take 1 tablet (25 mg) by mouth daily (Patient not taking: Reported on 5/26/2020)       metoprolol succinate ER (TOPROL-XL) 25 MG 24 hr tablet Take 1 tab daily 90 tablet 2     warfarin ANTICOAGULANT (COUMADIN) 5 MG tablet Take 1 tab on Tuesdays and Fridays and 1 1/2 tabs on all other days or as directed per INR clinic 135 tablet 1       ALLERGIES   No Known Allergies    PAST MEDICAL HISTORY:  Past Medical History:   Diagnosis Date     Arthritis     osteoarthrosis     Elevated PSA      Fatigue      Palpitations      Paroxysmal atrial fibrillation (H) 05/16/2017     Renal disease     kidney calculus     SOB (shortness of breath)        PAST SURGICAL HISTORY:  Past Surgical History:   Procedure Laterality Date     APPENDECTOMY       ARTHROPLASTY HIP  1/14/2013    Procedure: ARTHROPLASTY HIP;  RIGHT TOTAL HIP ARTHROPLASTY ;  Surgeon: Jamie Ohara MD;  Location:  OR     ARTHROPLASTY KNEE Right 11/6/2019    Procedure: RIGHT TOTAL KNEE ARTHROPLASTY (DEPUY)^;  Surgeon: Jamie Ohara MD;  Location:  OR     CARDIOVERSION       COLONOSCOPY N/A 2/9/2016    Procedure: COLONOSCOPY;  Surgeon: Astrid Vital MD;  Location:  GI     CYSTOSCOPY, RETROGRADES, EXTRACT STONE, COMBINED  9/12/2013    Procedure: COMBINED CYSTOSCOPY, RETROGRADES, EXTRACT STONE;  CYSTOSCOPY, RIGHT RETROGRADE, STONE EXTRACTION;  Surgeon: Carlos Mcknight MD;  Location:  OR     ENT SURGERY      right ear surgery     EP ABLATION FOCAL AFIB N/A 5/18/2020    Procedure: EP Ablation Focal AFIB;  Surgeon: Grzegorz Malhotra MD;  Location:  HEART CARDIAC CATH LAB     GENITOURINARY SURGERY      cystoscopy for stone removal     HERNIA REPAIR       ORTHOPEDIC SURGERY      shoulder surgeries,bilat carpel tunnel       FAMILY HISTORY:  Family History   Problem Relation Age of Onset     Lung Cancer Brother        SOCIAL HISTORY:  Social History     Socioeconomic History     Marital  status:      Spouse name: Not on file     Number of children: Not on file     Years of education: Not on file     Highest education level: Not on file   Occupational History     Not on file   Social Needs     Financial resource strain: Not on file     Food insecurity     Worry: Not on file     Inability: Not on file     Transportation needs     Medical: Not on file     Non-medical: Not on file   Tobacco Use     Smoking status: Never Smoker     Smokeless tobacco: Never Used   Substance and Sexual Activity     Alcohol use: No     Drug use: No     Sexual activity: Not on file   Lifestyle     Physical activity     Days per week: Not on file     Minutes per session: Not on file     Stress: Not on file   Relationships     Social connections     Talks on phone: Not on file     Gets together: Not on file     Attends Pentecostalism service: Not on file     Active member of club or organization: Not on file     Attends meetings of clubs or organizations: Not on file     Relationship status: Not on file     Intimate partner violence     Fear of current or ex partner: Not on file     Emotionally abused: Not on file     Physically abused: Not on file     Forced sexual activity: Not on file   Other Topics Concern     Parent/sibling w/ CABG, MI or angioplasty before 65F 55M? Not Asked   Social History Narrative     Not on file       Review of Systems:  Skin:        Eyes:       ENT:       Respiratory:       Cardiovascular:       Gastroenterology:      Genitourinary:       Musculoskeletal:       Neurologic:       Psychiatric:       Heme/Lymph/Imm:       Endocrine:           Recent Lab Results:  LIPID RESULTS:  No results found for: CHOL, HDL, LDL, TRIG, CHOLHDLRATIO    LIVER ENZYME RESULTS:  Lab Results   Component Value Date    AST 26 05/16/2017    ALT 30 05/16/2017       CBC RESULTS:  Lab Results   Component Value Date    WBC 6.0 05/18/2020    RBC 5.40 05/18/2020    HGB 16.2 05/18/2020    HCT 48.3 05/18/2020    MCV 89 05/18/2020     MCH 30.0 05/18/2020    MCHC 33.5 05/18/2020    RDW 13.6 05/18/2020     05/18/2020       BMP RESULTS:  Lab Results   Component Value Date     05/18/2020    POTASSIUM 4.2 05/18/2020    CHLORIDE 108 05/18/2020    CO2 29 05/18/2020    ANIONGAP 5 05/18/2020    GLC 89 05/18/2020    BUN 14 05/18/2020    CR 1.11 05/18/2020    GFRESTIMATED 63 05/18/2020    GFRESTBLACK 73 05/18/2020    CHERI 9.2 05/18/2020        A1C RESULTS:  No results found for: A1C    INR RESULTS:  Lab Results   Component Value Date    INR 2.4 (H) 08/10/2020    INR 2.1 (H) 06/29/2020    INR 2.58 (H) 05/18/2020    INR 2.0 (A) 02/17/2020    INR 2.2 (A) 01/29/2020    INR 2.50 (H) 11/18/2019         ECHOCARDIOGRAM  No results found for this or any previous visit (from the past 8760 hour(s)).      No orders of the defined types were placed in this encounter.    No orders of the defined types were placed in this encounter.    There are no discontinued medications.      No diagnosis found.        Thank you for allowing me to participate in the care of your patient.    Sincerely,     Grzegorz Malhotra MD     [facility]

## 2020-09-21 ENCOUNTER — TELEPHONE (OUTPATIENT)
Dept: CARDIOLOGY | Facility: CLINIC | Age: 80
End: 2020-09-21

## 2020-09-30 ENCOUNTER — ANTICOAGULATION THERAPY VISIT (OUTPATIENT)
Dept: CARDIOLOGY | Facility: CLINIC | Age: 80
End: 2020-09-30
Payer: MEDICARE

## 2020-09-30 DIAGNOSIS — I48.0 PAROXYSMAL ATRIAL FIBRILLATION (H): ICD-10-CM

## 2020-09-30 DIAGNOSIS — Z79.01 LONG TERM CURRENT USE OF ANTICOAGULANTS WITH INR GOAL OF 2.0-3.0: ICD-10-CM

## 2020-09-30 LAB
CAPILLARY BLOOD COLLECTION: NORMAL
INR PPP: 1.7 (ref 0.86–1.14)

## 2020-09-30 PROCEDURE — 36416 COLLJ CAPILLARY BLOOD SPEC: CPT | Performed by: INTERNAL MEDICINE

## 2020-09-30 PROCEDURE — 99207 ZZC NO CHARGE NURSE ONLY: CPT

## 2020-09-30 PROCEDURE — 85610 PROTHROMBIN TIME: CPT | Performed by: INTERNAL MEDICINE

## 2020-09-30 NOTE — PROGRESS NOTES
ANTICOAGULATION FOLLOW-UP CLINIC VISIT    Patient Name:  Nigel Rodarte Jr.  Date:  2020  Contact Type:  Telephone    SUBJECTIVE:  Patient Findings     Positives:   Missed doses (Missed 7.5 mg dose on 20)        Clinical Outcomes     Negatives:   Major bleeding event, Thromboembolic event, Anticoagulation-related hospital admission, Anticoagulation-related ED visit, Anticoagulation-related fatality           OBJECTIVE    Recent labs: (last 7 days)     20  1440   INR 1.70*       ASSESSMENT / PLAN  INR assessment SUB    Recheck INR In: 2 WEEKS    INR Location Outside lab      Anticoagulation Summary  As of 2020    INR goal:   2.0-3.0   TTR:   72.7 % (1 y)   INR used for dosin.70! (2020)   Warfarin maintenance plan:   5 mg (5 mg x 1) every Tue, Fri; 7.5 mg (5 mg x 1.5) all other days   Full warfarin instructions:   : 10 mg; Otherwise 5 mg every Tue, Fri; 7.5 mg all other days   Weekly warfarin total:   47.5 mg   Plan last modified:   Gladys Herrera RN (2019)   Next INR check:   10/14/2020   Target end date:   Indefinite    Indications    Atrial fibrillation (H) [I48.91] [I48.91]  Long term current use of anticoagulants with INR goal of 2.0-3.0 [Z79.01]  Paroxysmal atrial fibrillation (H) [I48.0]             Anticoagulation Episode Summary     INR check location:       Preferred lab:       Send INR reminders to:   [facility] HEART INR NURSE    Comments:         Anticoagulation Care Providers     Provider Role Specialty Phone number    Grzegorz Malhotra MD Referring Cardiology [phone]            See the Encounter Report to view Anticoagulation Flowsheet and Dosing Calendar (Go to Encounters tab in chart review, and find the Anticoagulation Therapy Visit)    INR was 1.7 today. Pt denies any abnormal bleeding or bruising. States was on a road trip this past Fri-Mon and thinks he missed Monday's 7.5 mg dose of warfarin. Eats approximately 3 servings of greens weekly. Will  have pt boost today's dose from 7.5 mg to 10 mg, then resume current dosing of 5 mg every Tue and Fri and 7.5 mg all other days of the week. Instructed also no serving of greens today. Next INR check is to be scheduled in 2 weeks on 10/14/20-pt prefers to schedule. Pt verbalized understanding.Dosage adjustment made based on physician directed care plan.    Helena Sanders RN

## 2020-10-14 ENCOUNTER — ANTICOAGULATION THERAPY VISIT (OUTPATIENT)
Dept: CARDIOLOGY | Facility: CLINIC | Age: 80
End: 2020-10-14
Payer: MEDICARE

## 2020-10-14 DIAGNOSIS — I48.0 PAROXYSMAL ATRIAL FIBRILLATION (H): ICD-10-CM

## 2020-10-14 DIAGNOSIS — Z79.01 LONG TERM CURRENT USE OF ANTICOAGULANTS WITH INR GOAL OF 2.0-3.0: ICD-10-CM

## 2020-10-14 LAB
CAPILLARY BLOOD COLLECTION: NORMAL
INR PPP: 1.7 (ref 0.86–1.14)

## 2020-10-14 PROCEDURE — 36416 COLLJ CAPILLARY BLOOD SPEC: CPT | Performed by: INTERNAL MEDICINE

## 2020-10-14 PROCEDURE — 85610 PROTHROMBIN TIME: CPT | Performed by: INTERNAL MEDICINE

## 2020-10-14 PROCEDURE — 99207 PR NO CHARGE NURSE ONLY: CPT

## 2020-10-14 NOTE — PROGRESS NOTES
ANTICOAGULATION FOLLOW-UP CLINIC VISIT    Patient Name:  Nigel Rodarte Jr.  Date:  10/14/2020  Contact Type:  Telephone    SUBJECTIVE:  Patient Findings         Clinical Outcomes     Negatives:  Major bleeding event, Thromboembolic event, Anticoagulation-related hospital admission, Anticoagulation-related ED visit, Anticoagulation-related fatality           OBJECTIVE    Recent labs: (last 7 days)     10/14/20  1030   INR 1.70*       ASSESSMENT / PLAN  INR assessment SUB    Recheck INR In: 2 WEEKS    INR Location Outside lab      Anticoagulation Summary  As of 10/14/2020    INR goal:  2.0-3.0   TTR:  71.8 % (1 y)   INR used for dosin.70 (10/14/2020)   Warfarin maintenance plan:  5 mg (5 mg x 1) every Fri; 7.5 mg (5 mg x 1.5) all other days   Full warfarin instructions:  10/14: 10 mg; Otherwise 5 mg every Fri; 7.5 mg all other days   Weekly warfarin total:  50 mg   Plan last modified:  Gladys Herrera RN (10/14/2020)   Next INR check:  10/28/2020   Target end date:  Indefinite    Indications    Atrial fibrillation (H) [I48.91] [I48.91]  Long term current use of anticoagulants with INR goal of 2.0-3.0 [Z79.01]  Paroxysmal atrial fibrillation (H) [I48.0]             Anticoagulation Episode Summary     INR check location:      Preferred lab:      Send INR reminders to:  [facility] HEART INR NURSE    Comments:        Anticoagulation Care Providers     Provider Role Specialty Phone number    Grzegorz Malhotra MD Referring Cardiology [phone]            See the Encounter Report to view Anticoagulation Flowsheet and Dosing Calendar (Go to Encounters tab in chart review, and find the Anticoagulation Therapy Visit)    INR 1.70 INR still low. Pt not available by phone yet. Will call him later.   11:30 am Left message asking pt to call back to review his current meds, diet and ask if any bleeding issues.  3pm Spoke with pt. Denies missed doses. No change in meds or diet. Denies abnormal bleeding or clotting symptoms.  Will boost dosing to 10 mg today then increase dosing to 5 mg Fridays and 7.5 mg all other days with recheck in 2 weeks. Dosage adjustment made based on physician directed care plan.    Gladys Herrera RN

## 2020-10-20 DIAGNOSIS — I48.0 PAROXYSMAL ATRIAL FIBRILLATION (H): ICD-10-CM

## 2020-10-20 DIAGNOSIS — Z79.01 LONG TERM CURRENT USE OF ANTICOAGULANT THERAPY: ICD-10-CM

## 2020-10-20 DIAGNOSIS — I48.91 ATRIAL FIBRILLATION, UNSPECIFIED TYPE (H): ICD-10-CM

## 2020-10-20 RX ORDER — METOPROLOL SUCCINATE 25 MG/1
25 TABLET, EXTENDED RELEASE ORAL DAILY
Qty: 90 TABLET | Refills: 0 | Status: SHIPPED | OUTPATIENT
Start: 2020-10-20 | End: 2021-04-06

## 2020-10-20 RX ORDER — WARFARIN SODIUM 5 MG/1
TABLET ORAL
Qty: 135 TABLET | Refills: 1 | Status: SHIPPED | OUTPATIENT
Start: 2020-10-20 | End: 2021-07-09

## 2020-10-26 NOTE — PROGRESS NOTES
HPI:   I had the pleasure of seeing Nigel when he came for follow up of atrial fibrillation.  This 80 year old sees Dr. Malhotra for his history of:    1. Paroxysmal AFib and Flutter - failed flecainide/metoprolol. S/p PVI and CTI ablation 5/18/2020  2. Dyslipidemia  3. Coronary Calcification noted on CT prior to AFib ablation      Dr. Malhotra saw Nigel 8/2020 at which time he was ~3 months out from his ablation. No changes were made to his AFib meds, but he was recommended to start Lipitor 10 mg daily d/t presence of coronary calcification. He deferred and was rec'd for 3 month follow-up to assess lipids.    Interval History:  Overall he's doing really well. No c/o CP, SOB. Remains very active. Has really cut back on saturated fats (peanut butter, specifically).     No c/o edema, orthopnea. No problems with recurrent AFib.     Recent Diagnostic Testing:  EKG today, which I overread, showed SR 65 bpm  BEN 5/18/2020 EF 55-60%. Normal RV size/function. PFO suspected. Mild atherosclerotic plaque in aortic arch  CT A Heart 5/11/2020 showed aortic and coronary plaquing. Noncardiac portion showed no findings requiring follow up  Proarrhythmia Stress Echo 2/2018 showed no proarrhythmia. No ischemia.  Component      Latest Ref Rng & Units 8/12/2020 10/30/2020 10/30/2020          10:36 AM 11:06 AM 11:06 AM   Cholesterol      <200 mg/dL 190  188   Triglycerides      <150 mg/dL 61  147   HDL Cholesterol      >39 mg/dL 51  48   LDL Cholesterol Calculated      <100 mg/dL 127 (H)  111 (H)   Non HDL Cholesterol      <130 mg/dL 139 (H)  140 (H)   ALT      5 - 30 U/L  <5 (L)      Assessment & Plan:    1. Sx'c Paroxysmal Atrial Arrhythmias    First dx'd 5/2017; medical therapy was limited by baseline bradycardia    S/p ablation (PVI x 4 and empiric CTI) 5/2020 and since then he's done well without recurrence     Remains on flecainide 50 mg BID and metoprolol XL 25 mg daily    Establish INR Clinic follow-up in [place]    PLAN:    Discussed  "stopping flecainide as Dr. Malhotra had noted this would be reasonable. Given that he's doing well and is leaving for [place] soon, he'd like to avoid any changes for now, which is reasonable    Continues on AC    Sees Dr. Malhotra when he returns - may consider stopping it at that time depending on how he's doing      2. Coronary Calcification; Dyslipidemia    Lipids noted above. LDL still higher than we'd like. His trigs are still at goal but higher as he wasn't fasting today    Discussed limitations of ASCVD Risk Score as he has known CAD    Reviewed negative stress test 2018    PLAN:    Repeat treadmill stress test ON medications when he returns from [place]    He'd like to continue current dietary changes and does not want to start statin therapy despite risks. He notes \"we all have to die of something.\"    Repeat lipids when he returns from [place].       Brooklyn Meyers PA-C, MSPAS      Orders Placed This Encounter   Procedures     Lipid Profile     Follow-Up with Electrophysiologist     EKG 12-lead complete w/read - Clinics     Orders Placed This Encounter   Medications     flecainide (TAMBOCOR) 50 MG tablet     Sig: Take 1 tablet (50 mg) by mouth 2 times daily     Dispense:        Medications Discontinued During This Encounter   Medication Reason     flecainide (TAMBOCOR) 50 MG tablet Erroneous Entry     acetaminophen (TYLENOL) 325 MG tablet Erroneous Entry     dutasteride (AVODART) 0.5 MG capsule Erroneous Entry     acetaminophen (TYLENOL) 325 MG tablet Stopped by Patient     flecainide (TAMBOCOR) 50 MG tablet Reorder         Encounter Diagnoses   Name Primary?     Paroxysmal atrial fibrillation (H) Yes     Lipid screening      Hyperlipidemia LDL goal <100        CURRENT MEDICATIONS:  Current Outpatient Medications   Medication Sig Dispense Refill     flecainide (TAMBOCOR) 50 MG tablet Take 1 tablet (50 mg) by mouth 2 times daily       cetirizine (ZYRTEC) 10 MG tablet Take 1 tablet (10 mg) by mouth 2 times daily as needed for " allergies (1 tab up to twice a day as needed for itch, rash, hives, allergy) 20 tablet 0     dutasteride (AVODART) 0.5 MG capsule Take 0.5 mg by mouth daily       glucosamine-chondroitin 500-400 MG CAPS per capsule Take 1 capsule by mouth daily       metoprolol succinate ER (TOPROL-XL) 25 MG 24 hr tablet Take 1 tablet (25 mg) by mouth daily 90 tablet 0     metoprolol succinate ER (TOPROL-XL) 25 MG 24 hr tablet Take 1 tab daily 90 tablet 2     warfarin ANTICOAGULANT (COUMADIN) 5 MG tablet Take 1 tab on Fridays and 1 1/2 tabs on all other days or as directed per INR clinic 135 tablet 1       ALLERGIES   No Known Allergies    PAST MEDICAL HISTORY:  Past Medical History:   Diagnosis Date     Arthritis     osteoarthrosis     Elevated PSA      Fatigue      Palpitations      Paroxysmal atrial fibrillation (H) 05/16/2017     Renal disease     kidney calculus     SOB (shortness of breath)        PAST SURGICAL HISTORY:  Past Surgical History:   Procedure Laterality Date     APPENDECTOMY       ARTHROPLASTY HIP  1/14/2013    Procedure: ARTHROPLASTY HIP;  RIGHT TOTAL HIP ARTHROPLASTY ;  Surgeon: Jamie Ohara MD;  Location:  OR     ARTHROPLASTY KNEE Right 11/6/2019    Procedure: RIGHT TOTAL KNEE ARTHROPLASTY (DEPUY)^;  Surgeon: Jamie Ohara MD;  Location:  OR     CARDIOVERSION       COLONOSCOPY N/A 2/9/2016    Procedure: COLONOSCOPY;  Surgeon: Astrid Vital MD;  Location:  GI     CYSTOSCOPY, RETROGRADES, EXTRACT STONE, COMBINED  9/12/2013    Procedure: COMBINED CYSTOSCOPY, RETROGRADES, EXTRACT STONE;  CYSTOSCOPY, RIGHT RETROGRADE, STONE EXTRACTION;  Surgeon: Carlos Mcknight MD;  Location:  OR     ENT SURGERY      right ear surgery     EP ABLATION FOCAL AFIB N/A 5/18/2020    Procedure: EP Ablation Focal AFIB;  Surgeon: Grzegorz Malhotra MD;  Location:  HEART CARDIAC CATH LAB     GENITOURINARY SURGERY      cystoscopy for stone removal     HERNIA REPAIR       ORTHOPEDIC SURGERY      shoulder  "surgeries,bilat carpel tunnel       FAMILY HISTORY:  Family History   Problem Relation Age of Onset     Lung Cancer Brother        SOCIAL HISTORY:  Social History     Socioeconomic History     Marital status:      Spouse name: None     Number of children: None     Years of education: None     Highest education level: None   Occupational History     None   Social Needs     Financial resource strain: None     Food insecurity     Worry: None     Inability: None     Transportation needs     Medical: None     Non-medical: None   Tobacco Use     Smoking status: Never Smoker     Smokeless tobacco: Never Used   Substance and Sexual Activity     Alcohol use: No     Drug use: No     Sexual activity: None   Lifestyle     Physical activity     Days per week: None     Minutes per session: None     Stress: None   Relationships     Social connections     Talks on phone: None     Gets together: None     Attends Caodaism service: None     Active member of club or organization: None     Attends meetings of clubs or organizations: None     Relationship status: None     Intimate partner violence     Fear of current or ex partner: None     Emotionally abused: None     Physically abused: None     Forced sexual activity: None   Other Topics Concern     Parent/sibling w/ CABG, MI or angioplasty before 65F 55M? Not Asked   Social History Narrative     None       Review of Systems:  Skin:  Negative     Eyes:  Positive for glasses  ENT:  Negative    Respiratory:  Negative for shortness of breath;dyspnea on exertion;cough  Cardiovascular:  Negative for;chest pain;edema;exercise intolerance;fatigue;lightheadedness;dizziness;palpitations    Gastroenterology: Negative for melena;hematochezia  Genitourinary:  Negative    Musculoskeletal:  Positive for arthritis  Neurologic:  Negative    Psychiatric:  Negative    Heme/Lymph/Imm:  Negative    Endocrine:  Negative      Physical Exam:  Vitals: /69   Pulse 66   Ht 1.753 m (5' 9\")   Wt " 71.2 kg (157 lb)   BMI 23.18 kg/m      Constitutional:  cooperative, alert and oriented, well developed, well nourished, in no acute distress        Skin:  warm and dry to the touch, no apparent skin lesions or masses noted        Head:  normocephalic, no masses or lesions        Eyes:  pupils equal and round;conjunctivae and lids unremarkable;sclera white;no xanthalasma        ENT:  no pallor or cyanosis, dentition good        Neck:  JVP normal;no carotid bruit        Chest:  normal breath sounds, clear to auscultation, normal A-P diameter, normal symmetry, normal respiratory excursion, no use of accessory muscles        Cardiac: regular rhythm;no S3 or S4 bradycardic                Abdomen:  abdomen soft        Vascular: pulses full and equal                                      Extremities and Back:  no deformities, clubbing, cyanosis, erythema observed;no edema        Neurological:  no gross motor deficits        Recent Lab Results:  LIPID RESULTS:  Lab Results   Component Value Date    CHOL 188 10/30/2020    HDL 48 10/30/2020     (H) 10/30/2020    TRIG 147 10/30/2020       LIVER ENZYME RESULTS:  Lab Results   Component Value Date    AST 26 05/16/2017    ALT <5 (L) 10/30/2020       CBC RESULTS:  Lab Results   Component Value Date    WBC 6.0 05/18/2020    RBC 5.40 05/18/2020    HGB 16.2 05/18/2020    HCT 48.3 05/18/2020    MCV 89 05/18/2020    MCH 30.0 05/18/2020    MCHC 33.5 05/18/2020    RDW 13.6 05/18/2020     05/18/2020       BMP RESULTS:  Lab Results   Component Value Date     05/18/2020    POTASSIUM 4.2 05/18/2020    CHLORIDE 108 05/18/2020    CO2 29 05/18/2020    ANIONGAP 5 05/18/2020    GLC 89 05/18/2020    BUN 14 05/18/2020    CR 1.11 05/18/2020    GFRESTIMATED 63 05/18/2020    GFRESTBLACK 73 05/18/2020    CHERI 9.2 05/18/2020

## 2020-10-28 ENCOUNTER — ANTICOAGULATION THERAPY VISIT (OUTPATIENT)
Dept: CARDIOLOGY | Facility: CLINIC | Age: 80
End: 2020-10-28
Payer: MEDICARE

## 2020-10-28 DIAGNOSIS — I48.0 PAROXYSMAL ATRIAL FIBRILLATION (H): ICD-10-CM

## 2020-10-28 DIAGNOSIS — Z79.01 LONG TERM CURRENT USE OF ANTICOAGULANTS WITH INR GOAL OF 2.0-3.0: ICD-10-CM

## 2020-10-28 LAB
CAPILLARY BLOOD COLLECTION: NORMAL
INR PPP: 3.1 (ref 0.86–1.14)

## 2020-10-28 PROCEDURE — 85610 PROTHROMBIN TIME: CPT | Performed by: INTERNAL MEDICINE

## 2020-10-28 PROCEDURE — 36416 COLLJ CAPILLARY BLOOD SPEC: CPT | Performed by: INTERNAL MEDICINE

## 2020-10-28 PROCEDURE — 99207 PR NO CHARGE NURSE ONLY: CPT | Performed by: INTERNAL MEDICINE

## 2020-10-28 NOTE — PROGRESS NOTES
ANTICOAGULATION FOLLOW-UP CLINIC VISIT    Patient Name:  Nigel Rodarte Jr.  Date:  10/28/2020  Contact Type:  Telephone    SUBJECTIVE:  Patient Findings         Clinical Outcomes     Negatives:  Major bleeding event, Thromboembolic event, Anticoagulation-related hospital admission, Anticoagulation-related ED visit, Anticoagulation-related fatality           OBJECTIVE    Recent labs: (last 7 days)     10/28/20  0904   INR 3.10*       ASSESSMENT / PLAN  INR assessment SUPRA    Recheck INR In: 2 WEEKS    INR Location Outside lab      Anticoagulation Summary  As of 10/28/2020    INR goal:  2.0-3.0   TTR:  71.7 % (1 y)   INR used for dosing:  3.10 (10/28/2020)   Warfarin maintenance plan:  5 mg (5 mg x 1) every Tue; 7.5 mg (5 mg x 1.5) all other days   Full warfarin instructions:  5 mg every Tue; 7.5 mg all other days   Weekly warfarin total:  50 mg   Plan last modified:  Gladys Herrera RN (10/28/2020)   Next INR check:  11/11/2020   Target end date:  Indefinite    Indications    Atrial fibrillation (H) [I48.91] [I48.91]  Long term current use of anticoagulants with INR goal of 2.0-3.0 [Z79.01]  Paroxysmal atrial fibrillation (H) [I48.0]             Anticoagulation Episode Summary     INR check location:      Preferred lab:      Send INR reminders to:  [facility] HEART INR NURSE    Comments:        Anticoagulation Care Providers     Provider Role Specialty Phone number    Grzegorz Malhotra MD Referring Clinical Cardiac Electrophysiology [phone]            See the Encounter Report to view Anticoagulation Flowsheet and Dosing Calendar (Go to Encounters tab in chart review, and find the Anticoagulation Therapy Visit)    INR 3.10 INR slightly elevated today. He mistakenly took 7.5 mg on Fridays instead of Tuesdays so he took an extra 2.5 mg of dosing 2 weeks ago. No change in other meds or diet (salads about 3x/wk). Denies abnormal bleeding. Has a small bruise on his hand. Will continue current dosing of 5 mg  Tuesdays and 7.5 mg all other days and eat an extra salad or veggie each week. Recheck in 2 weeks. He is planning to go to [place] for the winter months (December to March) so instructed him that he will need to establish care with a doctor in [place] for INR management during the winter. Pt verbalized understanding.    Gladys Herrera RN

## 2020-10-30 ENCOUNTER — OFFICE VISIT (OUTPATIENT)
Dept: CARDIOLOGY | Facility: CLINIC | Age: 80
End: 2020-10-30
Attending: INTERNAL MEDICINE
Payer: MEDICARE

## 2020-10-30 VITALS
BODY MASS INDEX: 23.25 KG/M2 | DIASTOLIC BLOOD PRESSURE: 69 MMHG | HEART RATE: 66 BPM | WEIGHT: 157 LBS | HEIGHT: 69 IN | SYSTOLIC BLOOD PRESSURE: 112 MMHG

## 2020-10-30 DIAGNOSIS — E78.5 HYPERLIPIDEMIA LDL GOAL <100: ICD-10-CM

## 2020-10-30 DIAGNOSIS — Z13.220 LIPID SCREENING: ICD-10-CM

## 2020-10-30 DIAGNOSIS — R79.9 ABNORMAL FINDING OF BLOOD CHEMISTRY, UNSPECIFIED: ICD-10-CM

## 2020-10-30 DIAGNOSIS — I48.0 PAROXYSMAL ATRIAL FIBRILLATION (H): Primary | ICD-10-CM

## 2020-10-30 DIAGNOSIS — I48.0 PAROXYSMAL ATRIAL FIBRILLATION (H): ICD-10-CM

## 2020-10-30 LAB
ALT SERPL W P-5'-P-CCNC: <5 U/L (ref 5–30)
CHOLEST SERPL-MCNC: 188 MG/DL
HDLC SERPL-MCNC: 48 MG/DL
LDLC SERPL CALC-MCNC: 111 MG/DL
NONHDLC SERPL-MCNC: 140 MG/DL
TRIGL SERPL-MCNC: 147 MG/DL

## 2020-10-30 PROCEDURE — 80061 LIPID PANEL: CPT | Performed by: INTERNAL MEDICINE

## 2020-10-30 PROCEDURE — 99214 OFFICE O/P EST MOD 30 MIN: CPT | Mod: 25 | Performed by: PHYSICIAN ASSISTANT

## 2020-10-30 PROCEDURE — 93000 ELECTROCARDIOGRAM COMPLETE: CPT | Performed by: PHYSICIAN ASSISTANT

## 2020-10-30 PROCEDURE — 84460 ALANINE AMINO (ALT) (SGPT): CPT | Performed by: INTERNAL MEDICINE

## 2020-10-30 PROCEDURE — 36415 COLL VENOUS BLD VENIPUNCTURE: CPT | Performed by: INTERNAL MEDICINE

## 2020-10-30 RX ORDER — FLECAINIDE ACETATE 50 MG/1
50 TABLET ORAL 2 TIMES DAILY
COMMUNITY
Start: 2020-10-30 | End: 2021-04-06

## 2020-10-30 ASSESSMENT — MIFFLIN-ST. JEOR: SCORE: 1417.53

## 2020-10-30 NOTE — LETTER
10/30/2020    Osmar Espinoza MD  [employer] [address]  [facility] [address]    RE: Nigel TAE Rodarte Jr.       Dear Colleague,    I had the pleasure of seeing Nigel STRONG Cayden Silva in the [facility].    HPI:   I had the pleasure of seeing Nigel when he came for follow up of atrial fibrillation.  This 80 year old sees Dr. Malhotra for his history of:    1. Paroxysmal AFib and Flutter - failed flecainide/metoprolol. S/p PVI and CTI ablation 5/18/2020  2. Dyslipidemia  3. Coronary Calcification noted on CT prior to AFib ablation      Dr. Malhotra saw Nigel 8/2020 at which time he was ~3 months out from his ablation. No changes were made to his AFib meds, but he was recommended to start Lipitor 10 mg daily d/t presence of coronary calcification. He deferred and was rec'd for 3 month follow-up to assess lipids.    Interval History:  Overall he's doing really well. No c/o CP, SOB. Remains very active. Has really cut back on saturated fats (peanut butter, specifically).     No c/o edema, orthopnea. No problems with recurrent AFib.     Recent Diagnostic Testing:  EKG today, which I overread, showed SR 65 bpm  BEN 5/18/2020 EF 55-60%. Normal RV size/function. PFO suspected. Mild atherosclerotic plaque in aortic arch  CT A Heart 5/11/2020 showed aortic and coronary plaquing. Noncardiac portion showed no findings requiring follow up  Proarrhythmia Stress Echo 2/2018 showed no proarrhythmia. No ischemia.  Component      Latest Ref Rng & Units 8/12/2020 10/30/2020 10/30/2020          10:36 AM 11:06 AM 11:06 AM   Cholesterol      <200 mg/dL 190  188   Triglycerides      <150 mg/dL 61  147   HDL Cholesterol      >39 mg/dL 51  48   LDL Cholesterol Calculated      <100 mg/dL 127 (H)  111 (H)   Non HDL Cholesterol      <130 mg/dL 139 (H)  140 (H)   ALT      5 - 30 U/L  <5 (L)      Assessment & Plan:    1. Sx'c Paroxysmal Atrial Arrhythmias    First dx'd 5/2017; medical therapy was limited by  "baseline bradycardia    S/p ablation (PVI x 4 and empiric CTI) 5/2020 and since then he's done well without recurrence     Remains on flecainide 50 mg BID and metoprolol XL 25 mg daily    Establish INR Clinic follow-up in [place]    PLAN:    Discussed stopping flecainide as Dr. Malhotra had noted this would be reasonable. Given that he's doing well and is leaving for [place] soon, he'd like to avoid any changes for now, which is reasonable    Continues on AC    Sees Dr. Malhotra when he returns - may consider stopping it at that time depending on how he's doing      2. Coronary Calcification; Dyslipidemia    Lipids noted above. LDL still higher than we'd like. His trigs are still at goal but higher as he wasn't fasting today    Discussed limitations of ASCVD Risk Score as he has known CAD    Reviewed negative stress test 2018    PLAN:    Repeat treadmill stress test ON medications when he returns from [place]    He'd like to continue current dietary changes and does not want to start statin therapy despite risks. He notes \"we all have to die of something.\"    Repeat lipids when he returns from [place].       Brooklyn Meyers PA-C, MSPAS      Orders Placed This Encounter   Procedures     Lipid Profile     Follow-Up with Electrophysiologist     EKG 12-lead complete w/read - Clinics     Orders Placed This Encounter   Medications     flecainide (TAMBOCOR) 50 MG tablet     Sig: Take 1 tablet (50 mg) by mouth 2 times daily     Dispense:        Medications Discontinued During This Encounter   Medication Reason     flecainide (TAMBOCOR) 50 MG tablet Erroneous Entry     acetaminophen (TYLENOL) 325 MG tablet Erroneous Entry     dutasteride (AVODART) 0.5 MG capsule Erroneous Entry     acetaminophen (TYLENOL) 325 MG tablet Stopped by Patient     flecainide (TAMBOCOR) 50 MG tablet Reorder         Encounter Diagnoses   Name Primary?     Paroxysmal atrial fibrillation (H) Yes     Lipid screening      Hyperlipidemia LDL goal <100        CURRENT " MEDICATIONS:  Current Outpatient Medications   Medication Sig Dispense Refill     flecainide (TAMBOCOR) 50 MG tablet Take 1 tablet (50 mg) by mouth 2 times daily       cetirizine (ZYRTEC) 10 MG tablet Take 1 tablet (10 mg) by mouth 2 times daily as needed for allergies (1 tab up to twice a day as needed for itch, rash, hives, allergy) 20 tablet 0     dutasteride (AVODART) 0.5 MG capsule Take 0.5 mg by mouth daily       glucosamine-chondroitin 500-400 MG CAPS per capsule Take 1 capsule by mouth daily       metoprolol succinate ER (TOPROL-XL) 25 MG 24 hr tablet Take 1 tablet (25 mg) by mouth daily 90 tablet 0     metoprolol succinate ER (TOPROL-XL) 25 MG 24 hr tablet Take 1 tab daily 90 tablet 2     warfarin ANTICOAGULANT (COUMADIN) 5 MG tablet Take 1 tab on Fridays and 1 1/2 tabs on all other days or as directed per INR clinic 135 tablet 1       ALLERGIES   No Known Allergies    PAST MEDICAL HISTORY:  Past Medical History:   Diagnosis Date     Arthritis     osteoarthrosis     Elevated PSA      Fatigue      Palpitations      Paroxysmal atrial fibrillation (H) 05/16/2017     Renal disease     kidney calculus     SOB (shortness of breath)        PAST SURGICAL HISTORY:  Past Surgical History:   Procedure Laterality Date     APPENDECTOMY       ARTHROPLASTY HIP  1/14/2013    Procedure: ARTHROPLASTY HIP;  RIGHT TOTAL HIP ARTHROPLASTY ;  Surgeon: Jamie Ohara MD;  Location:  OR     ARTHROPLASTY KNEE Right 11/6/2019    Procedure: RIGHT TOTAL KNEE ARTHROPLASTY (DEPUY)^;  Surgeon: Jamie Ohara MD;  Location:  OR     CARDIOVERSION       COLONOSCOPY N/A 2/9/2016    Procedure: COLONOSCOPY;  Surgeon: Astrid Vital MD;  Location:  GI     CYSTOSCOPY, RETROGRADES, EXTRACT STONE, COMBINED  9/12/2013    Procedure: COMBINED CYSTOSCOPY, RETROGRADES, EXTRACT STONE;  CYSTOSCOPY, RIGHT RETROGRADE, STONE EXTRACTION;  Surgeon: Carlos Mcknight MD;  Location:  OR     ENT SURGERY      right ear surgery     EP ABLATION  FOCAL AFIB N/A 5/18/2020    Procedure: EP Ablation Focal AFIB;  Surgeon: Grzegorz Malhotra MD;  Location:  HEART CARDIAC CATH LAB     GENITOURINARY SURGERY      cystoscopy for stone removal     HERNIA REPAIR       ORTHOPEDIC SURGERY      shoulder surgeries,bilat carpel tunnel       FAMILY HISTORY:  Family History   Problem Relation Age of Onset     Lung Cancer Brother        SOCIAL HISTORY:  Social History     Socioeconomic History     Marital status:      Spouse name: None     Number of children: None     Years of education: None     Highest education level: None   Occupational History     None   Social Needs     Financial resource strain: None     Food insecurity     Worry: None     Inability: None     Transportation needs     Medical: None     Non-medical: None   Tobacco Use     Smoking status: Never Smoker     Smokeless tobacco: Never Used   Substance and Sexual Activity     Alcohol use: No     Drug use: No     Sexual activity: None   Lifestyle     Physical activity     Days per week: None     Minutes per session: None     Stress: None   Relationships     Social connections     Talks on phone: None     Gets together: None     Attends Rastafari service: None     Active member of club or organization: None     Attends meetings of clubs or organizations: None     Relationship status: None     Intimate partner violence     Fear of current or ex partner: None     Emotionally abused: None     Physically abused: None     Forced sexual activity: None   Other Topics Concern     Parent/sibling w/ CABG, MI or angioplasty before 65F 55M? Not Asked   Social History Narrative     None       Review of Systems:  Skin:  Negative     Eyes:  Positive for glasses  ENT:  Negative    Respiratory:  Negative for shortness of breath;dyspnea on exertion;cough  Cardiovascular:  Negative for;chest pain;edema;exercise intolerance;fatigue;lightheadedness;dizziness;palpitations    Gastroenterology: Negative for  "melena;hematochezia  Genitourinary:  Negative    Musculoskeletal:  Positive for arthritis  Neurologic:  Negative    Psychiatric:  Negative    Heme/Lymph/Imm:  Negative    Endocrine:  Negative      Physical Exam:  Vitals: /69   Pulse 66   Ht 1.753 m (5' 9\")   Wt 71.2 kg (157 lb)   BMI 23.18 kg/m      Constitutional:  cooperative, alert and oriented, well developed, well nourished, in no acute distress        Skin:  warm and dry to the touch, no apparent skin lesions or masses noted        Head:  normocephalic, no masses or lesions        Eyes:  pupils equal and round;conjunctivae and lids unremarkable;sclera white;no xanthalasma        ENT:  no pallor or cyanosis, dentition good        Neck:  JVP normal;no carotid bruit        Chest:  normal breath sounds, clear to auscultation, normal A-P diameter, normal symmetry, normal respiratory excursion, no use of accessory muscles        Cardiac: regular rhythm;no S3 or S4 bradycardic                Abdomen:  abdomen soft        Vascular: pulses full and equal                                      Extremities and Back:  no deformities, clubbing, cyanosis, erythema observed;no edema        Neurological:  no gross motor deficits        Recent Lab Results:  LIPID RESULTS:  Lab Results   Component Value Date    CHOL 188 10/30/2020    HDL 48 10/30/2020     (H) 10/30/2020    TRIG 147 10/30/2020       LIVER ENZYME RESULTS:  Lab Results   Component Value Date    AST 26 05/16/2017    ALT <5 (L) 10/30/2020       CBC RESULTS:  Lab Results   Component Value Date    WBC 6.0 05/18/2020    RBC 5.40 05/18/2020    HGB 16.2 05/18/2020    HCT 48.3 05/18/2020    MCV 89 05/18/2020    MCH 30.0 05/18/2020    MCHC 33.5 05/18/2020    RDW 13.6 05/18/2020     05/18/2020       BMP RESULTS:  Lab Results   Component Value Date     05/18/2020    POTASSIUM 4.2 05/18/2020    CHLORIDE 108 05/18/2020    CO2 29 05/18/2020    ANIONGAP 5 05/18/2020    GLC 89 05/18/2020    BUN 14 " 05/18/2020    CR 1.11 05/18/2020    GFRESTIMATED 63 05/18/2020    GFRESTBLACK 73 05/18/2020    CHERI 9.2 05/18/2020        Thank you for allowing me to participate in the care of your patient.    Sincerely,     Briseyda Meyers PA-C     [facility]

## 2020-10-30 NOTE — PATIENT INSTRUCTIONS
Nigel - it was so nice to see you today!    1. Reviewed blood work and did the ASCVD Risk Calculator (though you do have known coronary calcification/CAD) - this test is best for people without any known blockages  2. Your stress test in 2018 showed that this calcification was NOT causing lack of blood flow to heart    PLAN:  1. Continue heart healthy living!  2. When you get back from [place], let's get another stress test and repeat cholesterol. You'll see Dr. Malhotra or me following  3. Get INR clinic in [place]    4. CALL if issues! [phone]

## 2020-10-30 NOTE — LETTER
10/30/2020    Osmar Espinoza MD  [employer] [id number]  [facility] [address]    RE: Nigel TAE Rodarte Jr.       Dear Colleague,    I had the pleasure of seeing Nigel STRONG Cayden Silva in the [facility].    HPI:   I had the pleasure of seeing Nigel when he came for follow up of atrial fibrillation.  This 80 year old sees Dr. Malhotra for his history of:    1. Paroxysmal AFib and Flutter - failed flecainide/metoprolol. S/p PVI and CTI ablation 5/18/2020  2. Dyslipidemia  3. Coronary Calcification noted on CT prior to AFib ablation      Dr. Malhotra saw Nigel 8/2020 at which time he was ~3 months out from his ablation. No changes were made to his AFib meds, but he was recommended to start Lipitor 10 mg daily d/t presence of coronary calcification. He deferred and was rec'd for 3 month follow-up to assess lipids.    Interval History:  Overall he's doing really well. No c/o CP, SOB. Remains very active. Has really cut back on saturated fats (peanut butter, specifically).     No c/o edema, orthopnea. No problems with recurrent AFib.     Recent Diagnostic Testing:  EKG today, which I overread, showed SR 65 bpm  BEN 5/18/2020 EF 55-60%. Normal RV size/function. PFO suspected. Mild atherosclerotic plaque in aortic arch  CT A Heart 5/11/2020 showed aortic and coronary plaquing. Noncardiac portion showed no findings requiring follow up  Proarrhythmia Stress Echo 2/2018 showed no proarrhythmia. No ischemia.  Component      Latest Ref Rng & Units 8/12/2020 10/30/2020 10/30/2020          10:36 AM 11:06 AM 11:06 AM   Cholesterol      <200 mg/dL 190  188   Triglycerides      <150 mg/dL 61  147   HDL Cholesterol      >39 mg/dL 51  48   LDL Cholesterol Calculated      <100 mg/dL 127 (H)  111 (H)   Non HDL Cholesterol      <130 mg/dL 139 (H)  140 (H)   ALT      5 - 30 U/L  <5 (L)      Assessment & Plan:    1. Sx'c Paroxysmal Atrial Arrhythmias    First dx'd 5/2017; medical therapy was limited by  "baseline bradycardia    S/p ablation (PVI x 4 and empiric CTI) 5/2020 and since then he's done well without recurrence     Remains on flecainide 50 mg BID and metoprolol XL 25 mg daily    Establish INR Clinic follow-up in [place]    PLAN:    Discussed stopping flecainide as Dr. Malhotra had noted this would be reasonable. Given that he's doing well and is leaving for [place] soon, he'd like to avoid any changes for now, which is reasonable    Continues on AC    Sees Dr. Malhotra when he returns - may consider stopping it at that time depending on how he's doing      2. Coronary Calcification; Dyslipidemia    Lipids noted above. LDL still higher than we'd like. His trigs are still at goal but higher as he wasn't fasting today    Discussed limitations of ASCVD Risk Score as he has known CAD    Reviewed negative stress test 2018    PLAN:    Repeat treadmill stress test ON medications when he returns from [place]    He'd like to continue current dietary changes and does not want to start statin therapy despite risks. He notes \"we all have to die of something.\"    Repeat lipids when he returns from [place].       Brooklyn Meyers PA-C, MSPAS      Orders Placed This Encounter   Procedures     Lipid Profile     Follow-Up with Electrophysiologist     EKG 12-lead complete w/read - Clinics     Orders Placed This Encounter   Medications     flecainide (TAMBOCOR) 50 MG tablet     Sig: Take 1 tablet (50 mg) by mouth 2 times daily     Dispense:        Medications Discontinued During This Encounter   Medication Reason     flecainide (TAMBOCOR) 50 MG tablet Erroneous Entry     acetaminophen (TYLENOL) 325 MG tablet Erroneous Entry     dutasteride (AVODART) 0.5 MG capsule Erroneous Entry     acetaminophen (TYLENOL) 325 MG tablet Stopped by Patient     flecainide (TAMBOCOR) 50 MG tablet Reorder         Encounter Diagnoses   Name Primary?     Paroxysmal atrial fibrillation (H) Yes     Lipid screening      Hyperlipidemia LDL goal <100        CURRENT " MEDICATIONS:  Current Outpatient Medications   Medication Sig Dispense Refill     flecainide (TAMBOCOR) 50 MG tablet Take 1 tablet (50 mg) by mouth 2 times daily       cetirizine (ZYRTEC) 10 MG tablet Take 1 tablet (10 mg) by mouth 2 times daily as needed for allergies (1 tab up to twice a day as needed for itch, rash, hives, allergy) 20 tablet 0     dutasteride (AVODART) 0.5 MG capsule Take 0.5 mg by mouth daily       glucosamine-chondroitin 500-400 MG CAPS per capsule Take 1 capsule by mouth daily       metoprolol succinate ER (TOPROL-XL) 25 MG 24 hr tablet Take 1 tablet (25 mg) by mouth daily 90 tablet 0     metoprolol succinate ER (TOPROL-XL) 25 MG 24 hr tablet Take 1 tab daily 90 tablet 2     warfarin ANTICOAGULANT (COUMADIN) 5 MG tablet Take 1 tab on Fridays and 1 1/2 tabs on all other days or as directed per INR clinic 135 tablet 1       ALLERGIES   No Known Allergies    PAST MEDICAL HISTORY:  Past Medical History:   Diagnosis Date     Arthritis     osteoarthrosis     Elevated PSA      Fatigue      Palpitations      Paroxysmal atrial fibrillation (H) 05/16/2017     Renal disease     kidney calculus     SOB (shortness of breath)        PAST SURGICAL HISTORY:  Past Surgical History:   Procedure Laterality Date     APPENDECTOMY       ARTHROPLASTY HIP  1/14/2013    Procedure: ARTHROPLASTY HIP;  RIGHT TOTAL HIP ARTHROPLASTY ;  Surgeon: Jamie Ohara MD;  Location:  OR     ARTHROPLASTY KNEE Right 11/6/2019    Procedure: RIGHT TOTAL KNEE ARTHROPLASTY (DEPUY)^;  Surgeon: Jamie Ohara MD;  Location:  OR     CARDIOVERSION       COLONOSCOPY N/A 2/9/2016    Procedure: COLONOSCOPY;  Surgeon: Astrid Vital MD;  Location:  GI     CYSTOSCOPY, RETROGRADES, EXTRACT STONE, COMBINED  9/12/2013    Procedure: COMBINED CYSTOSCOPY, RETROGRADES, EXTRACT STONE;  CYSTOSCOPY, RIGHT RETROGRADE, STONE EXTRACTION;  Surgeon: Carlos Mcknight MD;  Location:  OR     ENT SURGERY      right ear surgery     EP ABLATION  FOCAL AFIB N/A 5/18/2020    Procedure: EP Ablation Focal AFIB;  Surgeon: Grzegorz Malhotra MD;  Location:  HEART CARDIAC CATH LAB     GENITOURINARY SURGERY      cystoscopy for stone removal     HERNIA REPAIR       ORTHOPEDIC SURGERY      shoulder surgeries,bilat carpel tunnel       FAMILY HISTORY:  Family History   Problem Relation Age of Onset     Lung Cancer Brother        SOCIAL HISTORY:  Social History     Socioeconomic History     Marital status:      Spouse name: None     Number of children: None     Years of education: None     Highest education level: None   Occupational History     None   Social Needs     Financial resource strain: None     Food insecurity     Worry: None     Inability: None     Transportation needs     Medical: None     Non-medical: None   Tobacco Use     Smoking status: Never Smoker     Smokeless tobacco: Never Used   Substance and Sexual Activity     Alcohol use: No     Drug use: No     Sexual activity: None   Lifestyle     Physical activity     Days per week: None     Minutes per session: None     Stress: None   Relationships     Social connections     Talks on phone: None     Gets together: None     Attends Hindu service: None     Active member of club or organization: None     Attends meetings of clubs or organizations: None     Relationship status: None     Intimate partner violence     Fear of current or ex partner: None     Emotionally abused: None     Physically abused: None     Forced sexual activity: None   Other Topics Concern     Parent/sibling w/ CABG, MI or angioplasty before 65F 55M? Not Asked   Social History Narrative     None       Review of Systems:  Skin:  Negative     Eyes:  Positive for glasses  ENT:  Negative    Respiratory:  Negative for shortness of breath;dyspnea on exertion;cough  Cardiovascular:  Negative for;chest pain;edema;exercise intolerance;fatigue;lightheadedness;dizziness;palpitations    Gastroenterology: Negative for  "melena;hematochezia  Genitourinary:  Negative    Musculoskeletal:  Positive for arthritis  Neurologic:  Negative    Psychiatric:  Negative    Heme/Lymph/Imm:  Negative    Endocrine:  Negative      Physical Exam:  Vitals: /69   Pulse 66   Ht 1.753 m (5' 9\")   Wt 71.2 kg (157 lb)   BMI 23.18 kg/m      Constitutional:  cooperative, alert and oriented, well developed, well nourished, in no acute distress        Skin:  warm and dry to the touch, no apparent skin lesions or masses noted        Head:  normocephalic, no masses or lesions        Eyes:  pupils equal and round;conjunctivae and lids unremarkable;sclera white;no xanthalasma        ENT:  no pallor or cyanosis, dentition good        Neck:  JVP normal;no carotid bruit        Chest:  normal breath sounds, clear to auscultation, normal A-P diameter, normal symmetry, normal respiratory excursion, no use of accessory muscles        Cardiac: regular rhythm;no S3 or S4 bradycardic                Abdomen:  abdomen soft        Vascular: pulses full and equal                                      Extremities and Back:  no deformities, clubbing, cyanosis, erythema observed;no edema        Neurological:  no gross motor deficits        Recent Lab Results:  LIPID RESULTS:  Lab Results   Component Value Date    CHOL 188 10/30/2020    HDL 48 10/30/2020     (H) 10/30/2020    TRIG 147 10/30/2020       LIVER ENZYME RESULTS:  Lab Results   Component Value Date    AST 26 05/16/2017    ALT <5 (L) 10/30/2020       CBC RESULTS:  Lab Results   Component Value Date    WBC 6.0 05/18/2020    RBC 5.40 05/18/2020    HGB 16.2 05/18/2020    HCT 48.3 05/18/2020    MCV 89 05/18/2020    MCH 30.0 05/18/2020    MCHC 33.5 05/18/2020    RDW 13.6 05/18/2020     05/18/2020       BMP RESULTS:  Lab Results   Component Value Date     05/18/2020    POTASSIUM 4.2 05/18/2020    CHLORIDE 108 05/18/2020    CO2 29 05/18/2020    ANIONGAP 5 05/18/2020    GLC 89 05/18/2020    BUN 14 " 05/18/2020    CR 1.11 05/18/2020    GFRESTIMATED 63 05/18/2020    GFRESTBLACK 73 05/18/2020    CHERI 9.2 05/18/2020            Thank you for allowing me to participate in the care of your patient.      Sincerely,     ANA SalmonC     [facility]    cc:   Grzegorz Malhotra MD  [address]  [place] [id number]

## 2020-11-11 ENCOUNTER — ANTICOAGULATION THERAPY VISIT (OUTPATIENT)
Dept: CARDIOLOGY | Facility: CLINIC | Age: 80
End: 2020-11-11
Payer: MEDICARE

## 2020-11-11 DIAGNOSIS — Z79.01 LONG TERM CURRENT USE OF ANTICOAGULANTS WITH INR GOAL OF 2.0-3.0: ICD-10-CM

## 2020-11-11 DIAGNOSIS — I48.0 PAROXYSMAL ATRIAL FIBRILLATION (H): ICD-10-CM

## 2020-11-11 DIAGNOSIS — E78.5 HYPERLIPIDEMIA LDL GOAL <100: ICD-10-CM

## 2020-11-11 LAB
CAPILLARY BLOOD COLLECTION: NORMAL
INR PPP: 1.3 (ref 0.86–1.14)

## 2020-11-11 PROCEDURE — 36416 COLLJ CAPILLARY BLOOD SPEC: CPT | Performed by: PHYSICIAN ASSISTANT

## 2020-11-11 PROCEDURE — 85610 PROTHROMBIN TIME: CPT | Performed by: PHYSICIAN ASSISTANT

## 2020-11-11 PROCEDURE — 99207 PR NO CHARGE NURSE ONLY: CPT | Performed by: INTERNAL MEDICINE

## 2020-11-11 NOTE — PROGRESS NOTES
ANTICOAGULATION FOLLOW-UP CLINIC VISIT    Patient Name:  Nigel Rodarte Jr.  Date:  2020  Contact Type:  Telephone    SUBJECTIVE:         OBJECTIVE    Recent labs: (last 7 days)     20  1046   INR 1.30*       ASSESSMENT / PLAN  INR assessment SUB    Recheck INR In: 2 WEEKS    INR Location Outside lab      Anticoagulation Summary  As of 2020    INR goal:  2.0-3.0   TTR:  73.9 % (1 y)   INR used for dosin.30 (2020)   Warfarin maintenance plan:  5 mg (5 mg x 1) every Tue; 7.5 mg (5 mg x 1.5) all other days   Full warfarin instructions:  5 mg every Tue; 7.5 mg all other days   Weekly warfarin total:  50 mg   Plan last modified:  Gladys Herrera RN (10/28/2020)   Next INR check:  2020   Target end date:  Indefinite    Indications    Atrial fibrillation (H) [I48.91] [I48.91]  Long term current use of anticoagulants with INR goal of 2.0-3.0 [Z79.01]  Paroxysmal atrial fibrillation (H) [I48.0]             Anticoagulation Episode Summary     INR check location:      Preferred lab:      Send INR reminders to:  [facility] HEART INR NURSE    Comments:        Anticoagulation Care Providers     Provider Role Specialty Phone number    Grzegorz Malhotra MD Referring Clinical Cardiac Electrophysiology [phone]            See the Encounter Report to view Anticoagulation Flowsheet and Dosing Calendar (Go to Encounters tab in chart review, and find the Anticoagulation Therapy Visit)    INR 1.30 Pt not availabe by phone yet. Will call him later today.  11:45 am Spoke with pt. He doesn't think he missed a dose but at the most he may have missed one dose. Eating brussel sprouts recently. No change in meds. Denies bleeding or clotting symptoms. Will boost dosing to 12.5 mg today then increase maintenance dose to 7.5 mg daily with recheck in 1 week. He is planning to leave for [place] on  (will be spending the winter months there). Pt aware that he will need to establish care with a doctor in  [place] for INR management. Dosage adjustment made based on physician directed care plan.    Gladys Herrera RN

## 2020-11-18 ENCOUNTER — ANTICOAGULATION THERAPY VISIT (OUTPATIENT)
Dept: CARDIOLOGY | Facility: CLINIC | Age: 80
End: 2020-11-18

## 2020-11-18 DIAGNOSIS — Z79.01 LONG TERM CURRENT USE OF ANTICOAGULANTS WITH INR GOAL OF 2.0-3.0: ICD-10-CM

## 2020-11-18 DIAGNOSIS — I48.0 PAROXYSMAL ATRIAL FIBRILLATION (H): ICD-10-CM

## 2020-11-18 LAB
CAPILLARY BLOOD COLLECTION: NORMAL
INR PPP: 1.6 (ref 0.86–1.14)

## 2020-11-18 PROCEDURE — 36416 COLLJ CAPILLARY BLOOD SPEC: CPT | Performed by: INTERNAL MEDICINE

## 2020-11-18 PROCEDURE — 85610 PROTHROMBIN TIME: CPT | Performed by: INTERNAL MEDICINE

## 2020-11-18 NOTE — PROGRESS NOTES
ANTICOAGULATION FOLLOW-UP CLINIC VISIT    Patient Name:  Nigel Rodarte Jr.  Date:  2020  Contact Type:  Telephone    SUBJECTIVE:         OBJECTIVE    Recent labs: (last 7 days)     20  1047   INR 1.60*       ASSESSMENT / PLAN  INR assessment SUB    Recheck INR In: 2 WEEKS    INR Location Outside lab      Anticoagulation Summary  As of 2020    INR goal:  2.0-3.0   TTR:  72.9 % (1 y)   INR used for dosin.60 (2020)   Warfarin maintenance plan:  10 mg (5 mg x 2) every Wed; 7.5 mg (5 mg x 1.5) all other days   Full warfarin instructions:  10 mg every Wed; 7.5 mg all other days   Weekly warfarin total:  55 mg   Plan last modified:  Gladys Herrera RN (2020)   Next INR check:  2020   Target end date:  Indefinite    Indications    Atrial fibrillation (H) [I48.91] [I48.91]  Long term current use of anticoagulants with INR goal of 2.0-3.0 [Z79.01]  Paroxysmal atrial fibrillation (H) [I48.0]             Anticoagulation Episode Summary     INR check location:      Preferred lab:      Send INR reminders to:  [facility] HEART INR NURSE    Comments:        Anticoagulation Care Providers     Provider Role Specialty Phone number    Grzegorz Malhotra MD Referring Clinical Cardiac Electrophysiology [phone]            See the Encounter Report to view Anticoagulation Flowsheet and Dosing Calendar (Go to Encounters tab in chart review, and find the Anticoagulation Therapy Visit)    INR 1.60 INR rising with increase in dosing but is still low. Pt not available by phone yet. Will call him later today.  2:25pm Spoke with pt. No change in meds or diet. He is not taking any new supplements. Denies abnormal bleeding or clotting symptoms. Will boost today's dose to 12.5 mg then increase maintenance dose to 10 mg Wed and 7.5 mg all other days. He is leaving [place] for the winter probably early next week so will recheck INR on  to determine if he will need 1 or 2 days of 10 mg dosing each  week. He will establish care with a provider in [place] for INR management over the winter. Gave him the phone number for [facility] ([phone]) so he can request medical records be transferred to the clinic he will have INR's managed at over the winter months. Dosage adjustment made based on physician directed care plan.    Gladys Herrera RN

## 2020-11-23 ENCOUNTER — ANTICOAGULATION THERAPY VISIT (OUTPATIENT)
Dept: CARDIOLOGY | Facility: CLINIC | Age: 80
End: 2020-11-23
Payer: MEDICARE

## 2020-11-23 DIAGNOSIS — Z79.01 LONG TERM CURRENT USE OF ANTICOAGULANTS WITH INR GOAL OF 2.0-3.0: ICD-10-CM

## 2020-11-23 DIAGNOSIS — I48.0 PAROXYSMAL ATRIAL FIBRILLATION (H): ICD-10-CM

## 2020-11-23 LAB
CAPILLARY BLOOD COLLECTION: NORMAL
INR PPP: 3.5 (ref 0.86–1.14)

## 2020-11-23 PROCEDURE — 85610 PROTHROMBIN TIME: CPT | Performed by: INTERNAL MEDICINE

## 2020-11-23 PROCEDURE — 99207 PR NO CHARGE NURSE ONLY: CPT | Performed by: INTERNAL MEDICINE

## 2020-11-23 PROCEDURE — 36416 COLLJ CAPILLARY BLOOD SPEC: CPT | Performed by: INTERNAL MEDICINE

## 2020-11-23 NOTE — PROGRESS NOTES
ANTICOAGULATION FOLLOW-UP CLINIC VISIT    Patient Name:  Nigel Rodarte Jr.  Date:  11/23/2020  Contact Type:  Telephone    SUBJECTIVE:  Patient Findings     Positives:  Change in diet/appetite (less greens recently)        Clinical Outcomes     Negatives:  Major bleeding event, Thromboembolic event, Anticoagulation-related hospital admission, Anticoagulation-related ED visit, Anticoagulation-related fatality           OBJECTIVE    Recent labs: (last 7 days)     11/23/20  1103   INR 3.50*       ASSESSMENT / PLAN  INR assessment SUPRA    Recheck INR In: 2 WEEKS    INR Location Outside lab      Anticoagulation Summary  As of 11/23/2020    INR goal:  2.0-3.0   TTR:  72.3 % (1 y)   INR used for dosing:  3.50 (11/23/2020)   Warfarin maintenance plan:  7.5 mg (5 mg x 1.5) every day   Full warfarin instructions:  7.5 mg every day   Weekly warfarin total:  52.5 mg   Plan last modified:  Gladys Herrera RN (11/23/2020)   Next INR check:  12/7/2020   Target end date:  Indefinite    Indications    Atrial fibrillation (H) [I48.91] [I48.91]  Long term current use of anticoagulants with INR goal of 2.0-3.0 [Z79.01]  Paroxysmal atrial fibrillation (H) [I48.0]             Anticoagulation Episode Summary     INR check location:      Preferred lab:      Send INR reminders to:  [facility] HEART INR NURSE    Comments:        Anticoagulation Care Providers     Provider Role Specialty Phone number    Grzegorz Malhotra MD Referring Clinical Cardiac Electrophysiology [phone]            See the Encounter Report to view Anticoagulation Flowsheet and Dosing Calendar (Go to Encounters tab in chart review, and find the Anticoagulation Therapy Visit)    INR 3.50 INR high today but he ate less greens this week along with the boost in dosing. No change in other meds. Denies abnormal bleeding or bruising. He will resume his usual diet and decrease dosing to 7.5 mg daily with recheck in 2 weeks. He is leaving for [place] (will establish care  with a provider in [place] for INR management) and won't be back in [place] until March. Dosage adjustment made based on physician directed care plan.    Gladys Herrera RN

## 2020-12-21 ENCOUNTER — TELEPHONE (OUTPATIENT)
Dept: CARDIOLOGY | Facility: CLINIC | Age: 80
End: 2020-12-21

## 2020-12-21 NOTE — TELEPHONE ENCOUNTER
12/21/20 Pt called stating he is wintering in [place] and needs his medical records. His facility sent a request one week ago and they have not been received yet. Given medical records phone # [phone]. Also given website [email or web address]  Pt stated he will reach out to them to request records.  Sajan 11 am

## 2020-12-30 ENCOUNTER — TELEPHONE (OUTPATIENT)
Dept: CARDIOLOGY | Facility: CLINIC | Age: 80
End: 2020-12-30

## 2020-12-30 NOTE — TELEPHONE ENCOUNTER
Pt called and LM that he could not find anyone to do his INR's in [place], [place]. LM for pt that we can not help him find an INR clinic in [place].  Asked pt to talk to the clinic that he is getting established with in [place] to see if they can help. Arjun

## 2021-03-25 ENCOUNTER — ANTICOAGULATION THERAPY VISIT (OUTPATIENT)
Dept: CARDIOLOGY | Facility: CLINIC | Age: 81
End: 2021-03-25
Payer: MEDICARE

## 2021-03-25 DIAGNOSIS — I48.0 PAROXYSMAL ATRIAL FIBRILLATION (H): ICD-10-CM

## 2021-03-25 DIAGNOSIS — Z79.01 LONG TERM CURRENT USE OF ANTICOAGULANTS WITH INR GOAL OF 2.0-3.0: ICD-10-CM

## 2021-03-25 LAB
CAPILLARY BLOOD COLLECTION: NORMAL
INR PPP: 4.2 (ref 0.86–1.14)

## 2021-03-25 PROCEDURE — 36416 COLLJ CAPILLARY BLOOD SPEC: CPT | Performed by: INTERNAL MEDICINE

## 2021-03-25 PROCEDURE — 99207 PR NO CHARGE NURSE ONLY: CPT

## 2021-03-25 PROCEDURE — 85610 PROTHROMBIN TIME: CPT | Performed by: INTERNAL MEDICINE

## 2021-03-25 NOTE — PROGRESS NOTES
ANTICOAGULATION FOLLOW-UP CLINIC VISIT    Patient Name:  Nigel Rodarte Jr.  Date:  3/25/2021  Contact Type:  Telephone    SUBJECTIVE:  Patient Findings         Clinical Outcomes     Negatives:  Major bleeding event, Thromboembolic event, Anticoagulation-related hospital admission, Anticoagulation-related ED visit, Anticoagulation-related fatality           OBJECTIVE    Recent labs: (last 7 days)     21  1356   INR 4.20*       ASSESSMENT / PLAN  INR assessment SUPRA    Recheck INR In: 10 DAYS    INR Location Outside lab      Anticoagulation Summary  As of 3/25/2021    INR goal:  2.0-3.0   TTR:  68.6 % (8.1 mo)   INR used for dosin.20 (3/25/2021)   Warfarin maintenance plan:  5 mg (5 mg x 1) every Mon; 7.5 mg (5 mg x 1.5) all other days   Full warfarin instructions:  3/25: Hold; Otherwise 5 mg every Mon; 7.5 mg all other days   Weekly warfarin total:  50 mg   Plan last modified:  Gilma Anton RN (3/25/2021)   Next INR check:  2021   Target end date:  Indefinite    Indications    Atrial fibrillation (H) [I48.91] [I48.91]  Long term current use of anticoagulants with INR goal of 2.0-3.0 [Z79.01]  Paroxysmal atrial fibrillation (H) [I48.0]             Anticoagulation Episode Summary     INR check location:      Preferred lab:      Send INR reminders to:  [facility] HEART INR NURSE    Comments:        Anticoagulation Care Providers     Provider Role Specialty Phone number    Grzegorz Malhotra MD Referring Clinical Cardiac Electrophysiology [phone]            See the Encounter Report to view Anticoagulation Flowsheet and Dosing Calendar (Go to Encounters tab in chart review, and find the Anticoagulation Therapy Visit)    INR 4.2.  Called and spoke to the patient.  He just returned from [place] 2 days ago.  He said he had a hard time getting his INR checked while in [place].  He said INR done in January was 3.3 and INR in February was 3.4 but no adjustments were made.  He has been taking  7.5mg/day and eating less greens.  In the past in [place] last year his dosing was 2 days of 5mg when INRs were most stable.  Hold dose today x1 then decrease to 5mg Monday and 7.5mg ROW and recheck in 10 days.  He plans to increase his greens and eat broccoli about every other day.  Bay Anton, RN

## 2021-03-30 ENCOUNTER — TELEPHONE (OUTPATIENT)
Dept: CARDIOLOGY | Facility: CLINIC | Age: 81
End: 2021-03-30

## 2021-03-30 DIAGNOSIS — Z79.899 ENCOUNTER FOR MONITORING FLECAINIDE THERAPY: ICD-10-CM

## 2021-03-30 DIAGNOSIS — Z51.81 ENCOUNTER FOR MONITORING FLECAINIDE THERAPY: ICD-10-CM

## 2021-03-30 DIAGNOSIS — R07.89 CHEST DISCOMFORT: Primary | ICD-10-CM

## 2021-03-30 NOTE — TELEPHONE ENCOUNTER
"3/30/21 Spoke to pt who started  he just returned from 4 months in [place]. He wanted to report occasionally ( approx once/2 months) he has this \"achy feeling in his chest lasting a few minutes\". It can happen at rest or with activity. His BP and pulse are \"normal\" during these episodes and they subside on their own. Pt wanted to make Brooklyn Meyers PA-C/Dr Malhotra aware.  Pt is scheduled for GXT on 4/1 with follow up with Dr Malhotra on 4/29.  Soni end update to Brooklyn and call pt back w recommendations.  Pt voiced understanding and agreement with plan.   Sajan  "

## 2021-03-30 NOTE — TELEPHONE ENCOUNTER
3/30/21 Pt LM on  Afib RN line stating he had some questions regarding episodes of achy chest and SOB. Attempted to call pt back, reached KEHINDE, LM and requested callback.  Sajan 330 pm

## 2021-03-31 NOTE — TELEPHONE ENCOUNTER
3/31/21 Msg received from Brooklyn Meyers PA-C  Atypical for ischemia but has known calcifications.     Instead of plain GXT can we get exercise nuclear ON meds? Will help us with pro-arrhythmia and ensure no ischemia at workload he can reach with these meds. I've ordered.  I think test will likely need to be rescheduled b/c the nuc ones take longer but not sure.  Clarified with Brooklyn Meyers PA-C the type of test she wanted as a NM exercise stress test was already ordered and scheduled. She verified that this was indeed what she wanted.  Called ot and explained it os ok to proceed tomorrow as planned.  He voiced understanding and agreement w plan.  Sajan 1015 am

## 2021-03-31 NOTE — TELEPHONE ENCOUNTER
Atypical for ischemia but has known calcifications.    Instead of plain GXT can we get exercise nuclear ON meds? Will help us with pro-arrhythmia and ensure no ischemia at workload he can reach with these meds. I've ordered.  I think test will likely need to be rescheduled b/c the nuc ones take longer but not sure.    Mak Barahona

## 2021-04-01 ENCOUNTER — HOSPITAL ENCOUNTER (OUTPATIENT)
Dept: CARDIOLOGY | Facility: CLINIC | Age: 81
End: 2021-04-01
Attending: PHYSICIAN ASSISTANT
Payer: MEDICARE

## 2021-04-01 ENCOUNTER — HOSPITAL ENCOUNTER (OUTPATIENT)
Dept: CARDIOLOGY | Facility: CLINIC | Age: 81
Discharge: HOME | End: 2021-04-01
Attending: PHYSICIAN ASSISTANT | Admitting: PHYSICIAN ASSISTANT
Payer: MEDICARE

## 2021-04-01 ENCOUNTER — DOCUMENTATION ONLY (OUTPATIENT)
Dept: CARDIOLOGY | Facility: CLINIC | Age: 81
End: 2021-04-01

## 2021-04-01 VITALS
HEIGHT: 69 IN | WEIGHT: 154.4 LBS | SYSTOLIC BLOOD PRESSURE: 122 MMHG | BODY MASS INDEX: 22.87 KG/M2 | OXYGEN SATURATION: 97 % | DIASTOLIC BLOOD PRESSURE: 78 MMHG | HEART RATE: 71 BPM

## 2021-04-01 DIAGNOSIS — R07.89 CHEST DISCOMFORT: Primary | ICD-10-CM

## 2021-04-01 DIAGNOSIS — E78.5 HYPERLIPIDEMIA LDL GOAL <100: ICD-10-CM

## 2021-04-01 DIAGNOSIS — I48.0 PAROXYSMAL ATRIAL FIBRILLATION (H): ICD-10-CM

## 2021-04-01 LAB
CV STRESS MAX HR HE: 150
RATE PRESSURE PRODUCT: NORMAL
STRESS ANGINA INDEX: 0
STRESS ECHO BASELINE DIASTOLIC HE: 78
STRESS ECHO BASELINE HR: 72
STRESS ECHO BASELINE SYSTOLIC BP: 120
STRESS ECHO CALCULATED PERCENT HR: 106 %
STRESS ECHO LAST STRESS DIASTOLIC BP: 70
STRESS ECHO LAST STRESS SYSTOLIC BP: 154
STRESS ECHO POST ESTIMATED WORKLOAD: 11.8 METS
STRESS ECHO POST EXERCISE DUR MIN: 9 MIN
STRESS ECHO POST EXERCISE DUR SEC: 31 SEC
STRESS ECHO TARGET HR: 141

## 2021-04-01 PROCEDURE — A9502 TC99M TETROFOSMIN: HCPCS | Performed by: PHYSICIAN ASSISTANT

## 2021-04-01 PROCEDURE — 93018 CV STRESS TEST I&R ONLY: CPT | Performed by: INTERNAL MEDICINE

## 2021-04-01 PROCEDURE — 93017 CV STRESS TEST TRACING ONLY: CPT | Mod: MG

## 2021-04-01 PROCEDURE — 93016 CV STRESS TEST SUPVJ ONLY: CPT | Performed by: INTERNAL MEDICINE

## 2021-04-01 PROCEDURE — 343N000001 HC RX 343: Performed by: PHYSICIAN ASSISTANT

## 2021-04-01 PROCEDURE — 78452 HT MUSCLE IMAGE SPECT MULT: CPT | Mod: 26 | Performed by: INTERNAL MEDICINE

## 2021-04-01 RX ADMIN — TETROFOSMIN 10.8 MCI.: 1.38 INJECTION, POWDER, LYOPHILIZED, FOR SOLUTION INTRAVENOUS at 11:42

## 2021-04-01 RX ADMIN — TETROFOSMIN 3.6 MCI.: 1.38 INJECTION, POWDER, LYOPHILIZED, FOR SOLUTION INTRAVENOUS at 10:02

## 2021-04-01 ASSESSMENT — MIFFLIN-ST. JEOR: SCORE: 1405.73

## 2021-04-01 NOTE — PROGRESS NOTES
Message left for patient to review results of stress test and recommendations per SHELBI Avina.  Awaiting return call from patient.  TAMMY Mcneal

## 2021-04-01 NOTE — PROGRESS NOTES
Pls let Nigel know that his stress test was MILDLY abnl, showing there may be a small area of the heart not getting as much blood flow as is normal.  This could be contributing to his discomfort.       1. Stop flecainide. This medicine can be dangerous if there are indeed blockages in the heart.  2. Move up appt with Dr. Malhotra (can do virtual if can get in sooner than currently scheduled 4/29 - check [place] schedule as well.  3. Avoid exercise. He's not on bedrest but until he can see Dr. Malhotra, don't want him doing much more exercise than just walking  4. Pls Rx sl NTG ONLY for chest discomfort that comes on with exertion and doesn't get better with rest.   5. Start rosuvastatin 20 mg daily. Pls Rx   6. TO ER if he gets chest discomfort that does not improve with rest/NTG    Thx =Katie Barahona

## 2021-04-02 ENCOUNTER — TELEPHONE (OUTPATIENT)
Dept: LAB | Facility: CLINIC | Age: 81
End: 2021-04-02

## 2021-04-02 DIAGNOSIS — I48.0 PAROXYSMAL ATRIAL FIBRILLATION (H): Primary | ICD-10-CM

## 2021-04-02 RX ORDER — ROSUVASTATIN CALCIUM 20 MG/1
20 TABLET, COATED ORAL DAILY
Qty: 90 TABLET | Refills: 3 | Status: SHIPPED | OUTPATIENT
Start: 2021-04-02 | End: 2022-04-11

## 2021-04-02 RX ORDER — NITROGLYCERIN 0.4 MG/1
TABLET SUBLINGUAL
Qty: 25 TABLET | Refills: 1 | Status: SHIPPED | OUTPATIENT
Start: 2021-04-02

## 2021-04-02 NOTE — PROGRESS NOTES
Spoke to patient regarding results of stress test and recommendations per SHELBI Avina:  Pls let Nigel know that his stress test was MILDLY abnl, showing there may be a small area of the heart not getting as much blood flow as is normal.  This could be contributing to his discomfort.     1. Stop flecainide. This medicine can be dangerous if there are indeed blockages in the heart.   Medication list updated in epic  2. Move up appt with Dr. Malhotra (can do virtual if can get in sooner than currently scheduled 4/29 - check [place] schedule as well.  Scheduled patient for virtual visit with Dr Malhotra on 4/6.  I did not cancel the appt on the 29th in case he needed to be seen after plan discussed on the 6th.    3. Avoid exercise. He's not on bedrest but until he can see Dr. Malhotra, don't want him doing much more exercise than just walking  Patient provided verbal understanding.    4. Pls Rx sl NTG ONLY for chest discomfort that comes on with exertion and doesn't get better with rest.  Medication list update in epic and script sent to pharmacy  5. Start rosuvastatin 20 mg daily. Pls Rx   Medication list updated in epic and script sent to pharmacy.  6. TO ER if he gets chest discomfort that does not improve with rest/NTG  Patient provided verbal understanding regarding above.  TAMMY Mcneal

## 2021-04-02 NOTE — TELEPHONE ENCOUNTER
"Patient is scheduled to be seen in our lab 4/5/2021.  Appointment notes indicate \"INR\".  NO current FUTURE or STANDING ORDERS have been placed.  Please place current FUTURE or STANDING orders as needed.      Note that orders have a maximum duration of one (1) year.    Thank you.  Please call if you have any questions.     "

## 2021-04-05 ENCOUNTER — ANTICOAGULATION THERAPY VISIT (OUTPATIENT)
Dept: CARDIOLOGY | Facility: CLINIC | Age: 81
End: 2021-04-05
Payer: MEDICARE

## 2021-04-05 DIAGNOSIS — I48.0 PAROXYSMAL ATRIAL FIBRILLATION (H): ICD-10-CM

## 2021-04-05 DIAGNOSIS — Z79.01 LONG TERM CURRENT USE OF ANTICOAGULANTS WITH INR GOAL OF 2.0-3.0: ICD-10-CM

## 2021-04-05 LAB
CAPILLARY BLOOD COLLECTION: NORMAL
INR PPP: 2.2 (ref 0.86–1.14)

## 2021-04-05 PROCEDURE — 85610 PROTHROMBIN TIME: CPT | Performed by: INTERNAL MEDICINE

## 2021-04-05 PROCEDURE — 36416 COLLJ CAPILLARY BLOOD SPEC: CPT | Performed by: INTERNAL MEDICINE

## 2021-04-05 PROCEDURE — 99207 PR NO CHARGE NURSE ONLY: CPT | Performed by: INTERNAL MEDICINE

## 2021-04-05 NOTE — PROGRESS NOTES
ANTICOAGULATION FOLLOW-UP CLINIC VISIT    Patient Name:  Nigel Rodarte Jr.  Date:  2021  Contact Type:  Telephone    SUBJECTIVE:  Patient Findings     Positives:  Change in medications (Flecainide discontinued; nitroSL PRN), Change in diet/appetite (Broccoli every other day)        Clinical Outcomes     Negatives:  Major bleeding event, Thromboembolic event, Anticoagulation-related hospital admission, Anticoagulation-related ED visit, Anticoagulation-related fatality           OBJECTIVE    Recent labs: (last 7 days)     21  0953   INR 2.20*       ASSESSMENT / PLAN  INR assessment THER    Recheck INR In: 2 WEEKS    INR Location Outside lab      Anticoagulation Summary  As of 2021    INR goal:  2.0-3.0   TTR:  70.4 % (8.1 mo)   INR used for dosin.20 (2021)   Warfarin maintenance plan:  5 mg (5 mg x 1) every Mon; 7.5 mg (5 mg x 1.5) all other days   Full warfarin instructions:  5 mg every Mon; 7.5 mg all other days   Weekly warfarin total:  50 mg   No change documented:  Teresa Gardner RN   Plan last modified:  Gilma Anton RN (3/25/2021)   Next INR check:  2021   Target end date:  Indefinite    Indications    Atrial fibrillation (H) [I48.91] [I48.91]  Long term current use of anticoagulants with INR goal of 2.0-3.0 [Z79.01]  Paroxysmal atrial fibrillation (H) [I48.0]             Anticoagulation Episode Summary     INR check location:      Preferred lab:      Send INR reminders to:  [facility] HEART INR NURSE    Comments:        Anticoagulation Care Providers     Provider Role Specialty Phone number    Grzegorz Malhotra MD Referring Clinical Cardiac Electrophysiology [phone]            See the Encounter Report to view Anticoagulation Flowsheet and Dosing Calendar (Go to Encounters tab in chart review, and find the Anticoagulation Therapy Visit)    INR 2.2 today at outside clinic, back in range after decreasing maintenance dosing by 2.5mg overall. Spoke to patient, no  abnormal bleeding/bruising. He states his flecainide was discontinued two days ago and he is using nitroSL PRN for chest discomfort. Pt had an abnormal stress test recently and will be seeing Dr Malhotra tomorrow. No other med changes. He has been eating broccoli every other day. Will continue on current warfarin maintenance dosing of 5mg Mon and 7.5mg all other days. Recheck in 2wks. Pt will scale back on broccoli slightly to 3x/wk to insure INR does not drop below 2.0.     Teresa Gardner RN

## 2021-04-06 ENCOUNTER — VIRTUAL VISIT (OUTPATIENT)
Dept: CARDIOLOGY | Facility: CLINIC | Age: 81
End: 2021-04-06
Payer: MEDICARE

## 2021-04-06 VITALS — WEIGHT: 150 LBS | BODY MASS INDEX: 22.22 KG/M2 | HEIGHT: 69 IN

## 2021-04-06 DIAGNOSIS — R93.1 ABNORMAL FINDINGS ON DIAGNOSTIC IMAGING OF HEART AND CORONARY CIRCULATION: Primary | ICD-10-CM

## 2021-04-06 PROCEDURE — 99214 OFFICE O/P EST MOD 30 MIN: CPT | Mod: 95 | Performed by: INTERNAL MEDICINE

## 2021-04-06 ASSESSMENT — MIFFLIN-ST. JEOR: SCORE: 1385.78

## 2021-04-06 NOTE — PROGRESS NOTES
Electrophysiology/ Virtual Clinic Note         H&P and Plan:   Patient opted to have a virtual visit due to ongoing issues related to ongoing COVID-19 virus pandemic and to minimize social interaction (based on CDC guidelines).      Visit details:  Patient has given verbal consent for this visit.    Interview was done talking and seeing patient via Internet.    Mode of transmission: [employer] [employer].  Visit start time: 9:39 AM  Visit stop time: 9:55 AM  Provider location: Office.  Patient location: Home.       REASON FOR VISIT: Electrophysiology evaluation.      HISTORY OF PRESENT ILLNESS:  Mr. Rodarte is a delightful 80-year-old gentleman (former  of [employer] team) with a long history of paroxysmal atrial fibrillation/ flutter, who is here for evaluation of abnormal stress test.     Patient was initially evaluated by me due to symptomatic paroxysmal atrial fibrillation.  He failed rhythm control strategy with flecainide/ metoprolol and underwent atrial fibrillation/flutter ablation on 05/18/2020.  He did well after ablation and has been off antiarrhythmics.      He contacted our office in the beginning of this year with complaints of intermittent chest discomfort.  Apparently, he returned from vacation in [place] and after he arrived to [place] he was having episodes of chest pain which he described as an achiness/tightness in the chest which lasted for 10-15 minutes.  The episodes apparently improved with nitroglycerin.    A nuclear stress test was obtained, which showed ischemia in the inferior wall (details below).      Today, he informs he is doing well.  He had 2 episodes of chest discomfort after the stress test, which were self-limited.  He denies any other symptoms such as palpitation, shortness of breath, lightheadedness, near-syncope or syncope.       PREVIOUS STUDIES (personally reviewed):  - Stress Echo (2018): He was able to exercise for 7 minutes and 20 seconds. . Negative for ischemia,  "arrthyhmia or QRS widening.     -NM stress test (4/1/21): Patient was able to exercise for 9 minutes and 30 seconds.  There was mild count reduction in the basal, mid and apical inferior.    ASSESSMENT AND PLAN:   1.  Paroxysmal atrial fibrillation.  He responded well to ablation.    He is currently taking beta-blockers.  Heart rate is well controlled and he denies recurrence of A. fib.  Continue current medical therapy.  2.  Embolic prevention.  CHADS-VASc score of 2. Continue anticoagulation with Coumadin indefinitely.  3.    Chest discomfort.  Previous CT showed calcification coronaries and he was starting statin.  He is now having chest discomfort which is suggestive of angina (although, he affirms that when he exercised back in [place] he did not have any chest pain).  Stress test was abnormal.  I recommend coronary angiography.    I explained the procedure in details.  He understands there is a 1-2% risk comparison to the procedure.  He agrees with plan.    In the meantime, we will continue therapy with Coumadin, Crestor and metoprolol.  We will request coronary angiography to be performed through radial access.     Grzegorz Malhotra MD    Physical Exam:  Vitals: Ht 1.753 m (5' 9\")   Wt 68 kg (150 lb)   BMI 22.15 kg/m      Constitutional:  Pt is in no acute distress.  Normal body habitus, upright.  HEAD: Normocephalic. No evidence of injury or laceration.   SKIN: Skin normal color with no lesions or eruptions. No xanthelasma.  ENT/Mouth:  Membranes moist. No nasal discharge or bleeding gums.   Neck:  Supple, normal JVP, no evidence of thyromegaly.  Chest/Lungs: No audible wheezing or labored breathing. Normal chest wall expansion. No cough.   Cardiovascular: Normal jugular venous pressure. No evidence of pitting edema bilaterally.   Abdomen: No evidence of abdominal distention. No observed jaundice.  Eyes: PERRL, EOMI. No scleral icterus, normal conjunctivae.  Extremities:  No lower extremity edema noticed.  No " cyanosis or clubbing noted.   Neurologic: Normal arm motion bilateral.  No tremors. No evidence of focal defect.   Psychiatric: Alert and oriented x3, calm.         CURRENT MEDICATIONS:  Current Outpatient Medications   Medication Sig Dispense Refill     dutasteride (AVODART) 0.5 MG capsule Take 0.5 mg by mouth daily       metoprolol succinate ER (TOPROL-XL) 25 MG 24 hr tablet Take 1 tab daily 90 tablet 2     rosuvastatin (CRESTOR) 20 MG tablet Take 1 tablet (20 mg) by mouth daily 90 tablet 3     warfarin ANTICOAGULANT (COUMADIN) 5 MG tablet Take 1 tab on Fridays and 1 1/2 tabs on all other days or as directed per INR clinic 135 tablet 1     nitroGLYcerin (NITROSTAT) 0.4 MG sublingual tablet For chest pain place 1 tablet under the tongue every 5 minutes for 3 doses. If symptoms persist 5 minutes after 1st dose call 911. (Patient not taking: Reported on 4/6/2021) 25 tablet 1       ALLERGIES   No Known Allergies    PAST MEDICAL HISTORY:  Past Medical History:   Diagnosis Date     Arthritis     osteoarthrosis     Elevated PSA      Fatigue      Palpitations      Paroxysmal atrial fibrillation (H) 05/16/2017     Renal disease     kidney calculus     SOB (shortness of breath)        PAST SURGICAL HISTORY:  Past Surgical History:   Procedure Laterality Date     APPENDECTOMY       ARTHROPLASTY HIP  1/14/2013    Procedure: ARTHROPLASTY HIP;  RIGHT TOTAL HIP ARTHROPLASTY ;  Surgeon: Jamie Ohara MD;  Location:  OR     ARTHROPLASTY KNEE Right 11/6/2019    Procedure: RIGHT TOTAL KNEE ARTHROPLASTY (DEPUY)^;  Surgeon: Jamie Ohara MD;  Location:  OR     CARDIOVERSION       COLONOSCOPY N/A 2/9/2016    Procedure: COLONOSCOPY;  Surgeon: Astrid Vital MD;  Location:  GI     CYSTOSCOPY, RETROGRADES, EXTRACT STONE, COMBINED  9/12/2013    Procedure: COMBINED CYSTOSCOPY, RETROGRADES, EXTRACT STONE;  CYSTOSCOPY, RIGHT RETROGRADE, STONE EXTRACTION;  Surgeon: Carlos Mcknight MD;  Location:  OR     ENT SURGERY       right ear surgery     EP ABLATION FOCAL AFIB N/A 5/18/2020    Procedure: EP Ablation Focal AFIB;  Surgeon: Grzegorz Malhotra MD;  Location:  HEART CARDIAC CATH LAB     GENITOURINARY SURGERY      cystoscopy for stone removal     HERNIA REPAIR       ORTHOPEDIC SURGERY      shoulder surgeries,bilat carpel tunnel       FAMILY HISTORY:  Family History   Problem Relation Age of Onset     Lung Cancer Brother        SOCIAL HISTORY:  Social History     Socioeconomic History     Marital status:      Spouse name: None     Number of children: None     Years of education: None     Highest education level: None   Occupational History     None   Social Needs     Financial resource strain: None     Food insecurity     Worry: None     Inability: None     Transportation needs     Medical: None     Non-medical: None   Tobacco Use     Smoking status: Never Smoker     Smokeless tobacco: Never Used   Substance and Sexual Activity     Alcohol use: No     Drug use: No     Sexual activity: None   Lifestyle     Physical activity     Days per week: None     Minutes per session: None     Stress: None   Relationships     Social connections     Talks on phone: None     Gets together: None     Attends Pentec[employer]alism service: None     Active member of club or organization: None     Attends meetings of clubs or organizations: None     Relationship status: None     Intimate partner violence     Fear of current or ex partner: None     Emotionally abused: None     Physically abused: None     Forced sexual activity: None   Other Topics Concern     Parent/sibling w/ CABG, MI or angioplasty before 65F 55M? Not Asked   Social History Narrative     None       Review of Systems:  Skin:  Negative     Eyes:  Positive for glasses  ENT:  Positive for tinnitus  Respiratory:  Negative    Cardiovascular:  Negative    Gastroenterology: Negative    Genitourinary:  Positive for urinary frequency;urgency;retention;prostate problem  Musculoskeletal:  Positive  for arthritis;joint pain;joint swelling;joint stiffness  Neurologic:  Negative    Psychiatric:  Negative    Heme/Lymph/Imm:  Positive for easy bruising  Endocrine:  Negative        Recent Lab Results:  LIPID RESULTS:  Lab Results   Component Value Date    CHOL 188 10/30/2020    HDL 48 10/30/2020     (H) 10/30/2020    TRIG 147 10/30/2020       LIVER ENZYME RESULTS:  Lab Results   Component Value Date    AST 26 05/16/2017    ALT <5 (L) 10/30/2020       CBC RESULTS:  Lab Results   Component Value Date    WBC 6.0 05/18/2020    RBC 5.40 05/18/2020    HGB 16.2 05/18/2020    HCT 48.3 05/18/2020    MCV 89 05/18/2020    MCH 30.0 05/18/2020    MCHC 33.5 05/18/2020    RDW 13.6 05/18/2020     05/18/2020       BMP RESULTS:  Lab Results   Component Value Date     05/18/2020    POTASSIUM 4.2 05/18/2020    CHLORIDE 108 05/18/2020    CO2 29 05/18/2020    ANIONGAP 5 05/18/2020    GLC 89 05/18/2020    BUN 14 05/18/2020    CR 1.11 05/18/2020    GFRESTIMATED 63 05/18/2020    GFRESTBLACK 73 05/18/2020    CHERI 9.2 05/18/2020        A1C RESULTS:  No results found for: A1C    INR RESULTS:  Lab Results   Component Value Date    INR 2.20 (H) 04/05/2021    INR 4.20 (H) 03/25/2021         ECHOCARDIOGRAM  No results found for this or any previous visit (from the past 8760 hour(s)).      No orders of the defined types were placed in this encounter.    No orders of the defined types were placed in this encounter.    Medications Discontinued During This Encounter   Medication Reason     cetirizine (ZYRTEC) 10 MG tablet Therapy completed     glucosamine-chondroitin 500-400 MG CAPS per capsule Therapy completed     metoprolol succinate ER (TOPROL-XL) 25 MG 24 hr tablet Therapy completed     flecainide (TAMBOCOR) 50 MG tablet Therapy completed         No diagnosis found.      CC  No referring provider defined for this encounter.            Nigel is a 80 year old who is being evaluated via a billable video visit.      How would you  like to obtain your AVS? Mail a copy  If the video visit is dropped, the invitation should be resent by: Text to cell phone: [phone]  Will anyone else be joining your video visit? No    Video Start Time:   Video-Visit Details    Type of service:  Video Visit    Video End Time:    Originating Location (pt. Location):     Distant Location (provider location):  [facility] HEART [facility]     Platform used for Video Visit:

## 2021-04-06 NOTE — LETTER
4/6/2021    Osmar Espinoza MD  [address] [place] [address]    RE: Nigel Rodarte Jr.       Dear Colleague,    I had the pleasure of seeing Nigel Rosenthalroldan Silva in the [facility] [place] [facility].    Electrophysiology/ Virtual Clinic Note         H&P and Plan:   Patient opted to have a virtual visit due to ongoing issues related to ongoing COVID-19 virus pandemic and to minimize social interaction (based on CDC guidelines).      Visit details:  Patient has given verbal consent for this visit.    Interview was done talking and seeing patient via Internet.    Mode of transmission: [facility].  Visit start time: 9:39 AM  Visit stop time: 9:55 AM  Provider location: Office.  Patient location: Home.       REASON FOR VISIT: Electrophysiology evaluation.      HISTORY OF PRESENT ILLNESS:  Mr. Rodarte is a delightful 80-year-old gentleman (former  of [employer] team) with a long history of paroxysmal atrial fibrillation/ flutter, who is here for evaluation of abnormal stress test.     Patient was initially evaluated by me due to symptomatic paroxysmal atrial fibrillation.  He failed rhythm control strategy with flecainide/ metoprolol and underwent atrial fibrillation/flutter ablation on 05/18/2020.  He did well after ablation and has been off antiarrhythmics.      He contacted our office in the beginning of this year with complaints of intermittent chest discomfort.  Apparently, he returned from vacation in [place] and after he arrived to [place] he was having episodes of chest pain which he described as an achiness/tightness in the chest which lasted for 10-15 minutes.  The episodes apparently improved with nitroglycerin.    A nuclear stress test was obtained, which showed ischemia in the inferior wall (details below).      Today, he informs he is doing well.  He had 2 episodes of chest discomfort after the stress test, which were self-limited.  He denies  "any other symptoms such as palpitation, shortness of breath, lightheadedness, near-syncope or syncope.       PREVIOUS STUDIES (personally reviewed):  - Stress Echo (2018): He was able to exercise for 7 minutes and 20 seconds. . Negative for ischemia, arrthyhmia or QRS widening.     -NM stress test (4/1/21): Patient was able to exercise for 9 minutes and 30 seconds.  There was mild count reduction in the basal, mid and apical inferior.    ASSESSMENT AND PLAN:   1.  Paroxysmal atrial fibrillation.  He responded well to ablation.    He is currently taking beta-blockers.  Heart rate is well controlled and he denies recurrence of A. fib.  Continue current medical therapy.  2.  Embolic prevention.  CHADS-VASc score of 2. Continue anticoagulation with Coumadin indefinitely.  3.    Chest discomfort.  Previous CT showed calcification coronaries and he was starting statin.  He is now having chest discomfort which is suggestive of angina (although, he affirms that when he exercised back in [place] he did not have any chest pain).  Stress test was abnormal.  I recommend coronary angiography.    I explained the procedure in details.  He understands there is a 1-2% risk comparison to the procedure.  He agrees with plan.    In the meantime, we will continue therapy with Coumadin, Crestor and metoprolol.  We will request coronary angiography to be performed through radial access.     Grzegorz Malhotra MD    Physical Exam:  Vitals: Ht 1.753 m (5' 9\")   Wt 68 kg (150 lb)   BMI 22.15 kg/m      Constitutional:  Pt is in no acute distress.  Normal body habitus, upright.  HEAD: Normocephalic. No evidence of injury or laceration.   SKIN: Skin normal color with no lesions or eruptions. No xanthelasma.  ENT/Mouth:  Membranes moist. No nasal discharge or bleeding gums.   Neck:  Supple, normal JVP, no evidence of thyromegaly.  Chest/Lungs: No audible wheezing or labored breathing. Normal chest wall expansion. No cough.   Cardiovascular: Normal " jugular venous pressure. No evidence of pitting edema bilaterally.   Abdomen: No evidence of abdominal distention. No observed jaundice.  Eyes: PERRL, EOMI. No scleral icterus, normal conjunctivae.  Extremities:  No lower extremity edema noticed.  No cyanosis or clubbing noted.   Neurologic: Normal arm motion bilateral.  No tremors. No evidence of focal defect.   Psychiatric: Alert and oriented x3, calm.         CURRENT MEDICATIONS:  Current Outpatient Medications   Medication Sig Dispense Refill     dutasteride (AVODART) 0.5 MG capsule Take 0.5 mg by mouth daily       metoprolol succinate ER (TOPROL-XL) 25 MG 24 hr tablet Take 1 tab daily 90 tablet 2     rosuvastatin (CRESTOR) 20 MG tablet Take 1 tablet (20 mg) by mouth daily 90 tablet 3     warfarin ANTICOAGULANT (COUMADIN) 5 MG tablet Take 1 tab on Fridays and 1 1/2 tabs on all other days or as directed per INR clinic 135 tablet 1     nitroGLYcerin (NITROSTAT) 0.4 MG sublingual tablet For chest pain place 1 tablet under the tongue every 5 minutes for 3 doses. If symptoms persist 5 minutes after 1st dose call 911. (Patient not taking: Reported on 4/6/2021) 25 tablet 1       ALLERGIES   No Known Allergies    PAST MEDICAL HISTORY:  Past Medical History:   Diagnosis Date     Arthritis     osteoarthrosis     Elevated PSA      Fatigue      Palpitations      Paroxysmal atrial fibrillation (H) 05/16/2017     Renal disease     kidney calculus     SOB (shortness of breath)        PAST SURGICAL HISTORY:  Past Surgical History:   Procedure Laterality Date     APPENDECTOMY       ARTHROPLASTY HIP  1/14/2013    Procedure: ARTHROPLASTY HIP;  RIGHT TOTAL HIP ARTHROPLASTY ;  Surgeon: Jamie Ohara MD;  Location:  OR     ARTHROPLASTY KNEE Right 11/6/2019    Procedure: RIGHT TOTAL KNEE ARTHROPLASTY (DEPUY)^;  Surgeon: Jamie Ohara MD;  Location:  OR     CARDIOVERSION       COLONOSCOPY N/A 2/9/2016    Procedure: COLONOSCOPY;  Surgeon: Astrid Vital MD;  Location:   GI     CYSTOSCOPY, RETROGRADES, EXTRACT STONE, COMBINED  9/12/2013    Procedure: COMBINED CYSTOSCOPY, RETROGRADES, EXTRACT STONE;  CYSTOSCOPY, RIGHT RETROGRADE, STONE EXTRACTION;  Surgeon: Carlos Mcknight MD;  Location:  OR     ENT SURGERY      right ear surgery     EP ABLATION FOCAL AFIB N/A 5/18/2020    Procedure: EP Ablation Focal AFIB;  Surgeon: Grzegorz Malhotra MD;  Location:  HEART CARDIAC CATH LAB     GENITOURINARY SURGERY      cystoscopy for stone removal     HERNIA REPAIR       ORTHOPEDIC SURGERY      shoulder surgeries,bilat carpel tunnel       FAMILY HISTORY:  Family History   Problem Relation Age of Onset     Lung Cancer Brother        SOCIAL HISTORY:  Social History     Socioeconomic History     Marital status:      Spouse name: None     Number of children: None     Years of education: None     Highest education level: None   Occupational History     None   Social Needs     Financial resource strain: None     Food insecurity     Worry: None     Inability: None     Transportation needs     Medical: None     Non-medical: None   Tobacco Use     Smoking status: Never Smoker     Smokeless tobacco: Never Used   Substance and Sexual Activity     Alcohol use: No     Drug use: No     Sexual activity: None   Lifestyle     Physical activity     Days per week: None     Minutes per session: None     Stress: None   Relationships     Social connections     Talks on phone: None     Gets together: None     Attends Orthodox service: None     Active member of club or organization: None     Attends meetings of clubs or organizations: None     Relationship status: None     Intimate partner violence     Fear of current or ex partner: None     Emotionally abused: None     Physically abused: None     Forced sexual activity: None   Other Topics Concern     Parent/sibling w/ CABG, MI or angioplasty before 65F 55M? Not Asked   Social History Narrative     None       Review of Systems:  Skin:  Negative      Eyes:  Positive for glasses  ENT:  Positive for tinnitus  Respiratory:  Negative    Cardiovascular:  Negative    Gastroenterology: Negative    Genitourinary:  Positive for urinary frequency;urgency;retention;prostate problem  Musculoskeletal:  Positive for arthritis;joint pain;joint swelling;joint stiffness  Neurologic:  Negative    Psychiatric:  Negative    Heme/Lymph/Imm:  Positive for easy bruising  Endocrine:  Negative        Recent Lab Results:  LIPID RESULTS:  Lab Results   Component Value Date    CHOL 188 10/30/2020    HDL 48 10/30/2020     (H) 10/30/2020    TRIG 147 10/30/2020       LIVER ENZYME RESULTS:  Lab Results   Component Value Date    AST 26 05/16/2017    ALT <5 (L) 10/30/2020       CBC RESULTS:  Lab Results   Component Value Date    WBC 6.0 05/18/2020    RBC 5.40 05/18/2020    HGB 16.2 05/18/2020    HCT 48.3 05/18/2020    MCV 89 05/18/2020    MCH 30.0 05/18/2020    MCHC 33.5 05/18/2020    RDW 13.6 05/18/2020     05/18/2020       BMP RESULTS:  Lab Results   Component Value Date     05/18/2020    POTASSIUM 4.2 05/18/2020    CHLORIDE 108 05/18/2020    CO2 29 05/18/2020    ANIONGAP 5 05/18/2020    GLC 89 05/18/2020    BUN 14 05/18/2020    CR 1.11 05/18/2020    GFRESTIMATED 63 05/18/2020    GFRESTBLACK 73 05/18/2020    CHERI 9.2 05/18/2020        A1C RESULTS:  No results found for: A1C    INR RESULTS:  Lab Results   Component Value Date    INR 2.20 (H) 04/05/2021    INR 4.20 (H) 03/25/2021         ECHOCARDIOGRAM  No results found for this or any previous visit (from the past 8760 hour(s)).      No orders of the defined types were placed in this encounter.    No orders of the defined types were placed in this encounter.    Medications Discontinued During This Encounter   Medication Reason     cetirizine (ZYRTEC) 10 MG tablet Therapy completed     glucosamine-chondroitin 500-400 MG CAPS per capsule Therapy completed     metoprolol succinate ER (TOPROL-XL) 25 MG 24 hr tablet Therapy  completed     flecainide (TAMBOCOR) 50 MG tablet Therapy completed         No diagnosis found.      CC  No referring provider defined for this encounter.            Nigel is a 80 year old who is being evaluated via a billable video visit.      How would you like to obtain your AVS? Mail a copy  If the video visit is dropped, the invitation should be resent by: Text to cell phone: [phone]  Will anyone else be joining your video visit? No    Video Start Time:   Video-Visit Details    Type of service:  Video Visit    Video End Time:    Originating Location (pt. Location):     Distant Location (provider location):  [facility] [place]     Platform used for Video Visit:     Thank you for allowing me to participate in the care of your patient.      Sincerely,     Grzegorz Malhotra MD     [facility]    cc:   No referring provider defined for this encounter.

## 2021-04-08 ENCOUNTER — TELEPHONE (OUTPATIENT)
Dept: CARDIOLOGY | Facility: CLINIC | Age: 81
End: 2021-04-08

## 2021-04-08 DIAGNOSIS — Z11.59 ENCOUNTER FOR SCREENING FOR OTHER VIRAL DISEASES: ICD-10-CM

## 2021-04-08 DIAGNOSIS — I48.0 PAROXYSMAL ATRIAL FIBRILLATION (H): ICD-10-CM

## 2021-04-08 DIAGNOSIS — I48.0 PAROXYSMAL ATRIAL FIBRILLATION (H): Primary | ICD-10-CM

## 2021-04-08 PROCEDURE — 93000 ELECTROCARDIOGRAM COMPLETE: CPT

## 2021-04-08 NOTE — TELEPHONE ENCOUNTER
Pt walked into clinic as he had not been contacted to set up his angiogram.  Pt now scheduled on Tuesday 4/14.  Pt then told  that he has been having some episodes of A Fib. Spoke to pt who states that he can feel the episodes, but they are not lasting long.  This writer felt pt pulse (wrist) at first and felt nothing.  Pt then felt it again and then this writer felt pulse and at this time it was skipping beats.  Discussed PVC's vs A Fib. Decided to have pt get and EKG and have the MA run it for a bit to capture any irregularities.  None were found.  Pt HR was 90, but rhythm normal.  Pt Flecainide had been stopped on 4/1 for pt to have an angiogram done d/t an abnormal nuclear stress test.  Pt continues on Metoprolol XL 25 mg daily.  Discussed the Kardia device and how this can assist pt to check his rhythm and that he could then get the strips to the clinic via [employer].  Pt will look into it. Will make Dr Malhotra aware. Diana

## 2021-04-09 ENCOUNTER — TELEPHONE (OUTPATIENT)
Dept: CARDIOLOGY | Facility: CLINIC | Age: 81
End: 2021-04-09

## 2021-04-09 DIAGNOSIS — R94.39 ABNORMAL STRESS TEST: ICD-10-CM

## 2021-04-09 DIAGNOSIS — R00.2 PALPITATIONS: Primary | ICD-10-CM

## 2021-04-09 DIAGNOSIS — I48.0 PAROXYSMAL ATRIAL FIBRILLATION (H): ICD-10-CM

## 2021-04-09 DIAGNOSIS — R07.89 CHEST DISCOMFORT: ICD-10-CM

## 2021-04-09 DIAGNOSIS — R06.09 DOE (DYSPNEA ON EXERTION): ICD-10-CM

## 2021-04-09 DIAGNOSIS — Z79.01 LONG TERM CURRENT USE OF ANTICOAGULANTS WITH INR GOAL OF 2.0-3.0: ICD-10-CM

## 2021-04-09 RX ORDER — LIDOCAINE 40 MG/G
CREAM TOPICAL
Status: CANCELLED | OUTPATIENT
Start: 2021-04-09

## 2021-04-09 RX ORDER — SODIUM CHLORIDE 9 MG/ML
INJECTION, SOLUTION INTRAVENOUS CONTINUOUS
Status: CANCELLED | OUTPATIENT
Start: 2021-04-09

## 2021-04-09 RX ORDER — ASPIRIN 81 MG/1
325 TABLET ORAL DAILY
Status: CANCELLED | OUTPATIENT
Start: 2021-04-13 | End: 2021-04-15

## 2021-04-09 RX ORDER — POTASSIUM CHLORIDE 750 MG/1
20 TABLET, EXTENDED RELEASE ORAL
Status: CANCELLED | OUTPATIENT
Start: 2021-04-09

## 2021-04-09 NOTE — TELEPHONE ENCOUNTER
Pt was scheduled for a coronary angiogram on 4/14. Spoke with pt regarding warfarin hold for the procedue. He will start the 4 day warfarin hold on Saturday 4/10 then resume his usual Warfarin dosing of 5 mg Mondays and 7.5 mg all other days after the procedure on 4/14 and recheck INR on 4/23. Updated the anticoagulation dosing calendar. Tamar

## 2021-04-09 NOTE — TELEPHONE ENCOUNTER
Coronary angiogram/[facility] prep instructions.   COVID test scheduled 4/12/21 *   Wellness screening *    Patient is scheduled for a left heart cath/coronary angiogram at [facility], on 4/14/21. Check in time is at 6:30 and procedure time is at 8:30am.    Advised patient not eat or drink after midnight on 4/13/21.    Patient is not taking Metformin or other diabetic medications.     Advised to hold (coumadin) for 4 days before the procedure, starting on 4/10/21. Patient can resume as directed by cardiologist or INR team.      Advised to take 325mg of Aspirin the day before the procedure and the morning of the procedure. Advised to take their other daily medications the morning of the procedure with small sips of water.     Verified patient does not have an allergy to contrast dye.     Verified patient has someone available to drive them home from the hospital and can stay with them for 24 hours after the procedure.     Follow up on 4/29/21 with     Wellness Screening Tool    Symptom Screening:    Do you have one of the following NEW symptoms:      Fever (subjective or >100.0)?  No    New cough? No    Shortness of breath? No    Chills? No    New loss of taste or smell? No    Generalized body aches? No    New persistent headache? No    New sore throat? No    Nausea, vomiting or diarrhea? No    Within the past 2 weeks, have you been exposed to someone with a known positive illness below?      COVID - 19 (known or suspected) No    Chicken pox?  No    Measles? No    Pertussis? No    Have you had a positive COVID-19 diagnostic test (nasal swab test) in the last 14 days or are you currently   on self-quarantine restrictions (i.e.travel restriction, exposure, etc?) No      Patient notified of visitor restriction: Yes  Patient informed to wear a mask: Yes      Patient had no questions about their prep instructions.     TAMMY Baker    [facility]

## 2021-04-12 DIAGNOSIS — Z11.59 ENCOUNTER FOR SCREENING FOR OTHER VIRAL DISEASES: ICD-10-CM

## 2021-04-12 LAB
LABORATORY COMMENT REPORT: NORMAL
SARS-COV-2 RNA RESP QL NAA+PROBE: NEGATIVE
SARS-COV-2 RNA RESP QL NAA+PROBE: NORMAL
SPECIMEN SOURCE: NORMAL
SPECIMEN SOURCE: NORMAL

## 2021-04-12 PROCEDURE — U0005 INFEC AGEN DETEC AMPLI PROBE: HCPCS | Performed by: INTERNAL MEDICINE

## 2021-04-12 PROCEDURE — U0003 INFECTIOUS AGENT DETECTION BY NUCLEIC ACID (DNA OR RNA); SEVERE ACUTE RESPIRATORY SYNDROME CORONAVIRUS 2 (SARS-COV-2) (CORONAVIRUS DISEASE [COVID-19]), AMPLIFIED PROBE TECHNIQUE, MAKING USE OF HIGH THROUGHPUT TECHNOLOGIES AS DESCRIBED BY CMS-2020-01-R: HCPCS | Performed by: INTERNAL MEDICINE

## 2021-04-12 NOTE — MR AVS SNAPSHOT
Nigel Rodarte Jr.   1/25/2018 2:20 PM   Anticoagulation Therapy Visit    Description:  77 year old male   Provider:  YAIR ANTICOAGULATION   Department:  [facility]           INR as of 1/25/2018     Today's INR 2.8      Anticoagulation Summary as of 1/25/2018     INR goal 2.0-3.0   Today's INR 2.8   Full instructions 10 mg on Fri; 7.5 mg all other days   Next INR check 2/15/2018    Indications   Long-term (current) use of anticoagulants [Z79.01] [Z79.01]  Atrial fibrillation (H) [I48.91] [I48.91]         Your next Anticoagulation Clinic appointment(s)     Feb 15, 2018  7:00 AM CST   Anticoagulation Visit with  ANTICOAGULATION   [facility] ([facility])    [address] [place] [id number]   [phone]              Contact Numbers     Anticoagulant (INR) Clinic Number: [phone]          January 2018 Details    Sun Mon Tue Wed Thu Fri Sat      1               2               3               4               5               6                 7               8               9               10               11               12               13                 14               15               16               17               18               19               20                 21               22               23               24               25      7.5 mg   See details      26      10 mg         27      7.5 mg           28      7.5 mg         29      7.5 mg         30      7.5 mg         31      7.5 mg             Date Details   01/25 This INR check               How to take your warfarin dose     To take:  7.5 mg Take 1.5 of the 5 mg tablets.    To take:  10 mg Take 2 of the 5 mg tablets.           February 2018 Details    Sun Mon Tue Wed Thu Fri Sat         1      7.5 mg         2      10 mg         3      7.5 mg           4      7.5 mg         5      7.5 mg         6      7.5 mg         7      7.5 mg         8      7.5 mg        [address] [facility] [place]. [place], [address]  Tel.  [phone]  Fax. [address] [facility] [address]  [place], [address] [facility]  Tel.  [phone]  Fax. [phone] [address], [address]  Tel.  [phone]  Fax. [phone] Jonathan Garcia (: 1971) is a 53 y.o. female, established patient, seen for positive airway pressure follow-up and evaluation of the following chief complaint(s):   Sleep Problem (ELLA Management: 1st Adherence Visit)       Raven Espana was last seen by me on 21, prior notes reviewed in detail. Polysomnogram (PSG) performed on 10-13-20 showed an AHI of 12.9/hr with a lowest SpO2 of 83%, duration of SpO2 < 88% 5.10 minutes. ASSESSMENT/PLAN:    ICD-10-CM ICD-9-CM    1. ELLA (obstructive sleep apnea)  G47.33 327.23    2. Essential hypertension  I10 401.9    3. Anxiety  F41.9 300.00    4. BMI 40.0-44.9, adult ([facility]  Z68.41 V85.41        On Respironics :  DreamStation Auto-CPAP. Download not available at time of visit. 1. Sleep Apnea -   * She is adherent with PAP therapy and PAP continues to benefit patient and remains necessary for control of her sleep apnea. Continue on current pressures    * Technologist to scan in current download for review. * She is familiar with the telephone monitoring application, is willing to track therapy and agrees to notify use if AHI is >5 per hour. * Counseling was provided regarding the importance of regular PAP use with emphasis on ensuring sufficient total sleep time, proper sleep hygiene, and safe driving. * Re-enforced proper and regular cleaning for the device. * She was asked to contact our office for any problems regarding PAP therapy. 2. Recommended a dedicated weight loss program through appropriate diet and exercise regimen as significant weight reduction has been shown to reduce severity of obstructive sleep apnea.      Follow-up and Dispositions   9      10 mg         10      7.5 mg           11      7.5 mg         12      7.5 mg         13      7.5 mg         14      7.5 mg         15            16               17                 18               19               20               21               22               23               24                 25               26               27               28                   Date Details   No additional details    Date of next INR:  2/15/2018         How to take your warfarin dose     To take:  7.5 mg Take 1.5 of the 5 mg tablets.    To take:  10 mg Take 2 of the 5 mg tablets.            · Return for follow-up with technologist, for device download, follow-up with sleep provider in 1 yr or as needed. SUBJECTIVE/OBJECTIVE:    She  is seen today for follow up on PAP device and reports no problems using the device. The following concerns identified:    Drowsiness no Problems exhaling no   Snoring no Forget to put on no   Mask Comfortable yes Can't fall asleep no   Dry Mouth no Mask falls off no   Air Leaking no Frequent awakenings no       She admits that her sleep has improved on PAP therapy using FF mask and non-heated tubing. Review of device download indicated:    Download not available at time of visit. [place] Sleepiness Score: 8   Modified F.O.S.Q. Score Total / 2: 16       Sleep Review of Systems: notable for Negative difficulty falling asleep; Negative awakenings at night; Negative  early morning headaches; Negative  memory problems; Negative  concentration issues; Negative  chest pain; Negative  shortness of breath;  Negative rashes or itching; Negative heartburn / belching / flatulence; Negative  significant mood issues; no afternoon naps per week. Vitals reported by patient     Patient-Reported Vitals 4/8/2021   Patient-Reported Weight 235   Patient-Reported Height 53   Patient-Reported Pulse 98   Patient-Reported Temperature 97.7   Patient-Reported Systolic  822   Patient-Reported Diastolic 98      Calculated BMI 42,16 kg/m2    Physical Exam completed by visual and auditory observation of patient with verbal input from patient.     General:   Alert, oriented, not in acute distress   Eyes:  Anicteric Sclerae; no obvious strabismus   Nose:  No obvious nasal septum deviation    Neck:   Midline trachea, no visible mass   Chest/Lungs:  Respiratory effort normal, no visualized signs of difficulty breathing or respiratory distress   CVS:  No JVD   Extremities:  No obvious rashes noted on face, neck, or hands   Neuro:  No facial asymmetry, no focal deficits; no obvious tremor    Psych:  Normal affect,  normal countenance     Maryann Munoz is being evaluated by a Virtual Visit (video visit) encounter to address concerns as mentioned above. A caregiver was present when appropriate. Due to this being a TeleHealth encounter (During [facility]-04 public health emergency), evaluation of the following organ systems was limited: Vitals/Constitutional/EENT/Resp/CV/GI//MS/Neuro/Skin/Heme-Lymph-Imm. Pursuant to the emergency declaration under the [address] [place] [address], [address] [place] [address] and the [employer] Act, this Virtual Visit was conducted with patient's (and/or legal guardian's) consent, to reduce the patient's risk of exposure to COVID-19 and provide necessary medical care. Patient identification was verified at the start of the visit: YES using name and date of birth. Patient's phone number [phone] (home)  was confirmed for accuracy. She gives permission for messages regarding results and appointments to be left at that number. Services were provided through a video synchronous discussion virtually to substitute for in-person clinic visit. I was at home while conducting this encounter, patient located at their home or alternate location of their choice. An electronic signature was used to authenticate this note. Gilma Preciado MD, FAASM  Electronically signed.  04/12/21

## 2021-04-13 NOTE — TELEPHONE ENCOUNTER
LM for pt that Dr Malhotra did not want to make any changes at this time.  Pt to call back with any questions and aware that if he has received his Kardia device and has any strips they can be sent via [employer]. Dara

## 2021-04-14 ENCOUNTER — HOSPITAL ENCOUNTER (OUTPATIENT)
Facility: CLINIC | Age: 81
Discharge: HOME | End: 2021-04-14
Admitting: INTERNAL MEDICINE
Payer: MEDICARE

## 2021-04-14 VITALS
TEMPERATURE: 97.9 F | SYSTOLIC BLOOD PRESSURE: 100 MMHG | HEIGHT: 69 IN | HEART RATE: 60 BPM | OXYGEN SATURATION: 98 % | DIASTOLIC BLOOD PRESSURE: 62 MMHG | RESPIRATION RATE: 12 BRPM | WEIGHT: 157.3 LBS | BODY MASS INDEX: 23.3 KG/M2

## 2021-04-14 DIAGNOSIS — R93.1 ABNORMAL FINDINGS ON DIAGNOSTIC IMAGING OF HEART AND CORONARY CIRCULATION: ICD-10-CM

## 2021-04-14 DIAGNOSIS — R94.39 ABNORMAL STRESS TEST: ICD-10-CM

## 2021-04-14 DIAGNOSIS — R07.89 CHEST DISCOMFORT: ICD-10-CM

## 2021-04-14 DIAGNOSIS — R00.2 PALPITATIONS: ICD-10-CM

## 2021-04-14 DIAGNOSIS — R06.09 DOE (DYSPNEA ON EXERTION): ICD-10-CM

## 2021-04-14 PROBLEM — Z98.890 STATUS POST CORONARY ANGIOGRAM: Status: ACTIVE | Noted: 2021-04-14

## 2021-04-14 LAB
ANION GAP SERPL CALCULATED.3IONS-SCNC: 5 MMOL/L (ref 3–14)
APTT PPP: 33 SEC (ref 22–37)
BUN SERPL-MCNC: 20 MG/DL (ref 7–30)
CALCIUM SERPL-MCNC: 8.6 MG/DL (ref 8.5–10.1)
CHLORIDE SERPL-SCNC: 110 MMOL/L (ref 94–109)
CO2 SERPL-SCNC: 26 MMOL/L (ref 20–32)
CREAT SERPL-MCNC: 1.19 MG/DL (ref 0.66–1.25)
ERYTHROCYTE [DISTWIDTH] IN BLOOD BY AUTOMATED COUNT: 12.6 % (ref 10–15)
GFR SERPL CREATININE-BSD FRML MDRD: 57 ML/MIN/{1.73_M2}
GLUCOSE SERPL-MCNC: 86 MG/DL (ref 70–99)
HCT VFR BLD AUTO: 43.7 % (ref 40–53)
HGB BLD-MCNC: 14.7 G/DL (ref 13.3–17.7)
INR PPP: 1.19 (ref 0.86–1.14)
MCH RBC QN AUTO: 30.9 PG (ref 26.5–33)
MCHC RBC AUTO-ENTMCNC: 33.6 G/DL (ref 31.5–36.5)
MCV RBC AUTO: 92 FL (ref 78–100)
PLATELET # BLD AUTO: 159 10E9/L (ref 150–450)
POTASSIUM SERPL-SCNC: 4.2 MMOL/L (ref 3.4–5.3)
RBC # BLD AUTO: 4.76 10E12/L (ref 4.4–5.9)
SODIUM SERPL-SCNC: 141 MMOL/L (ref 133–144)
WBC # BLD AUTO: 5 10E9/L (ref 4–11)

## 2021-04-14 PROCEDURE — 250N000011 HC RX IP 250 OP 636: Performed by: INTERNAL MEDICINE

## 2021-04-14 PROCEDURE — 36591 DRAW BLOOD OFF VENOUS DEVICE: CPT

## 2021-04-14 PROCEDURE — 93005 ELECTROCARDIOGRAM TRACING: CPT

## 2021-04-14 PROCEDURE — 999N000054 HC STATISTIC EKG NON-CHARGEABLE

## 2021-04-14 PROCEDURE — 272N000001 HC OR GENERAL SUPPLY STERILE: Performed by: INTERNAL MEDICINE

## 2021-04-14 PROCEDURE — 85610 PROTHROMBIN TIME: CPT

## 2021-04-14 PROCEDURE — 85027 COMPLETE CBC AUTOMATED: CPT

## 2021-04-14 PROCEDURE — 250N000009 HC RX 250: Performed by: INTERNAL MEDICINE

## 2021-04-14 PROCEDURE — 80048 BASIC METABOLIC PNL TOTAL CA: CPT

## 2021-04-14 PROCEDURE — 258N000003 HC RX IP 258 OP 636: Performed by: INTERNAL MEDICINE

## 2021-04-14 PROCEDURE — 93571 IV DOP VEL&/PRESS C FLO 1ST: CPT | Mod: 52,LD | Performed by: INTERNAL MEDICINE

## 2021-04-14 PROCEDURE — 99153 MOD SED SAME PHYS/QHP EA: CPT | Performed by: INTERNAL MEDICINE

## 2021-04-14 PROCEDURE — C1894 INTRO/SHEATH, NON-LASER: HCPCS | Performed by: INTERNAL MEDICINE

## 2021-04-14 PROCEDURE — 999N000071 HC STATISTIC HEART CATH LAB OR EP LAB

## 2021-04-14 PROCEDURE — 99152 MOD SED SAME PHYS/QHP 5/>YRS: CPT | Performed by: INTERNAL MEDICINE

## 2021-04-14 PROCEDURE — 999N000184 HC STATISTIC TELEMETRY

## 2021-04-14 PROCEDURE — 93458 L HRT ARTERY/VENTRICLE ANGIO: CPT | Performed by: INTERNAL MEDICINE

## 2021-04-14 PROCEDURE — C1769 GUIDE WIRE: HCPCS | Performed by: INTERNAL MEDICINE

## 2021-04-14 PROCEDURE — 85730 THROMBOPLASTIN TIME PARTIAL: CPT | Mod: GZ

## 2021-04-14 PROCEDURE — C1887 CATHETER, GUIDING: HCPCS | Performed by: INTERNAL MEDICINE

## 2021-04-14 RX ORDER — IOPAMIDOL 755 MG/ML
INJECTION, SOLUTION INTRAVASCULAR
Status: DISCONTINUED | OUTPATIENT
Start: 2021-04-14 | End: 2021-04-14 | Stop reason: HOSPADM

## 2021-04-14 RX ORDER — SODIUM CHLORIDE 9 MG/ML
INJECTION, SOLUTION INTRAVENOUS CONTINUOUS
Status: DISCONTINUED | OUTPATIENT
Start: 2021-04-14 | End: 2021-04-14 | Stop reason: HOSPADM

## 2021-04-14 RX ORDER — VERAPAMIL HYDROCHLORIDE 2.5 MG/ML
INJECTION, SOLUTION INTRAVENOUS
Status: DISCONTINUED | OUTPATIENT
Start: 2021-04-14 | End: 2021-04-14 | Stop reason: HOSPADM

## 2021-04-14 RX ORDER — NALOXONE HYDROCHLORIDE 0.4 MG/ML
0.2 INJECTION, SOLUTION INTRAMUSCULAR; INTRAVENOUS; SUBCUTANEOUS
Status: DISCONTINUED | OUTPATIENT
Start: 2021-04-14 | End: 2021-04-14 | Stop reason: HOSPADM

## 2021-04-14 RX ORDER — ACETAMINOPHEN 325 MG/1
650 TABLET ORAL EVERY 4 HOURS PRN
Status: DISCONTINUED | OUTPATIENT
Start: 2021-04-14 | End: 2021-04-14 | Stop reason: HOSPADM

## 2021-04-14 RX ORDER — FENTANYL CITRATE 50 UG/ML
25-50 INJECTION, SOLUTION INTRAMUSCULAR; INTRAVENOUS
Status: ACTIVE | OUTPATIENT
Start: 2021-04-14 | End: 2021-04-14

## 2021-04-14 RX ORDER — POTASSIUM CHLORIDE 1500 MG/1
20 TABLET, EXTENDED RELEASE ORAL
Status: DISCONTINUED | OUTPATIENT
Start: 2021-04-14 | End: 2021-04-14 | Stop reason: HOSPADM

## 2021-04-14 RX ORDER — FENTANYL CITRATE 50 UG/ML
INJECTION, SOLUTION INTRAMUSCULAR; INTRAVENOUS
Status: DISCONTINUED | OUTPATIENT
Start: 2021-04-14 | End: 2021-04-14 | Stop reason: HOSPADM

## 2021-04-14 RX ORDER — HEPARIN SODIUM 1000 [USP'U]/ML
INJECTION, SOLUTION INTRAVENOUS; SUBCUTANEOUS
Status: DISCONTINUED | OUTPATIENT
Start: 2021-04-14 | End: 2021-04-14 | Stop reason: HOSPADM

## 2021-04-14 RX ORDER — NALOXONE HYDROCHLORIDE 0.4 MG/ML
0.4 INJECTION, SOLUTION INTRAMUSCULAR; INTRAVENOUS; SUBCUTANEOUS
Status: DISCONTINUED | OUTPATIENT
Start: 2021-04-14 | End: 2021-04-14 | Stop reason: HOSPADM

## 2021-04-14 RX ORDER — LIDOCAINE 40 MG/G
CREAM TOPICAL
Status: DISCONTINUED | OUTPATIENT
Start: 2021-04-14 | End: 2021-04-14 | Stop reason: HOSPADM

## 2021-04-14 RX ORDER — NITROGLYCERIN 5 MG/ML
VIAL (ML) INTRAVENOUS
Status: DISCONTINUED | OUTPATIENT
Start: 2021-04-14 | End: 2021-04-14 | Stop reason: HOSPADM

## 2021-04-14 RX ORDER — FLUMAZENIL 0.1 MG/ML
0.2 INJECTION, SOLUTION INTRAVENOUS
Status: DISCONTINUED | OUTPATIENT
Start: 2021-04-14 | End: 2021-04-14 | Stop reason: HOSPADM

## 2021-04-14 RX ORDER — ATROPINE SULFATE 0.1 MG/ML
0.5 INJECTION INTRAVENOUS
Status: DISCONTINUED | OUTPATIENT
Start: 2021-04-14 | End: 2021-04-14 | Stop reason: HOSPADM

## 2021-04-14 RX ADMIN — SODIUM CHLORIDE, PRESERVATIVE FREE: 5 INJECTION INTRAVENOUS at 07:37

## 2021-04-14 ASSESSMENT — MIFFLIN-ST. JEOR: SCORE: 1418.89

## 2021-04-14 NOTE — PLAN OF CARE
Care Suites Admission Nursing Note    Patient Information  Name: Nigel Rodarte Jr.  Age: 80 year old  Reason for admission: coronary angiogram  Care Suites arrival time: 0650    Visitor Information  Name: Denise, spouse  Informed of visitor restrictions: Yes  1 visitor allowed per patient   Visitor must screen negative for COVID symptoms   Visitor must wear a mask  Waiting rooms closed to visitors    Patient Admission/Assessment   Pre-procedure assessment complete: Yes  If abnormal assessment/labs, provider notified: N/A  NPO: Yes  Medications held per instructions/orders: Yes  Consent: deferred  If applicable, pregnancy test status: deferred  Patient oriented to room: Yes  Education/questions answered: Yes  Plan/other: coronary angiogram scheduled for 0830    Discharge Planning  Discharge name/phone number: Denise Rodarte, spouse [phone]  Overnight post sedation caregiver: Denise  Discharge location: home    Litzy Claudio RN

## 2021-04-14 NOTE — PLAN OF CARE
Care Suites Discharge Nursing Note    Patient Information  Name: Nigel Rodarte Jr.  Age: 80 year old    Discharge Education:  Discharge instructions reviewed: Yes, reviewed with pt and spouse. Questions answered, they deny concerns.  Additional education/resources provided: none  Patient/patient representative verbalizes understanding: Yes  Patient discharging on new medications: No  Medication education completed: N/A    Discharge Plans:   Discharge location: home  Discharge ride contacted: Yes  Approximate discharge time: 1500    Discharge Criteria:  Discharge criteria met and vital signs stable: Yes    Patient Belongs:  Patient belongings returned to patient: Yes    Litzy Claudio RN

## 2021-04-14 NOTE — DISCHARGE INSTRUCTIONS
Cardiac Angiogram Discharge Instructions - Radial    After you go home:      Have an adult stay with you until tomorrow.    Drink extra fluids for 2 days.    You may resume your normal diet.    No smoking       For 24 hours - due to the sedation you received:    Relax and take it easy.    Do NOT make any important or legal decisions.    Do NOT drive or operate machines at home or at work.    Do NOT drink alcohol.    Care of Wrist Puncture Site:      For the first 24 hrs - check the puncture site every 1-2 hours while awake.    It is normal to have soreness at the puncture site and mild tingling in your hand for up to 3 days.    Remove the bandaid after 24 hours. If there is minor oozing, apply another bandaid and remove it after 12 hours.    You may shower tomorrow.  Do NOT take a bath, or use a hot tub or pool for at least 3 days. Do NOT scrub the site. Do not use lotion or powder near the puncture site.           Activity:        For 2 days:     do not use your hand or arm to support your weight (such as rising from a chair)     do not bend your wrist (such as lifting a garage door).    do not lift more than 5 pounds or exercise your arm (such as tennis, golf or bowling).    Do NOT do any heavy activity such as exercise, lifting, or straining.     Bleeding:      If you start bleeding from the site in your wrist, sit down and press firmly on/above the site for 10 minutes.     Once bleeding stops, keep arm still for 2 hours.     Call [facility] as soon as you can.       Call 911 right away if you have heavy bleeding or bleeding that does not stop.      Medicines:      Take your medications, including blood thinners, unless your provider tells you not to.      If you take Coumadin (Warfarin), have your INR checked by your provider in  3-5 days. Call your clinic to schedule this.    If you have stopped any medicines, check with your provider about when to restart them.    Follow Up Appointments:      Follow up with  [facility] Nurse Practitioner at [facility] of patient preference in 7-10 days.    Call the clinic if:      You have a large or growing hard lump around the site.    The site is red, swollen, hot or tender.    Blood or fluid is draining from the site.    You have chills or a fever greater than 101 F (38 C).    Your arm feels numb, cool or changes color.    You have hives, a rash or unusual itching.    Any questions or concerns.          [facility] [place]:    [phone] [facility] (7 days a week)

## 2021-04-14 NOTE — PLAN OF CARE
PATIENT/VISITOR WELLNESS SCREENING    Step 1 Patient Screening    1. In the last month, have you been in contact with someone who was confirmed or suspected to have Coronavirus/COVID-19? No    2. Do you have the following symptoms?  Fever/Chills? No   Cough? No   Shortness of breath? No   New loss of taste or smell? No  Sore throat? No  Muscle or body aches? No  Headaches? No  Fatigue? No  Vomiting or diarrhea? No    Step 2 Visitor Screening    1. Name of Visitor (1 visitor per patient): Denise    2. In the last month, have you been in contact with someone who was confirmed or suspected to have Coronavirus/COVID-19? No    3. Do you have the following symptoms?  Fever/Chills? No   Cough? No   Shortness of breath? No   Skin rash? No   Loss of taste or smell? No  Sore throat? No  Runny or stuffy nose? No  Muscle or body aches? No  Headaches? No  Fatigue? No  Vomiting or diarrhea? No      If the visitor has positive symptoms, notify supervisor/manger  Per policy, the visitor will need to leave the facility     Step 3 Refer to logic grid below for actions    NO SYMPTOM(S)    ACTIONS:  1. Standard rooming process  2. Provider to assess per normal protocol  3. Implement precautions as needed and per guidelines     POSITIVE SYMPTOM(S)  If positive for ANY of the following symptoms: fever, cough, shortness of breath, rash    ACTION:  1. Continue to have the patient wear a mask   2. Room patient as soon as possible  3. Don appropriate PPE when entering room  4. Provider evaluation

## 2021-04-14 NOTE — PLAN OF CARE
Care Suites Post Procedure Note    Patient Information  Name: Nigel Rodarte Jr.  Age: 80 year old    Post Procedure  Time patient returned to Care Suites: 1115  Concerns/abnormal assessment: R radial site bleeding slightly, 2 additional ml air applied to TR band, stable  If abnormal assessment, provider notified: N/A  Plan/Other: recovery from sedation, TR removal per protocol    Call to pt's spouse to update    Litzy Claudio RN

## 2021-04-14 NOTE — PRE-PROCEDURE
GENERAL PRE-PROCEDURE:   Procedure:  CAG possible PCI  Date/Time:  4/14/2021 8:42 AM    Verbal consent obtained?: Yes    Written consent obtained?: Yes    Risks and benefits: Risks, benefits and alternatives were discussed    Consent given by:  Patient  Patient states understanding of procedure being performed: Yes    Patient's understanding of procedure matches consent: Yes    Procedure consent matches procedure scheduled: Yes    Expected level of sedation:  Moderate  Appropriately NPO:  Yes  ASA Class:  Class 3- Severe systemic disease, definite functional limitations  Lungs:  Lungs clear with good breath sounds bilaterally  Heart:  Normal heart sounds and rate  History & Physical reviewed:  History and physical reviewed and no updates needed  I have examined the patient, reviewed the history, medications and pre procedural tests. He has a history of new onset chest pain and a nuclear scan suggestive of inferior wall ischemia.  I have explained to the patient the risks of death, MI, stroke, hematoma, possible urgent bypass surgery for failed PCI, use of stents, thienopyridine agents, possible peripheral vascular complications, arrhythmia, the use of FFR in clinical decision-making and alternative of medical therapy alone in regards to left heart catheterization, left ventriculography, coronary angiography, and possible percutaneous coronary intervention. The patient voiced understanding and wishes to proceed. The patient has a good right radial pulse, normal ulnar pulse and a normal Ronnie's sign.

## 2021-04-16 LAB — INTERPRETATION ECG - MUSE: NORMAL

## 2021-04-20 ENCOUNTER — ANTICOAGULATION THERAPY VISIT (OUTPATIENT)
Dept: CARDIOLOGY | Facility: CLINIC | Age: 81
End: 2021-04-20

## 2021-04-20 DIAGNOSIS — Z79.01 LONG TERM CURRENT USE OF ANTICOAGULANTS WITH INR GOAL OF 2.0-3.0: ICD-10-CM

## 2021-04-20 DIAGNOSIS — I48.0 PAROXYSMAL ATRIAL FIBRILLATION (H): ICD-10-CM

## 2021-04-20 LAB
CAPILLARY BLOOD COLLECTION: NORMAL
INR PPP: 1.7 (ref 0.86–1.14)

## 2021-04-20 PROCEDURE — 36416 COLLJ CAPILLARY BLOOD SPEC: CPT | Performed by: INTERNAL MEDICINE

## 2021-04-20 PROCEDURE — 85610 PROTHROMBIN TIME: CPT | Performed by: INTERNAL MEDICINE

## 2021-04-20 NOTE — PROGRESS NOTES
ANTICOAGULATION FOLLOW-UP CLINIC VISIT    Patient Name:  Nigel Rodarte Jr.  Date:  2021  Contact Type:  Telephone    SUBJECTIVE:  Patient Findings     Positives:  Missed doses    Comments:  Held warfarin for 4 days for angio on          Clinical Outcomes     Negatives:  Major bleeding event, Thromboembolic event, Anticoagulation-related hospital admission, Anticoagulation-related ED visit, Anticoagulation-related fatality    Comments:  Held warfarin for 4 days for angio on             OBJECTIVE    Recent labs: (last 7 days)     21  1037   INR 1.70*       ASSESSMENT / PLAN  INR assessment SUB    Recheck INR In: 1 WEEK    INR Location Outside lab      Anticoagulation Summary  As of 2021    INR goal:  2.0-3.0   TTR:  69.1 % (8.1 mo)   INR used for dosin.70 (2021)   Warfarin maintenance plan:  5 mg (5 mg x 1) every Mon; 7.5 mg (5 mg x 1.5) all other days   Full warfarin instructions:  5 mg every Mon; 7.5 mg all other days   Weekly warfarin total:  50 mg   No change documented:  Gilma Anton RN   Plan last modified:  Gladys Herrera RN (2021)   Next INR check:  2021   Target end date:  Indefinite    Indications    Atrial fibrillation (H) [I48.91] [I48.91]  Long term current use of anticoagulants with INR goal of 2.0-3.0 [Z79.01]  Paroxysmal atrial fibrillation (H) [I48.0]             Anticoagulation Episode Summary     INR check location:      Preferred lab:      Send INR reminders to:  [facility] INR NURSE    Comments:        Anticoagulation Care Providers     Provider Role Specialty Phone number    Grzegorz Malhotra MD Referring Clinical Cardiac Electrophysiology [phone]            See the Encounter Report to view Anticoagulation Flowsheet and Dosing Calendar (Go to Encounters tab in chart review, and find the Anticoagulation Therapy Visit)    INR 1.7.  Patient held warfarin for 4 days for heart cath on .  He resumed warfarin on  and we had  planned to recheck INR 10 days later but patient said the MD told him to check his INR sooner.  Continue same schedule and recheck in 1 week.  INR is still low because it has only been 6 days back on Warfarin and it takes closer to 10 days to get back in range.  No other med changes or bleeding issues or pain meds.  Bay Anton, RN

## 2021-04-27 ENCOUNTER — ANTICOAGULATION THERAPY VISIT (OUTPATIENT)
Dept: CARDIOLOGY | Facility: CLINIC | Age: 81
End: 2021-04-27
Payer: MEDICARE

## 2021-04-27 DIAGNOSIS — Z79.01 LONG TERM CURRENT USE OF ANTICOAGULANTS WITH INR GOAL OF 2.0-3.0: ICD-10-CM

## 2021-04-27 DIAGNOSIS — I48.0 PAROXYSMAL ATRIAL FIBRILLATION (H): ICD-10-CM

## 2021-04-27 LAB
CAPILLARY BLOOD COLLECTION: NORMAL
INR PPP: 2.4 (ref 0.86–1.14)

## 2021-04-27 PROCEDURE — 99207 PR NO CHARGE NURSE ONLY: CPT

## 2021-04-27 PROCEDURE — 85610 PROTHROMBIN TIME: CPT | Performed by: INTERNAL MEDICINE

## 2021-04-27 PROCEDURE — 36416 COLLJ CAPILLARY BLOOD SPEC: CPT | Performed by: INTERNAL MEDICINE

## 2021-04-27 NOTE — PROGRESS NOTES
ANTICOAGULATION FOLLOW-UP CLINIC VISIT    Patient Name:  Nigel Rodarte Jr.  Date:  2021  Contact Type:  Telephone    SUBJECTIVE:         OBJECTIVE    Recent labs: (last 7 days)     21  1041   INR 2.40*       ASSESSMENT / PLAN  INR assessment THER    Recheck INR In: 3 WEEKS    INR Location Outside lab      Anticoagulation Summary  As of 2021    INR goal:  2.0-3.0   TTR:  67.9 % (8.1 mo)   INR used for dosin.40 (2021)   Warfarin maintenance plan:  5 mg (5 mg x 1) every Mon; 7.5 mg (5 mg x 1.5) all other days   Full warfarin instructions:  5 mg every Mon; 7.5 mg all other days   Weekly warfarin total:  50 mg   Plan last modified:  Gladys Herrera RN (2021)   Next INR check:     Target end date:  Indefinite    Indications    Atrial fibrillation (H) [I48.91] [I48.91]  Long term current use of anticoagulants with INR goal of 2.0-3.0 [Z79.01]  Paroxysmal atrial fibrillation (H) [I48.0]             Anticoagulation Episode Summary     INR check location:      Preferred lab:      Send INR reminders to:  [facility]ART INR NURSE    Comments:        Anticoagulation Care Providers     Provider Role Specialty Phone number    Grzegorz Malhotra MD Referring Clinical Cardiac Electrophysiology [phone]            See the Encounter Report to view Anticoagulation Flowsheet and Dosing Calendar (Go to Encounters tab in chart review, and find the Anticoagulation Therapy Visit)    INR 2.40 INR back in range after he had held 4 doses prior to angiogram on  (no intervention done as there were no flow limiting lesions). Pt not available by phone yet. Will call him later today.  11:35am Left message asking pt to call back to review results and let us know if there have been any changes in meds, diet or bleeding issues. Left detailed instructions to continue current dosing of 5 mg Mon and 7.5 mg all other days with recheck in 2-3 weeks. Asked that he call back to confirm that he got the instructions,  update us on his status and let us know if he wants help scheduling the next appt.   12:50pm No change in meds or diet. Denies abnormal bleeding issues (he noted blood tinge mucous after blowing his nose 1 week ago but none since). Will  continue current dosing of 5 mg Mon and 7.5 mg all other days with recheck in 2-3 weeks.  Gladys Herrera RN

## 2021-04-29 ENCOUNTER — OFFICE VISIT (OUTPATIENT)
Dept: CARDIOLOGY | Facility: CLINIC | Age: 81
End: 2021-04-29
Attending: PHYSICIAN ASSISTANT
Payer: MEDICARE

## 2021-04-29 VITALS
HEIGHT: 69 IN | HEART RATE: 78 BPM | SYSTOLIC BLOOD PRESSURE: 108 MMHG | BODY MASS INDEX: 23.33 KG/M2 | WEIGHT: 157.5 LBS | OXYGEN SATURATION: 97 % | DIASTOLIC BLOOD PRESSURE: 56 MMHG

## 2021-04-29 DIAGNOSIS — I48.0 PAROXYSMAL ATRIAL FIBRILLATION (H): ICD-10-CM

## 2021-04-29 DIAGNOSIS — E78.5 HYPERLIPIDEMIA LDL GOAL <100: ICD-10-CM

## 2021-04-29 PROCEDURE — 93000 ELECTROCARDIOGRAM COMPLETE: CPT | Performed by: INTERNAL MEDICINE

## 2021-04-29 PROCEDURE — 99214 OFFICE O/P EST MOD 30 MIN: CPT | Performed by: INTERNAL MEDICINE

## 2021-04-29 ASSESSMENT — MIFFLIN-ST. JEOR: SCORE: 1419.8

## 2021-04-29 NOTE — PROGRESS NOTES
"Electrophysiology/ Clinic Note         H&P and Plan:     REASON FOR VISIT: Electrophysiology evaluation.      HISTORY OF PRESENT ILLNESS: Mr. Rodarte is a delightful 80-year-old gentleman (former  of [employer] team) with a long history of paroxysmal atrial fibrillation/ flutter (ablation on 5/18/2020), who is here for evaluation.     Patient presented with symptomatic atrial fibrillation last year.  He failed rhythm control strategy with flecainide/ metoprolol and underwent atrial fibrillation/flutter ablation on 05/18/2020.  He did well after ablation and has been off antiarrhythmics.       Today, he informs he is doing great.  He denies any recurrence of A. fib.  He continues to exercise on the daily basis.  He denies any symptoms of chest pain, shortness of breath, lightheadedness, near-syncope or syncope.    Of note, about a month ago, he was having complaints of chest discomfort and a nuclear stress test was suggestive of ischemia.  Coronary angiography was obtained which rule out significant coronary disease.    EKG done today showed normal sinus rhythm.    PREVIOUS STUDIES (personally reviewed):  - Stress Echo (2018): He was able to exercise for 7 minutes and 20 seconds. . Negative for ischemia, arrthyhmia or QRS widening.     -NM stress test (4/1/21): Patient was able to exercise for 9 minutes and 30 seconds.  There was mild count reduction in the basal, mid and apical inferior.  -Cath (4/14/2021): Two lesions in the LAD that are not flow limiting based on iFR (0.92, 0.93).     ASSESSMENT AND PLAN:   1.  Paroxysmal atrial fibrillation.  He responded well to ablation.   No recurrence of A. fib since ablation.  We will continue therapy with beta-blockers.    2.  Embolic prevention.  CHADS-VASc score of 2. Continue anticoagulation with Coumadin indefinitely.      Grzegorz Malhotra MD    Physical Exam:  Vitals: /56   Pulse 78   Ht 1.753 m (5' 9\")   Wt 71.4 kg (157 lb 8 oz)   SpO2 97%   BMI " 23.26 kg/m      Constitutional:  AAO x3.  Pt is in NAD.  HEAD: normocephalic.  SKIN: Skin normal color, texture and turgor with no lesions or eruptions.  Eyes: PERRL, EOMI.  ENT:  Supple, normal JVP. No lymphadenopathy or thyroid enlargement.  Chest:  CTAB.  Cardiac: RRR, normal  S1 and S2.  No murmurs rubs or gallop.  Abdomen:  Normal BS.  Soft, non-tender and non-distended.  No rebound or guarding.    Extremities:  Pedious pulses palpable B/L.  No LE edema noticed.   Neurological: Strength and sensation grossly symmetric and intact throughout.       CURRENT MEDICATIONS:  Current Outpatient Medications   Medication Sig Dispense Refill     dutasteride (AVODART) 0.5 MG capsule Take 0.5 mg by mouth daily       metoprolol succinate ER (TOPROL-XL) 25 MG 24 hr tablet Take 1 tab daily 90 tablet 2     nitroGLYcerin (NITROSTAT) 0.4 MG sublingual tablet For chest pain place 1 tablet under the tongue every 5 minutes for 3 doses. If symptoms persist 5 minutes after 1st dose call 911. 25 tablet 1     rosuvastatin (CRESTOR) 20 MG tablet Take 1 tablet (20 mg) by mouth daily 90 tablet 3     warfarin ANTICOAGULANT (COUMADIN) 5 MG tablet Take 1 tab on Fridays and 1 1/2 tabs on all other days or as directed per INR clinic 135 tablet 1       ALLERGIES   No Known Allergies    PAST MEDICAL HISTORY:  Past Medical History:   Diagnosis Date     Arthritis     osteoarthrosis     Elevated PSA      Fatigue      Palpitations      Paroxysmal atrial fibrillation (H) 05/16/2017     Renal disease     kidney calculus     SOB (shortness of breath)        PAST SURGICAL HISTORY:  Past Surgical History:   Procedure Laterality Date     ARTHROPLASTY HIP  1/14/2013    Procedure: ARTHROPLASTY HIP;  RIGHT TOTAL HIP ARTHROPLASTY ;  Surgeon: Jamie Ohara MD;  Location:  OR     ARTHROPLASTY KNEE Right 11/6/2019    Procedure: RIGHT TOTAL KNEE ARTHROPLASTY (DEPUY)^;  Surgeon: Jamie Ohara MD;  Location:  OR     CARDIOVERSION       COLONOSCOPY N/A  2/9/2016    Procedure: COLONOSCOPY;  Surgeon: Astrid Vital MD;  Location:  GI     CV CORONARY ANGIOGRAM N/A 4/14/2021    Procedure: Coronary Angiogram;  Surgeon: Jacob Baird MD;  Location:  HEART CARDIAC CATH LAB     CV INSTANTANEOUS WAVE-FREE RATIO N/A 4/14/2021    Procedure: Instantaneous Wave-Free Ratio;  Surgeon: Jacob Baird MD;  Location:  HEART CARDIAC CATH LAB     CYSTOSCOPY, RETROGRADES, EXTRACT STONE, COMBINED  9/12/2013    Procedure: COMBINED CYSTOSCOPY, RETROGRADES, EXTRACT STONE;  CYSTOSCOPY, RIGHT RETROGRADE, STONE EXTRACTION;  Surgeon: Carlos Mcknight MD;  Location:  OR     ENT SURGERY      stapendectomy     EP ABLATION FOCAL AFIB N/A 5/18/2020    Procedure: EP Ablation Focal AFIB;  Surgeon: Grzegorz Malhotra MD;  Location:  HEART CARDIAC CATH LAB     GENITOURINARY SURGERY      cystoscopy for stone removal     HERNIA REPAIR       ORTHOPEDIC SURGERY      shoulder surgeries,bilat carpel tunnel     TONSILLECTOMY         FAMILY HISTORY:  Family History   Problem Relation Age of Onset     Lung Cancer Brother        SOCIAL HISTORY:  Social History     Socioeconomic History     Marital status:      Spouse name: None     Number of children: None     Years of education: None     Highest education level: None   Occupational History     None   Social Needs     Financial resource strain: None     Food insecurity     Worry: None     Inability: None     Transportation needs     Medical: None     Non-medical: None   Tobacco Use     Smoking status: Never Smoker     Smokeless tobacco: Never Used   Substance and Sexual Activity     Alcohol use: No     Drug use: No     Sexual activity: None   Lifestyle     Physical activity     Days per week: None     Minutes per session: None     Stress: None   Relationships     Social connections     Talks on phone: None     Gets together: None     Attends Catholic service: None     Active member of club or organization: None      Attends meetings of clubs or organizations: None     Relationship status: None     Intimate partner violence     Fear of current or ex partner: None     Emotionally abused: None     Physically abused: None     Forced sexual activity: None   Other Topics Concern     Parent/sibling w/ CABG, MI or angioplasty before 65F 55M? Not Asked   Social History Narrative     None       Review of Systems:  Skin:  Negative     Eyes:  Positive for glasses  ENT:  Positive for tinnitus  Respiratory:  Negative    Cardiovascular:  Negative    Gastroenterology: Negative    Genitourinary:  Positive for urinary frequency;urgency;retention;prostate problem;nocturia  Musculoskeletal:  Positive for arthritis;joint pain;joint swelling;joint stiffness  Neurologic:  Negative    Psychiatric:  Negative    Heme/Lymph/Imm:  Positive for hay fever  Endocrine:  Negative        Recent Lab Results:  LIPID RESULTS:  Lab Results   Component Value Date    CHOL 188 10/30/2020    HDL 48 10/30/2020     (H) 10/30/2020    TRIG 147 10/30/2020       LIVER ENZYME RESULTS:  Lab Results   Component Value Date    AST 26 05/16/2017    ALT <5 (L) 10/30/2020       CBC RESULTS:  Lab Results   Component Value Date    WBC 5.0 04/14/2021    RBC 4.76 04/14/2021    HGB 14.7 04/14/2021    HCT 43.7 04/14/2021    MCV 92 04/14/2021    MCH 30.9 04/14/2021    MCHC 33.6 04/14/2021    RDW 12.6 04/14/2021     04/14/2021       BMP RESULTS:  Lab Results   Component Value Date     04/14/2021    POTASSIUM 4.2 04/14/2021    CHLORIDE 110 (H) 04/14/2021    CO2 26 04/14/2021    ANIONGAP 5 04/14/2021    GLC 86 04/14/2021    BUN 20 04/14/2021    CR 1.19 04/14/2021    GFRESTIMATED 57 (L) 04/14/2021    GFRESTBLACK 67 04/14/2021    CHERI 8.6 04/14/2021        A1C RESULTS:  No results found for: A1C    INR RESULTS:  Lab Results   Component Value Date    INR 2.40 (H) 04/27/2021    INR 1.70 (H) 04/20/2021         ECHOCARDIOGRAM  No results found for this or any previous visit (from  the past 8760 hour(s)).      Orders Placed This Encounter   Procedures     EKG 12-lead complete w/read - Clinics (performed today)     No orders of the defined types were placed in this encounter.    There are no discontinued medications.      Encounter Diagnoses   Name Primary?     Paroxysmal atrial fibrillation (H)      Hyperlipidemia LDL goal <100          CC  Briseyda Meyers PA-C  [id number]  [place] [id number]

## 2021-04-29 NOTE — LETTER
4/29/2021    Osmar Espinoza MD  [address] [place] [address]    RE: Nigel Rodarte Jr.       Dear Colleague,    I had the pleasure of seeing Nigel STRONG Cayden Silva in the [facility].    Electrophysiology/ Clinic Note         H&P and Plan:     REASON FOR VISIT: Electrophysiology evaluation.      HISTORY OF PRESENT ILLNESS: Mr. Rodarte is a delightful 80-year-old gentleman (former  of [employer] team) with a long history of paroxysmal atrial fibrillation/ flutter (ablation on 5/18/2020), who is here for evaluation.     Patient presented with symptomatic atrial fibrillation last year.  He failed rhythm control strategy with flecainide/ metoprolol and underwent atrial fibrillation/flutter ablation on 05/18/2020.  He did well after ablation and has been off antiarrhythmics.       Today, he informs he is doing great.  He denies any recurrence of A. fib.  He continues to exercise on the daily basis.  He denies any symptoms of chest pain, shortness of breath, lightheadedness, near-syncope or syncope.    Of note, about a month ago, he was having complaints of chest discomfort and a nuclear stress test was suggestive of ischemia.  Coronary angiography was obtained which rule out significant coronary disease.    EKG done today showed normal sinus rhythm.    PREVIOUS STUDIES (personally reviewed):  - Stress Echo (2018): He was able to exercise for 7 minutes and 20 seconds. . Negative for ischemia, arrthyhmia or QRS widening.     -NM stress test (4/1/21): Patient was able to exercise for 9 minutes and 30 seconds.  There was mild count reduction in the basal, mid and apical inferior.  -Cath (4/14/2021): Two lesions in the LAD that are not flow limiting based on iFR (0.92, 0.93).     ASSESSMENT AND PLAN:   1.  Paroxysmal atrial fibrillation.  He responded well to ablation.   No recurrence of A. fib since ablation.  We will continue therapy with  "beta-blockers.    2.  Embolic prevention.  CHADS-VASc score of 2. Continue anticoagulation with Coumadin indefinitely.      Grzegorz Malhotra MD    Physical Exam:  Vitals: /56   Pulse 78   Ht 1.753 m (5' 9\")   Wt 71.4 kg (157 lb 8 oz)   SpO2 97%   BMI 23.26 kg/m      Constitutional:  AAO x3.  Pt is in NAD.  HEAD: normocephalic.  SKIN: Skin normal color, texture and turgor with no lesions or eruptions.  Eyes: PERRL, EOMI.  ENT:  Supple, normal JVP. No lymphadenopathy or thyroid enlargement.  Chest:  CTAB.  Cardiac: RRR, normal  S1 and S2.  No murmurs rubs or gallop.  Abdomen:  Normal BS.  Soft, non-tender and non-distended.  No rebound or guarding.    Extremities:  Pedious pulses palpable B/L.  No LE edema noticed.   Neurological: Strength and sensation grossly symmetric and intact throughout.       CURRENT MEDICATIONS:  Current Outpatient Medications   Medication Sig Dispense Refill     dutasteride (AVODART) 0.5 MG capsule Take 0.5 mg by mouth daily       metoprolol succinate ER (TOPROL-XL) 25 MG 24 hr tablet Take 1 tab daily 90 tablet 2     nitroGLYcerin (NITROSTAT) 0.4 MG sublingual tablet For chest pain place 1 tablet under the tongue every 5 minutes for 3 doses. If symptoms persist 5 minutes after 1st dose call 911. 25 tablet 1     rosuvastatin (CRESTOR) 20 MG tablet Take 1 tablet (20 mg) by mouth daily 90 tablet 3     warfarin ANTICOAGULANT (COUMADIN) 5 MG tablet Take 1 tab on Fridays and 1 1/2 tabs on all other days or as directed per INR clinic 135 tablet 1       ALLERGIES   No Known Allergies    PAST MEDICAL HISTORY:  Past Medical History:   Diagnosis Date     Arthritis     osteoarthrosis     Elevated PSA      Fatigue      Palpitations      Paroxysmal atrial fibrillation (H) 05/16/2017     Renal disease     kidney calculus     SOB (shortness of breath)        PAST SURGICAL HISTORY:  Past Surgical History:   Procedure Laterality Date     ARTHROPLASTY HIP  1/14/2013    Procedure: ARTHROPLASTY HIP;  " RIGHT TOTAL HIP ARTHROPLASTY ;  Surgeon: Jamie Ohara MD;  Location:  OR     ARTHROPLASTY KNEE Right 11/6/2019    Procedure: RIGHT TOTAL KNEE ARTHROPLASTY (DEPUY)^;  Surgeon: Jamie Ohara MD;  Location:  OR     CARDIOVERSION       COLONOSCOPY N/A 2/9/2016    Procedure: COLONOSCOPY;  Surgeon: Astrid Vital MD;  Location:  GI     CV CORONARY ANGIOGRAM N/A 4/14/2021    Procedure: Coronary Angiogram;  Surgeon: Jacob Baird MD;  Location:  HEART CARDIAC CATH LAB     CV INSTANTANEOUS WAVE-FREE RATIO N/A 4/14/2021    Procedure: Instantaneous Wave-Free Ratio;  Surgeon: Jacob Baird MD;  Location:  HEART CARDIAC CATH LAB     CYSTOSCOPY, RETROGRADES, EXTRACT STONE, COMBINED  9/12/2013    Procedure: COMBINED CYSTOSCOPY, RETROGRADES, EXTRACT STONE;  CYSTOSCOPY, RIGHT RETROGRADE, STONE EXTRACTION;  Surgeon: Carlos Mcknight MD;  Location:  OR     ENT SURGERY      stapendectomy     EP ABLATION FOCAL AFIB N/A 5/18/2020    Procedure: EP Ablation Focal AFIB;  Surgeon: Grzegorz Malhotra MD;  Location:  HEART CARDIAC CATH LAB     GENITOURINARY SURGERY      cystoscopy for stone removal     HERNIA REPAIR       ORTHOPEDIC SURGERY      shoulder surgeries,bilat carpel tunnel     TONSILLECTOMY         FAMILY HISTORY:  Family History   Problem Relation Age of Onset     Lung Cancer Brother        SOCIAL HISTORY:  Social History     Socioeconomic History     Marital status:      Spouse name: None     Number of children: None     Years of education: None     Highest education level: None   Occupational History     None   Social Needs     Financial resource strain: None     Food insecurity     Worry: None     Inability: None     Transportation needs     Medical: None     Non-medical: None   Tobacco Use     Smoking status: Never Smoker     Smokeless tobacco: Never Used   Substance and Sexual Activity     Alcohol use: No     Drug use: No     Sexual activity: None   Lifestyle     Physical  activity     Days per week: None     Minutes per session: None     Stress: None   Relationships     Social connections     Talks on phone: None     Gets together: None     Attends Zoroastrianism service: None     Active member of club or organization: None     Attends meetings of clubs or organizations: None     Relationship status: None     Intimate partner violence     Fear of current or ex partner: None     Emotionally abused: None     Physically abused: None     Forced sexual activity: None   Other Topics Concern     Parent/sibling w/ CABG, MI or angioplasty before 65F 55M? Not Asked   Social History Narrative     None       Review of Systems:  Skin:  Negative     Eyes:  Positive for glasses  ENT:  Positive for tinnitus  Respiratory:  Negative    Cardiovascular:  Negative    Gastroenterology: Negative    Genitourinary:  Positive for urinary frequency;urgency;retention;prostate problem;nocturia  Musculoskeletal:  Positive for arthritis;joint pain;joint swelling;joint stiffness  Neurologic:  Negative    Psychiatric:  Negative    Heme/Lymph/Imm:  Positive for hay fever  Endocrine:  Negative        Recent Lab Results:  LIPID RESULTS:  Lab Results   Component Value Date    CHOL 188 10/30/2020    HDL 48 10/30/2020     (H) 10/30/2020    TRIG 147 10/30/2020       LIVER ENZYME RESULTS:  Lab Results   Component Value Date    AST 26 05/16/2017    ALT <5 (L) 10/30/2020       CBC RESULTS:  Lab Results   Component Value Date    WBC 5.0 04/14/2021    RBC 4.76 04/14/2021    HGB 14.7 04/14/2021    HCT 43.7 04/14/2021    MCV 92 04/14/2021    MCH 30.9 04/14/2021    MCHC 33.6 04/14/2021    RDW 12.6 04/14/2021     04/14/2021       BMP RESULTS:  Lab Results   Component Value Date     04/14/2021    POTASSIUM 4.2 04/14/2021    CHLORIDE 110 (H) 04/14/2021    CO2 26 04/14/2021    ANIONGAP 5 04/14/2021    GLC 86 04/14/2021    BUN 20 04/14/2021    CR 1.19 04/14/2021    GFRESTIMATED 57 (L) 04/14/2021    GFRESTBLACK 67  04/14/2021    CHERI 8.6 04/14/2021        A1C RESULTS:  No results found for: A1C    INR RESULTS:  Lab Results   Component Value Date    INR 2.40 (H) 04/27/2021    INR 1.70 (H) 04/20/2021         ECHOCARDIOGRAM  No results found for this or any previous visit (from the past 8760 hour(s)).      Orders Placed This Encounter   Procedures     EKG 12-lead complete w/read - Clinics (performed today)     No orders of the defined types were placed in this encounter.    There are no discontinued medications.      Encounter Diagnoses   Name Primary?     Paroxysmal atrial fibrillation (H)      Hyperlipidemia LDL goal <100        Thank you for allowing me to participate in the care of your patient.      Sincerely,     Grzegorz Malhotra MD     [facility]    cc:   Briseyda Meeyrs PA-C  [id number]  [place] [address]

## 2021-05-03 DIAGNOSIS — I48.0 PAROXYSMAL ATRIAL FIBRILLATION (H): ICD-10-CM

## 2021-05-03 DIAGNOSIS — I48.91 ATRIAL FIBRILLATION, UNSPECIFIED TYPE (H): ICD-10-CM

## 2021-05-03 RX ORDER — METOPROLOL SUCCINATE 25 MG/1
TABLET, EXTENDED RELEASE ORAL
Qty: 90 TABLET | Refills: 0 | Status: SHIPPED | OUTPATIENT
Start: 2021-05-03 | End: 2021-08-09

## 2021-05-10 LAB
ALT SERPL-CCNC: 48 IU/L (ref 8–45)
AST SERPL-CCNC: 54 IU/L (ref 2–40)
CREATININE (EXTERNAL): 1.06 MG/DL (ref 0.72–1.25)
GFR ESTIMATED (EXTERNAL): >60 ML/MIN/1.73M2
GFR ESTIMATED (IF AFRICAN AMERICAN) (EXTERNAL): >60 ML/MIN/1.73M2
GLUCOSE (EXTERNAL): 92 MG/DL (ref 65–100)
POTASSIUM (EXTERNAL): 4.4 MMOL/L (ref 3.5–5)

## 2021-05-18 ENCOUNTER — ANTICOAGULATION THERAPY VISIT (OUTPATIENT)
Dept: CARDIOLOGY | Facility: CLINIC | Age: 81
End: 2021-05-18
Payer: MEDICARE

## 2021-05-18 DIAGNOSIS — Z79.01 LONG TERM CURRENT USE OF ANTICOAGULANTS WITH INR GOAL OF 2.0-3.0: ICD-10-CM

## 2021-05-18 DIAGNOSIS — I48.0 PAROXYSMAL ATRIAL FIBRILLATION (H): ICD-10-CM

## 2021-05-18 LAB
CAPILLARY BLOOD COLLECTION: NORMAL
INR PPP: 2 (ref 0.86–1.14)

## 2021-05-18 PROCEDURE — 85610 PROTHROMBIN TIME: CPT | Performed by: INTERNAL MEDICINE

## 2021-05-18 PROCEDURE — 99207 PR NO CHARGE NURSE ONLY: CPT | Performed by: INTERNAL MEDICINE

## 2021-05-18 PROCEDURE — 36416 COLLJ CAPILLARY BLOOD SPEC: CPT | Performed by: INTERNAL MEDICINE

## 2021-05-18 NOTE — PROGRESS NOTES
ANTICOAGULATION FOLLOW-UP CLINIC VISIT    Patient Name:  Nigel Rodarte Jr.  Date:  2021  Contact Type:  Telephone    SUBJECTIVE:  Patient Findings     Positives:  Change in diet/appetite (ate 2 extra salads)        Clinical Outcomes     Negatives:  Major bleeding event, Thromboembolic event, Anticoagulation-related hospital admission, Anticoagulation-related ED visit, Anticoagulation-related fatality           OBJECTIVE    Recent labs: (last 7 days)     21  1009   INR 2.00*       ASSESSMENT / PLAN  INR assessment THER    Recheck INR In: 3 WEEKS    INR Location Outside lab      Anticoagulation Summary  As of 2021    INR goal:  2.0-3.0   TTR:  67.9 % (8.1 mo)   INR used for dosin.00 (2021)   Warfarin maintenance plan:  5 mg (5 mg x 1) every Mon; 7.5 mg (5 mg x 1.5) all other days   Full warfarin instructions:  5 mg every Mon; 7.5 mg all other days   Weekly warfarin total:  50 mg   No change documented:  Gladys Herrera RN   Plan last modified:  Gladys Herrera RN (2021)   Next INR check:  2021   Target end date:  Indefinite    Indications    Atrial fibrillation (H) [I48.91] [I48.91]  Long term current use of anticoagulants with INR goal of 2.0-3.0 [Z79.01]  Paroxysmal atrial fibrillation (H) [I48.0]             Anticoagulation Episode Summary     INR check location:      Preferred lab:      Send INR reminders to:  [facility] HEART INR NURSE    Comments:        Anticoagulation Care Providers     Provider Role Specialty Phone number    Grzegorz Malhotra MD Referring Clinical Cardiac Electrophysiology [phone]            See the Encounter Report to view Anticoagulation Flowsheet and Dosing Calendar (Go to Encounters tab in chart review, and find the Anticoagulation Therapy Visit)    INR 2.00 Pt not available by phone yet. Will call him later.  11am Spoke with pt. No change in meds. He ate a couple extra salads since last INR but doesn't plan to continue to eat the extra greens.  Denies abnormal bleeding or bruising. Will continue current dosing of 5 mg Mondays and 7.5 mg all other days with recheck in 3 weeks.   Gladys Herrera RN

## 2021-06-04 ENCOUNTER — ANTICOAGULATION THERAPY VISIT (OUTPATIENT)
Dept: CARDIOLOGY | Facility: CLINIC | Age: 81
End: 2021-06-04
Payer: MEDICARE

## 2021-06-04 DIAGNOSIS — Z79.01 LONG TERM CURRENT USE OF ANTICOAGULANTS WITH INR GOAL OF 2.0-3.0: ICD-10-CM

## 2021-06-04 DIAGNOSIS — I48.0 PAROXYSMAL ATRIAL FIBRILLATION (H): ICD-10-CM

## 2021-06-04 LAB
CAPILLARY BLOOD COLLECTION: NORMAL
INR PPP: 3.6 (ref 0.86–1.14)

## 2021-06-04 PROCEDURE — 99207 PR NO CHARGE NURSE ONLY: CPT | Performed by: INTERNAL MEDICINE

## 2021-06-04 PROCEDURE — 36416 COLLJ CAPILLARY BLOOD SPEC: CPT | Performed by: INTERNAL MEDICINE

## 2021-06-04 PROCEDURE — 85610 PROTHROMBIN TIME: CPT | Performed by: INTERNAL MEDICINE

## 2021-06-04 NOTE — PROGRESS NOTES
ANTICOAGULATION FOLLOW-UP CLINIC VISIT    Patient Name:  Nigel Rodarte Jr.  Date:  6/4/2021  Contact Type:  Telephone    SUBJECTIVE:  Patient Findings         Clinical Outcomes     Negatives:  Major bleeding event, Thromboembolic event, Anticoagulation-related hospital admission, Anticoagulation-related ED visit, Anticoagulation-related fatality           OBJECTIVE    Recent labs: (last 7 days)     06/04/21  0904   INR 3.60*       ASSESSMENT / PLAN  INR assessment SUPRA    Recheck INR In: 2 WEEKS    INR Location Outside lab      Anticoagulation Summary  As of 6/4/2021    INR goal:  2.0-3.0   TTR:  65.2 % (8.1 mo)   INR used for dosing:  3.60 (6/4/2021)   Warfarin maintenance plan:  5 mg (5 mg x 1) every Mon; 7.5 mg (5 mg x 1.5) all other days   Full warfarin instructions:  6/4: 5 mg; Otherwise 5 mg every Mon; 7.5 mg all other days   Weekly warfarin total:  50 mg   Plan last modified:  Gladys Herrera RN (4/9/2021)   Next INR check:  6/22/2021   Target end date:  Indefinite    Indications    Atrial fibrillation (H) [I48.91] [I48.91]  Long term current use of anticoagulants with INR goal of 2.0-3.0 [Z79.01]  Paroxysmal atrial fibrillation (H) [I48.0]             Anticoagulation Episode Summary     INR check location:      Preferred lab:      Send INR reminders to:  [facility] HEART INR NURSE    Comments:        Anticoagulation Care Providers     Provider Role Specialty Phone number    Grzegorz Malhotra MD Referring Clinical Cardiac Electrophysiology [phone]            See the Encounter Report to view Anticoagulation Flowsheet and Dosing Calendar (Go to Encounters tab in chart review, and find the Anticoagulation Therapy Visit)    INR 3.6.  Called and spoke to the patient.  NO bleeding.  NO changes.  He was eating a salad when I called him.  Decrease today from 7.5mg to 5mg x1 then resume normal schedule and recheck in about 2 weeks.  Bay Anton RN

## 2021-06-07 ENCOUNTER — TELEPHONE (OUTPATIENT)
Dept: CARDIOLOGY | Facility: CLINIC | Age: 81
End: 2021-06-07

## 2021-06-22 ENCOUNTER — ANTICOAGULATION THERAPY VISIT (OUTPATIENT)
Dept: CARDIOLOGY | Facility: CLINIC | Age: 81
End: 2021-06-22

## 2021-06-22 DIAGNOSIS — Z79.01 LONG TERM CURRENT USE OF ANTICOAGULANTS WITH INR GOAL OF 2.0-3.0: ICD-10-CM

## 2021-06-22 DIAGNOSIS — I48.0 PAROXYSMAL ATRIAL FIBRILLATION (H): ICD-10-CM

## 2021-06-22 LAB
CAPILLARY BLOOD COLLECTION: NORMAL
INR PPP: 3.1 (ref 0.86–1.14)

## 2021-06-22 PROCEDURE — 85610 PROTHROMBIN TIME: CPT | Performed by: INTERNAL MEDICINE

## 2021-06-22 PROCEDURE — 36416 COLLJ CAPILLARY BLOOD SPEC: CPT | Performed by: INTERNAL MEDICINE

## 2021-06-22 NOTE — PROGRESS NOTES
ANTICOAGULATION FOLLOW-UP CLINIC VISIT    Patient Name:  Nigel Rodarte Jr.  Date:  6/22/2021  Contact Type:  Telephone    SUBJECTIVE:  Patient Findings              OBJECTIVE    Recent labs: (last 7 days)     06/22/21  0753   INR 3.10*       ASSESSMENT / PLAN  No question data found.  Anticoagulation Summary  As of 6/22/2021    INR goal:  2.0-3.0   TTR:  57.7 % (8.1 mo)   INR used for dosing:  3.10 (6/22/2021)   Warfarin maintenance plan:  5 mg (5 mg x 1) every Mon, Fri; 7.5 mg (5 mg x 1.5) all other days   Full warfarin instructions:  5 mg every Mon, Fri; 7.5 mg all other days   Weekly warfarin total:  47.5 mg   Plan last modified:  Gladys Herrera RN (6/23/2021)   Next INR check:  7/7/2021   Target end date:  Indefinite    Indications    Atrial fibrillation (H) [I48.91] [I48.91]  Long term current use of anticoagulants with INR goal of 2.0-3.0 [Z79.01]  Paroxysmal atrial fibrillation (H) [I48.0]             Anticoagulation Episode Summary     INR check location:      Preferred lab:      Send INR reminders to:  [facility] HEART INR NURSE    Comments:        Anticoagulation Care Providers     Provider Role Specialty Phone number    Grzegorz Malhotra MD Referring Clinical Cardiac Electrophysiology [phone]            See the Encounter Report to view Anticoagulation Flowsheet and Dosing Calendar (Go to Encounters tab in chart review, and find the Anticoagulation Therapy Visit)    INR 3.10 INR still elevated after a one time decrease in dosing after last INR  check. Pt not available by phone yet. Will call him later today.  10:45am Pt still not available. Left message that I will call him later today.  3:05pm Have tried to reach pt multiple times today but he was not available. Left a detailed message asking him to call back to review his current status, meds and diet and ask if any bleeding issues. Left detailed instructions to take 7.5 mg tonight but to call us back so we can determine if his dosing should be  decreased or if he wants to eat more greens.   Dosage adjustment made based on physician directed care plan.    Gladys Herrera, RN    6/23/21 9:12am Left another message asking pt to call back. Spoke with Denise, pt's wife. No change in meds. He is eating greens daily. Denies abnormal bleeding or bruising. INR is still slightly high after a one time decrease in dosing 2 1/2 weeks ago. Will decrease dosing to 5 mg MF and 7.5 mg all other days with recheck in 2 weeks. Tamar

## 2021-06-24 LAB
CREATININE (EXTERNAL): 1.09 MG/DL (ref 0.72–1.25)
GFR ESTIMATED (EXTERNAL): >60 ML/MIN/1.73M2
GFR ESTIMATED (IF AFRICAN AMERICAN) (EXTERNAL): >60 ML/MIN/1.73M2
GLUCOSE (EXTERNAL): 144 MG/DL (ref 65–100)
POTASSIUM (EXTERNAL): 4.4 MMOL/L (ref 3.5–5)

## 2021-07-07 ENCOUNTER — ANTICOAGULATION THERAPY VISIT (OUTPATIENT)
Dept: CARDIOLOGY | Facility: CLINIC | Age: 81
End: 2021-07-07
Payer: MEDICARE

## 2021-07-07 DIAGNOSIS — Z79.01 LONG TERM CURRENT USE OF ANTICOAGULANTS WITH INR GOAL OF 2.0-3.0: ICD-10-CM

## 2021-07-07 DIAGNOSIS — I48.0 PAROXYSMAL ATRIAL FIBRILLATION (H): ICD-10-CM

## 2021-07-07 LAB
CAPILLARY BLOOD COLLECTION: NORMAL
INR PPP: 1.5 (ref 0.86–1.14)

## 2021-07-07 PROCEDURE — 85610 PROTHROMBIN TIME: CPT | Performed by: INTERNAL MEDICINE

## 2021-07-07 PROCEDURE — 99207 PR NO CHARGE NURSE ONLY: CPT | Performed by: INTERNAL MEDICINE

## 2021-07-07 PROCEDURE — 36416 COLLJ CAPILLARY BLOOD SPEC: CPT | Performed by: INTERNAL MEDICINE

## 2021-07-07 NOTE — PROGRESS NOTES
ANTICOAGULATION FOLLOW-UP CLINIC VISIT    Patient Name:  Nigel Rodarte Jr.  Date:  2021  Contact Type:  Telephone    SUBJECTIVE:  Patient Findings     Positives:  Change in medications (Took a multivitamin x2 this week which he does not normally take), Change in diet/appetite (head lettuce salad 2-3x/wk; two serving of brussel sprouts last week, usually only once; asparagus yesterday)        Clinical Outcomes     Negatives:  Major bleeding event, Thromboembolic event, Anticoagulation-related hospital admission, Anticoagulation-related ED visit, Anticoagulation-related fatality           OBJECTIVE    Recent labs: (last 7 days)     21  0741   INR 1.50*       ASSESSMENT / PLAN  INR assessment SUB    Recheck INR In: 10 DAYS    INR Location Outside lab      Anticoagulation Summary  As of 2021    INR goal:  2.0-3.0   TTR:  55.5 % (8.1 mo)   INR used for dosin.50 (2021)   Warfarin maintenance plan:  5 mg (5 mg x 1) every Mon, Fri; 7.5 mg (5 mg x 1.5) all other days   Full warfarin instructions:  : 10 mg; Otherwise 5 mg every Mon, Fri; 7.5 mg all other days   Weekly warfarin total:  47.5 mg   Plan last modified:  Gladys Herrera RN (2021)   Next INR check:  2021   Target end date:  Indefinite    Indications    Atrial fibrillation (H) [I48.91] [I48.91]  Long term current use of anticoagulants with INR goal of 2.0-3.0 [Z79.01]  Paroxysmal atrial fibrillation (H) [I48.0]             Anticoagulation Episode Summary     INR check location:      Preferred lab:      Send INR reminders to:  [facility] HEART INR NURSE    Comments:        Anticoagulation Care Providers     Provider Role Specialty Phone number    Grzegorz Malhotra MD Referring Clinical Cardiac Electrophysiology [phone]            See the Encounter Report to view Anticoagulation Flowsheet and Dosing Calendar (Go to Encounters tab in chart review, and find the Anticoagulation Therapy Visit)    INR 1.5 today at outside clinic.  Spoke to patient, no abnormal bleeding/bruising. No missed doses of warfarin. He states he took a multivitamin twice over the last week which he doesn't normally do; unsure of VitK content as pt is at work. No other med changes. He may have had more greens since his last INR check, ate brussel sprouts x2 last week and usually only has once. Also has salads 2-3x/wk. Average green vegetable intake is 3-4x/wk. No ETOH use. No boost/ensure/V8. Will boost patient to 10mg today and then continue on current warfarin maintenance dosing of 5mg MF and 7.5mg all other days. Pt will stop taking multivitamin and will level out diet going forward. No greens today. Recheck in 10 days. If INR still subtherapeutic at that time, consider resuming old maintenance dosing of only one day of 5mg.     Teresa Gardner RN

## 2021-07-09 DIAGNOSIS — I48.91 ATRIAL FIBRILLATION, UNSPECIFIED TYPE (H): ICD-10-CM

## 2021-07-09 DIAGNOSIS — Z79.01 LONG TERM CURRENT USE OF ANTICOAGULANT THERAPY: ICD-10-CM

## 2021-07-09 RX ORDER — WARFARIN SODIUM 5 MG/1
TABLET ORAL
Qty: 135 TABLET | Refills: 1 | Status: SHIPPED | OUTPATIENT
Start: 2021-07-09 | End: 2022-03-25

## 2021-07-16 ENCOUNTER — ANTICOAGULATION THERAPY VISIT (OUTPATIENT)
Dept: CARDIOLOGY | Facility: CLINIC | Age: 81
End: 2021-07-16
Payer: MEDICARE

## 2021-07-16 ENCOUNTER — LAB (OUTPATIENT)
Dept: LAB | Facility: CLINIC | Age: 81
End: 2021-07-16
Payer: MEDICARE

## 2021-07-16 DIAGNOSIS — Z79.01 LONG TERM CURRENT USE OF ANTICOAGULANTS WITH INR GOAL OF 2.0-3.0: ICD-10-CM

## 2021-07-16 DIAGNOSIS — I48.0 PAROXYSMAL ATRIAL FIBRILLATION (H): ICD-10-CM

## 2021-07-16 DIAGNOSIS — I48.91 ATRIAL FIBRILLATION, UNSPECIFIED TYPE (H): Primary | ICD-10-CM

## 2021-07-16 LAB — INR BLD: 2.9 (ref 0.9–1.1)

## 2021-07-16 PROCEDURE — 85610 PROTHROMBIN TIME: CPT

## 2021-07-16 PROCEDURE — 99207 PR NO CHARGE NURSE ONLY: CPT | Performed by: INTERNAL MEDICINE

## 2021-07-16 PROCEDURE — 36416 COLLJ CAPILLARY BLOOD SPEC: CPT

## 2021-07-16 NOTE — PROGRESS NOTES
ANTICOAGULATION FOLLOW-UP CLINIC VISIT    Patient Name:  Nigel Rodarte Jr.  Date:  2021  Contact Type:  Telephone    SUBJECTIVE:  Patient Findings         Clinical Outcomes     Negatives:  Major bleeding event, Thromboembolic event, Anticoagulation-related hospital admission, Anticoagulation-related ED visit, Anticoagulation-related fatality           OBJECTIVE    Recent labs: (last 7 days)     21  1204   INR 2.9*       ASSESSMENT / PLAN  INR assessment THER    Recheck INR In: 2 WEEKS    INR Location Outside lab      Anticoagulation Summary  As of 2021    INR goal:  2.0-3.0   TTR:  54.2 % (8.1 mo)   INR used for dosin.9 (2021)   Warfarin maintenance plan:  5 mg (5 mg x 1) every Mon, Fri; 7.5 mg (5 mg x 1.5) all other days   Full warfarin instructions:  5 mg every Mon, Fri; 7.5 mg all other days   Weekly warfarin total:  47.5 mg   No change documented:  Gilma Anton RN   Plan last modified:  Gladys Herrera RN (2021)   Next INR check:  2021   Target end date:  Indefinite    Indications    Atrial fibrillation (H) [I48.91] [I48.91]  Long term current use of anticoagulants with INR goal of 2.0-3.0 [Z79.01]  Paroxysmal atrial fibrillation (H) [I48.0]             Anticoagulation Episode Summary     INR check location:      Preferred lab:      Send INR reminders to:  [facility] HEART INR NURSE    Comments:        Anticoagulation Care Providers     Provider Role Specialty Phone number    Grzegorz Malhotra MD Referring Clinical Cardiac Electrophysiology [phone]            See the Encounter Report to view Anticoagulation Flowsheet and Dosing Calendar (Go to Encounters tab in chart review, and find the Anticoagulation Therapy Visit)    INR 2.9.  Called and left message for the patient.  Continue the same dosing schedule and recheck in 2 weeks.  Asked that he call the INR clinic to discuss further.  Bay MUNOZ    :  Called and left another message regarding the above  information.  Bay MUNOZ    7/20:  Called and left another message.  Patient is due for next INR check around 7/30.  Bay Anton, RN

## 2021-08-02 ENCOUNTER — LAB (OUTPATIENT)
Dept: LAB | Facility: CLINIC | Age: 81
End: 2021-08-02
Payer: MEDICARE

## 2021-08-02 ENCOUNTER — ANTICOAGULATION THERAPY VISIT (OUTPATIENT)
Dept: CARDIOLOGY | Facility: CLINIC | Age: 81
End: 2021-08-02
Payer: MEDICARE

## 2021-08-02 DIAGNOSIS — I48.91 ATRIAL FIBRILLATION (H): Primary | ICD-10-CM

## 2021-08-02 DIAGNOSIS — I48.0 PAROXYSMAL ATRIAL FIBRILLATION (H): ICD-10-CM

## 2021-08-02 DIAGNOSIS — Z79.01 LONG TERM CURRENT USE OF ANTICOAGULANTS WITH INR GOAL OF 2.0-3.0: ICD-10-CM

## 2021-08-02 LAB — INR BLD: 3.4 (ref 0.9–1.1)

## 2021-08-02 PROCEDURE — 36416 COLLJ CAPILLARY BLOOD SPEC: CPT

## 2021-08-02 PROCEDURE — 85610 PROTHROMBIN TIME: CPT

## 2021-08-02 PROCEDURE — 99207 PR NO CHARGE NURSE ONLY: CPT

## 2021-08-02 NOTE — PROGRESS NOTES
ANTICOAGULATION FOLLOW-UP CLINIC VISIT    Patient Name:  Nigel Rodarte Jr.  Date:  8/2/2021  Contact Type:  Telephone    SUBJECTIVE:  Patient Findings     Positives:  Change in diet/appetite (Big salad 2 nights ago; three servings of victorino/spinach salad/wk, no brussel sprouts/broccoli lately)        Clinical Outcomes     Negatives:  Major bleeding event, Thromboembolic event, Anticoagulation-related hospital admission, Anticoagulation-related ED visit, Anticoagulation-related fatality           OBJECTIVE    Recent labs: (last 7 days)     08/02/21  0939   INR 3.4*       ASSESSMENT / PLAN  INR assessment SUPRA    Recheck INR In: 2 WEEKS    INR Location Outside lab      Anticoagulation Summary  As of 8/2/2021    INR goal:  2.0-3.0   TTR:  48.7 % (8.1 mo)   INR used for dosing:  3.4 (8/2/2021)   Warfarin maintenance plan:  5 mg (5 mg x 1) every Mon, Fri; 7.5 mg (5 mg x 1.5) all other days   Full warfarin instructions:  8/2: 2.5 mg; Otherwise 5 mg every Mon, Fri; 7.5 mg all other days   Weekly warfarin total:  47.5 mg   Plan last modified:  Gladys Herrera RN (6/23/2021)   Next INR check:  8/16/2021   Target end date:  Indefinite    Indications    Atrial fibrillation (H) [I48.91] [I48.91]  Long term current use of anticoagulants with INR goal of 2.0-3.0 [Z79.01]  Paroxysmal atrial fibrillation (H) [I48.0]             Anticoagulation Episode Summary     INR check location:      Preferred lab:      Send INR reminders to:  [facility] HEART INR NURSE    Comments:        Anticoagulation Care Providers     Provider Role Specialty Phone number    Grzegorz Malhotra MD Referring Clinical Cardiac Electrophysiology [phone]            See the Encounter Report to view Anticoagulation Flowsheet and Dosing Calendar (Go to Encounters tab in chart review, and find the Anticoagulation Therapy Visit)    INR 3.4 today at outside clinic. Spoke to patient, no abnormal bleeding/bruising. No med changes or OTC meds. He is eating  victorino/spinach salads 3x/wk but has not had his normal brussel sprouts/broccoli 1-2x/wk recently. No ETOH use. Will have patient take 2.5mg today and then resume normal maintenance dosing of 5mg MF and 7.5mg all other days. Pt will reincorporate the dark green veggies in addition to his salads going forward. Recheck INR in 2wks. Pt will schedule on his own.     Teresa Gardner RN

## 2021-08-09 DIAGNOSIS — I48.91 ATRIAL FIBRILLATION, UNSPECIFIED TYPE (H): ICD-10-CM

## 2021-08-09 DIAGNOSIS — I48.0 PAROXYSMAL ATRIAL FIBRILLATION (H): ICD-10-CM

## 2021-08-09 RX ORDER — METOPROLOL SUCCINATE 25 MG/1
TABLET, EXTENDED RELEASE ORAL
Qty: 90 TABLET | Refills: 1 | Status: SHIPPED | OUTPATIENT
Start: 2021-08-09 | End: 2021-11-09

## 2021-08-11 ENCOUNTER — LAB (OUTPATIENT)
Dept: LAB | Facility: CLINIC | Age: 81
End: 2021-08-11
Payer: MEDICARE

## 2021-08-11 ENCOUNTER — ANTICOAGULATION THERAPY VISIT (OUTPATIENT)
Dept: CARDIOLOGY | Facility: CLINIC | Age: 81
End: 2021-08-11
Payer: MEDICARE

## 2021-08-11 DIAGNOSIS — I48.0 PAROXYSMAL ATRIAL FIBRILLATION (H): ICD-10-CM

## 2021-08-11 DIAGNOSIS — Z79.01 LONG TERM CURRENT USE OF ANTICOAGULANTS WITH INR GOAL OF 2.0-3.0: ICD-10-CM

## 2021-08-11 DIAGNOSIS — I48.91 ATRIAL FIBRILLATION (H): Primary | ICD-10-CM

## 2021-08-11 LAB — INR BLD: 2.7 (ref 0.9–1.1)

## 2021-08-11 PROCEDURE — 36416 COLLJ CAPILLARY BLOOD SPEC: CPT

## 2021-08-11 PROCEDURE — 99207 PR NO CHARGE NURSE ONLY: CPT

## 2021-08-11 PROCEDURE — 85610 PROTHROMBIN TIME: CPT

## 2021-08-11 NOTE — PROGRESS NOTES
ANTICOAGULATION FOLLOW-UP CLINIC VISIT    Patient Name:  Nigel Rodarte Jr.  Date:  2021  Contact Type:  Telephone    SUBJECTIVE:         OBJECTIVE    Recent labs: (last 7 days)     21  1508   INR 2.7*       ASSESSMENT / PLAN  INR assessment THER    Recheck INR In: 3 WEEKS    INR Location Outside lab      Anticoagulation Summary  As of 2021    INR goal:  2.0-3.0   TTR:  46.5 % (8.1 mo)   INR used for dosin.7 (2021)   Warfarin maintenance plan:  5 mg (5 mg x 1) every Mon, Fri; 7.5 mg (5 mg x 1.5) all other days   Full warfarin instructions:  5 mg every Mon, Fri; 7.5 mg all other days   Weekly warfarin total:  47.5 mg   No change documented:  Teresa Gardner, RN   Plan last modified:  Gladys Herrera RN (2021)   Next INR check:  2021   Target end date:  Indefinite    Indications    Atrial fibrillation (H) [I48.91] [I48.91]  Long term current use of anticoagulants with INR goal of 2.0-3.0 [Z79.01]  Paroxysmal atrial fibrillation (H) [I48.0]             Anticoagulation Episode Summary     INR check location:      Preferred lab:      Send INR reminders to:  [facility] HEART INR NURSE    Comments:        Anticoagulation Care Providers     Provider Role Specialty Phone number    Grzegorz Malhotra MD Referring Clinical Cardiac Electrophysiology [phone]            See the Encounter Report to view Anticoagulation Flowsheet and Dosing Calendar (Go to Encounters tab in chart review, and find the Anticoagulation Therapy Visit)    INR 2.7 today at outside clinic. Left message for patient to call back and verify if any abnormal bleeding/bruising, or if any changes to diet/meds. After last INR check pt was to resume eating broccoli/brussel sprouts 1-2x/wk in addition to his salads 3-4x/wk, asked if he did this. Left detailed instructions that if no other changes, would continue on current warfarin maintenance dosing of 5mg MF and 7.5mg all other days and recheck around . Requested  callback to confirm.     Addendum 8/13/21 at 0848: Left another message for patient reiterating information as above. Requested callback to confirm.     1420: Spoke to patient, no abnormal bleeding/bruising. He increased his greens as instructed. No med changes. Continue same dosing as above and recheck scheduled for 8/30/21.     Teresa Gardner RN

## 2021-08-30 ENCOUNTER — ANTICOAGULATION THERAPY VISIT (OUTPATIENT)
Dept: CARDIOLOGY | Facility: CLINIC | Age: 81
End: 2021-08-30
Payer: MEDICARE

## 2021-08-30 ENCOUNTER — LAB (OUTPATIENT)
Dept: LAB | Facility: CLINIC | Age: 81
End: 2021-08-30
Payer: MEDICARE

## 2021-08-30 DIAGNOSIS — Z79.01 LONG TERM CURRENT USE OF ANTICOAGULANTS WITH INR GOAL OF 2.0-3.0: ICD-10-CM

## 2021-08-30 DIAGNOSIS — I48.0 PAROXYSMAL ATRIAL FIBRILLATION (H): ICD-10-CM

## 2021-08-30 DIAGNOSIS — I48.91 ATRIAL FIBRILLATION, UNSPECIFIED TYPE (H): Primary | ICD-10-CM

## 2021-08-30 LAB — INR BLD: 4.1 (ref 0.9–1.1)

## 2021-08-30 PROCEDURE — 36416 COLLJ CAPILLARY BLOOD SPEC: CPT

## 2021-08-30 PROCEDURE — 85610 PROTHROMBIN TIME: CPT

## 2021-08-30 PROCEDURE — 99207 PR NO CHARGE NURSE ONLY: CPT

## 2021-08-30 NOTE — PROGRESS NOTES
ANTICOAGULATION FOLLOW-UP CLINIC VISIT    Patient Name:  Nigel Rodarte Jr.  Date:  2021  Contact Type:  Telephone    SUBJECTIVE:  Patient Findings         Clinical Outcomes     Negatives:  Major bleeding event, Thromboembolic event, Anticoagulation-related hospital admission, Anticoagulation-related ED visit, Anticoagulation-related fatality           OBJECTIVE    Recent labs: (last 7 days)     21  0919   INR 4.1*       ASSESSMENT / PLAN  INR assessment SUPRA    Recheck INR In: 2 WEEKS    INR Location Outside lab      Anticoagulation Summary  As of 2021    INR goal:  2.0-3.0   TTR:  40.4 % (8.1 mo)   INR used for dosin.1 (2021)   Warfarin maintenance plan:  5 mg (5 mg x 1) every Mon, Fri; 7.5 mg (5 mg x 1.5) all other days   Full warfarin instructions:  : Hold; Otherwise 5 mg every Mon, Fri; 7.5 mg all other days   Weekly warfarin total:  47.5 mg   Plan last modified:  Gladys Herrera RN (2021)   Next INR check:  2021   Target end date:  Indefinite    Indications    Atrial fibrillation (H) [I48.91] [I48.91]  Long term current use of anticoagulants with INR goal of 2.0-3.0 [Z79.01]  Paroxysmal atrial fibrillation (H) [I48.0]             Anticoagulation Episode Summary     INR check location:      Preferred lab:      Send INR reminders to:  [facility] HEART INR NURSE    Comments:        Anticoagulation Care Providers     Provider Role Specialty Phone number    Grzegorz Malhotra MD Referring Clinical Cardiac Electrophysiology [phone]            See the Encounter Report to view Anticoagulation Flowsheet and Dosing Calendar (Go to Encounters tab in chart review, and find the Anticoagulation Therapy Visit)    INR 4.1.  Called and spoke to the patient.  NO illness.  NO bleeding.  NO ETOH or pain meds.  He thinks less green veggies.  Hold dose today x1 then resume normal schedule and recheck in 2 weeks.  He will increase his greens.  Bay Anton RN

## 2021-09-14 ENCOUNTER — ANTICOAGULATION THERAPY VISIT (OUTPATIENT)
Dept: CARDIOLOGY | Facility: CLINIC | Age: 81
End: 2021-09-14
Payer: MEDICARE

## 2021-09-14 ENCOUNTER — LAB (OUTPATIENT)
Dept: LAB | Facility: CLINIC | Age: 81
End: 2021-09-14
Payer: MEDICARE

## 2021-09-14 DIAGNOSIS — Z79.01 LONG TERM CURRENT USE OF ANTICOAGULANTS WITH INR GOAL OF 2.0-3.0: ICD-10-CM

## 2021-09-14 DIAGNOSIS — I48.0 PAROXYSMAL ATRIAL FIBRILLATION (H): ICD-10-CM

## 2021-09-14 DIAGNOSIS — I48.91 ATRIAL FIBRILLATION (H): Primary | ICD-10-CM

## 2021-09-14 LAB — INR BLD: 2.5 (ref 0.9–1.1)

## 2021-09-14 PROCEDURE — 85610 PROTHROMBIN TIME: CPT

## 2021-09-14 PROCEDURE — 36415 COLL VENOUS BLD VENIPUNCTURE: CPT

## 2021-09-14 PROCEDURE — 99207 PR NO CHARGE NURSE ONLY: CPT

## 2021-09-14 NOTE — PROGRESS NOTES
ANTICOAGULATION FOLLOW-UP CLINIC VISIT    Patient Name:  Nigel Rodarte Jr.  Date:  2021  Contact Type:  Telephone    SUBJECTIVE:  Patient Findings         Clinical Outcomes     Negatives:  Major bleeding event, Thromboembolic event, Anticoagulation-related hospital admission, Anticoagulation-related ED visit, Anticoagulation-related fatality           OBJECTIVE    Recent labs: (last 7 days)     21  0903   INR 2.5*       ASSESSMENT / PLAN  No question data found.  Anticoagulation Summary  As of 2021    INR goal:  2.0-3.0   TTR:  38.4 % (8.1 mo)   INR used for dosin.5 (2021)   Warfarin maintenance plan:  5 mg (5 mg x 1) every Mon, Fri; 7.5 mg (5 mg x 1.5) all other days   Full warfarin instructions:  5 mg every Mon, Fri; 7.5 mg all other days   Weekly warfarin total:  47.5 mg   No change documented:  Gladys Herrera RN   Plan last modified:  Gladys Herrera RN (2021)   Next INR check:  10/5/2021   Target end date:  Indefinite    Indications    Atrial fibrillation (H) [I48.91] [I48.91]  Long term current use of anticoagulants with INR goal of 2.0-3.0 [Z79.01]  Paroxysmal atrial fibrillation (H) [I48.0]             Anticoagulation Episode Summary     INR check location:      Preferred lab:      Send INR reminders to:  [facility] HEART INR NURSE    Comments:        Anticoagulation Care Providers     Provider Role Specialty Phone number    Grzegorz Malhotra MD Referring Clinical Cardiac Electrophysiology [phone]            See the Encounter Report to view Anticoagulation Flowsheet and Dosing Calendar (Go to Encounters tab in chart review, and find the Anticoagulation Therapy Visit)    INR 2.5 INR back in range after a one time hold and eating more greens. Left message asking pt to return the call to review result and let INR clinic know if he has had any change in meds, diet or bleeding issues. Left detailed message to continue current dosing of 5 mg MF and 7.5 mg all other days with  recheck in 3 weeks. Asked that he call back to confirm that he received the instructions, to update INR clinic on his status and to let us know if he wants help scheduling the next INR appt.  1:30pm Spoke with pt's wife, Denise. Pt can't find his phone today. She states that there haven't been any change in meds or diet (he is eating greens). Denies abnormal bleeding or bruising. Will continue current dosing of 5 mg MF and 7.5 mg all other days with recheck in 3 weeks.  Gladys Herrera RN

## 2021-10-05 ENCOUNTER — ANTICOAGULATION THERAPY VISIT (OUTPATIENT)
Dept: CARDIOLOGY | Facility: CLINIC | Age: 81
End: 2021-10-05
Payer: MEDICARE

## 2021-10-05 ENCOUNTER — LAB (OUTPATIENT)
Dept: LAB | Facility: CLINIC | Age: 81
End: 2021-10-05
Payer: MEDICARE

## 2021-10-05 DIAGNOSIS — Z79.01 LONG TERM CURRENT USE OF ANTICOAGULANTS WITH INR GOAL OF 2.0-3.0: ICD-10-CM

## 2021-10-05 DIAGNOSIS — I48.91 ATRIAL FIBRILLATION (H): Primary | ICD-10-CM

## 2021-10-05 DIAGNOSIS — I48.0 PAROXYSMAL ATRIAL FIBRILLATION (H): ICD-10-CM

## 2021-10-05 LAB — INR BLD: 3.6 (ref 0.9–1.1)

## 2021-10-05 PROCEDURE — 99207 PR NO CHARGE NURSE ONLY: CPT

## 2021-10-05 PROCEDURE — 85610 PROTHROMBIN TIME: CPT

## 2021-10-05 PROCEDURE — 36416 COLLJ CAPILLARY BLOOD SPEC: CPT

## 2021-10-05 NOTE — PROGRESS NOTES
ANTICOAGULATION FOLLOW-UP CLINIC VISIT    Patient Name:  Nigel Rodarte Jr.  Date:  10/5/2021  Contact Type:  Telephone    SUBJECTIVE:         OBJECTIVE    Recent labs: (last 7 days)     10/05/21  0847   INR 3.6*       ASSESSMENT / PLAN  INR assessment SUPRA    Recheck INR In: 2 WEEKS    INR Location Outside lab      Anticoagulation Summary  As of 10/5/2021    INR goal:  2.0-3.0   TTR:  42.3 % (8.1 mo)   INR used for dosing:  3.6 (10/5/2021)   Warfarin maintenance plan:  5 mg (5 mg x 1) every Mon, Fri; 7.5 mg (5 mg x 1.5) all other days   Full warfarin instructions:  10/5: 2.5 mg; Otherwise 5 mg every Mon, Fri; 7.5 mg all other days   Weekly warfarin total:  47.5 mg   Plan last modified:  Gladys Herrera, RN (6/23/2021)   Next INR check:  10/19/2021   Target end date:  Indefinite    Indications    Atrial fibrillation (H) [I48.91] [I48.91]  Long term current use of anticoagulants with INR goal of 2.0-3.0 [Z79.01]  Paroxysmal atrial fibrillation (H) [I48.0]             Anticoagulation Episode Summary     INR check location:      Preferred lab:      Send INR reminders to:  [facility] HEART INR NURSE    Comments:        Anticoagulation Care Providers     Provider Role Specialty Phone number    Grzegorz Malhotra MD Referring Clinical Cardiac Electrophysiology [phone]            See the Encounter Report to view Anticoagulation Flowsheet and Dosing Calendar (Go to Encounters tab in chart review, and find the Anticoagulation Therapy Visit)    INR 3.6 today at outside clinic. Left message for patient to call back to discuss result and dosing instructions.     Addendum 1600: Unable to reach patient today, spoke to wife who said he is out boating. She will relay today's dosing instructions to the patient- take 2.5mg instead of 7.5mg. He will call back tomorrow. She mentions that he is under more stress as he will likely need hip surgery (not scheduled). He also is not exercising at this time due to back/hip pain.      Addendum 10/6/21 at 1005: Spoke to patient, no abnormal bleeding/bruising. No med changes. He did take 2.5mg last night as instructed. He thinks he had less greens, maybe 1-2x/wk. Pt will increase greens to 3x/wk and continue on current dosing of 5mg MF and 7.5mg all other days. Recheck in 2wks.     Teresa Gardner RN

## 2021-10-19 ENCOUNTER — LAB (OUTPATIENT)
Dept: LAB | Facility: CLINIC | Age: 81
End: 2021-10-19
Payer: MEDICARE

## 2021-10-19 ENCOUNTER — ANTICOAGULATION THERAPY VISIT (OUTPATIENT)
Dept: CARDIOLOGY | Facility: CLINIC | Age: 81
End: 2021-10-19
Payer: MEDICARE

## 2021-10-19 DIAGNOSIS — I48.0 PAROXYSMAL ATRIAL FIBRILLATION (H): ICD-10-CM

## 2021-10-19 DIAGNOSIS — I48.91 ATRIAL FIBRILLATION, UNSPECIFIED TYPE (H): Primary | ICD-10-CM

## 2021-10-19 DIAGNOSIS — Z79.01 LONG TERM CURRENT USE OF ANTICOAGULANTS WITH INR GOAL OF 2.0-3.0: ICD-10-CM

## 2021-10-19 LAB — INR BLD: 3.3 (ref 0.9–1.1)

## 2021-10-19 PROCEDURE — 99207 PR NO CHARGE NURSE ONLY: CPT | Performed by: INTERNAL MEDICINE

## 2021-10-19 PROCEDURE — 36416 COLLJ CAPILLARY BLOOD SPEC: CPT

## 2021-10-19 PROCEDURE — 85610 PROTHROMBIN TIME: CPT

## 2021-10-25 ENCOUNTER — TELEPHONE (OUTPATIENT)
Dept: CARDIOLOGY | Facility: CLINIC | Age: 81
End: 2021-10-25

## 2021-10-25 DIAGNOSIS — I48.0 PAROXYSMAL ATRIAL FIBRILLATION (H): Primary | ICD-10-CM

## 2021-10-26 RX ORDER — FLECAINIDE ACETATE 50 MG/1
50 TABLET ORAL 2 TIMES DAILY
Qty: 180 TABLET | Refills: 1 | Status: SHIPPED | OUTPATIENT
Start: 2021-10-26 | End: 2021-11-09

## 2021-10-26 NOTE — TELEPHONE ENCOUNTER
10/26/21 Called pt and explained recommendation to resume Flecainide 50 mg BID. Rx sent to [facility] in [place]. Order placed for f/u w EKG 4/2022. Enc to call back with problems prior  Pt voiced understanding and agreement with plan.   Sajan 1205 pm

## 2021-10-26 NOTE — TELEPHONE ENCOUNTER
10/26/21 Msg recd from Dr Malhotra  He did well after ablation with ALT antiarrhythmics.  However, it seems that A. fib burden is increasing.  We should resume flecainide.  I believe his dose was 50 mg twice daily.  Attempted to call pt but reached . LM and requested callback. Sajan 1030 am

## 2021-11-02 ENCOUNTER — LAB (OUTPATIENT)
Dept: LAB | Facility: CLINIC | Age: 81
End: 2021-11-02
Payer: MEDICARE

## 2021-11-02 ENCOUNTER — ANTICOAGULATION THERAPY VISIT (OUTPATIENT)
Dept: CARDIOLOGY | Facility: CLINIC | Age: 81
End: 2021-11-02
Payer: MEDICARE

## 2021-11-02 DIAGNOSIS — I48.0 PAROXYSMAL ATRIAL FIBRILLATION (H): ICD-10-CM

## 2021-11-02 DIAGNOSIS — I48.91 ATRIAL FIBRILLATION, UNSPECIFIED TYPE (H): Primary | ICD-10-CM

## 2021-11-02 DIAGNOSIS — Z79.01 LONG TERM CURRENT USE OF ANTICOAGULANTS WITH INR GOAL OF 2.0-3.0: ICD-10-CM

## 2021-11-02 LAB — INR BLD: 2.5 (ref 0.9–1.1)

## 2021-11-02 PROCEDURE — 99207 PR NO CHARGE NURSE ONLY: CPT

## 2021-11-02 PROCEDURE — 85610 PROTHROMBIN TIME: CPT

## 2021-11-02 PROCEDURE — 36416 COLLJ CAPILLARY BLOOD SPEC: CPT

## 2021-11-02 NOTE — PROGRESS NOTES
ANTICOAGULATION FOLLOW-UP CLINIC VISIT    Patient Name:  Nigel Rodarte Jr.  Date:  2021  Contact Type:  Telephone    SUBJECTIVE:  Patient Findings         Clinical Outcomes     Negatives:  Major bleeding event, Thromboembolic event, Anticoagulation-related hospital admission, Anticoagulation-related ED visit, Anticoagulation-related fatality           OBJECTIVE    Recent labs: (last 7 days)     21  0936   INR 2.5*       ASSESSMENT / PLAN  INR assessment THER    Recheck INR In: 3 WEEKS    INR Location Outside lab      Anticoagulation Summary  As of 2021    INR goal:  2.0-3.0   TTR:  40.3 % (8.1 mo)   INR used for dosin.5 (2021)   Warfarin maintenance plan:  5 mg (5 mg x 1) every Mon, Wed, Fri; 7.5 mg (5 mg x 1.5) all other days   Full warfarin instructions:  5 mg every Mon, Wed, Fri; 7.5 mg all other days   Weekly warfarin total:  45 mg   No change documented:  Gilma Anton RN   Plan last modified:  Gilma Anton RN (10/19/2021)   Next INR check:  2021   Target end date:  Indefinite    Indications    Atrial fibrillation (H) [I48.91] [I48.91]  Long term current use of anticoagulants with INR goal of 2.0-3.0 [Z79.01]  Paroxysmal atrial fibrillation (H) [I48.0]             Anticoagulation Episode Summary     INR check location:      Preferred lab:      Send INR reminders to:  [facility] HEART INR NURSE    Comments:        Anticoagulation Care Providers     Provider Role Specialty Phone number    Grzegorz Malhotra MD Referring Clinical Cardiac Electrophysiology [phone]            See the Encounter Report to view Anticoagulation Flowsheet and Dosing Calendar (Go to Encounters tab in chart review, and find the Anticoagulation Therapy Visit)    INR 2.5.  Called and spoke to the patient. Dosing was decreased last time and INR is back in range.  Continue the decreased schedule and was going to recheck in 3 weeks but he is leaving for [place] on .  He will be in  [place] for 3 months and I reminded patient that he needs to have INRs checked and managed in [place] while he is gone.  Bay Anton RN

## 2021-11-09 ENCOUNTER — OFFICE VISIT (OUTPATIENT)
Dept: CARDIOLOGY | Facility: CLINIC | Age: 81
End: 2021-11-09
Payer: MEDICARE

## 2021-11-09 VITALS
SYSTOLIC BLOOD PRESSURE: 110 MMHG | HEIGHT: 69 IN | HEART RATE: 60 BPM | OXYGEN SATURATION: 97 % | DIASTOLIC BLOOD PRESSURE: 68 MMHG | WEIGHT: 154.2 LBS | BODY MASS INDEX: 22.84 KG/M2

## 2021-11-09 DIAGNOSIS — I48.0 PAROXYSMAL ATRIAL FIBRILLATION (H): Primary | ICD-10-CM

## 2021-11-09 DIAGNOSIS — Z51.81 ENCOUNTER FOR MONITORING FLECAINIDE THERAPY: ICD-10-CM

## 2021-11-09 DIAGNOSIS — Z79.899 ENCOUNTER FOR MONITORING FLECAINIDE THERAPY: ICD-10-CM

## 2021-11-09 PROCEDURE — 99214 OFFICE O/P EST MOD 30 MIN: CPT | Performed by: NURSE PRACTITIONER

## 2021-11-09 PROCEDURE — 93000 ELECTROCARDIOGRAM COMPLETE: CPT | Performed by: NURSE PRACTITIONER

## 2021-11-09 RX ORDER — FLECAINIDE ACETATE 50 MG/1
50 TABLET ORAL 2 TIMES DAILY
Qty: 180 TABLET | Refills: 3 | Status: SHIPPED | OUTPATIENT
Start: 2021-11-09 | End: 2022-06-19

## 2021-11-09 RX ORDER — METOPROLOL SUCCINATE 25 MG/1
TABLET, EXTENDED RELEASE ORAL
Qty: 90 TABLET | Refills: 3 | Status: SHIPPED | OUTPATIENT
Start: 2021-11-09 | End: 2022-12-12

## 2021-11-09 ASSESSMENT — MIFFLIN-ST. JEOR: SCORE: 1399.83

## 2021-11-09 NOTE — PROGRESS NOTES
Cardiology Clinic Progress Note    Service Date: 11/09/21    Primary Cardiologist: Sachin Malhotra      Reason for Visit: atrial fibrillation    HPI:   I had the pleasure of seeing Mr. Nigel Rodarte Jr. in the clinic today and he is a very pleasant 81 year old male with a past medical history notable for    1. paroxysmal atrial fibrillation/ flutter.  S/p PVI and CTI ablation on 5/18/2020)  2. Non-obstructive coronary artery disease.  Per coronary catheretization (4/2021)      Diagnostics  I have personally reviewed the following reports    coronary catheretization (4/2021); Prox LAD lesion is 40% stenosed. Mid LAD lesion is 30% stenosed. Pressure wire/FFR was performed on the lesion.  Two lesions in the LAD that are not flow limiting based on iFR (0.92, 0.93).  Otherwise, mild diffuse atherosclerosis without a focal lesion.     In 5/2020, pt underwent a atrial fibrillation ablation and flutter ablation.      In 4/2021, he had chest discomfort and a nuclear stress test showed ischemia and a coronary catheretization which revealed non-obstructive CAD.     He did well off antiarrhythmic medications until 10/2021 when he had afib for 4 hours and took extra metoprolol 25 mg.   Dr. Malhotra recommended to resume flecainide 50 mg BID.       Today, he reports having 2 episodes of atrial fibrillation which lasts less than 4 hours.   He is exercising regularly with no chest pain, shortness of breath, dyspnea on exertion, orthopnea, PND, lower extremity edema, dizziness, presyncope, or syncope.   Reports taking medications as prescribed including warfarin and denies bleeding and sign/symptoms of stroke.     ASSESSMENT AND PLAN:    Symptomatic paroxymal Atrial fibrillation    S/p PVI and CTI ablation on (5/18/2020) and he has done well until recently and had 2 episodes of atrial fibrillation lasting less than 4 hours.  When in atrial fibrillation he took an extra metoprolol and subsequently converted.  He was then asked to  restart flecainide 50 mg twice daily and continue metoprolol succinate 25 mg daily.    Today his ECG revealed sinus bradycardia with a ventricular rate of 58 bpm QRS= 94 ms.     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  Patient intermittently has trouble maintaining his INR between 2 and 3.  Today we discussed direct oral anticoagulation such as Eliquis 5 mg twice daily or Xarelto 20 mg daily.  Patient is going to call his insurance company for the prices and call the clinic if he would like to discontinue his warfarin and start a DOAC.      Non obstructive coronary artery disease     Patient is currently taking metoprolol succinate 25 mg daily and rosuvastatin 20 mg daily.    Per care everywhere his last LDL was 75 (5/2021)      Plan:    Continue with current medications    Call if like to change to a DOAC.  Will need to stop warfarin, when INR is less than 2 then start Eliquis 5 mg BID.     Follow up with Dr. Malhotra upon returning to [place] in March 2021    Please call the clinic if questions or problems arise      Thank you for the opportunity to participate in this pleasant patient's care. We would be happy to see him sooner if needed for any concerns in the meantime.          BRENDA Maldonado CNP  [facility]  Text Page  (M-F 8:00 am - 4:30 pm)    Orders this Visit:  Orders Placed This Encounter   Procedures     Follow-Up with Electrophysiologist     EKG 12-lead complete w/read - Clinics (performed today)     Orders Placed This Encounter   Medications     flecainide (TAMBOCOR) 50 MG tablet     Sig: Take 1 tablet (50 mg) by mouth 2 times daily     Dispense:  180 tablet     Refill:  3     metoprolol succinate ER (TOPROL-XL) 25 MG 24 hr tablet     Sig: Take 1 tablet (25 mg) daily     Dispense:  90 tablet     Refill:  3     Medications Discontinued During This Encounter   Medication Reason     metoprolol succinate ER (TOPROL-XL) 25 MG 24 hr tablet Reorder  "    flecainide (TAMBOCOR) 50 MG tablet Reorder     Encounter Diagnoses   Name Primary?     Atrial fibrillation, unspecified type (H) Yes     Paroxysmal atrial fibrillation (H)        CURRENT MEDICATIONS:  Current Outpatient Medications   Medication Sig Dispense Refill     dutasteride (AVODART) 0.5 MG capsule Take 0.5 mg by mouth daily       flecainide (TAMBOCOR) 50 MG tablet Take 1 tablet (50 mg) by mouth 2 times daily 180 tablet 3     metoprolol succinate ER (TOPROL-XL) 25 MG 24 hr tablet Take 1 tablet (25 mg) daily 90 tablet 3     nitroGLYcerin (NITROSTAT) 0.4 MG sublingual tablet For chest pain place 1 tablet under the tongue every 5 minutes for 3 doses. If symptoms persist 5 minutes after 1st dose call 911. 25 tablet 1     rosuvastatin (CRESTOR) 20 MG tablet Take 1 tablet (20 mg) by mouth daily 90 tablet 3     warfarin ANTICOAGULANT (COUMADIN) 5 MG tablet Take 1 tab (5mg) on Mondays and Fridays and 1 1/2 tabs (7.5mg) on all other days or as directed by INR clinic 135 tablet 1       ALLERGIES  No Known Allergies    PAST MEDICAL, SURGICAL, SOCIAL FAMILY HISTORY:  History was reviewed and updated as needed, see medical record.    Review of Systems:  Skin:  Negative     Eyes:  Positive for glasses  ENT:  Positive for tinnitus  Respiratory:  Positive for dyspnea on exertion  Cardiovascular:    palpitations;Positive for  Gastroenterology: Negative    Genitourinary:  Positive for urinary frequency;urgency;retention;prostate problem;nocturia  Musculoskeletal:  Positive for arthritis;joint pain;joint swelling;joint stiffness  Neurologic:  Negative    Psychiatric:  Negative    Heme/Lymph/Imm:  Positive for hay fever  Endocrine:  Negative       Physical Exam:  Vitals: /68   Pulse 60   Ht 1.753 m (5' 9\")   Wt 69.9 kg (154 lb 3.2 oz)   SpO2 97%   BMI 22.77 kg/m     Wt Readings from Last 4 Encounters:   11/09/21 69.9 kg (154 lb 3.2 oz)   04/29/21 71.4 kg (157 lb 8 oz)   04/14/21 71.4 kg (157 lb 4.8 oz)   04/06/21 " 68 kg (150 lb)     CONSTITUTIONAL: Appears his stated age, well nourished, and in no acute distress.  HEENT: Pupils equal, round. Sclerae nonicteric.    NECK: Supple, no masses appreciated.   C/V:  Regular rate and rhythm  RESP: Respirations are unlabored. Lungs are clear to auscultation bilaterally without wheezing, rales, or rhonchi.  GI: Abdomen soft, non-tender, non-distended.  EXTREM:  No clubbing, cyanosis, or lower extremity edema bilaterally.   NEURO: Alert and oriented, cooperative. Gait steady. No gross focal deficits.   PSYCH: Affect appropriate. Mentation normal. Responds to questions appropriately.  SKIN: Warm and dry. No apparent rashes or bruising.     Recent Lab Results:  CBC RESULTS:  Lab Results   Component Value Date    WBC 5.0 04/14/2021    RBC 4.76 04/14/2021    HGB 14.7 04/14/2021    HCT 43.7 04/14/2021    MCV 92 04/14/2021    MCH 30.9 04/14/2021    MCHC 33.6 04/14/2021    RDW 12.6 04/14/2021     04/14/2021     BMP RESULTS:  Lab Results   Component Value Date     04/14/2021    POTASSIUM 4.2 04/14/2021    CHLORIDE 110 (H) 04/14/2021    CO2 26 04/14/2021    ANIONGAP 5 04/14/2021    GLC 86 04/14/2021    BUN 20 04/14/2021    CR 1.19 04/14/2021    GFRESTIMATED 57 (L) 04/14/2021    GFRESTBLACK 67 04/14/2021    CHERI 8.6 04/14/2021          CC  No referring provider defined for this encounter.    This note was completed in part using Dragon voice recognition software. Although reviewed after completion, some word and grammatical errors may occur.

## 2021-11-09 NOTE — PATIENT INSTRUCTIONS
If you have problems or questions please call the RNs (Cindy Jimenez, & Fifi) at [phone]      Follow up with Dr. Malhotra in March 2021    If you want to change to Eliquis and stop warfarin, please call.

## 2021-11-09 NOTE — LETTER
11/9/2021    Osmar Espinoza MD  [address] [place] [address]    RE: Nigel Rosenthalroldan Duran.       Dear Colleague,    I had the pleasure of seeing Nigel Rodarte Jr. in the [facility].      Cardiology Clinic Progress Note    Service Date: 11/09/21    Primary Cardiologist: Sachin Malhotra      Reason for Visit: atrial fibrillation    HPI:   I had the pleasure of seeing Mr. Nigel Rodarte Jr. in the clinic today and he is a very pleasant 81 year old male with a past medical history notable for    1. paroxysmal atrial fibrillation/ flutter.  S/p PVI and CTI ablation on 5/18/2020)  2. Non-obstructive coronary artery disease.  Per coronary catheretization (4/2021)      Diagnostics  I have personally reviewed the following reports    coronary catheretization (4/2021); Prox LAD lesion is 40% stenosed. Mid LAD lesion is 30% stenosed. Pressure wire/FFR was performed on the lesion.  Two lesions in the LAD that are not flow limiting based on iFR (0.92, 0.93).  Otherwise, mild diffuse atherosclerosis without a focal lesion.     In 5/2020, pt underwent a atrial fibrillation ablation and flutter ablation.      In 4/2021, he had chest discomfort and a nuclear stress test showed ischemia and a coronary catheretization which revealed non-obstructive CAD.     He did well off antiarrhythmic medications until 10/2021 when he had afib for 4 hours and took extra metoprolol 25 mg.   Dr. Malhotra recommended to resume flecainide 50 mg BID.       Today, he reports having 2 episodes of atrial fibrillation which lasts less than 4 hours.   He is exercising regularly with no chest pain, shortness of breath, dyspnea on exertion, orthopnea, PND, lower extremity edema, dizziness, presyncope, or syncope.   Reports taking medications as prescribed including warfarin and denies bleeding and sign/symptoms of stroke.     ASSESSMENT AND PLAN:    Symptomatic paroxymal Atrial fibrillation    S/p  PVI and CTI ablation on (5/18/2020) and he has done well until recently and had 2 episodes of atrial fibrillation lasting less than 4 hours.  When in atrial fibrillation he took an extra metoprolol and subsequently converted.  He was then asked to restart flecainide 50 mg twice daily and continue metoprolol succinate 25 mg daily.    Today his ECG revealed sinus bradycardia with a ventricular rate of 58 bpm QRS= 94 ms.     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  Patient intermittently has trouble maintaining his INR between 2 and 3.  Today we discussed direct oral anticoagulation such as Eliquis 5 mg twice daily or Xarelto 20 mg daily.  Patient is going to call his insurance company for the prices and call the clinic if he would like to discontinue his warfarin and start a DOAC.      Non obstructive coronary artery disease     Patient is currently taking metoprolol succinate 25 mg daily and rosuvastatin 20 mg daily.    Per care everywhere his last LDL was 75 (5/2021)      Plan:    Continue with current medications    Call if like to change to a DOAC.  Will need to stop warfarin, when INR is less than 2 then start Eliquis 5 mg BID.     Follow up with Dr. Malhotra upon returning to [place] in March 2021    Please call the clinic if questions or problems arise      Thank you for the opportunity to participate in this pleasant patient's care. We would be happy to see him sooner if needed for any concerns in the meantime.          BRENDA Maldonado [facility]  Text Page  (M-F 8:00 am - 4:30 pm)    Orders this Visit:  Orders Placed This Encounter   Procedures     Follow-Up with Electrophysiologist     EKG 12-lead complete w/read - Clinics (performed today)     Orders Placed This Encounter   Medications     flecainide (TAMBOCOR) 50 MG tablet     Sig: Take 1 tablet (50 mg) by mouth 2 times daily     Dispense:  180 tablet     Refill:  3     metoprolol succinate  ER (TOPROL-XL) 25 MG 24 hr tablet     Sig: Take 1 tablet (25 mg) daily     Dispense:  90 tablet     Refill:  3     Medications Discontinued During This Encounter   Medication Reason     metoprolol succinate ER (TOPROL-XL) 25 MG 24 hr tablet Reorder     flecainide (TAMBOCOR) 50 MG tablet Reorder     Encounter Diagnoses   Name Primary?     Atrial fibrillation, unspecified type (H) Yes     Paroxysmal atrial fibrillation (H)        CURRENT MEDICATIONS:  Current Outpatient Medications   Medication Sig Dispense Refill     dutasteride (AVODART) 0.5 MG capsule Take 0.5 mg by mouth daily       flecainide (TAMBOCOR) 50 MG tablet Take 1 tablet (50 mg) by mouth 2 times daily 180 tablet 3     metoprolol succinate ER (TOPROL-XL) 25 MG 24 hr tablet Take 1 tablet (25 mg) daily 90 tablet 3     nitroGLYcerin (NITROSTAT) 0.4 MG sublingual tablet For chest pain place 1 tablet under the tongue every 5 minutes for 3 doses. If symptoms persist 5 minutes after 1st dose call 911. 25 tablet 1     rosuvastatin (CRESTOR) 20 MG tablet Take 1 tablet (20 mg) by mouth daily 90 tablet 3     warfarin ANTICOAGULANT (COUMADIN) 5 MG tablet Take 1 tab (5mg) on Mondays and Fridays and 1 1/2 tabs (7.5mg) on all other days or as directed by INR clinic 135 tablet 1       ALLERGIES  No Known Allergies    PAST MEDICAL, SURGICAL, SOCIAL FAMILY HISTORY:  History was reviewed and updated as needed, see medical record.    Review of Systems:  Skin:  Negative     Eyes:  Positive for glasses  ENT:  Positive for tinnitus  Respiratory:  Positive for dyspnea on exertion  Cardiovascular:    palpitations;Positive for  Gastroenterology: Negative    Genitourinary:  Positive for urinary frequency;urgency;retention;prostate problem;nocturia  Musculoskeletal:  Positive for arthritis;joint pain;joint swelling;joint stiffness  Neurologic:  Negative    Psychiatric:  Negative    Heme/Lymph/Imm:  Positive for hay fever  Endocrine:  Negative       Physical Exam:  Vitals: /68   " Pulse 60   Ht 1.753 m (5' 9\")   Wt 69.9 kg (154 lb 3.2 oz)   SpO2 97%   BMI 22.77 kg/m     Wt Readings from Last 4 Encounters:   11/09/21 69.9 kg (154 lb 3.2 oz)   04/29/21 71.4 kg (157 lb 8 oz)   04/14/21 71.4 kg (157 lb 4.8 oz)   04/06/21 68 kg (150 lb)     CONSTITUTIONAL: Appears his stated age, well nourished, and in no acute distress.  HEENT: Pupils equal, round. Sclerae nonicteric.    NECK: Supple, no masses appreciated.   C/V:  Regular rate and rhythm  RESP: Respirations are unlabored. Lungs are clear to auscultation bilaterally without wheezing, rales, or rhonchi.  GI: Abdomen soft, non-tender, non-distended.  EXTREM:  No clubbing, cyanosis, or lower extremity edema bilaterally.   NEURO: Alert and oriented, cooperative. Gait steady. No gross focal deficits.   PSYCH: Affect appropriate. Mentation normal. Responds to questions appropriately.  SKIN: Warm and dry. No apparent rashes or bruising.     Recent Lab Results:  CBC RESULTS:  Lab Results   Component Value Date    WBC 5.0 04/14/2021    RBC 4.76 04/14/2021    HGB 14.7 04/14/2021    HCT 43.7 04/14/2021    MCV 92 04/14/2021    MCH 30.9 04/14/2021    MCHC 33.6 04/14/2021    RDW 12.6 04/14/2021     04/14/2021     BMP RESULTS:  Lab Results   Component Value Date     04/14/2021    POTASSIUM 4.2 04/14/2021    CHLORIDE 110 (H) 04/14/2021    CO2 26 04/14/2021    ANIONGAP 5 04/14/2021    GLC 86 04/14/2021    BUN 20 04/14/2021    CR 1.19 04/14/2021    GFRESTIMATED 57 (L) 04/14/2021    GFRESTBLACK 67 04/14/2021    CHERI 8.6 04/14/2021        This note was completed in part using Dragon voice recognition software. Although reviewed after completion, some word and grammatical errors may occur.      BRENDA Maldonado CNP   [facility]  "

## 2021-11-17 ENCOUNTER — ANTICOAGULATION THERAPY VISIT (OUTPATIENT)
Dept: CARDIOLOGY | Facility: CLINIC | Age: 81
End: 2021-11-17
Payer: MEDICARE

## 2021-11-17 ENCOUNTER — LAB (OUTPATIENT)
Dept: LAB | Facility: CLINIC | Age: 81
End: 2021-11-17
Payer: MEDICARE

## 2021-11-17 DIAGNOSIS — Z79.01 LONG TERM CURRENT USE OF ANTICOAGULANTS WITH INR GOAL OF 2.0-3.0: ICD-10-CM

## 2021-11-17 DIAGNOSIS — I48.91 ATRIAL FIBRILLATION (H): Primary | ICD-10-CM

## 2021-11-17 DIAGNOSIS — I48.0 PAROXYSMAL ATRIAL FIBRILLATION (H): ICD-10-CM

## 2021-11-17 LAB — INR BLD: 3.2 (ref 0.9–1.1)

## 2021-11-17 PROCEDURE — 85610 PROTHROMBIN TIME: CPT

## 2021-11-17 PROCEDURE — 36416 COLLJ CAPILLARY BLOOD SPEC: CPT

## 2021-11-17 PROCEDURE — 99207 PR NO CHARGE NURSE ONLY: CPT

## 2021-11-17 NOTE — PROGRESS NOTES
ANTICOAGULATION FOLLOW-UP CLINIC VISIT    Patient Name:  Nigel Rodarte Jr.  Date:  11/17/2021  Contact Type:  Telephone    SUBJECTIVE:  Patient Findings     Positives:  Change in diet/appetite (Decrease number of servings of greens this past week)        Clinical Outcomes     Negatives:  Major bleeding event, Thromboembolic event, Anticoagulation-related hospital admission, Anticoagulation-related ED visit, Anticoagulation-related fatality           OBJECTIVE    Recent labs: (last 7 days)     11/17/21  0826   INR 3.2*       ASSESSMENT / PLAN  INR assessment SUPRA    Recheck INR In: 2 WEEKS    INR Location Outside lab      Anticoagulation Summary  As of 11/17/2021    INR goal:  2.0-3.0   TTR:  43.0 % (8.1 mo)   INR used for dosing:  3.2 (11/17/2021)   Warfarin maintenance plan:  5 mg (5 mg x 1) every Mon, Wed, Fri; 7.5 mg (5 mg x 1.5) all other days   Full warfarin instructions:  11/17: 2.5 mg; Otherwise 5 mg every Mon, Wed, Fri; 7.5 mg all other days   Weekly warfarin total:  45 mg   Plan last modified:  Gilam Anton RN (10/19/2021)   Next INR check:  12/1/2021   Target end date:  Indefinite    Indications    Atrial fibrillation (H) [I48.91] [I48.91]  Long term current use of anticoagulants with INR goal of 2.0-3.0 [Z79.01]  Paroxysmal atrial fibrillation (H) [I48.0]             Anticoagulation Episode Summary     INR check location:      Preferred lab:      Send INR reminders to:  [facility] HEART INR NURSE    Comments:        Anticoagulation Care Providers     Provider Role Specialty Phone number    Grzegorz Malhotra MD Referring Clinical Cardiac Electrophysiology [phone]            See the Encounter Report to view Anticoagulation Flowsheet and Dosing Calendar (Go to Encounters tab in chart review, and find the Anticoagulation Therapy Visit)    INR was 3.2 today. Pt denies any abnormal bleeding or bruising. Denies any recent medication or dietary changes or recent illness. Pt believes he had less  servings of greens this past week than his normal 3-4 servings weekly. Instructed pt to eat a serving of greens today. Will have pt take 2.5 mg (half of his scheduled 5 mg dose) of warfarin today and then continue current dosing of 5 mg every MWF and 7.5 mg all other days of the week. Pt did discuss possibly switching to a NOAC, but states OOP cost for Eliquis is $500.00 monthly, which is not finacially feasible for pt. Pt is planning on leaving on Sunday for dual residence in [facility], and will be there thru mid to late March-will be driving. Instructed pt that he will need to establish care with a PMD in [place] to manage his INR while there. Instructed pt to have next INR drawn in 2 weeks. If he has a questions, our INR clinic phone number was provided and encouraged to reach out to us anytime. Instructed to call our INR clinic upon his return from [place] in March. Next Pt verbalized understanding.    Helena Sanders RN

## 2021-12-08 ENCOUNTER — TELEPHONE (OUTPATIENT)
Dept: CARDIOLOGY | Facility: CLINIC | Age: 81
End: 2021-12-08
Payer: MEDICARE

## 2021-12-08 LAB — INR (EXTERNAL): 2.9 (ref 0.9–1.1)

## 2021-12-08 NOTE — TELEPHONE ENCOUNTER
Pt wife called and pt is needing to get an INR done while in [place] for 4 months.  They have tried to get an MD, but state the no one is taking new patients and that they would need to go through ER to get one. Last year pt went through [facility] and phone number [phone] and fax [phone] was given to this writer.  Explained that this writer would speak to INR nurse team to help assist.     Spoke to Gladys STRONG RN in INR who will call pt wife Denise. Dara

## 2021-12-08 NOTE — TELEPHONE ENCOUNTER
Pt's wife, Denise, calling to report that he has been unable to schedule an appt with a doctor for INR management while he is in [place] for 4 months. She will continue to try to schedule an appt with a doctor in [place] but pt will need an order for INRs to be done and managed by our INR clinic until he has been able to establish care with a doctor there (he goes to [place] for 4 months each winter). Will have a standing order for INR lab faxed to [facility] [phone]. Tamar  11:15am Pt's wife, Denise, calling back to report that she couldn't get through to [facility] so now wants the lab order faxed to [facility] Fax# [phone] (phone [phone]). Sent message to office staff to request that the INR lab order be faxed to [facility] as requested (will be requesting the lab order as a standing order). Tamar  1:28pm Left message for pt's wife that the standing lab order for INR labs was faxed to [facility] fax # [phone]. Tamar

## 2021-12-09 ENCOUNTER — ANTICOAGULATION THERAPY VISIT (OUTPATIENT)
Dept: CARDIOLOGY | Facility: CLINIC | Age: 81
End: 2021-12-09
Payer: MEDICARE

## 2021-12-09 DIAGNOSIS — I48.0 PAROXYSMAL ATRIAL FIBRILLATION (H): ICD-10-CM

## 2021-12-09 DIAGNOSIS — I48.91 ATRIAL FIBRILLATION (H): Primary | ICD-10-CM

## 2021-12-09 DIAGNOSIS — Z79.01 LONG TERM CURRENT USE OF ANTICOAGULANTS WITH INR GOAL OF 2.0-3.0: ICD-10-CM

## 2021-12-09 PROCEDURE — 99207 PR NO CHARGE NURSE ONLY: CPT | Performed by: INTERNAL MEDICINE

## 2021-12-09 NOTE — PROGRESS NOTES
ANTICOAGULATION FOLLOW-UP CLINIC VISIT    Patient Name:  Ngiel Rodarte Jr.  Date:  2021  Contact Type:  Telephone    SUBJECTIVE:  Patient Findings     Positives:  Change in diet/appetite (greens 3x/wk)        Clinical Outcomes     Negatives:  Major bleeding event, Thromboembolic event, Anticoagulation-related hospital admission, Anticoagulation-related ED visit, Anticoagulation-related fatality           OBJECTIVE    Recent labs: (last 7 days)     21  1557   INR 2.9*       ASSESSMENT / PLAN  No question data found.  Anticoagulation Summary  As of 2021    INR goal:  2.0-3.0   TTR:  42.3 % (8.6 mo)   INR used for dosin.9 (2021)   Warfarin maintenance plan:  5 mg (5 mg x 1) every Mon, Wed, Fri; 7.5 mg (5 mg x 1.5) all other days   Full warfarin instructions:  5 mg every Mon, Wed, Fri; 7.5 mg all other days   Weekly warfarin total:  45 mg   No change documented:  Gladys Herrera RN   Plan last modified:  Gilma Anton RN (10/19/2021)   Next INR check:  2022   Target end date:  Indefinite    Indications    Atrial fibrillation (H) [I48.91] [I48.91]  Long term current use of anticoagulants with INR goal of 2.0-3.0 [Z79.01]  Paroxysmal atrial fibrillation (H) [I48.0]             Anticoagulation Episode Summary     INR check location:      Preferred lab:      Send INR reminders to:  [facility]ART INR NURSE    Comments:        Anticoagulation Care Providers     Provider Role Specialty Phone number    Grzegorz Malhotra MD Referring Clinical Cardiac Electrophysiology [phone]            See the Encounter Report to view Anticoagulation Flowsheet and Dosing Calendar (Go to Encounters tab in chart review, and find the Anticoagulation Therapy Visit)    21 INR 2.9 He is in [place] for the winter and wasn't able to establish care with a doctor (no one was taking new patients) so a standing order for INR labs was faxed yesterday to [facility] in [facility] (their fax# is  [phone]). No change in meds. Eating salads 3x/wk. Denies abnormal bleeding or bruising. Will continue current dosing of 5 mg MWF and 7.5 mg all other days with recheck in 4 weeks.  Gladys Herrera RN

## 2021-12-23 ENCOUNTER — TELEPHONE (OUTPATIENT)
Dept: CARDIOLOGY | Facility: CLINIC | Age: 81
End: 2021-12-23
Payer: MEDICARE

## 2021-12-23 NOTE — TELEPHONE ENCOUNTER
12/23/21 Pt called to report he has been having more episodes of Afib this past fall. He reports dates of 10/25, 11/21, 12/7, 12/16 and 12/23. Each episode lasts from 4-6 hours and rate is in betwen 80-85. He feels fatigued, SOB and palpitations . He confirms he is taking Flecainide 50 mg BID, Metoprolol ER 25 mg daily and Warfarin.  Pt does not have a home monitoring device, just checking his pulse manually. He states he is challenged by technology and therefore a [email or web address] device or Apple Watch is not an option.  Pt is currently wintering in [place] until  March. Encouraged pt to find cardiologist in [place]. Pt states this has been very difficult.  Will route update to Dr Malhotra and call pt back w recommendations.  Pt voiced understanding and agreement with plan.   Sajan 444 pm

## 2021-12-29 NOTE — TELEPHONE ENCOUNTER
12/29/21 Msg recd from Dr Malhotra  We could go up on flecainide.  However, like to have a close EKG follow-up after titrating the dose.  Ideally, he should find a cardiologist in [place].     Left pt detailed VM with information above. Requested callback with questions.  Sajan 1052 am

## 2022-01-06 LAB — INR (EXTERNAL): 3.2 (ref 0.9–1.1)

## 2022-01-10 ENCOUNTER — ANTICOAGULATION THERAPY VISIT (OUTPATIENT)
Dept: ANTICOAGULATION | Facility: CLINIC | Age: 82
End: 2022-01-10
Payer: MEDICARE

## 2022-01-10 DIAGNOSIS — I48.91 ATRIAL FIBRILLATION (H): Primary | ICD-10-CM

## 2022-01-10 DIAGNOSIS — Z79.01 LONG TERM CURRENT USE OF ANTICOAGULANTS WITH INR GOAL OF 2.0-3.0: ICD-10-CM

## 2022-01-10 DIAGNOSIS — I48.0 PAROXYSMAL ATRIAL FIBRILLATION (H): ICD-10-CM

## 2022-01-10 NOTE — PROGRESS NOTES
ANTICOAGULATION MANAGEMENT     Nigel STRONG Cayden Duran. 81 year old male is on warfarin with supratherapeutic INR result. (Goal INR 2.0-3.0)    Recent labs: (last 7 days)     01/06/22  1100   INR 3.2*       ASSESSMENT     Source(s): Chart Review and Patient/Caregiver Call       Warfarin doses taken: Warfarin taken as instructed    Diet: Decreased greens/vitamin K in diet; plans to resume previous intake - discussed increasing greens over the next couple weeks.    New illness, injury, or hospitalization: Yes: increased hip pain.     Medication/supplement changes: Discussed tylenol use for hip pain - pt would like to continue to hold off on this, but advised that okay to take occasionally if needed. Notified that it can increase INR if used regularly.     Signs or symptoms of bleeding or clotting: No    Previous INR: Therapeutic last visit; previously outside of goal range    Additional findings: pt preferred to keep dosing the same and increase greens     Pt currently in [place] for the Winter. Did not receive faxed INR from 1/6 until today. Provided [facility] # and encouraged pt to reach out if he does not hear from [facility] day of next INR.      PLAN     Recommended plan for temporary change(s) affecting INR     Dosing Instructions: Continue your current warfarin dose with next INR in 2 weeks       Summary  As of 1/10/2022    Full warfarin instructions:  5 mg every Mon, Wed, Fri; 7.5 mg all other days   Next INR check:  1/20/2022             Telephone call with Nigel who verbalizes understanding and agrees to plan    Patient using outside facility for labs    Education provided: Importance of consistent vitamin K intake, Impact of vitamin K foods on INR, Goal range and significance of current result, Importance of therapeutic range, Potential interaction between warfarin and tylenol and Monitoring for bleeding signs and symptoms    Plan made per [facility] anticoagulation protocol    Brissa Almaguer, RN  Anticoagulation  Clinic  1/10/2022    _______________________________________________________________________     Anticoagulation Episode Summary     Current INR goal:  2.0-3.0   TTR:  41.4 % (9.5 mo)   Target end date:  Indefinite   Send INR reminders to:  [facility] HEART INR NURSE    Indications    Atrial fibrillation (H) [I48.91] [I48.91]  Long term current use of anticoagulants with INR goal of 2.0-3.0 [Z79.01]  Paroxysmal atrial fibrillation (H) [I48.0]           Comments:           Anticoagulation Care Providers     Provider Role Specialty Phone number    Grzegorz Malhotra MD Referring Clinical Cardiac Electrophysiology [phone]

## 2022-01-10 NOTE — PROGRESS NOTES
Incoming fax from [facility] in [place], [place].    Collected: 01/06/2022 at 11:10 AM  INR: 3.2

## 2022-01-26 ENCOUNTER — TRANSFERRED RECORDS (OUTPATIENT)
Dept: HEALTH INFORMATION MANAGEMENT | Facility: CLINIC | Age: 82
End: 2022-01-26
Payer: MEDICARE

## 2022-01-27 ENCOUNTER — ANTICOAGULATION THERAPY VISIT (OUTPATIENT)
Dept: ANTICOAGULATION | Facility: CLINIC | Age: 82
End: 2022-01-27
Payer: MEDICARE

## 2022-01-27 DIAGNOSIS — I48.0 PAROXYSMAL ATRIAL FIBRILLATION (H): ICD-10-CM

## 2022-01-27 DIAGNOSIS — I48.91 ATRIAL FIBRILLATION, UNSPECIFIED TYPE (H): Primary | ICD-10-CM

## 2022-01-27 DIAGNOSIS — Z79.01 LONG TERM CURRENT USE OF ANTICOAGULANTS WITH INR GOAL OF 2.0-3.0: ICD-10-CM

## 2022-01-27 LAB — INR (EXTERNAL): 2.3 (ref 0.9–1.1)

## 2022-01-27 NOTE — PROGRESS NOTES
ANTICOAGULATION MANAGEMENT     Nigel Rodarte Jr. 81 year old male is on warfarin with therapeutic INR result. (Goal INR 2.0-3.0)    Recent labs: (last 7 days)     01/27/22  1608   INR 2.3       ASSESSMENT     Source(s): Chart Review and Patient/Caregiver Call       Warfarin doses taken: Reviewed in chart    Diet: No new diet changes identified    New illness, injury, or hospitalization: No    Medication/supplement changes: None noted    Signs or symptoms of bleeding or clotting: No    Previous INR: Supratherapeutic    Additional findings: Patient in [place]      PLAN     Recommended plan for no diet, medication or health factor changes affecting INR     Dosing Instructions: Continue your current warfarin dose with next INR in 4-6 weeks in [place] or when he returns to [place] but unsure of that date.       Summary  As of 1/27/2022    Full warfarin instructions:  5 mg every Mon, Wed, Fri; 7.5 mg all other days   Next INR check:  3/10/2022             Detailed voice message left for Nigel with dosing instructions and follow up date.     Patient using outside facility for labs    Education provided: Goal range and significance of current result and Contact [phone] with any changes, questions or concerns.     Plan made per [facility] anticoagulation protocol    Gilma Anton, RN  Anticoagulation Clinic  1/27/2022    _______________________________________________________________________     Anticoagulation Episode Summary     Current INR goal:  2.0-3.0   TTR:  43.9 % (10.3 mo)   Target end date:  Indefinite   Send INR reminders to:  [facility] HEART INR NURSE    Indications    Atrial fibrillation (H) [I48.91] [I48.91]  Long term current use of anticoagulants with INR goal of 2.0-3.0 [Z79.01]  Paroxysmal atrial fibrillation (H) [I48.0]           Comments:           Anticoagulation Care Providers     Provider Role Specialty Phone number    Grzegorz Malhotra MD Referring Cardiovascular Disease [phone]

## 2022-03-25 DIAGNOSIS — I48.91 ATRIAL FIBRILLATION, UNSPECIFIED TYPE (H): ICD-10-CM

## 2022-03-25 DIAGNOSIS — Z79.01 LONG TERM CURRENT USE OF ANTICOAGULANT THERAPY: ICD-10-CM

## 2022-03-25 RX ORDER — WARFARIN SODIUM 5 MG/1
TABLET ORAL
Qty: 115 TABLET | Refills: 0 | Status: SHIPPED | OUTPATIENT
Start: 2022-03-25 | End: 2022-06-24

## 2022-04-04 ENCOUNTER — TELEPHONE (OUTPATIENT)
Dept: ANTICOAGULATION | Facility: CLINIC | Age: 82
End: 2022-04-04
Payer: MEDICARE

## 2022-04-04 NOTE — TELEPHONE ENCOUNTER
"ANTICOAGULATION     Nigel Rodarte  is overdue for INR check.      spoke with patient's wife who declined to schedule a lab appointment at this time. If calling back please schedule as soon as possible.     She said patient had \"cardiac surgery\" in [place] and had an ablation. They are driving back from [place] now and would like a call tomorrow to set up INR appointment and also an appointment with Dr. Alfaro.    I will have reminder set to call patient for appointment tomorrow.    I do not see any documentation of surgical procedures from outside facility.    Estrella Bowden RN    "

## 2022-04-05 ENCOUNTER — TELEPHONE (OUTPATIENT)
Dept: ANTICOAGULATION | Facility: CLINIC | Age: 82
End: 2022-04-05
Payer: MEDICARE

## 2022-04-05 NOTE — TELEPHONE ENCOUNTER
Called patient and left message to call [phone] ([facility] anticoag) to schedule his next INR check once he is back in [place].  Bay MUNOZ

## 2022-04-11 DIAGNOSIS — R07.89 CHEST DISCOMFORT: ICD-10-CM

## 2022-04-11 RX ORDER — ROSUVASTATIN CALCIUM 20 MG/1
20 TABLET, COATED ORAL DAILY
Qty: 90 TABLET | Refills: 0 | Status: SHIPPED | OUTPATIENT
Start: 2022-04-11 | End: 2022-04-21

## 2022-04-21 ENCOUNTER — TELEPHONE (OUTPATIENT)
Dept: CARDIOLOGY | Facility: CLINIC | Age: 82
End: 2022-04-21
Payer: MEDICARE

## 2022-04-21 DIAGNOSIS — R07.89 CHEST DISCOMFORT: ICD-10-CM

## 2022-04-21 RX ORDER — ROSUVASTATIN CALCIUM 20 MG/1
20 TABLET, COATED ORAL DAILY
Qty: 90 TABLET | Refills: 0 | Status: SHIPPED | OUTPATIENT
Start: 2022-04-21 | End: 2022-07-25

## 2022-04-21 NOTE — TELEPHONE ENCOUNTER
4/21/22 Spoke w pt and he is currently in [place], not [place]. Previous refill on 4/11/22 sent to [facility] in [place].  Updated rx sent to [facility] in [place] per pt request.  Pt voiced understanding and agreement with plan.   Sajan 1252 pm

## 2022-04-21 NOTE — TELEPHONE ENCOUNTER
[facility]    Phone Message    May a detailed message be left on voicemail: yes     Reason for Call: Medication Refill Request    Has the patient contacted the pharmacy for the refill? Yes   Name of medication being requested: rosuvastatin (CRESTOR) 20 MG tablet    Provider who prescribed the medication: Roscoe  Pharmacy: [facility][id number] [address] AT [facility] OF  & [facility]      Date medication is needed: 4.25.22 - note:  Pt has an appt w/Dr. Malhotra for May 3, 2022.    Action Taken: Message routed to:  [facility] cardio    Travel Screening: Not Applicable

## 2022-05-03 ENCOUNTER — TELEPHONE (OUTPATIENT)
Dept: CARDIOLOGY | Facility: CLINIC | Age: 82
End: 2022-05-03

## 2022-05-03 ENCOUNTER — OFFICE VISIT (OUTPATIENT)
Dept: CARDIOLOGY | Facility: CLINIC | Age: 82
End: 2022-05-03
Attending: NURSE PRACTITIONER
Payer: MEDICARE

## 2022-05-03 VITALS
BODY MASS INDEX: 23.25 KG/M2 | HEART RATE: 65 BPM | SYSTOLIC BLOOD PRESSURE: 98 MMHG | HEIGHT: 69 IN | WEIGHT: 157 LBS | OXYGEN SATURATION: 100 % | DIASTOLIC BLOOD PRESSURE: 68 MMHG

## 2022-05-03 DIAGNOSIS — I48.0 PAROXYSMAL ATRIAL FIBRILLATION (H): ICD-10-CM

## 2022-05-03 PROCEDURE — 99214 OFFICE O/P EST MOD 30 MIN: CPT | Performed by: INTERNAL MEDICINE

## 2022-05-03 PROCEDURE — 93000 ELECTROCARDIOGRAM COMPLETE: CPT | Performed by: INTERNAL MEDICINE

## 2022-05-03 NOTE — PROGRESS NOTES
"Electrophysiology/ Clinic Note         H&P and Plan:     REASON FOR VISIT: Electrophysiology evaluation.      HISTORY OF PRESENT ILLNESS: Mr. Rodarte is a delightful 81-year-old gentleman (former  of [employer]) with a long history of paroxysmal atrial fibrillation/ flutter (ablation on 5/18/2020), who is here for evaluation.     Patient  failed rhythm control strategy with flecainide/ metoprolol and underwent atrial fibrillation/flutter ablation on 05/18/2020.  He did well after ablation.  However, he started having episodes of A. fib in the end of last year.  He was a started on flecainide/metoprolol.  He went to [place] for the winter, and there he underwent a redo ablation.    Today, he informs he is doing well.  He denies recurrence of A. fib after the second ablation.  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      He denies any symptoms of chest pain, shortness of breath, lightheadedness, near-syncope or syncope.     EKG done today showed normal sinus rhythm.     PREVIOUS STUDIES (personally reviewed):  -Stress Echo (2018): Exercised for 7 minutes and 20 seconds. Negative for ischemia, arrthyhmia or QRS widening.    -NM stress test (4/1/21): Exercised for 9 minutes and 30 seconds.  There was mild count reduction in the basal, mid and apical inferior.  -Cath (4/14/2021):  No obstructive CAD.    She  ASSESSMENT AND PLAN:   1.  Paroxysmal atrial fibrillation.  He had a redo ablation 3 months ago.  No recurrence of A. fib so far.  I recommend stopping the flecainide and continue metoprolol.  If he does have recurrence of A. fib, he will contact our office.  2.  Embolic prevention.  CHADS-VASc score of 2. Continue anticoagulation with Coumadin indefinitely.      Grzegorz Malhotra MD    Physical Exam:  Vitals: BP 98/68   Pulse 65   Ht 1.753 m (5' 9\")   Wt 71.2 kg (157 lb)   SpO2 100%   BMI 23.18 kg/m      Constitutional:  AAO x3.  Pt is " in NAD.  HEAD: normocephalic.  SKIN: Skin normal color, texture and turgor with no lesions or eruptions.  Eyes: PERRL, EOMI.  ENT:  Supple, normal JVP. No lymphadenopathy or thyroid enlargement.  Chest:  CTAB.  Cardiac:  RRR, normal  S1 and S2.  No murmurs rubs or gallop.  Abdomen:  Normal BS.  Soft, non-tender and non-distended.  No rebound or guarding.    Extremities:  Pedious pulses palpable B/L.  No LE edema noticed.   Neurological: Strength and sensation grossly symmetric and intact throughout.       CURRENT MEDICATIONS:  Current Outpatient Medications   Medication Sig Dispense Refill     dutasteride (AVODART) 0.5 MG capsule Take 0.5 mg by mouth daily       flecainide (TAMBOCOR) 50 MG tablet Take 1 tablet (50 mg) by mouth 2 times daily 180 tablet 3     metoprolol succinate ER (TOPROL-XL) 25 MG 24 hr tablet Take 1 tablet (25 mg) daily 90 tablet 3     nitroGLYcerin (NITROSTAT) 0.4 MG sublingual tablet For chest pain place 1 tablet under the tongue every 5 minutes for 3 doses. If symptoms persist 5 minutes after 1st dose call 911. 25 tablet 1     rosuvastatin (CRESTOR) 20 MG tablet Take 1 tablet (20 mg) by mouth daily 90 tablet 0     warfarin ANTICOAGULANT (COUMADIN) 5 MG tablet Take 1 tab (5mg) on Mondays, Wednesdays, and Fridays and 1 1/2 tabs (7.5mg) on all other days or as directed by INR clinic 115 tablet 0       ALLERGIES   No Known Allergies    PAST MEDICAL HISTORY:  Past Medical History:   Diagnosis Date     Arthritis     osteoarthrosis     Coronary atherosclerosis of native coronary artery     Mild per angiogram     Elevated PSA      Fatigue      HLD (hyperlipidemia)      Palpitations      Paroxysmal atrial fibrillation (H) 05/16/2017    S/p PVI and CTI ablation 5/18/2020     Renal disease     kidney calculus     SOB (shortness of breath)        PAST SURGICAL HISTORY:  Past Surgical History:   Procedure Laterality Date     ARTHROPLASTY HIP  1/14/2013    Procedure: ARTHROPLASTY HIP;  RIGHT TOTAL HIP  ARTHROPLASTY ;  Surgeon: Jamie Ohara MD;  Location:  OR     ARTHROPLASTY KNEE Right 11/6/2019    Procedure: RIGHT TOTAL KNEE ARTHROPLASTY (DEPUY)^;  Surgeon: Jamie Ohara MD;  Location:  OR     CARDIOVERSION       COLONOSCOPY N/A 2/9/2016    Procedure: COLONOSCOPY;  Surgeon: Astrid Vital MD;  Location:  GI     CV CORONARY ANGIOGRAM N/A 4/14/2021    Procedure: Coronary Angiogram;  Surgeon: Jacob Baird MD;  Location:  HEART CARDIAC CATH LAB     CV INSTANTANEOUS WAVE-FREE RATIO N/A 4/14/2021    Procedure: Instantaneous Wave-Free Ratio;  Surgeon: Jacob Baird MD;  Location:  HEART CARDIAC CATH LAB     CYSTOSCOPY, RETROGRADES, EXTRACT STONE, COMBINED  9/12/2013    Procedure: COMBINED CYSTOSCOPY, RETROGRADES, EXTRACT STONE;  CYSTOSCOPY, RIGHT RETROGRADE, STONE EXTRACTION;  Surgeon: Carlos Mcknight MD;  Location:  OR     ENT SURGERY      stapendectomy     EP ABLATION FOCAL AFIB N/A 5/18/2020    Procedure: EP Ablation Focal AFIB;  Surgeon: Grzegorz Malhotra MD;  Location:  HEART CARDIAC CATH LAB     GENITOURINARY SURGERY      cystoscopy for stone removal     HERNIA REPAIR       ORTHOPEDIC SURGERY      shoulder surgeries,bilat carpel tunnel     TONSILLECTOMY         FAMILY HISTORY:  Family History   Problem Relation Age of Onset     Lung Cancer Brother        SOCIAL HISTORY:  Social History     Socioeconomic History     Marital status:      Spouse name: None     Number of children: None     Years of education: None     Highest education level: None   Tobacco Use     Smoking status: Never Smoker     Smokeless tobacco: Never Used   Substance and Sexual Activity     Alcohol use: No     Drug use: No       Review of Systems:  Skin:  Negative     Eyes:  Positive for glasses  ENT:  Positive for tinnitus  Respiratory:  Negative    Cardiovascular:  Negative    Gastroenterology: Negative melena;hematochezia  Genitourinary:  Positive for urinary  frequency;urgency;retention;prostate problem;nocturia  Musculoskeletal:  Positive for arthritis;joint pain;joint swelling;joint stiffness  Neurologic:  Negative    Psychiatric:  Negative    Heme/Lymph/Imm:  Positive for hay fever  Endocrine:  Negative        Recent Lab Results:  LIPID RESULTS:  Lab Results   Component Value Date    CHOL 188 10/30/2020    HDL 48 10/30/2020     (H) 10/30/2020    TRIG 147 10/30/2020       LIVER ENZYME RESULTS:  Lab Results   Component Value Date    AST 26 05/16/2017    ALT <5 (L) 10/30/2020       CBC RESULTS:  Lab Results   Component Value Date    WBC 5.0 04/14/2021    RBC 4.76 04/14/2021    HGB 14.7 04/14/2021    HCT 43.7 04/14/2021    MCV 92 04/14/2021    MCH 30.9 04/14/2021    MCHC 33.6 04/14/2021    RDW 12.6 04/14/2021     04/14/2021       BMP RESULTS:  Lab Results   Component Value Date     04/14/2021    POTASSIUM 4.2 04/14/2021    CHLORIDE 110 (H) 04/14/2021    CO2 26 04/14/2021    ANIONGAP 5 04/14/2021    GLC 86 04/14/2021    BUN 20 04/14/2021    CR 1.19 04/14/2021    GFRESTIMATED 57 (L) 04/14/2021    GFRESTBLACK 67 04/14/2021    CHERI 8.6 04/14/2021        A1C RESULTS:  No results found for: A1C    INR RESULTS:  Lab Results   Component Value Date    INR 2.3 (A) 01/27/2022    INR 2.3 01/27/2022    INR 1.50 (H) 07/07/2021    INR 3.10 (H) 06/22/2021         ECHOCARDIOGRAM  No results found for this or any previous visit (from the past 8760 hour(s)).      Orders Placed This Encounter   Procedures     EKG 12-lead complete w/read - Clinics (performed today)     No orders of the defined types were placed in this encounter.    There are no discontinued medications.      Encounter Diagnosis   Name Primary?     Paroxysmal atrial fibrillation (H)          CC  Lorena Bernal, APRN CNP  [address]  [place] [id number]

## 2022-05-03 NOTE — TELEPHONE ENCOUNTER
5/3/22 Msg recd from Dr Malhotra  Patient had a redo A. fib ablation in February of this year in [place].  Apparently, the procedure was done by Dr. Jt Naidu ([facility]ology care group).  Can we request records so he can have for our records?     Records requested. Awaiting fax  Sajan 3 pm

## 2022-05-03 NOTE — LETTER
5/3/2022    Osmar Espinoza MD  [address] [place] [address]    RE: Nigel Rodarte Jr.       Dear Colleague,     I had the pleasure of seeing Nigel Rosenthalroldan Silva in the [facility].  Electrophysiology/ Clinic Note         H&P and Plan:     REASON FOR VISIT: Electrophysiology evaluation.      HISTORY OF PRESENT ILLNESS: Mr. 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     Patient  failed rhythm control strategy with flecainide/ metoprolol and underwent atrial fibrillation/flutter ablation on 05/18/2020.  He did well after ablation.  However, he started having episodes of A. fib in the end of last year.  He was a started on flecainide/metoprolol.  He went to [place] for the winter, and there he underwent a redo ablation.    Today, he informs he is doing well.  He denies recurrence of A. fib after the second ablation.  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      He denies any symptoms of chest pain, shortness of breath, lightheadedness, near-syncope or syncope.     EKG done today showed normal sinus rhythm.     PREVIOUS STUDIES (personally reviewed):  -Stress Echo (2018): Exercised for 7 minutes and 20 seconds. Negative for ischemia, arrthyhmia or QRS widening.    -NM stress test (4/1/21): Exercised for 9 minutes and 30 seconds.  There was mild count reduction in the basal, mid and apical inferior.  -Cath (4/14/2021):  No obstructive CAD.    She  ASSESSMENT AND PLAN:   1.  Paroxysmal atrial fibrillation.  He had a redo ablation 3 months ago.  No recurrence of A. fib so far.  I recommend stopping the flecainide and continue metoprolol.  If he does have recurrence of A. fib, he will contact our office.  2.  Embolic prevention.  CHADS-VASc score of 2. Continue  "anticoagulation with Coumadin indefinitely.      Grzegorz Malhotra MD    Physical Exam:  Vitals: BP 98/68   Pulse 65   Ht 1.753 m (5' 9\")   Wt 71.2 kg (157 lb)   SpO2 100%   BMI 23.18 kg/m      Constitutional:  AAO x3.  Pt is in NAD.  HEAD: normocephalic.  SKIN: Skin normal color, texture and turgor with no lesions or eruptions.  Eyes: PERRL, EOMI.  ENT:  Supple, normal JVP. No lymphadenopathy or thyroid enlargement.  Chest:  CTAB.  Cardiac:  RRR, normal  S1 and S2.  No murmurs rubs or gallop.  Abdomen:  Normal BS.  Soft, non-tender and non-distended.  No rebound or guarding.    Extremities:  Pedious pulses palpable B/L.  No LE edema noticed.   Neurological: Strength and sensation grossly symmetric and intact throughout.       CURRENT MEDICATIONS:  Current Outpatient Medications   Medication Sig Dispense Refill     dutasteride (AVODART) 0.5 MG capsule Take 0.5 mg by mouth daily       flecainide (TAMBOCOR) 50 MG tablet Take 1 tablet (50 mg) by mouth 2 times daily 180 tablet 3     metoprolol succinate ER (TOPROL-XL) 25 MG 24 hr tablet Take 1 tablet (25 mg) daily 90 tablet 3     nitroGLYcerin (NITROSTAT) 0.4 MG sublingual tablet For chest pain place 1 tablet under the tongue every 5 minutes for 3 doses. If symptoms persist 5 minutes after 1st dose call 911. 25 tablet 1     rosuvastatin (CRESTOR) 20 MG tablet Take 1 tablet (20 mg) by mouth daily 90 tablet 0     warfarin ANTICOAGULANT (COUMADIN) 5 MG tablet Take 1 tab (5mg) on Mondays, Wednesdays, and Fridays and 1 1/2 tabs (7.5mg) on all other days or as directed by INR clinic 115 tablet 0       ALLERGIES   No Known Allergies    PAST MEDICAL HISTORY:  Past Medical History:   Diagnosis Date     Arthritis     osteoarthrosis     Coronary atherosclerosis of native coronary artery     Mild per angiogram     Elevated PSA      Fatigue      HLD (hyperlipidemia)      Palpitations      Paroxysmal atrial fibrillation (H) 05/16/2017    S/p PVI and CTI ablation 5/18/2020     Renal " disease     kidney calculus     SOB (shortness of breath)        PAST SURGICAL HISTORY:  Past Surgical History:   Procedure Laterality Date     ARTHROPLASTY HIP  1/14/2013    Procedure: ARTHROPLASTY HIP;  RIGHT TOTAL HIP ARTHROPLASTY ;  Surgeon: Jamie Ohara MD;  Location:  OR     ARTHROPLASTY KNEE Right 11/6/2019    Procedure: RIGHT TOTAL KNEE ARTHROPLASTY (DEPUY)^;  Surgeon: Jamie Ohara MD;  Location:  OR     CARDIOVERSION       COLONOSCOPY N/A 2/9/2016    Procedure: COLONOSCOPY;  Surgeon: Astrid Vital MD;  Location:  GI     CV CORONARY ANGIOGRAM N/A 4/14/2021    Procedure: Coronary Angiogram;  Surgeon: Jacob Baird MD;  Location:  HEART CARDIAC CATH LAB     CV INSTANTANEOUS WAVE-FREE RATIO N/A 4/14/2021    Procedure: Instantaneous Wave-Free Ratio;  Surgeon: Jacob Baird MD;  Location:  HEART CARDIAC CATH LAB     CYSTOSCOPY, RETROGRADES, EXTRACT STONE, COMBINED  9/12/2013    Procedure: COMBINED CYSTOSCOPY, RETROGRADES, EXTRACT STONE;  CYSTOSCOPY, RIGHT RETROGRADE, STONE EXTRACTION;  Surgeon: Carlos Mcknight MD;  Location:  OR     ENT SURGERY      stapendectomy     EP ABLATION FOCAL AFIB N/A 5/18/2020    Procedure: EP Ablation Focal AFIB;  Surgeon: Grzegorz Malhotra MD;  Location:  HEART CARDIAC CATH LAB     GENITOURINARY SURGERY      cystoscopy for stone removal     HERNIA REPAIR       ORTHOPEDIC SURGERY      shoulder surgeries,bilat carpel tunnel     TONSILLECTOMY         FAMILY HISTORY:  Family History   Problem Relation Age of Onset     Lung Cancer Brother        SOCIAL HISTORY:  Social History     Socioeconomic History     Marital status:      Spouse name: None     Number of children: None     Years of education: None     Highest education level: None   Tobacco Use     Smoking status: Never Smoker     Smokeless tobacco: Never Used   Substance and Sexual Activity     Alcohol use: No     Drug use: No       Review of Systems:  Skin:  Negative      Eyes:  Positive for glasses  ENT:  Positive for tinnitus  Respiratory:  Negative    Cardiovascular:  Negative    Gastroenterology: Negative melena;hematochezia  Genitourinary:  Positive for urinary frequency;urgency;retention;prostate problem;nocturia  Musculoskeletal:  Positive for arthritis;joint pain;joint swelling;joint stiffness  Neurologic:  Negative    Psychiatric:  Negative    Heme/Lymph/Imm:  Positive for hay fever  Endocrine:  Negative        Recent Lab Results:  LIPID RESULTS:  Lab Results   Component Value Date    CHOL 188 10/30/2020    HDL 48 10/30/2020     (H) 10/30/2020    TRIG 147 10/30/2020       LIVER ENZYME RESULTS:  Lab Results   Component Value Date    AST 26 05/16/2017    ALT <5 (L) 10/30/2020       CBC RESULTS:  Lab Results   Component Value Date    WBC 5.0 04/14/2021    RBC 4.76 04/14/2021    HGB 14.7 04/14/2021    HCT 43.7 04/14/2021    MCV 92 04/14/2021    MCH 30.9 04/14/2021    MCHC 33.6 04/14/2021    RDW 12.6 04/14/2021     04/14/2021       BMP RESULTS:  Lab Results   Component Value Date     04/14/2021    POTASSIUM 4.2 04/14/2021    CHLORIDE 110 (H) 04/14/2021    CO2 26 04/14/2021    ANIONGAP 5 04/14/2021    GLC 86 04/14/2021    BUN 20 04/14/2021    CR 1.19 04/14/2021    GFRESTIMATED 57 (L) 04/14/2021    GFRESTBLACK 67 04/14/2021    CHERI 8.6 04/14/2021        A1C RESULTS:  No results found for: A1C    INR RESULTS:  Lab Results   Component Value Date    INR 2.3 (A) 01/27/2022    INR 2.3 01/27/2022    INR 1.50 (H) 07/07/2021    INR 3.10 (H) 06/22/2021         ECHOCARDIOGRAM  No results found for this or any previous visit (from the past 8760 hour(s)).      Orders Placed This Encounter   Procedures     EKG 12-lead complete w/read - Clinics (performed today)     No orders of the defined types were placed in this encounter.    There are no discontinued medications.      Encounter Diagnosis   Name Primary?     Paroxysmal atrial fibrillation (H)          CLAU Carrillo  Nica Bernal, APRN CNP  [address]  [place] [address]    Thank you for allowing me to participate in the care of your patient.      Sincerely,     Grzegorz Malhotra MD     [facility]

## 2022-05-04 ENCOUNTER — DOCUMENTATION ONLY (OUTPATIENT)
Dept: ANTICOAGULATION | Facility: CLINIC | Age: 82
End: 2022-05-04
Payer: MEDICARE

## 2022-05-04 DIAGNOSIS — I48.0 PAROXYSMAL ATRIAL FIBRILLATION (H): Primary | ICD-10-CM

## 2022-05-04 NOTE — PROGRESS NOTES
ANTICOAGULATION MANAGEMENT      Nigel Rodarte Jr. due for annual renewal of referral to anticoagulation monitoring. Order pended for your review and signature.      ANTICOAGULATION SUMMARY      Warfarin indication(s)     Atrial fibrillation    Heart valve present?  NO       Current goal range   INR: 2.0-3.0     Goal appropriate for indication? Yes, INR 2-3 appropriate for hx of DVT, PE, hypercoagulable state, Afib, LVAD, or bileaflet AVR without risk factors     Current duration of therapy Indefinite/long term therapy   Time in Therapeutic Range (TTR)  (Goal > 60%) 43.9%       Office visit with referring provider's group within last year yes on 5/3/22       Jess Lawrence RN

## 2022-05-11 ENCOUNTER — LAB (OUTPATIENT)
Dept: LAB | Facility: CLINIC | Age: 82
End: 2022-05-11
Payer: MEDICARE

## 2022-05-11 ENCOUNTER — ANTICOAGULATION THERAPY VISIT (OUTPATIENT)
Dept: ANTICOAGULATION | Facility: CLINIC | Age: 82
End: 2022-05-11

## 2022-05-11 DIAGNOSIS — I48.91 ATRIAL FIBRILLATION (H): Primary | ICD-10-CM

## 2022-05-11 DIAGNOSIS — Z79.01 LONG TERM CURRENT USE OF ANTICOAGULANTS WITH INR GOAL OF 2.0-3.0: ICD-10-CM

## 2022-05-11 DIAGNOSIS — I48.0 PAROXYSMAL ATRIAL FIBRILLATION (H): ICD-10-CM

## 2022-05-11 LAB — INR BLD: 2.1 (ref 0.9–1.1)

## 2022-05-11 PROCEDURE — 85610 PROTHROMBIN TIME: CPT

## 2022-05-11 PROCEDURE — 36415 COLL VENOUS BLD VENIPUNCTURE: CPT

## 2022-05-12 LAB
ALT SERPL-CCNC: 43 IU/L (ref 8–45)
AST SERPL-CCNC: 47 IU/L (ref 2–40)
CREATININE (EXTERNAL): 1.03 MG/DL (ref 0.72–1.25)
GFR ESTIMATED (EXTERNAL): 73 ML/MIN/1.73M2
GLUCOSE (EXTERNAL): 88 MG/DL (ref 65–100)
POTASSIUM (EXTERNAL): 4.1 MMOL/L (ref 3.5–5)

## 2022-05-12 NOTE — TELEPHONE ENCOUNTER
5/12/22 Spoke again to [facility] in [place] and requested records from [facility] in February. Fax # given  TOerroRN [phone] pm

## 2022-05-12 NOTE — PROGRESS NOTES
ANTICOAGULATION MANAGEMENT     Nigel Rodarte Jr. 81 year old male is on warfarin with therapeutic INR result. (Goal INR 2.0-3.0)    Recent labs: (last 7 days)     05/11/22  1500   INR 2.1*       ASSESSMENT       Source(s): Chart Review    Previous INR was Therapeutic last 2(+) visits    Medication, diet, health changes since last INR chart reviewed; none identified           PLAN     Recommended plan for no diet, medication or health factor changes affecting INR     Dosing Instructions: continue your current warfarin dose with next INR in 4 weeks       Summary  As of 5/11/2022    Full warfarin instructions:  5 mg every Mon, Wed, Fri; 7.5 mg all other days   Next INR check:  6/9/2022             Detailed voice message left for Nigel with dosing instructions and follow up date.     Contact [phone] to schedule and with any changes, questions or concerns.     Education provided: Please call back if any changes to your diet, medications or how you've been taking warfarin    Plan made per [facility] anticoagulation protocol    Brissa Almaguer RN  Anticoagulation Clinic  5/12/2022    _______________________________________________________________________     Anticoagulation Episode Summary     Current INR goal:  2.0-3.0   TTR:  42.4 % (8.7 mo)   Target end date:  Indefinite   Send INR reminders to:  [facility] HEART INR NURSE    Indications    Atrial fibrillation (H) [I48.91] [I48.91]  Long term current use of anticoagulants with INR goal of 2.0-3.0 [Z79.01]  Paroxysmal atrial fibrillation (H) [I48.0]           Comments:           Anticoagulation Care Providers     Provider Role Specialty Phone number    Grzegorz Malhotra MD Referring Cardiovascular Disease [phone]

## 2022-05-20 NOTE — TELEPHONE ENCOUNTER
5/20/22 Spoke w pt and asked him to reach out to [facility] as 2 attempts have been made to obtain records . Gave pt phone # and Afib fax # to pass along. Pt stated he will reach out to them today   KHerrKaykay 1010 am

## 2022-06-09 ENCOUNTER — LAB (OUTPATIENT)
Dept: LAB | Facility: CLINIC | Age: 82
End: 2022-06-09
Payer: MEDICARE

## 2022-06-09 ENCOUNTER — ANTICOAGULATION THERAPY VISIT (OUTPATIENT)
Dept: ANTICOAGULATION | Facility: CLINIC | Age: 82
End: 2022-06-09

## 2022-06-09 DIAGNOSIS — Z79.01 LONG TERM CURRENT USE OF ANTICOAGULANTS WITH INR GOAL OF 2.0-3.0: ICD-10-CM

## 2022-06-09 DIAGNOSIS — I48.0 PAROXYSMAL ATRIAL FIBRILLATION (H): ICD-10-CM

## 2022-06-09 DIAGNOSIS — I48.91 ATRIAL FIBRILLATION (H): Primary | ICD-10-CM

## 2022-06-09 LAB — INR BLD: 3.8 (ref 0.9–1.1)

## 2022-06-09 PROCEDURE — 36416 COLLJ CAPILLARY BLOOD SPEC: CPT

## 2022-06-09 PROCEDURE — 85610 PROTHROMBIN TIME: CPT

## 2022-06-09 NOTE — PROGRESS NOTES
ANTICOAGULATION MANAGEMENT     Nigel Rodarte . 82 year old male is on warfarin with supratherapeutic INR result. (Goal INR 2.0-3.0)    Recent labs: (last 7 days)     06/09/22  1312   INR 3.8*       ASSESSMENT       Source(s): Chart Review and Patient/Caregiver Call       Warfarin doses taken: Warfarin taken as instructed    Diet: Decreased greens/vitamin K in diet; plans to resume previous intake    New illness, injury, or hospitalization: No    Medication/supplement changes: None noted    Signs or symptoms of bleeding or clotting: No    Previous INR: Therapeutic last 2(+) visits    Additional findings: Pt has been out of town last 5 days - altered diet       PLAN     Recommended plan for temporary change(s) affecting INR     Dosing Instructions: hold dose then continue your current warfarin dose with next INR in 2 weeks       Summary  As of 6/9/2022    Full warfarin instructions:  6/9: Hold; Otherwise 5 mg every Mon, Wed, Fri; 7.5 mg all other days   Next INR check:  6/23/2022             Telephone call with Nigel who verbalizes understanding and agrees to plan    Lab visit scheduled    Education provided: Goal range and significance of current result, Importance of following up at instructed interval and Monitoring for bleeding signs and symptoms    Plan made per [facility] anticoagulation protocol    Brissa Almaguer RN  Anticoagulation Clinic  6/9/2022    _______________________________________________________________________     Anticoagulation Episode Summary     Current INR goal:  2.0-3.0   TTR:  41.7 % (8.7 mo)   Target end date:  Indefinite   Send INR reminders to:  [facility] HEART INR NURSE    Indications    Atrial fibrillation (H) [I48.91] [I48.91]  Long term current use of anticoagulants with INR goal of 2.0-3.0 [Z79.01]  Paroxysmal atrial fibrillation (H) [I48.0]           Comments:           Anticoagulation Care Providers     Provider Role Specialty Phone number    Grzegorz Malhotra MD Referring  Cardiovascular Disease [phone]

## 2022-06-19 ENCOUNTER — APPOINTMENT (OUTPATIENT)
Dept: CT IMAGING | Facility: CLINIC | Age: 82
End: 2022-06-19
Attending: EMERGENCY MEDICINE
Payer: MEDICARE

## 2022-06-19 ENCOUNTER — HOSPITAL ENCOUNTER (EMERGENCY)
Facility: CLINIC | Age: 82
Discharge: HOME | End: 2022-06-19
Attending: PHYSICIAN ASSISTANT | Admitting: PHYSICIAN ASSISTANT
Payer: MEDICARE

## 2022-06-19 ENCOUNTER — APPOINTMENT (OUTPATIENT)
Dept: GENERAL RADIOLOGY | Facility: CLINIC | Age: 82
End: 2022-06-19
Attending: PHYSICIAN ASSISTANT
Payer: MEDICARE

## 2022-06-19 VITALS
BODY MASS INDEX: 22.22 KG/M2 | WEIGHT: 150 LBS | OXYGEN SATURATION: 98 % | HEART RATE: 81 BPM | TEMPERATURE: 97.6 F | DIASTOLIC BLOOD PRESSURE: 84 MMHG | SYSTOLIC BLOOD PRESSURE: 137 MMHG | RESPIRATION RATE: 18 BRPM | HEIGHT: 69 IN

## 2022-06-19 DIAGNOSIS — S09.90XA INJURY OF HEAD, INITIAL ENCOUNTER: ICD-10-CM

## 2022-06-19 DIAGNOSIS — M25.562 ACUTE PAIN OF LEFT KNEE: ICD-10-CM

## 2022-06-19 PROCEDURE — 99284 EMERGENCY DEPT VISIT MOD MDM: CPT | Mod: 25

## 2022-06-19 PROCEDURE — 73562 X-RAY EXAM OF KNEE 3: CPT | Mod: LT

## 2022-06-19 PROCEDURE — 70450 CT HEAD/BRAIN W/O DYE: CPT

## 2022-06-19 ASSESSMENT — ENCOUNTER SYMPTOMS
ARTHRALGIAS: 1
NUMBNESS: 0
HEADACHES: 1
BACK PAIN: 0
VOMITING: 0
NECK PAIN: 1
ABDOMINAL PAIN: 0
NAUSEA: 0
DIZZINESS: 0
SHORTNESS OF BREATH: 0
WEAKNESS: 0
LIGHT-HEADEDNESS: 0
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
## 2022-06-19 NOTE — DISCHARGE INSTRUCTIONS
-Your CT scan of your head does not reveal bleeding.  However, as discussed you are at increased risk of delayed bleeding due to coumadin therapy.  Have a low threshold to return if you develop headache, dizziness, confusion, vomiting or any other concerns.  NO activity that would allow you to strike your head again until you are seen by your primary care provider.

## 2022-06-19 NOTE — ED NOTES
Patient ambulatory to room. Gait stable. Patient continues to endorse improvement in knee discomfort. Rates at 4/10.    Patient continues to have mild headache. Refused offered pain medication.    Patient denies any dizziness/lightheadedness, vision changes, nausea/vomiting, or other concerns.    Patient remains A and Ox 4.

## 2022-06-19 NOTE — ED NOTES
Patient is resting quietly on cart with spouse at bedside.. Respirations are regular and unlabored. Patient repositions self independently on cart.    Patient and spouse updated on continued pOC and waits. Deny needs. Continue to monitor.
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
## 2022-06-20 ENCOUNTER — TELEPHONE (OUTPATIENT)
Dept: ANTICOAGULATION | Facility: CLINIC | Age: 82
End: 2022-06-20
Payer: MEDICARE

## 2022-06-20 DIAGNOSIS — I48.0 PAROXYSMAL ATRIAL FIBRILLATION (H): ICD-10-CM

## 2022-06-20 DIAGNOSIS — Z79.01 LONG TERM CURRENT USE OF ANTICOAGULANTS WITH INR GOAL OF 2.0-3.0: ICD-10-CM

## 2022-06-20 DIAGNOSIS — I48.91 ATRIAL FIBRILLATION (H): Primary | ICD-10-CM

## 2022-06-20 NOTE — TELEPHONE ENCOUNTER
ANTICOAGULATION  MANAGEMENT: Discharge Review    Nigel Rodarte Jr. chart reviewed for anticoagulation continuity of care    Emergency room visit on 6/19 for head injury and knee injury.    Discharge disposition: Home    Results:    No results for input(s): INR, [id number], F2, ALMWH, AAUFH in the last 168 hours.  Anticoagulation inpatient management:     not applicable     Anticoagulation discharge instructions:     Warfarin dosing: home regimen continued   Bridging: No   INR goal change: No      Medication changes affecting anticoagulation: No    Additional factors affecting anticoagulation: No     PLAN     No adjustment to anticoagulation plan needed    Patient not contacted    No adjustment to Anticoagulation Calendar was required.  Already scheduled for INR this week on 6/23/22.      Gilma Anton RN

## 2022-06-24 DIAGNOSIS — I48.91 ATRIAL FIBRILLATION, UNSPECIFIED TYPE (H): ICD-10-CM

## 2022-06-24 DIAGNOSIS — Z79.01 LONG TERM CURRENT USE OF ANTICOAGULANT THERAPY: ICD-10-CM

## 2022-06-24 RX ORDER — WARFARIN SODIUM 5 MG/1
TABLET ORAL
Qty: 115 TABLET | Refills: 0 | Status: SHIPPED | OUTPATIENT
Start: 2022-06-24 | End: 2022-07-15

## 2022-06-24 NOTE — TELEPHONE ENCOUNTER
Fax received from [facility] for refill of Warfarin 5 mg tablets  Moriah Whelan, RN  Anticoagulation Nurse - [facility], [place]

## 2022-07-15 DIAGNOSIS — I48.91 ATRIAL FIBRILLATION, UNSPECIFIED TYPE (H): ICD-10-CM

## 2022-07-15 DIAGNOSIS — Z79.01 LONG TERM CURRENT USE OF ANTICOAGULANT THERAPY: ICD-10-CM

## 2022-07-15 RX ORDER — WARFARIN SODIUM 5 MG/1
TABLET ORAL
Qty: 115 TABLET | Refills: 1 | Status: SHIPPED | OUTPATIENT
Start: 2022-07-15 | End: 2023-01-16

## 2022-07-18 ENCOUNTER — ANTICOAGULATION THERAPY VISIT (OUTPATIENT)
Dept: ANTICOAGULATION | Facility: CLINIC | Age: 82
End: 2022-07-18

## 2022-07-18 ENCOUNTER — LAB (OUTPATIENT)
Dept: LAB | Facility: CLINIC | Age: 82
End: 2022-07-18
Payer: MEDICARE

## 2022-07-18 DIAGNOSIS — I48.0 PAROXYSMAL ATRIAL FIBRILLATION (H): ICD-10-CM

## 2022-07-18 DIAGNOSIS — Z79.01 LONG TERM CURRENT USE OF ANTICOAGULANTS WITH INR GOAL OF 2.0-3.0: ICD-10-CM

## 2022-07-18 DIAGNOSIS — I48.91 ATRIAL FIBRILLATION, UNSPECIFIED TYPE (H): Primary | ICD-10-CM

## 2022-07-18 LAB — INR BLD: 3.4 (ref 0.9–1.1)

## 2022-07-18 PROCEDURE — 36416 COLLJ CAPILLARY BLOOD SPEC: CPT

## 2022-07-18 PROCEDURE — 85610 PROTHROMBIN TIME: CPT

## 2022-07-18 NOTE — PROGRESS NOTES
ANTICOAGULATION MANAGEMENT     Nigel Rodarte Jr. 82 year old male is on warfarin with supratherapeutic INR result. (Goal INR 2.0-3.0)    Recent labs: (last 7 days)     07/18/22  1041   INR 3.4*       ASSESSMENT       Source(s): Chart Review and Patient/Caregiver Call       Warfarin doses taken: Reviewed in chart    Diet: No new diet changes identified    New illness, injury, or hospitalization: 6/19 ER visit for head and knee injury     Medication/supplement changes: None noted    Signs or symptoms of bleeding or clotting: No    Previous INR: Supratherapeutic    Additional findings: None       PLAN     Recommended plan for no diet, medication or health factor changes affecting INR     Dosing Instructions: decrease your warfarin dose (5.6% change) with next INR in 2 weeks       Summary  As of 7/18/2022    Full warfarin instructions:  7.5 mg every Sun, Tue, Thu; 5 mg all other days   Next INR check:  8/1/2022             Detailed voice message left for Nigel with dosing instructions and follow up date.     Contact [phone] to schedule and with any changes, questions or concerns.     Education provided: Goal range and significance of current result and Contact [phone] with any changes, questions or concerns.     Plan made per [facility] anticoagulation protocol    Gilma Anton, RN  Anticoagulation Clinic  7/18/2022    _______________________________________________________________________     Anticoagulation Episode Summary     Current INR goal:  2.0-3.0   TTR:  35.2 % (8.7 mo)   Target end date:  Indefinite   Send INR reminders to:  [facility] HEART INR NURSE    Indications    Atrial fibrillation (H) [I48.91] [I48.91]  Long term current use of anticoagulants with INR goal of 2.0-3.0 [Z79.01]  Paroxysmal atrial fibrillation (H) [I48.0]           Comments:           Anticoagulation Care Providers     Provider Role Specialty Phone number    Grzegorz Malhotra MD Referring Cardiovascular Disease [phone]

## 2022-07-25 DIAGNOSIS — R07.89 CHEST DISCOMFORT: ICD-10-CM

## 2022-07-25 RX ORDER — ROSUVASTATIN CALCIUM 20 MG/1
20 TABLET, COATED ORAL DAILY
Qty: 90 TABLET | Refills: 3 | Status: SHIPPED | OUTPATIENT
Start: 2022-07-25 | End: 2023-09-12

## 2022-08-04 ENCOUNTER — APPOINTMENT (OUTPATIENT)
Dept: MRI IMAGING | Facility: CLINIC | Age: 82
End: 2022-08-04
Attending: EMERGENCY MEDICINE
Payer: MEDICARE

## 2022-08-04 ENCOUNTER — HOSPITAL ENCOUNTER (EMERGENCY)
Facility: CLINIC | Age: 82
Discharge: HOME | End: 2022-08-04
Attending: EMERGENCY MEDICINE | Admitting: EMERGENCY MEDICINE
Payer: MEDICARE

## 2022-08-04 ENCOUNTER — APPOINTMENT (OUTPATIENT)
Dept: ULTRASOUND IMAGING | Facility: CLINIC | Age: 82
End: 2022-08-04
Attending: EMERGENCY MEDICINE
Payer: MEDICARE

## 2022-08-04 VITALS
DIASTOLIC BLOOD PRESSURE: 75 MMHG | SYSTOLIC BLOOD PRESSURE: 121 MMHG | RESPIRATION RATE: 17 BRPM | HEART RATE: 77 BPM | OXYGEN SATURATION: 98 % | TEMPERATURE: 98 F

## 2022-08-04 DIAGNOSIS — R26.81 UNSTEADY GAIT: ICD-10-CM

## 2022-08-04 DIAGNOSIS — N43.3 HYDROCELE, UNSPECIFIED HYDROCELE TYPE: ICD-10-CM

## 2022-08-04 LAB
ALBUMIN SERPL-MCNC: 4 G/DL (ref 3.4–5)
ALP SERPL-CCNC: 73 U/L (ref 40–150)
ALT SERPL W P-5'-P-CCNC: 43 U/L (ref 0–70)
ANION GAP SERPL CALCULATED.3IONS-SCNC: 2 MMOL/L (ref 3–14)
AST SERPL W P-5'-P-CCNC: 39 U/L (ref 0–45)
BASOPHILS # BLD AUTO: 0.1 10E3/UL (ref 0–0.2)
BASOPHILS NFR BLD AUTO: 1 %
BILIRUB SERPL-MCNC: 0.7 MG/DL (ref 0.2–1.3)
BUN SERPL-MCNC: 17 MG/DL (ref 7–30)
CALCIUM SERPL-MCNC: 9.3 MG/DL (ref 8.5–10.1)
CHLORIDE BLD-SCNC: 109 MMOL/L (ref 94–109)
CO2 SERPL-SCNC: 26 MMOL/L (ref 20–32)
CREAT SERPL-MCNC: 0.97 MG/DL (ref 0.66–1.25)
EOSINOPHIL # BLD AUTO: 0.2 10E3/UL (ref 0–0.7)
EOSINOPHIL NFR BLD AUTO: 3 %
ERYTHROCYTE [DISTWIDTH] IN BLOOD BY AUTOMATED COUNT: 12.5 % (ref 10–15)
GFR SERPL CREATININE-BSD FRML MDRD: 78 ML/MIN/1.73M2
GLUCOSE BLD-MCNC: 102 MG/DL (ref 70–99)
HCT VFR BLD AUTO: 47 % (ref 40–53)
HGB BLD-MCNC: 15.4 G/DL (ref 13.3–17.7)
IMM GRANULOCYTES # BLD: 0 10E3/UL
IMM GRANULOCYTES NFR BLD: 0 %
INR PPP: 3.68 (ref 0.85–1.15)
LYMPHOCYTES # BLD AUTO: 1.7 10E3/UL (ref 0.8–5.3)
LYMPHOCYTES NFR BLD AUTO: 26 %
MCH RBC QN AUTO: 30.1 PG (ref 26.5–33)
MCHC RBC AUTO-ENTMCNC: 32.8 G/DL (ref 31.5–36.5)
MCV RBC AUTO: 92 FL (ref 78–100)
MONOCYTES # BLD AUTO: 0.4 10E3/UL (ref 0–1.3)
MONOCYTES NFR BLD AUTO: 7 %
NEUTROPHILS # BLD AUTO: 4.2 10E3/UL (ref 1.6–8.3)
NEUTROPHILS NFR BLD AUTO: 63 %
NRBC # BLD AUTO: 0 10E3/UL
NRBC BLD AUTO-RTO: 0 /100
PLATELET # BLD AUTO: 185 10E3/UL (ref 150–450)
POTASSIUM BLD-SCNC: 4.5 MMOL/L (ref 3.4–5.3)
PROT SERPL-MCNC: 7.3 G/DL (ref 6.8–8.8)
RBC # BLD AUTO: 5.11 10E6/UL (ref 4.4–5.9)
SODIUM SERPL-SCNC: 137 MMOL/L (ref 133–144)
TROPONIN I SERPL HS-MCNC: 5 NG/L
WBC # BLD AUTO: 6.6 10E3/UL (ref 4–11)

## 2022-08-04 PROCEDURE — G1010 CDSM STANSON: HCPCS

## 2022-08-04 PROCEDURE — 85014 HEMATOCRIT: CPT | Performed by: EMERGENCY MEDICINE

## 2022-08-04 PROCEDURE — 70549 MR ANGIOGRAPH NECK W/O&W/DYE: CPT | Mod: MA

## 2022-08-04 PROCEDURE — 99285 EMERGENCY DEPT VISIT HI MDM: CPT | Mod: 25

## 2022-08-04 PROCEDURE — 36415 COLL VENOUS BLD VENIPUNCTURE: CPT | Performed by: EMERGENCY MEDICINE

## 2022-08-04 PROCEDURE — 70544 MR ANGIOGRAPHY HEAD W/O DYE: CPT | Mod: MA,59

## 2022-08-04 PROCEDURE — A9585 GADOBUTROL INJECTION: HCPCS | Performed by: EMERGENCY MEDICINE

## 2022-08-04 PROCEDURE — 76870 US EXAM SCROTUM: CPT

## 2022-08-04 PROCEDURE — 80053 COMPREHEN METABOLIC PANEL: CPT | Performed by: EMERGENCY MEDICINE

## 2022-08-04 PROCEDURE — 82040 ASSAY OF SERUM ALBUMIN: CPT | Performed by: EMERGENCY MEDICINE

## 2022-08-04 PROCEDURE — 84484 ASSAY OF TROPONIN QUANT: CPT | Performed by: EMERGENCY MEDICINE

## 2022-08-04 PROCEDURE — 255N000002 HC RX 255 OP 636: Performed by: EMERGENCY MEDICINE

## 2022-08-04 PROCEDURE — 85610 PROTHROMBIN TIME: CPT | Performed by: EMERGENCY MEDICINE

## 2022-08-04 PROCEDURE — 70553 MRI BRAIN STEM W/O & W/DYE: CPT | Mod: MG

## 2022-08-04 RX ORDER — GADOBUTROL 604.72 MG/ML
10 INJECTION INTRAVENOUS ONCE
Status: COMPLETED | OUTPATIENT
Start: 2022-08-04 | End: 2022-08-04

## 2022-08-04 RX ADMIN — GADOBUTROL 10 ML: 604.72 INJECTION INTRAVENOUS at 15:29

## 2022-08-04 ASSESSMENT — ENCOUNTER SYMPTOMS
NUMBNESS: 0
DIZZINESS: 1
WEAKNESS: 0
SPEECH DIFFICULTY: 0

## 2022-08-04 NOTE — ED PROVIDER NOTES
History   Chief Complaint:  Dizziness, Loss of Balance     The history is provided by the patient and the spouse.      Nigel Rodarte Jr. is a 82 year old male, on Warfarin, with history of atrial fibrillation, hypercholesteremia, and osteoarthritis of multiple joints who presents with progressive loss of balance. The patient reports that this morning when the patient woke up that he felt more unsteady/lack of balance more than usual. Before presenting to the emergency department today he called his primary ENT and was unable to make an appointment until three weeks from now. 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 He states that his symptoms today feel similar to his past experience of ear discomfort which prompted the patients concern and their presentation to the ED today. The patient confirms dizziness and a progressive decrease in his gait/balance and says that he really has to focus on walking correctly. The patient denies numbness, tingling, weakness, speech difficulty, or recent trauma to his head. Notably, he has a history of atrial fibrillation, for which he is anticoagulated on Warfarin. He had his last INR roughly 2.5 weeks ago when his blood work came back high. Notably, he was recently taking prednisone for hip pain which was prescribed from orthopedics but has stopped this medication as his pain has resolved. The patient works as a  on a sailboat. He denies metal implants.    Review of Systems   Musculoskeletal: Positive for gait problem (unsteady).   Neurological: Positive for dizziness. Negative for speech difficulty, weakness and numbness (tingling).   10 point review of systems performed and is negative except as above and in HPI.    Allergies:  No known drug allergies    Medications:  Dutasteride  Metoprolol  succinate  Nitroglycerin  Rosuvastatin  Warfarin    Past Medical History:     Ureteral stone  Palpitations  Atrial fibrillation  Hydrocele in adult  Osteoarthrosis  Right inguinal hernia  Hypercholesterolemia  Post-traumatic osteoarthritis  Kidney stones  Benign non-nodular prostatic hyperplasia    Past Surgical History:    Arthroplasty hip  Arthroplasty knee, right  Cardioversion  Colonoscopy  Coronary angiogram  Instantaneous wave free ratio  Cystoscopy, retrogrades, extract stone, combined  Stapedectomy  Ablation focal atrial fibrillation  Cystoscopy for stone removal  Hernia repair  Shoulder surgeries, bilateral carpel tunnel  Tonsillectomy    Family History:    Lung cancer  Bipolar disorder  Terminal cancer nodules  Lung cancer  Liver cancer  Bypass surgery  Manic depressive disorder  Heart disease  COPD    Social History:  The patient presents to the ED with his wife.  The patient arrived in a private vehicle.  PCP: Osmar Espinoza MD, Family Medicine    Physical Exam     Patient Vitals for the past 24 hrs:   BP Temp Temp src Pulse Resp SpO2   08/04/22 1700 121/75 98  F (36.7  C) -- 77 17 98 %   08/04/22 1104 119/67 98  F (36.7  C) Temporal 75 16 98 %     Physical Exam  General: Resting on the gurney, appears uncomfortable  Head:  The scalp, face, and head appear normal  Mouth/Throat: Mucus membranes are moist  CV:  Regular rate    Normal S1 and S2  No pathological murmur   Resp:  Breath sounds clear and equal bilaterally    Non-labored, no retractions or accessory muscle use    No coarseness    No wheezing   GI:  Abdomen is soft, no rigidity    No tenderness to palpation  :  Enlarged right hemiscrotum, no edema or pain  MS:  Normal motor assessment of all extremities.    Good capillary refill noted.  Skin:  No rash or lesions noted.  Neuro: Speech is normal and fluent. Cranial nerves 2-12 intact other than diminished hearing in the right ear, which is chronic for him. Strength and sensation intact  x4. Normal gait. Unable to perform tandem gait, which is unusual for him.   Psych: Awake. Alert.  Normal affect.      Appropriate interactions.    Emergency Department Course     Imaging:  US Testicular & Scrotum w Doppler Ltd   Final Result   IMPRESSION:   1. Large right scrotal hydrocele.   2. Remainder unremarkable. No testicular mass.      MRA Neck (Carotids) wo & w Contrast   Final Result   IMPRESSION: No flow-limiting stenosis of the cervical carotid or   vertebral arteries.      SUE BA MD            SYSTEM ID:  [id number]      MRA Brain ([place]) wo Contrast   Final Result   IMPRESSION: Unremarkable MR angiogram of the [place].       SUE BA MD            SYSTEM ID:  [id number]      MR Brain w/o & w Contrast   Final Result   IMPRESSION:   1. No acute intracranial process.   2. Small chronic infarcts, as described.   3. Brain atrophy and presumed chronic small vessel ischemic changes,   as described.       SUE BA MD            SYSTEM ID:  [id number]        Report per radiology    Laboratory:  Labs Ordered and Resulted from Time of ED Arrival to Time of ED Departure   COMPREHENSIVE METABOLIC PANEL - Abnormal       Result Value    Sodium 137      Potassium 4.5      Chloride 109      Carbon Dioxide (CO2) 26      Anion Gap 2 (*)     Urea Nitrogen 17      Creatinine 0.97      Calcium 9.3      Glucose 102 (*)     Alkaline Phosphatase 73      AST 39      ALT 43      Protein Total 7.3      Albumin 4.0      Bilirubin Total 0.7      GFR Estimate 78     INR - Abnormal    INR 3.68 (*)    TROPONIN I - Normal    Troponin I High Sensitivity 5     CBC WITH PLATELETS AND DIFFERENTIAL    WBC Count 6.6      RBC Count 5.11      Hemoglobin 15.4      Hematocrit 47.0      MCV 92      MCH 30.1      MCHC 32.8      RDW 12.5      Platelet Count 185      % Neutrophils 63      % Lymphocytes 26      % Monocytes 7      % Eosinophils 3      % Basophils 1      % Immature Granulocytes 0      NRBCs per 100 WBC  0      Absolute Neutrophils 4.2      Absolute Lymphocytes 1.7      Absolute Monocytes 0.4      Absolute Eosinophils 0.2      Absolute Basophils 0.1      Absolute Immature Granulocytes 0.0      Absolute NRBCs 0.0       Emergency Department Course:      Reviewed:  I reviewed nursing notes, vitals, past medical history and Care Everywhere.    Assessments:  1121 I obtained history and examined the patient as noted above.   1432 I rechecked the patient and explained findings.   1616 I rechecked the patient and explained findings.  1835 I rechecked the patient and explained findings. I believe that they are safe for discharge at this time.    Interventions:  1529 Gadavist 10 mL IV    Disposition:  The patient was discharged to home.     Impression & Plan     Medical Decision Making:    Nigel Rodarte Jr. is a 82 year old male who presents for evaluation of ataxia. The differential diagnosis of is broad and includes common etiologies such as labyrinthitis, benign positional vertigo, otitis media, etc.  More serious etiologies considered include central etiologies such as tumor, intracerebral bleed, dissection, ischemic cerebral vascular accident. The history, physical exam including detailed neurologic exam, and workup in the emergency room suggests a benign cause of symptoms today. The patient has history of right ear problems with stapendectomy 35 years ago. His current dizziness feels similar to his past ear problems. Imaging was obtained in ED and resulted negative.    Diagnosis:    ICD-10-CM    1. Unsteady gait  R26.81 Physical Therapy Referral   2. Hydrocele, unspecified hydrocele type  N43.3      Scribe Disclosure:  ICecile, am serving as a scribe at 11:20 AM on 8/4/2022 to document services personally performed by Irene Gustafson MD based on my observations and the provider's statements to me.     IAnna, am serving as a scribe at 11:20 AM on 8/4/2022 to document services personally performed  by Irene Gustafson MD based on my observations and the provider's statements to me.     Irene Gustafson MD  08/04/22 6212

## 2022-08-04 NOTE — ED TRIAGE NOTES
Pt reports that he woke up with dizziness. Pt reports remote history of inner ear issue that caused this 30 years ago. Is on coumadin for Afib.      Triage Assessment     Row Name 08/04/22 1104       Triage Assessment (Adult)    Airway WDL WDL       Respiratory WDL    Respiratory WDL WDL       Skin Circulation/Temperature WDL    Skin Circulation/Temperature WDL WDL       Peripheral/Neurovascular WDL    Peripheral Neurovascular WDL WDL       Cognitive/Neuro/Behavioral WDL    Cognitive/Neuro/Behavioral WDL WDL

## 2022-08-04 NOTE — CONSULTS
"      [facility]    Stroke Telephone Note    I was called by Irene Gustafson on 08/04/22 regarding patient Nigel STRONG Cayden Silva. The patient is a 82 year old male with a PMHx of Afib on Coumadin, INR 3.68 on admission presents with intermittent gait abnormalities for the past 3 weeks. He went to bed normal and woke up with gait trouble this morning. No weakness on exam, mild gait abnormalities but able to ambulate independently. No other focal findings.     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     Recommendations   MRI brain/ MRA head and neck  Further recs pending on results    My recommendations are based on the information provided over the phone by Nigel Rodarte Jr.'s in-person providers. They are not intended to replace the clinical judgment of his in-person providers. I was not requested to personally see or examine the patient at this time.    The Stroke Staff is Dr. Rogers.    Livier Hurst MD  Vascular Neurology Fellow  To page me or covering stroke neurology team member, click here: [facility]   Choose \"On Call\" tab at top, then search dropdown box for \"Neurology Adult\", select location, press Enter, then look for stroke/neuro ICU/telestroke.        "

## 2022-08-05 ENCOUNTER — TELEPHONE (OUTPATIENT)
Dept: ANTICOAGULATION | Facility: CLINIC | Age: 82
End: 2022-08-05

## 2022-08-05 DIAGNOSIS — I48.0 PAROXYSMAL ATRIAL FIBRILLATION (H): ICD-10-CM

## 2022-08-05 DIAGNOSIS — I48.91 ATRIAL FIBRILLATION, UNSPECIFIED TYPE (H): Primary | ICD-10-CM

## 2022-08-05 DIAGNOSIS — Z79.01 LONG TERM CURRENT USE OF ANTICOAGULANTS WITH INR GOAL OF 2.0-3.0: ICD-10-CM

## 2022-08-05 NOTE — TELEPHONE ENCOUNTER
Spoke to patient.  He said he was told by the ER doctor yesterday to hold his warfarin dose last night then decrease dosing to 5mg/day.  He has been taking 4 days of 7.5mg and 3 days of 5mg and had not decreased his dosing as instructed at the last INR on 7/18.  INR scheduled for 8/12/22.  Bay MUNOZ

## 2022-08-05 NOTE — TELEPHONE ENCOUNTER
Called patient to discuss INR of 3.68 done yesterday in the ER.  Want to confirm if patient decreased his dosing last time as instructed.  Left message to call the INR clinic.  Bay MUNOZ

## 2022-08-12 ENCOUNTER — ANTICOAGULATION THERAPY VISIT (OUTPATIENT)
Dept: ANTICOAGULATION | Facility: CLINIC | Age: 82
End: 2022-08-12

## 2022-08-12 ENCOUNTER — LAB (OUTPATIENT)
Dept: LAB | Facility: CLINIC | Age: 82
End: 2022-08-12
Payer: MEDICARE

## 2022-08-12 DIAGNOSIS — I48.91 ATRIAL FIBRILLATION, UNSPECIFIED TYPE (H): Primary | ICD-10-CM

## 2022-08-12 DIAGNOSIS — I48.0 PAROXYSMAL ATRIAL FIBRILLATION (H): ICD-10-CM

## 2022-08-12 DIAGNOSIS — Z79.01 LONG TERM CURRENT USE OF ANTICOAGULANTS WITH INR GOAL OF 2.0-3.0: ICD-10-CM

## 2022-08-12 LAB — INR BLD: 1.8 (ref 0.9–1.1)

## 2022-08-12 PROCEDURE — 85610 PROTHROMBIN TIME: CPT

## 2022-08-12 PROCEDURE — 36416 COLLJ CAPILLARY BLOOD SPEC: CPT

## 2022-08-12 NOTE — PROGRESS NOTES
ANTICOAGULATION MANAGEMENT     Nigel Rodarte Jr. 82 year old male is on warfarin with subtherapeutic INR result. (Goal INR 2.0-3.0)    Recent labs: (last 7 days)     08/12/22  1310   INR 1.8*       ASSESSMENT       Source(s): Chart Review and Patient/Caregiver Call       Warfarin doses taken: Reviewed in chart    Diet: No new diet changes identified    New illness, injury, or hospitalization: No    Medication/supplement changes: None noted    Signs or symptoms of bleeding or clotting: No    Previous INR: Supratherapeutic, patient was told by ER MD last week when INR was 3.68 to decrease dosing to 5mg/day and before that he was taking 4 days of 7.5mg    Additional findings: None       PLAN     Recommended plan for no diet, medication or health factor changes affecting INR     Dosing Instructions: Increase your warfarin dose (7.1% change) with next INR in 2 weeks       Summary  As of 8/12/2022    Full warfarin instructions:  7.5 mg every Fri; 5 mg all other days   Next INR check:  8/26/2022             Detailed voice message left for Nigel with dosing instructions and follow up date.     Contact [phone] to schedule and with any changes, questions or concerns.     Education provided: Goal range and significance of current result and Contact [phone] with any changes, questions or concerns.     Plan made per [facility] anticoagulation protocol    Gilma Anton, RN  Anticoagulation Clinic  8/12/2022    _______________________________________________________________________     Anticoagulation Episode Summary     Current INR goal:  2.0-3.0   TTR:  34.4 % (8.7 mo)   Target end date:  Indefinite   Send INR reminders to:  [facility] HEART INR NURSE    Indications    Atrial fibrillation (H) [I48.91] [I48.91]  Long term current use of anticoagulants with INR goal of 2.0-3.0 [Z79.01]  Paroxysmal atrial fibrillation (H) [I48.0]           Comments:           Anticoagulation Care Providers     Provider Role Specialty Phone  number    Grzegorz Malhotra MD Referring Cardiovascular Disease [phone]

## 2022-09-06 ENCOUNTER — LAB (OUTPATIENT)
Dept: LAB | Facility: CLINIC | Age: 82
End: 2022-09-06
Payer: MEDICARE

## 2022-09-06 ENCOUNTER — ANTICOAGULATION THERAPY VISIT (OUTPATIENT)
Dept: ANTICOAGULATION | Facility: CLINIC | Age: 82
End: 2022-09-06

## 2022-09-06 DIAGNOSIS — I48.91 ATRIAL FIBRILLATION, UNSPECIFIED TYPE (H): Primary | ICD-10-CM

## 2022-09-06 DIAGNOSIS — I48.0 PAROXYSMAL ATRIAL FIBRILLATION (H): ICD-10-CM

## 2022-09-06 DIAGNOSIS — Z79.01 LONG TERM CURRENT USE OF ANTICOAGULANTS WITH INR GOAL OF 2.0-3.0: ICD-10-CM

## 2022-09-06 LAB — INR BLD: 2.3 (ref 0.9–1.1)

## 2022-09-06 PROCEDURE — 36416 COLLJ CAPILLARY BLOOD SPEC: CPT

## 2022-09-06 PROCEDURE — 85610 PROTHROMBIN TIME: CPT

## 2022-09-06 NOTE — PROGRESS NOTES
ANTICOAGULATION MANAGEMENT     Nigel Rodarte Jr. 82 year old male is on warfarin with therapeutic INR result. (Goal INR 2.0-3.0)    Recent labs: (last 7 days)     09/06/22  1454   INR 2.3*       ASSESSMENT       Source(s): Chart Review and Patient/Caregiver Call       Warfarin doses taken: Less warfarin taken than planned which may be affecting INR, he has been taking 5mg/day and didn't increase Friday to 7.5mg    Diet: No new diet changes identified    New illness, injury, or hospitalization: No    Medication/supplement changes: None noted    Signs or symptoms of bleeding or clotting: No    Previous INR: Subtherapeutic    Additional findings: None       PLAN     Recommended plan for no diet, medication or health factor changes affecting INR     Dosing Instructions: Continue your current warfarin dose with next INR in 2 weeks per patient request.      Summary  As of 9/6/2022    Full warfarin instructions:  5 mg every day   Next INR check:  9/20/2022             Telephone call with Nigel who verbalizes understanding and agrees to plan    Patient offered & declined to schedule next visit patient will call back to schedule    Education provided: Goal range and significance of current result and Contact [phone] with any changes, questions or concerns.     Plan made per [facility] anticoagulation protocol    Gilma Anton, RN  Anticoagulation Clinic  9/6/2022    _______________________________________________________________________     Anticoagulation Episode Summary     Current INR goal:  2.0-3.0   TTR:  38.8 % (8.7 mo)   Target end date:  Indefinite   Send INR reminders to:  [facility] HEART INR NURSE    Indications    Atrial fibrillation (H) [I48.91] [I48.91]  Long term current use of anticoagulants with INR goal of 2.0-3.0 [Z79.01]  Paroxysmal atrial fibrillation (H) [I48.0]           Comments:           Anticoagulation Care Providers     Provider Role Specialty Phone number    Grzegorz Malhtora MD Referring  Cardiovascular Disease [phone]

## 2022-09-15 NOTE — PROGRESS NOTES
ANTICOAGULATION MANAGEMENT     Nigel Rodarte . 81 year old male is on warfarin with therapeutic INR result. (Goal INR 2.0-3.0)    Recent labs: (last 7 days)     05/11/22  1500   INR 2.1*       ASSESSMENT       Source(s): Chart Review    Previous INR was Therapeutic last 2(+) visits    Medication, diet, health changes since last INR chart reviewed; none identified     Very overdue for INR (last 1/27/220 . Need to confirm dosing and assess for changes.            PLAN     Unable to reach Nigel today.    Left message to continue current dose of warfarin 5 mg tonight. Request call back for assessment.    Follow up required to confirm warfarin dose taken and assess for changes    Brissa Almaguer RN  Anticoagulation Clinic  5/11/2022     Suturegard Body: The suture ends were repeatedly re-tightened and re-clamped to achieve the desired tissue expansion.

## 2022-09-19 ENCOUNTER — LAB (OUTPATIENT)
Dept: LAB | Facility: CLINIC | Age: 82
End: 2022-09-19
Payer: MEDICARE

## 2022-09-19 ENCOUNTER — ANTICOAGULATION THERAPY VISIT (OUTPATIENT)
Dept: ANTICOAGULATION | Facility: CLINIC | Age: 82
End: 2022-09-19

## 2022-09-19 DIAGNOSIS — I48.91 ATRIAL FIBRILLATION (H): Primary | ICD-10-CM

## 2022-09-19 DIAGNOSIS — Z79.01 LONG TERM CURRENT USE OF ANTICOAGULANTS WITH INR GOAL OF 2.0-3.0: ICD-10-CM

## 2022-09-19 DIAGNOSIS — I48.0 PAROXYSMAL ATRIAL FIBRILLATION (H): ICD-10-CM

## 2022-09-19 LAB — INR BLD: 2.1 (ref 0.9–1.1)

## 2022-09-19 PROCEDURE — 36416 COLLJ CAPILLARY BLOOD SPEC: CPT

## 2022-09-19 PROCEDURE — 85610 PROTHROMBIN TIME: CPT

## 2022-09-19 NOTE — PROGRESS NOTES
ANTICOAGULATION MANAGEMENT     Nigel Rodarte . 82 year old male is on warfarin with therapeutic INR result. (Goal INR 2.0-3.0)    Recent labs: (last 7 days)     09/19/22  1316   INR 2.1*       ASSESSMENT       Source(s): Chart Review and Patient/Caregiver Call       Warfarin doses taken: Warfarin taken as instructed    Diet: No new diet changes identified - eating salad ~ every 3 days    New illness, injury, or hospitalization: No    Medication/supplement changes: None noted    Signs or symptoms of bleeding or clotting: No    Previous INR: Therapeutic last visit; previously outside of goal range    Additional findings: None       PLAN     Recommended plan for no diet, medication or health factor changes affecting INR     Dosing Instructions: Continue your current warfarin dose with next INR in 3 weeks       Summary  As of 9/19/2022    Full warfarin instructions:  5 mg every day   Next INR check:  10/10/2022             Telephone call with Nigel who agrees to plan and repeated back plan correctly    Patient offered & declined to schedule next visit    Education provided: Importance of consistent vitamin K intake and Goal range and significance of current result    Plan made per [facility] anticoagulation protocol    Brissa Almaguer RN  Anticoagulation Clinic  9/19/2022    _______________________________________________________________________     Anticoagulation Episode Summary     Current INR goal:  2.0-3.0   TTR:  40.1 % (8.7 mo)   Target end date:  Indefinite   Send INR reminders to:  [facility] HEART INR NURSE    Indications    Atrial fibrillation (H) [I48.91] [I48.91]  Long term current use of anticoagulants with INR goal of 2.0-3.0 [Z79.01]  Paroxysmal atrial fibrillation (H) [I48.0]           Comments:           Anticoagulation Care Providers     Provider Role Specialty Phone number    Grzegorz Malhotra MD Referring Cardiovascular Disease [phone]

## 2022-10-04 ENCOUNTER — ANTICOAGULATION THERAPY VISIT (OUTPATIENT)
Dept: ANTICOAGULATION | Facility: CLINIC | Age: 82
End: 2022-10-04

## 2022-10-04 ENCOUNTER — LAB (OUTPATIENT)
Dept: LAB | Facility: CLINIC | Age: 82
End: 2022-10-04
Payer: MEDICARE

## 2022-10-04 DIAGNOSIS — Z79.01 LONG TERM CURRENT USE OF ANTICOAGULANTS WITH INR GOAL OF 2.0-3.0: ICD-10-CM

## 2022-10-04 DIAGNOSIS — I48.0 PAROXYSMAL ATRIAL FIBRILLATION (H): ICD-10-CM

## 2022-10-04 DIAGNOSIS — I48.91 ATRIAL FIBRILLATION, UNSPECIFIED TYPE (H): Primary | ICD-10-CM

## 2022-10-04 LAB — INR BLD: 1.7 (ref 0.9–1.1)

## 2022-10-04 PROCEDURE — 85610 PROTHROMBIN TIME: CPT

## 2022-10-04 PROCEDURE — 36416 COLLJ CAPILLARY BLOOD SPEC: CPT

## 2022-10-04 NOTE — PROGRESS NOTES
ANTICOAGULATION MANAGEMENT     Nigel Rodarte Jr. 82 year old male is on warfarin with subtherapeutic INR result. (Goal INR 2.0-3.0)    Recent labs: (last 7 days)     10/04/22  1140   INR 1.7*       ASSESSMENT       Source(s): Chart Review    Previous INR was Therapeutic last 2(+) visits    Medication, diet, health changes since last INR chart reviewed; none identified           PLAN     Unable to reach patient today.    Left message to take a booster dose of warfarin,  7.5 mg tonight. Request call back for assessment.    Follow up required to confirm warfarin dose taken and assess for changes, discuss out of range result  and discuss dosing instructions and confirm understanding of instructions    Gilma Anton, RN  Anticoagulation Clinic  10/4/2022

## 2022-10-05 NOTE — PROGRESS NOTES
ANTICOAGULATION MANAGEMENT     Nigel Rodarte . 82 year old male is on warfarin with subtherapeutic INR result. (Goal INR 2.0-3.0)    Recent labs: (last 7 days)     10/04/22  1140   INR 1.7*       ASSESSMENT       Source(s): Chart Review and Patient/Caregiver Call       Warfarin doses taken: Warfarin taken as instructed denied missed doses, took 7.5mg on 10/4 as directed    Diet: No new diet changes identified    New illness, injury, or hospitalization: No    Medication/supplement changes: None noted    Signs or symptoms of bleeding or clotting: No    Previous INR: Therapeutic last 2(+) visits    Additional findings: None       PLAN     Recommended plan for no diet, medication or health factor changes affecting INR     Dosing Instructions: Increase your warfarin dose (7.1% change) with next INR in 2 weeks.  Patient prefers to increase dosing overall to get INR closer to 2.5 ideally.         Summary  As of 10/4/2022    Full warfarin instructions:  10/4: 7.5 mg; Otherwise 7.5 mg every Tue; 5 mg all other days   Next INR check:  10/18/2022             Telephone call with Nigel who verbalizes understanding and agrees to plan    Lab visit scheduled    Education provided: Goal range and significance of current result and Contact [phone] with any changes, questions or concerns.     Plan made per [facility] anticoagulation protocol    Gilma Anton, RN  Anticoagulation Clinic  10/5/2022    _______________________________________________________________________     Anticoagulation Episode Summary     Current INR goal:  2.0-3.0   TTR:  39.9 % (8.7 mo)   Target end date:  Indefinite   Send INR reminders to:  [facility] HEART INR NURSE    Indications    Atrial fibrillation (H) [I48.91] [I48.91]  Long term current use of anticoagulants with INR goal of 2.0-3.0 [Z79.01]  Paroxysmal atrial fibrillation (H) [I48.0]           Comments:           Anticoagulation Care Providers     Provider Role Specialty Phone number    [place],  Grzegorz Soler MD Referring Cardiovascular Disease [phone]

## 2022-10-18 ENCOUNTER — LAB (OUTPATIENT)
Dept: LAB | Facility: CLINIC | Age: 82
End: 2022-10-18
Payer: MEDICARE

## 2022-10-18 ENCOUNTER — ANTICOAGULATION THERAPY VISIT (OUTPATIENT)
Dept: ANTICOAGULATION | Facility: CLINIC | Age: 82
End: 2022-10-18

## 2022-10-18 DIAGNOSIS — I48.0 PAROXYSMAL ATRIAL FIBRILLATION (H): ICD-10-CM

## 2022-10-18 DIAGNOSIS — I48.91 ATRIAL FIBRILLATION, UNSPECIFIED TYPE (H): Primary | ICD-10-CM

## 2022-10-18 DIAGNOSIS — Z79.01 LONG TERM CURRENT USE OF ANTICOAGULANTS WITH INR GOAL OF 2.0-3.0: ICD-10-CM

## 2022-10-18 LAB — INR BLD: 2.8 (ref 0.9–1.1)

## 2022-10-18 PROCEDURE — 36416 COLLJ CAPILLARY BLOOD SPEC: CPT

## 2022-10-18 PROCEDURE — 85610 PROTHROMBIN TIME: CPT

## 2022-10-18 NOTE — PROGRESS NOTES
ANTICOAGULATION MANAGEMENT     Nigel Rodarte . 82 year old male is on warfarin with therapeutic INR result. (Goal INR 2.0-3.0)    Recent labs: (last 7 days)     10/18/22  0923   INR 2.8*       ASSESSMENT       Source(s): Chart Review and Patient/Caregiver Call       Warfarin doses taken: Warfarin taken as instructed    Diet: No new diet changes identified    New illness, injury, or hospitalization: No    Medication/supplement changes: None noted    Signs or symptoms of bleeding or clotting: No    Previous INR: Subtherapeutic dosing increased    Additional findings: Upcoming surgery/procedure 10/26 drain fluid from testicle per patient then MOHS in November. Patient will call and check with provider to see if warfarin hold is needed for next week.         PLAN     Recommended plan for no diet, medication or health factor changes affecting INR     Dosing Instructions: Continue your current warfarin dose with next INR in 3 weeks       Summary  As of 10/18/2022    Full warfarin instructions:  7.5 mg every Tue; 5 mg all other days   Next INR check:  11/8/2022             Telephone call with Nigel who verbalizes understanding and agrees to plan    Patient offered & declined to schedule next visit    Education provided: Goal range and significance of current result, Importance of notifying clinic of upcoming surgeries and procedures 2 weeks in advance and Contact [phone] with any changes, questions or concerns.     Plan made per [facility] anticoagulation protocol    Gilma Anton, RN  Anticoagulation Clinic  10/18/2022    _______________________________________________________________________     Anticoagulation Episode Summary     Current INR goal:  2.0-3.0   TTR:  43.7 % (8.7 mo)   Target end date:  Indefinite   Send INR reminders to:  [facility] [[email or web address] HEART INR NURSE    Indications    Atrial fibrillation (H) [I48.91] [I48.91]  Long term current use of anticoagulants with INR goal of 2.0-3.0 [Z79.01]  Paroxysmal atrial  fibrillation (H) [I48.0]           Comments:           Anticoagulation Care Providers     Provider Role Specialty Phone number    Grzegorz Malhotra MD Referring Cardiovascular Disease [phone]

## 2022-10-21 ENCOUNTER — TELEPHONE (OUTPATIENT)
Dept: ANTICOAGULATION | Facility: CLINIC | Age: 82
End: 2022-10-21

## 2022-10-21 DIAGNOSIS — I48.91 ATRIAL FIBRILLATION, UNSPECIFIED TYPE (H): Primary | ICD-10-CM

## 2022-10-21 DIAGNOSIS — I48.0 PAROXYSMAL ATRIAL FIBRILLATION (H): ICD-10-CM

## 2022-10-21 DIAGNOSIS — Z79.01 LONG TERM CURRENT USE OF ANTICOAGULANTS WITH INR GOAL OF 2.0-3.0: ICD-10-CM

## 2022-10-21 NOTE — TELEPHONE ENCOUNTER
Gordon called stating that Dr. Mcknight with [facility] would like him to be off his warfarin for 5 days leading up to 10/26 procedure to drain fluid from his testicle. Advised patient to hold warfarin starting tonight. Will follow up with him with any further directions as needed. Routing to [facility] for hold/bridge assessment.

## 2022-10-21 NOTE — TELEPHONE ENCOUNTER
".VIDHI-PROCEDURAL ANTICOAGULATION  MANAGEMENT    ASSESSMENT     Warfarin interruption plan for testicular surgery on 10/26/2022.    Indication for Anticoagulation: Atrial Fibrillation      [id number] = 2 (Age >= 75)      Vidhi-Procedure Risk stratification for thromboembolism: low (2017 ACC periprocedure pathway for NVAF Expert Consensus)    NVAF: 2017 ACC periprocedure pathway for NVAF advises NO bridge for low risk stratification ([id number] score <=4 and no prior hx of stroke, TIA or systemic embolism)     RECOMMENDATION       Pre-Procedure:  o Hold warfarin for 5 days, until after procedure starting: 10/21/2022   o No Bridge      Post-Procedure:  o Resume warfarin dose if okay with provider doing procedure on night of procedure, 10/26/2022 PM: 7.5mg  o Recheck INR ~ 7 days after resuming warfarin       Plan routed to referring provider for approval  ?   Anjelica Baires [facility]    SUBJECTIVE/OBJECTIVE     Nigel STRONG Cayden Silva, a 82 year old male    Goal INR Range: 2.0-3.0     Patient bridged in past: No      Wt Readings from Last 3 Encounters:   06/19/22 68 kg (150 lb)   05/03/22 71.2 kg (157 lb)   11/09/21 69.9 kg (154 lb 3.2 oz)      Ideal body weight: 69.6 kg (153 lb 5.3 oz)     Estimated body mass index is 22.48 kg/m  as calculated from the following:    Height as of 6/19/22: 1.74 m (5' 8.5\").    Weight as of 6/19/22: 68 kg (150 lb).    Lab Results   Component Value Date    INR 2.8 (H) 10/18/2022    INR 1.7 (H) 10/04/2022    INR 2.1 (H) 09/19/2022     Lab Results   Component Value Date    HGB 15.4 08/04/2022    HGB 14.7 04/14/2021    HCT 47.0 08/04/2022    HCT 43.7 04/14/2021     08/04/2022     04/14/2021     Lab Results   Component Value Date    CR 0.97 08/04/2022    CR 1.19 04/14/2021    CR 1.11 05/18/2020     CrCl cannot be calculated (Patient's most recent lab result is older than the maximum 30 days allowed.).  "

## 2022-10-24 NOTE — TELEPHONE ENCOUNTER
Called patient and reviewed warfarin plan.  He is scheduled for INR recheck on 11/4/22.  Patient scheduled for MOHS on 11/10 and will check with clinic for warfarin or INR recommendations.  He also wanted INR on 11/14/22 because then he heads to [place] for the winter.  Bay MUNOZ

## 2022-11-04 ENCOUNTER — LAB (OUTPATIENT)
Dept: LAB | Facility: CLINIC | Age: 82
End: 2022-11-04
Payer: MEDICARE

## 2022-11-04 ENCOUNTER — ANTICOAGULATION THERAPY VISIT (OUTPATIENT)
Dept: ANTICOAGULATION | Facility: CLINIC | Age: 82
End: 2022-11-04

## 2022-11-04 DIAGNOSIS — I48.0 PAROXYSMAL ATRIAL FIBRILLATION (H): ICD-10-CM

## 2022-11-04 DIAGNOSIS — Z79.01 LONG TERM CURRENT USE OF ANTICOAGULANTS WITH INR GOAL OF 2.0-3.0: ICD-10-CM

## 2022-11-04 DIAGNOSIS — I48.91 ATRIAL FIBRILLATION, UNSPECIFIED TYPE (H): Primary | ICD-10-CM

## 2022-11-04 LAB — INR BLD: 1.7 (ref 0.9–1.1)

## 2022-11-04 PROCEDURE — 36416 COLLJ CAPILLARY BLOOD SPEC: CPT

## 2022-11-04 PROCEDURE — 85610 PROTHROMBIN TIME: CPT

## 2022-11-04 NOTE — PROGRESS NOTES
ANTICOAGULATION MANAGEMENT     Nigel Rodarte . 82 year old male is on warfarin with subtherapeutic INR result. (Goal INR 2.0-3.0)    Recent labs: (last 7 days)     11/04/22  0949   INR 1.7*       ASSESSMENT       Source(s): Chart Review and Patient/Caregiver Call       Warfarin doses taken: Held for 5 days for testicular surgery on 10/26  recently which may be affecting INR    Diet: No new diet changes identified    New illness, injury, or hospitalization: No    Medication/supplement changes: None noted    Signs or symptoms of bleeding or clotting: No    Previous INR: Therapeutic last visit; previously outside of goal range    Additional findings: Upcoming surgery/procedure 11/10/22 MOHS: patient will call derm clinic to check if warfarin should be held       PLAN     Recommended plan for temporary change(s) affecting INR     Dosing Instructions: Continue your current warfarin dose with next INR in 10 days.  Decided to not give boost today to keep INR on the lower side for MOHS next week.  Patient thinks he should hold warfarin 3-5 days before MOHS but I told him the dermatology clinic should give their recommendations.        Summary  As of 11/4/2022    Full warfarin instructions:  7.5 mg every Tue; 5 mg all other days; Starting 11/4/2022   Next INR check:  11/14/2022             Telephone call with Nigel who verbalizes understanding and agrees to plan    Patient elected to schedule next visit 11/14 then he goes to [place] for the winter     Education provided:     Goal range and lab monitoring: goal range and significance of current result    Contact [phone] with any changes, questions or concerns.     Plan made per [facility] anticoagulation protocol    Gilma Anton, RN  Anticoagulation Clinic  11/4/2022    _______________________________________________________________________     Anticoagulation Episode Summary     Current INR goal:  2.0-3.0   TTR:  44.3 % (8.7 mo)   Target end date:  Indefinite    Send INR reminders to:  [facility] HEART INR NURSE    Indications    Atrial fibrillation (H) [I48.91] [I48.91]  Long term current use of anticoagulants with INR goal of 2.0-3.0 [Z79.01]  Paroxysmal atrial fibrillation (H) [I48.0]           Comments:           Anticoagulation Care Providers     Provider Role Specialty Phone number    Grzegorz Malhotra MD Referring Cardiovascular Disease [phone]

## 2022-11-14 ENCOUNTER — LAB (OUTPATIENT)
Dept: LAB | Facility: CLINIC | Age: 82
End: 2022-11-14
Payer: MEDICARE

## 2022-11-14 ENCOUNTER — ANTICOAGULATION THERAPY VISIT (OUTPATIENT)
Dept: ANTICOAGULATION | Facility: CLINIC | Age: 82
End: 2022-11-14

## 2022-11-14 DIAGNOSIS — Z79.01 LONG TERM CURRENT USE OF ANTICOAGULANTS WITH INR GOAL OF 2.0-3.0: ICD-10-CM

## 2022-11-14 DIAGNOSIS — I48.0 PAROXYSMAL ATRIAL FIBRILLATION (H): ICD-10-CM

## 2022-11-14 DIAGNOSIS — I48.0 PAROXYSMAL ATRIAL FIBRILLATION (H): Primary | ICD-10-CM

## 2022-11-14 LAB — INR BLD: 1.4 (ref 0.9–1.1)

## 2022-11-14 PROCEDURE — 36416 COLLJ CAPILLARY BLOOD SPEC: CPT

## 2022-11-14 PROCEDURE — 85610 PROTHROMBIN TIME: CPT

## 2022-11-14 NOTE — PROGRESS NOTES
ANTICOAGULATION MANAGEMENT     Nigel TAE Rodarte Jr. 82 year old male is on warfarin with subtherapeutic INR result. (Goal INR 2.0-3.0)    Recent labs: (last 7 days)     11/14/22  0922   INR 1.4*       ASSESSMENT       Source(s): Chart Review and Patient/Caregiver Call       Warfarin doses taken: Held for MOHs procedure  recently which may be affecting INR    Diet: No new diet changes identified    New illness, injury, or hospitalization: Yes: had MOHs procedure 11/10    Medication/supplement changes: None noted    Signs or symptoms of bleeding or clotting: No    Previous INR: Subtherapeutic  - likely due to holds    Additional findings: Patient traveling to [place] for about a week and a half and then to [place] for the winter and will not be able to recheck INR til in [place] (at least 2 weeks) He would like to have INRs checked at a lab where they can do finger sticks and he beleives his cardiologist in [place] does this. He will discuss INR management with cardiologist and update ACC.        PLAN     Recommended plan for temporary change(s) affecting INR     Dosing Instructions: booster dose then continue your current warfarin dose with next INR in 1-2 weeks or ASAP when in [place].        Summary  As of 11/14/2022    Full warfarin instructions:  11/14: 7.5 mg; Otherwise 7.5 mg every Tue; 5 mg all other days; Starting 11/14/2022   Next INR check:  11/28/2022             Telephone call with Nigel who agrees to plan and repeated back plan correctly    Patient using outside facility for labs    Education provided:     Taking warfarin: Importance of taking warfarin as instructed    Goal range and lab monitoring: goal range and significance of current result and Importance of following up at instructed interval    Plan made per [facility] anticoagulation protocol    Brissa Almaguer, RN  Anticoagulation Clinic  11/14/2022    _______________________________________________________________________     Anticoagulation Episode Summary     Current  INR goal:  2.0-3.0   TTR:  41.0 % (8.7 mo)   Target end date:  Indefinite   Send INR reminders to:  [facility] HEART INR NURSE    Indications    Atrial fibrillation (H) [I48.91] [I48.91]  Long term current use of anticoagulants with INR goal of 2.0-3.0 [Z79.01]  Paroxysmal atrial fibrillation (H) [I48.0]           Comments:           Anticoagulation Care Providers     Provider Role Specialty Phone number    Grzegorz Malhotra MD Referring Cardiovascular Disease [phone]

## 2022-12-07 ENCOUNTER — TELEPHONE (OUTPATIENT)
Dept: ANTICOAGULATION | Facility: CLINIC | Age: 82
End: 2022-12-07

## 2022-12-07 NOTE — TELEPHONE ENCOUNTER
ANTICOAGULATION     Nigel STRONG Cayden Silva is overdue for INR check.      Per last dosing note Nigel was on his way to [place] for the winter and was to be investigating if his cardiologist could do fingerstick INR testing for him and take over management.      LMTCB with an update on who is managing INRs at this time.     Fadumo Norton RN

## 2022-12-07 NOTE — TELEPHONE ENCOUNTER
Nigel returned the call and states that he is in [place], he did have an INR done recently at his cardiologist and it was 2.1. He states to his knowledge [facility] at [facility] is managing results at this time, not his [place] cardiologist.     Advised that for purposes of dosing his warfarin we need a faxed copy of results. Recommended for Nigel to call back (he is driving) with a phone and fax # for the lab in [place] and we will fax an order to them with our contact info and fax #. He verbalized understanding.

## 2022-12-12 DIAGNOSIS — I48.0 PAROXYSMAL ATRIAL FIBRILLATION (H): ICD-10-CM

## 2022-12-12 RX ORDER — METOPROLOL SUCCINATE 25 MG/1
TABLET, EXTENDED RELEASE ORAL
Qty: 90 TABLET | Refills: 1 | Status: SHIPPED | OUTPATIENT
Start: 2022-12-12 | End: 2023-06-09

## 2022-12-19 ENCOUNTER — TELEPHONE (OUTPATIENT)
Dept: ANTICOAGULATION | Facility: CLINIC | Age: 82
End: 2022-12-19

## 2022-12-19 DIAGNOSIS — Z79.01 LONG TERM CURRENT USE OF ANTICOAGULANTS WITH INR GOAL OF 2.0-3.0: Primary | ICD-10-CM

## 2022-12-19 DIAGNOSIS — I48.0 PAROXYSMAL ATRIAL FIBRILLATION (H): ICD-10-CM

## 2022-12-19 NOTE — TELEPHONE ENCOUNTER
Spoke to patient and he said INRs are done and managed at cardiology office in [place].  He will return to [place] between 3/1/23 and 3/20/23 and [facility] anticoagulation will resume management.  Bay MUNOZ

## 2023-01-16 ENCOUNTER — TELEPHONE (OUTPATIENT)
Dept: ANTICOAGULATION | Facility: CLINIC | Age: 83
End: 2023-01-16

## 2023-01-16 DIAGNOSIS — Z79.01 LONG TERM CURRENT USE OF ANTICOAGULANT THERAPY: ICD-10-CM

## 2023-01-16 DIAGNOSIS — I48.91 ATRIAL FIBRILLATION, UNSPECIFIED TYPE (H): ICD-10-CM

## 2023-01-16 RX ORDER — WARFARIN SODIUM 5 MG/1
TABLET ORAL
Qty: 100 TABLET | Refills: 1 | Status: ON HOLD | OUTPATIENT
Start: 2023-01-16 | End: 2023-07-20

## 2023-01-16 NOTE — TELEPHONE ENCOUNTER
Incoming fax from [facility] in [place] requesting a refill of 5 mg of warfarin (writer unable to pend medication)

## 2023-01-16 NOTE — PROGRESS NOTES
Refill request for 5 mg Warfarin tabs. See pended with pharmacy loaded.    Thanks! Tiffani Beasley RN

## 2023-01-16 NOTE — PROGRESS NOTES
ANTICOAGULATION MANAGEMENT:  Medication Refill    Anticoagulation Summary  As of 12/19/2022    Warfarin maintenance plan:  7.5 mg (5 mg x 1.5) every Tue; 5 mg (5 mg x 1) all other days   Next INR check:  3/27/2023   Target end date:  Indefinite    Indications    Atrial fibrillation (H) [I48.91] [I48.91]  Long term current use of anticoagulants with INR goal of 2.0-3.0 [Z79.01]  Paroxysmal atrial fibrillation (H) [I48.0]             Anticoagulation Care Providers     Provider Role Specialty Phone number    Grzegorz Malhotra MD Referring Cardiovascular Disease [phone]          Visit with referring provider/group within last year: Yes    ACC referral signed within last year: Yes    Nigel meets all criteria for refill (current [facility] referral, office visit with referring provider/group in last year, lab monitoring up to date or not exceeding 2 weeks overdue). Rx instructions and quantity supplied updated to match patient's current dosing plan. Warfarin 90 day supply with 1 refill granted per ACC protocol     Gilma Anton, RN  Anticoagulation Clinic

## 2023-03-08 LAB — INR (EXTERNAL): 3.7 (ref 0.8–1.1)

## 2023-04-03 ENCOUNTER — DOCUMENTATION ONLY (OUTPATIENT)
Dept: ANTICOAGULATION | Facility: CLINIC | Age: 83
End: 2023-04-03
Payer: MEDICARE

## 2023-04-03 NOTE — PROGRESS NOTES
ANTICOAGULATION     Nigel Rodarte Jr. is overdue for INR check.      Reminder letter sent    Jess Lawrence RN

## 2023-04-03 NOTE — LETTER
[facility] ANTICOAGULATION [facility]INIC  [address] [facility] [address]  Phone: [phone]  Fax: [phone]     April 3, 2023        Nigel Rodarte Jr.  [address] [place] [address]            Dear Nigel,    You are currently under the care of [facility] for your warfarin (Coumadin , Jantoven ) therapy.  We are contacting you because our records show you were due for an INR upon your return to [place] a couple weeks ago.    There are potentially serious risks when taking warfarin without careful monitoring and we want to make sure you are safely managed.  Routine lab monitoring is required for warfarin refills.     Please call [phone] as soon as possible to schedule an appointment.  If there has been a change in your care or other concerns, please let us know so we can help and or update our records.     Sincerely,       [facility]

## 2023-04-05 ENCOUNTER — LAB (OUTPATIENT)
Dept: LAB | Facility: CLINIC | Age: 83
End: 2023-04-05
Payer: MEDICARE

## 2023-04-05 ENCOUNTER — DOCUMENTATION ONLY (OUTPATIENT)
Dept: ANTICOAGULATION | Facility: CLINIC | Age: 83
End: 2023-04-05

## 2023-04-05 ENCOUNTER — ANTICOAGULATION THERAPY VISIT (OUTPATIENT)
Dept: ANTICOAGULATION | Facility: CLINIC | Age: 83
End: 2023-04-05

## 2023-04-05 DIAGNOSIS — I48.0 PAROXYSMAL ATRIAL FIBRILLATION (H): Primary | ICD-10-CM

## 2023-04-05 DIAGNOSIS — Z79.01 LONG TERM CURRENT USE OF ANTICOAGULANTS WITH INR GOAL OF 2.0-3.0: ICD-10-CM

## 2023-04-05 DIAGNOSIS — I48.0 PAROXYSMAL ATRIAL FIBRILLATION (H): ICD-10-CM

## 2023-04-05 LAB — INR BLD: 4.6 (ref 0.9–1.1)

## 2023-04-05 PROCEDURE — 85610 PROTHROMBIN TIME: CPT

## 2023-04-05 PROCEDURE — 36416 COLLJ CAPILLARY BLOOD SPEC: CPT

## 2023-04-05 NOTE — PROGRESS NOTES
"ANTICOAGULATION MANAGEMENT     Nigel Rodarte Jr. 82 year old male is on warfarin with supratherapeutic INR result. (Goal INR 2.0-3.0)    Recent labs: (last 7 days)     04/05/23  1456   INR 4.6*       ASSESSMENT       Source(s): Chart Review and Patient/Caregiver Call       Warfarin doses taken: Template updated for the last few weeks. Warfarin was dosed by [place] provider.    Diet: No new diet changes identified    New illness, injury, or hospitalization: Yes: Had foot surgery March 2023    Medication/supplement changes: None noted    Signs or symptoms of bleeding or clotting: No    Previous INR: Supratherapeutic    Additional findings: INR's done and warfarin managed by provider in [place] since Dec. 2022     \"had operation on foot\", last INR was 3.7 on 3/8/23. Has been taking warfarin 5 mg M/W/F and 7.5 mg row since.         PLAN     Recommended plan for ongoing change(s) affecting INR     Dosing Instructions: hold 1.5 doses then decrease your warfarin dose (15% change) with next INR in 1 week       Summary  As of 4/5/2023    Full warfarin instructions:  4/5: Hold; 4/6: 2.5 mg; Otherwise 7.5 mg every Tue; 5 mg all other days   Next INR check:  4/13/2023             Telephone call with Nigel who verbalizes understanding and agrees to plan    Lab visit scheduled    Education provided:     Goal range and lab monitoring: goal range and significance of current result and Importance of following up at instructed interval    Symptom monitoring: monitoring for bleeding signs and symptoms    Contact [phone] with any changes, questions or concerns.     Plan made per [facility] anticoagulation protocol    Estrella Bowden RN  Anticoagulation Clinic  4/5/2023    _______________________________________________________________________     Anticoagulation Episode Summary     Current INR goal:  2.0-3.0   TTR:  34.3 % (7.2 mo)   Target end date:  Indefinite   Send INR reminders to:  [facility] HEART INR NURSE    Indications    Atrial " fibrillation (H) [I48.91] [I48.91]  Long term current use of anticoagulants with INR goal of 2.0-3.0 [Z79.01]  Paroxysmal atrial fibrillation (H) [I48.0]           Comments:           Anticoagulation Care Providers     Provider Role Specialty Phone number    Grzegorz Malhotra MD Referring Cardiovascular Disease [phone]

## 2023-04-05 NOTE — PROGRESS NOTES
ANTICOAGULATION CLINIC REFERRAL RENEWAL REQUEST       An annual renewal order is required for all patients referred to [facility] Anticoagulation Clinic.?  Please review and sign the pended referral order for Nigel Rodarte Jr..       ANTICOAGULATION SUMMARY      Warfarin indication(s)   Atrial Fibrillation    Mechanical heart valve present?  NO       Current goal range   INR: 2.0-3.0     Goal appropriate for indication? Goal INR 2-3, standard for indication(s) above     Time in Therapeutic Range (TTR)  (Goal > 60%) 34.3%       Office visit with referring provider's group within last year yes on 5/3/2022       Estrella Bowden RN  [facility] Anticoagulation Clinic

## 2023-04-13 ENCOUNTER — ANTICOAGULATION THERAPY VISIT (OUTPATIENT)
Dept: ANTICOAGULATION | Facility: CLINIC | Age: 83
End: 2023-04-13

## 2023-04-13 ENCOUNTER — LAB (OUTPATIENT)
Dept: LAB | Facility: CLINIC | Age: 83
End: 2023-04-13
Payer: MEDICARE

## 2023-04-13 DIAGNOSIS — Z79.01 LONG TERM CURRENT USE OF ANTICOAGULANTS WITH INR GOAL OF 2.0-3.0: ICD-10-CM

## 2023-04-13 DIAGNOSIS — I48.91 ATRIAL FIBRILLATION, UNSPECIFIED TYPE (H): Primary | ICD-10-CM

## 2023-04-13 DIAGNOSIS — I48.0 PAROXYSMAL ATRIAL FIBRILLATION (H): ICD-10-CM

## 2023-04-13 LAB — INR BLD: 2.6 (ref 0.9–1.1)

## 2023-04-13 PROCEDURE — 85610 PROTHROMBIN TIME: CPT

## 2023-04-13 PROCEDURE — 36416 COLLJ CAPILLARY BLOOD SPEC: CPT

## 2023-04-13 NOTE — PROGRESS NOTES
ANTICOAGULATION MANAGEMENT     Nigel Rodarte Jr. 82 year old male is on warfarin with therapeutic INR result. (Goal INR 2.0-3.0)    Recent labs: (last 7 days)     04/13/23  1318   INR 2.6*       ASSESSMENT       Source(s): Chart Review    Previous INR was Supratherapeutic    Medication, diet, health changes since last INR chart reviewed; none identified     See previous note for details: managed in [place] for the winter, foot surgery in March             PLAN     Recommended plan for ongoing change(s) affecting INR     Dosing Instructions: Continue your current warfarin dose with next INR in 2 weeks       Summary  As of 4/13/2023    Full warfarin instructions:  7.5 mg every Tue; 5 mg all other days   Next INR check:  4/27/2023             Detailed voice message left for Nigel with dosing instructions and follow up date.     Contact [phone] to schedule and with any changes, questions or concerns.     Education provided:     Goal range and lab monitoring: goal range and significance of current result    Contact [phone] with any changes, questions or concerns.     Plan made per [facility] anticoagulation protocol    Gilma Anton, RN  Anticoagulation Clinic  4/13/2023    _______________________________________________________________________     Anticoagulation Episode Summary     Current INR goal:  2.0-3.0   TTR:  33.8 % (7.4 mo)   Target end date:  Indefinite   Send INR reminders to:  [facility] HEART INR NURSE    Indications    Atrial fibrillation (H) [I48.91] [I48.91]  Long term current use of anticoagulants with INR goal of 2.0-3.0 [Z79.01]  Paroxysmal atrial fibrillation (H) [I48.0]           Comments:           Anticoagulation Care Providers     Provider Role Specialty Phone number    Grzegorz Malhotra MD Referring Cardiovascular Disease [phone]

## 2023-04-14 ENCOUNTER — APPOINTMENT (OUTPATIENT)
Dept: CT IMAGING | Facility: CLINIC | Age: 83
End: 2023-04-14
Attending: EMERGENCY MEDICINE
Payer: MEDICARE

## 2023-04-14 ENCOUNTER — HOSPITAL ENCOUNTER (EMERGENCY)
Facility: CLINIC | Age: 83
Discharge: HOME | End: 2023-04-14
Attending: EMERGENCY MEDICINE | Admitting: EMERGENCY MEDICINE
Payer: MEDICARE

## 2023-04-14 VITALS
DIASTOLIC BLOOD PRESSURE: 65 MMHG | OXYGEN SATURATION: 98 % | RESPIRATION RATE: 20 BRPM | BODY MASS INDEX: 22.22 KG/M2 | TEMPERATURE: 98.2 F | HEIGHT: 69 IN | SYSTOLIC BLOOD PRESSURE: 120 MMHG | WEIGHT: 150 LBS | HEART RATE: 90 BPM

## 2023-04-14 DIAGNOSIS — S00.03XA CONTUSION OF SCALP, INITIAL ENCOUNTER: ICD-10-CM

## 2023-04-14 PROCEDURE — 99284 EMERGENCY DEPT VISIT MOD MDM: CPT | Mod: 25

## 2023-04-14 PROCEDURE — G1010 CDSM STANSON: HCPCS

## 2023-04-14 ASSESSMENT — ACTIVITIES OF DAILY LIVING (ADL): ADLS_ACUITY_SCORE: 39

## 2023-04-14 ASSESSMENT — ENCOUNTER SYMPTOMS
FEVER: 0
ABDOMINAL PAIN: 0
COUGH: 0
WOUND: 1

## 2023-04-14 NOTE — ED TRIAGE NOTES
Tripped and hit rt side of head 2 days ago, on coumadin. INR was 2.6 yesterday. Having headache/ pressure to rt temporal     Triage Assessment     Row Name 04/14/23 1631       Triage Assessment (Adult)    Airway WDL WDL       Respiratory WDL    Respiratory WDL WDL       Cardiac WDL    Cardiac WDL WDL       Cognitive/Neuro/Behavioral WDL    Cognitive/Neuro/Behavioral WDL X

## 2023-04-14 NOTE — ED PROVIDER NOTES
History     Chief Complaint:  Head Injury     The history is provided by the patient.      Nigel Rodarte Jr. is a 82 year old male on Warfarin with a history of paroxysmal atrial fibrillation who presents with head injury. Patient reports that two days ago he tripped on firewood and he hit the right side of his head. Patient reports small laceration on the right side of his head and some intermittent throbbing pain. He reports he is on warfarin for atrial fibrillation; INR of 2.6 yesterday. Patient also reports some pain in his right eye and normal vision. He denies fever, cough, and abdominal pain.     Independent Historian:   None - Patient Only     Review of External Notes: n/a    ROS:  Review of Systems   Constitutional: Negative for fever.   Respiratory: Negative for cough.    Gastrointestinal: Negative for abdominal pain.   Skin: Positive for wound (R head).   All other systems reviewed and are negative.    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   PCP: Osmar Espinoza     Physical Exam     Patient Vitals for the past 24 hrs:   BP Temp Pulse Resp SpO2 Height Weight   04/14/23 1633 120/65 -- -- -- -- -- --   04/14/23 1630 -- 98.2  F (36.8  C) 90 20 98 % 1.753 m (5' 9\") 68 kg (150 lb)      Physical Exam    General: Patient in mild distress.  Alert and cooperative with exam. Normal mentation  HEENT: right temple abrasion. Conjunctiva without injection or scleral icterus. External ears normal.  Respiratory: Breathing comfortably on room air  CV: Normal rate, all extremities well perfused  GI:  Non-distended abdomen  Skin: Warm, dry, no rashes/open wounds on exposed skin  Musculoskeletal: No obvious deformities      Emergency Department Course     Imaging:  Head CT w/o contrast   Final Result   IMPRESSION:   1.  No acute traumatic intracranial abnormality.               Report per radiology    Emergency Department Course & Assessments:     Interventions:  Medications - No data to display     Independent Interpretation (X-rays, CTs, rhythm strip):  I reviewed the patient's head CT; no evidence of intracranial bleeding    Assessments/Consultations/Discussion of Management or Tests:  ED Course as of 04/14/23 1725   Fri Apr 14, 2023 1657 I obtained history and examined the patient as noted above.      Social Determinants of Health affecting care:   None    Disposition:  The patient was discharged to home.     Impression & Plan      Medical Decision Making:    This patient presents with a fall from height with a history of head injury.  The differential diagnosis includes skull fracture, epidural hematoma, subdural hematoma, intracerebral hemorrhage, and traumatic subarachnoid hemorrhage.  There are no physical signs of skull fracture or other bony fracture on exam and the patient is well-appearing, but given the patients age and anticoagulated status, a CT of the head was indicated.  Fortunately, no signs of intracerebral bleed or " skull fracture were detected during this visit on CT imaging.  Return precautions were discussed.  No evidence of significant concussion.  Supportive care discussed.  Follow-up with PCP as needed.    Diagnosis:    ICD-10-CM    1. Contusion of scalp, initial encounter  S00.03XA          Scribe Disclosure:  I, Dianne Dowd, am serving as a scribe at 5:24 PM on 4/14/2023 to document services personally performed by Mamadou Gurrola DO based on my observations and the provider's statements to me.   4/14/2023   Mamadou Gurrola DO O'Neill, Christopher Warren, DO  04/15/23 0052

## 2023-04-17 ENCOUNTER — DOCUMENTATION ONLY (OUTPATIENT)
Dept: ANTICOAGULATION | Facility: CLINIC | Age: 83
End: 2023-04-17
Payer: MEDICARE

## 2023-04-17 NOTE — PROGRESS NOTES
ANTICOAGULATION  MANAGEMENT: Discharge Review    Nigel Rodarte Jr. chart reviewed for anticoagulation continuity of care    Emergency room visit on 4/14/23 for head injury post fall.    Discharge disposition: Home    Results:    Recent labs: (last 7 days)     04/13/23  1318   INR 2.6*     Anticoagulation inpatient management:     not applicable     Anticoagulation discharge instructions:     Warfarin dosing: home regimen continued   Bridging: No   INR goal change: No      Medication changes affecting anticoagulation: No    Additional factors affecting anticoagulation: No     PLAN     No adjustment to anticoagulation plan needed    Patient not contacted    No adjustment to Anticoagulation Calendar was required    Estrella Bowden RN

## 2023-05-02 ENCOUNTER — ANTICOAGULATION THERAPY VISIT (OUTPATIENT)
Dept: ANTICOAGULATION | Facility: CLINIC | Age: 83
End: 2023-05-02

## 2023-05-02 ENCOUNTER — LAB (OUTPATIENT)
Dept: LAB | Facility: CLINIC | Age: 83
End: 2023-05-02
Payer: MEDICARE

## 2023-05-02 DIAGNOSIS — Z79.01 LONG TERM CURRENT USE OF ANTICOAGULANTS WITH INR GOAL OF 2.0-3.0: ICD-10-CM

## 2023-05-02 DIAGNOSIS — I48.0 PAROXYSMAL ATRIAL FIBRILLATION (H): ICD-10-CM

## 2023-05-02 DIAGNOSIS — I48.91 ATRIAL FIBRILLATION, UNSPECIFIED TYPE (H): Primary | ICD-10-CM

## 2023-05-02 LAB — INR BLD: 2.3 (ref 0.9–1.1)

## 2023-05-02 PROCEDURE — 85610 PROTHROMBIN TIME: CPT

## 2023-05-02 PROCEDURE — 36416 COLLJ CAPILLARY BLOOD SPEC: CPT

## 2023-05-02 NOTE — PROGRESS NOTES
ANTICOAGULATION MANAGEMENT     Nigel Rodarte Jr. 82 year old male is on warfarin with therapeutic INR result. (Goal INR 2.0-3.0)    Recent labs: (last 7 days)     05/02/23  1320   INR 2.3*       ASSESSMENT     Warfarin Lab Questionnaire    Warfarin Doses Last 7 Days          5/2/2023   Warfarin Lab Questionnaire   Missed doses within past 14 days? No   Changes in diet or alcohol within past 14 days? No   Medication changes since last result? No   Injuries or illness since last result? Yes: 4/14 ER visit after fall and head injury, no signs of intracerebral bleed or skull fracture   New shortness of breath, severe headaches or sudden changes in vision since last result? No   Abnormal bleeding since last result? No   Upcoming surgery, procedure? No   Best number to call with results? [phone]        Previous result: Therapeutic last visit; previously outside of goal range  Additional findings: None       PLAN     Recommended plan for no diet, medication or health factor changes affecting INR     Dosing Instructions: Continue your current warfarin dose with next INR in 3 weeks       Summary  As of 5/2/2023    Full warfarin instructions:  7.5 mg every Tue; 5 mg all other days   Next INR check:  5/23/2023             Detailed voice message left for Nigel with dosing instructions and follow up date.     Contact [phone] to schedule and with any changes, questions or concerns.     Education provided:     Goal range and lab monitoring: goal range and significance of current result    Contact [phone] with any changes, questions or concerns.     Plan made per [facility] anticoagulation protocol    Gilma Anton, RN  Anticoagulation Clinic  5/2/2023    _______________________________________________________________________     Anticoagulation Episode Summary     Current INR goal:  2.0-3.0   TTR:  39.0 % (8.1 mo)   Target end date:  Indefinite   Send INR reminders to:  [facility] HEART INR NURSE    Indications     Atrial fibrillation (H) [I48.91] [I48.91]  Long term current use of anticoagulants with INR goal of 2.0-3.0 [Z79.01]  Paroxysmal atrial fibrillation (H) [I48.0]           Comments:           Anticoagulation Care Providers     Provider Role Specialty Phone number    Grzegorz Malhotra MD Referring Cardiovascular Disease [phone]

## 2023-05-19 LAB
ALT SERPL-CCNC: 29 IU/L (ref 10–50)
AST SERPL-CCNC: 50 IU/L (ref 10–50)
CREATININE (EXTERNAL): 0.98 MG/DL (ref 0.7–1.2)
GFR ESTIMATED (EXTERNAL): 77 ML/MIN/1.73M2
GLUCOSE (EXTERNAL): 89 MG/DL (ref 70–99)
POTASSIUM (EXTERNAL): 4.5 MMOL/L (ref 3.5–5.1)

## 2023-06-08 ENCOUNTER — TELEPHONE (OUTPATIENT)
Dept: ANTICOAGULATION | Facility: CLINIC | Age: 83
End: 2023-06-08
Payer: MEDICARE

## 2023-06-08 NOTE — TELEPHONE ENCOUNTER
ANTICOAGULATION     Nigel Rodarte Jr. is overdue for INR check.      Left message for patient to call and schedule lab appointment as soon as possible. If returning call, please schedule.     Estrella Bowden RN

## 2023-06-09 ENCOUNTER — TELEPHONE (OUTPATIENT)
Dept: CARDIOLOGY | Facility: CLINIC | Age: 83
End: 2023-06-09
Payer: MEDICARE

## 2023-06-09 DIAGNOSIS — I48.0 PAROXYSMAL ATRIAL FIBRILLATION (H): ICD-10-CM

## 2023-06-09 RX ORDER — METOPROLOL SUCCINATE 25 MG/1
TABLET, EXTENDED RELEASE ORAL
Qty: 90 TABLET | Refills: 0 | Status: SHIPPED | OUTPATIENT
Start: 2023-06-09 | End: 2023-09-12

## 2023-06-13 NOTE — TELEPHONE ENCOUNTER
"10/25/21 Pt called stating he had an episode of Afib this am which lasted approx 4 hours. He took his am dose of Metoprolol ER 25 mg . He had \"left over\" Flecainide 50 mg and took that at the same time as well. He is now back in NSR.  He had a similar episode approx 2 weeks ago . He woke up in the morning in Afib. He took his am Metoprolol along with a 50 of Flecainide and within 4 hours converted back to NSR.  Informed pt that instead of taking Flecainide, he should call us first.  Per encounter note from [place] on 4/1, pt was to stop Flecainide due to mildly abnormal stress test done for chest discomfort.   Pt is s/p Afib Ablation 5/2020  NM GXT 4/1/21  Coronary Angiogram 04/14/21  Will route update to Dr Malhotra and call pt back w recommendations.  Pt voiced understanding and agreement with plan.   Sajan 145 pm  " Concentration Of Kenalog Solution Injected (Mg/Ml): 5.0 X Size Of Lesion In Cm (Optional): 0 Detail Level: Detailed Kenalog Preparation: Kenalog Include Z78.9 (Other Specified Conditions Influencing Health Status) As An Associated Diagnosis?: No Administered By (Optional): Dr. Stevenson Consent: The risks of atrophy were reviewed with the patient. Medical Necessity Clause: This procedure was medically necessary because the lesions that were treated were: Total Volume (Ccs): 0.1 Ndc# For Kenalog Only: 7231-2308-27 Validate Note Data When Using Inventory: Yes Show Inventory Tab: Hide Total Volume (Ccs): 0.4 Concentration Of Kenalog Solution Injected (Mg/Ml): 10.0 Administered By (Optional): Smitha Stevenson MD

## 2023-06-15 ENCOUNTER — ANTICOAGULATION THERAPY VISIT (OUTPATIENT)
Dept: ANTICOAGULATION | Facility: CLINIC | Age: 83
End: 2023-06-15

## 2023-06-15 ENCOUNTER — LAB (OUTPATIENT)
Dept: LAB | Facility: CLINIC | Age: 83
End: 2023-06-15
Payer: MEDICARE

## 2023-06-15 ENCOUNTER — TELEPHONE (OUTPATIENT)
Dept: ANTICOAGULATION | Facility: CLINIC | Age: 83
End: 2023-06-15

## 2023-06-15 DIAGNOSIS — Z79.01 LONG TERM CURRENT USE OF ANTICOAGULANTS WITH INR GOAL OF 2.0-3.0: ICD-10-CM

## 2023-06-15 DIAGNOSIS — I48.0 PAROXYSMAL ATRIAL FIBRILLATION (H): ICD-10-CM

## 2023-06-15 DIAGNOSIS — I48.91 ATRIAL FIBRILLATION, UNSPECIFIED TYPE (H): Primary | ICD-10-CM

## 2023-06-15 LAB — INR BLD: 2.7 (ref 0.9–1.1)

## 2023-06-15 PROCEDURE — 36415 COLL VENOUS BLD VENIPUNCTURE: CPT

## 2023-06-15 PROCEDURE — 85610 PROTHROMBIN TIME: CPT

## 2023-06-15 NOTE — TELEPHONE ENCOUNTER
Patient scheduled for right hydrocelectomy on 7/20/23.  Routing message to [facility] to review Warfarin hold plan.  Bay MUNOZ

## 2023-06-15 NOTE — PROGRESS NOTES
ANTICOAGULATION MANAGEMENT     Nigel Rodarte Jr. 83 year old male is on warfarin with therapeutic INR result. (Goal INR 2.0-3.0)    Recent labs: (last 7 days)     06/15/23  1557   INR 2.7*       ASSESSMENT       Source(s): Chart Review    Previous INR was Therapeutic last 2(+) visits    Medication, diet, health changes since last INR chart reviewed; none identified     7/20/23 right hydrocelectomy scheduled, sending message to [facility] to review warfarin hold             PLAN     Recommended plan for no diet, medication or health factor changes affecting INR     Dosing Instructions: Continue your current warfarin dose with next INR in 4 weeks       Summary  As of 6/15/2023    Full warfarin instructions:  7.5 mg every Tue; 5 mg all other days   Next INR check:  7/13/2023             Detailed voice message left for Nigel with dosing instructions and follow up date.     Contact [phone] to schedule and with any changes, questions or concerns.     Education provided:     Goal range and lab monitoring: goal range and significance of current result    Contact [phone] with any changes, questions or concerns.     Plan made per [facility] anticoagulation protocol    Gilma Anton, RN  Anticoagulation Clinic  6/15/2023    _______________________________________________________________________     Anticoagulation Episode Summary     Current INR goal:  2.0-3.0   TTR:  48.9 % (8.4 mo)   Target end date:  Indefinite   Send INR reminders to:  [facility] HEART INR NURSE    Indications    Atrial fibrillation (H) [I48.91] [I48.91]  Long term current use of anticoagulants with INR goal of 2.0-3.0 [Z79.01]  Paroxysmal atrial fibrillation (H) [I48.0]           Comments:           Anticoagulation Care Providers     Provider Role Specialty Phone number    Grzegorz Malhotra MD Referring Cardiovascular Disease [phone]

## 2023-06-30 NOTE — TELEPHONE ENCOUNTER
"AGNIE-PROCEDURAL ANTICOAGULATION  MANAGEMENT    ASSESSMENT     Warfarin interruption plan for Hydrocelectomy on 7/20/23.    Indication for Anticoagulation: Atrial Fibrillation      [id number] = 2 (Age >= 75)      Hx of Re-do ablation Jan/Feb 2022.      Bleeding risk-falls ED visits (4/2023, 8/2022, 6/2022)      Angie-Procedure Risk stratification for thromboembolism: low (2022 Chest guidelines)    AFIB: 2022 CHEST Perioperative Management guidelines recommends against bridging for patients with atrial fibrillation except in high risk stratification patients.     RECOMMENDATION       Pre-Procedure:  o Hold warfarin for 5 days, until after procedure starting: Sat 7/15/23   o No Bridge      Post-Procedure:  o Resume warfarin dose if okay with provider doing procedure on night of procedure, 7/20 PM: 10 mg x 1, 7.5 mg x 1 then resume current dose  o Recheck INR ~ 7 days after resuming warfarin     Plan routed to referring provider for approval  ?   Neida Olmos, [facility]    SUBJECTIVE/OBJECTIVE     Nigel Rodarte Jr., a 83 year old male    Wt Readings from Last 3 Encounters:   04/14/23 68 kg (150 lb)   06/19/22 68 kg (150 lb)   05/03/22 71.2 kg (157 lb)      Ideal body weight: 70.7 kg (155 lb 13.8 oz)     Estimated body mass index is 22.15 kg/m  as calculated from the following:    Height as of 4/14/23: 1.753 m (5' 9\").    Weight as of 4/14/23: 68 kg (150 lb).    Lab Results   Component Value Date    INR 2.7 (H) 06/15/2023    INR 2.3 (H) 05/02/2023    INR 2.6 (H) 04/13/2023     Lab Results   Component Value Date    HGB 15.4 08/04/2022    HCT 47.0 08/04/2022     08/04/2022     Lab Results   Component Value Date    CR 0.97 08/04/2022    CR 1.19 04/14/2021    CR 1.11 05/18/2020     Estimated Creatinine Clearance: 56.5 mL/min (based on SCr of 0.97 mg/dL).  "

## 2023-06-30 NOTE — TELEPHONE ENCOUNTER
Grzegorz Malhotra MD  You 57 minutes ago (3:59 PM)     I agree with plan.  Grzegorz      ___    [facility] to review plan with patient with 7/13/23 INR appointment

## 2023-07-13 ENCOUNTER — LAB (OUTPATIENT)
Dept: LAB | Facility: CLINIC | Age: 83
End: 2023-07-13
Payer: MEDICARE

## 2023-07-13 ENCOUNTER — ANTICOAGULATION THERAPY VISIT (OUTPATIENT)
Dept: ANTICOAGULATION | Facility: CLINIC | Age: 83
End: 2023-07-13

## 2023-07-13 DIAGNOSIS — I48.0 PAROXYSMAL ATRIAL FIBRILLATION (H): ICD-10-CM

## 2023-07-13 DIAGNOSIS — Z79.01 LONG TERM CURRENT USE OF ANTICOAGULANTS WITH INR GOAL OF 2.0-3.0: ICD-10-CM

## 2023-07-13 DIAGNOSIS — I48.91 ATRIAL FIBRILLATION, UNSPECIFIED TYPE (H): Primary | ICD-10-CM

## 2023-07-13 LAB — INR BLD: 2.2 (ref 0.9–1.1)

## 2023-07-13 PROCEDURE — 85610 PROTHROMBIN TIME: CPT

## 2023-07-13 PROCEDURE — 36416 COLLJ CAPILLARY BLOOD SPEC: CPT

## 2023-07-13 NOTE — PROGRESS NOTES
ANTICOAGULATION MANAGEMENT     Nigel Rodarte Jr. 83 year old male is on warfarin with therapeutic INR result. (Goal INR 2.0-3.0)    Recent labs: (last 7 days)     07/13/23  0730   INR 2.2*       ASSESSMENT     Warfarin Lab Questionnaire    Warfarin Doses Last 7 Days          7/13/2023   Warfarin Lab Questionnaire   Missed doses within past 14 days? No   Changes in diet or alcohol within past 14 days? No   Medication changes since last result? No   Injuries or illness since last result? No   New shortness of breath, severe headaches or sudden changes in vision since last result? No   Abnormal bleeding since last result? No   Upcoming surgery, procedure? Yes   Please explain, date scheduled? 7/20 Hydrocelectomy, hold warfarin 5 days and no bridge   Best number to call with results? [phone]     Previous result: Therapeutic last 2(+) visits  Additional findings: None       PLAN     Recommended plan for temporary change(s) affecting INR     Dosing Instructions: Start 5 day hold on 7/15.  Resume Warfarin on 7/20 with 10mg then 7.5mg on 7/21 then normal dosing and INR check about 1 week later per [facility] hold plan.  NO bridge.        Summary  As of 7/13/2023    Full warfarin instructions:  7/15: Hold; 7/16: Hold; 7/17: Hold; 7/18: Hold; 7/19: Hold; 7/20: 10 mg; 7/21: 7.5 mg; Otherwise 7.5 mg every Tue; 5 mg all other days   Next INR check:  7/28/2023             Detailed voice message left for Nigel with dosing instructions and follow up date.  Left 2 detailed messages.      Contact [phone] to schedule and with any changes, questions or concerns.     Education provided:     Goal range and lab monitoring: goal range and significance of current result and Importance of following up at instructed interval    Contact [phone] with any changes, questions or concerns.     Plan made per [facility] anticoagulation protocol    Gilma Anton, RN  Anticoagulation  Clinic  7/13/2023    _______________________________________________________________________     Anticoagulation Episode Summary     Current INR goal:  2.0-3.0   TTR:  59.9 % (8.4 mo)   Target end date:  Indefinite   Send INR reminders to:  [facility] HEART INR NURSE    Indications    Atrial fibrillation (H) [I48.91] [I48.91]  Long term current use of anticoagulants with INR goal of 2.0-3.0 [Z79.01]  Paroxysmal atrial fibrillation (H) [I48.0]           Comments:           Anticoagulation Care Providers     Provider Role Specialty Phone number    Grzegorz Malhotra MD Referring Cardiovascular Disease [phone]

## 2023-07-19 ENCOUNTER — ANESTHESIA EVENT (OUTPATIENT)
Dept: SURGERY | Facility: CLINIC | Age: 83
End: 2023-07-19
Payer: MEDICARE

## 2023-07-19 LAB
CREATININE (EXTERNAL): 1.05 MG/DL (ref 0.7–1.2)
GFR ESTIMATED (EXTERNAL): 71 ML/MIN/1.73M2
GLUCOSE (EXTERNAL): 82 MG/DL (ref 70–99)
POTASSIUM (EXTERNAL): 4.5 MMOL/L (ref 3.5–5.1)

## 2023-07-19 NOTE — H&P (VIEW-ONLY)
[facility]      Preoperative Consultation   Nigel Rodarte JrMagdalena   : 1940   Gender: male    Date of Encounter: 2023    Nursing Notes:   Brooklyn Garcia  2023  1:47 PM  Addendum  Nigel Rodarte Jr. is a 83 y.o. male (1940) who presents for Right Hydrocelectomy    Date of Surgery: 23  Surgical Specialty: Urology  Carlos Mcknight MD - [facility]  [facility]Surgical Facility: [facility]  Fax number: [phone]  Surgery type: outpatient  Primary Physician: Osmar Espinoza Preoperative Questionnaire    x  Anyone in the family that had a problem following an anesthetic?    x  Any prior problems with an anesthetic?    x  Any chest pain or breathlessness when climbing up two flights of stairs at normal pace?   x   Any heart problems?   x   Do you have any history of anemia or easy bruising/bleeding?    x  Have you used any products containing aspirin or NSAIDS in the last 4 weeks?    x  Have you used any steroids in the last year?       Brooklyn BURK LPN         2023       1:38 PM           History of Present Illness   Leonardo is a 82 y/o male here for pre-op exam.     Review of Systems   A comprehensive review of systems was negative except for items noted in HPI.    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 Per cards for a fib     rosuvastatin  (CRESTOR) 20 mg tablet Take 20 mg by mouth.     warfarin (COUMADIN) 5 mg tablet As per cards     No current facility-administered medications for this visit.     Medications have been reviewed by me and are current to the best of my knowledge and ability.     No Known Allergies  Past Surgical History:   . Laterality Date     ABLATION       CARPAL TUNNEL RELEASE Bilateral      COLONOSCOPY SCREENING  2004     HIP REPLACEMENT Right 01/14/2013     HX CYSTOSCOPY      Stone Removal     KNEE REPLACEMENT Right      NC UNLISTED PROCEDURE INNER EAR Right      ROTATOR CUFF REPAIR Bilateral      TONSILLECTOMY       Social History     Tobacco Use     Smoking status: Never     Smokeless tobacco: Never   Vaping Use     Vaping Use: Never used   Substance Use Topics     Alcohol use: No     Alcohol/week: 0.0 standard drinks of alcohol     Drug use: No     Family History   Problem Relation Age of Onset     Stroke Father 77        Heart Disease     Cancer Mother 72        Lung CA, COPD     Psychiatric illness Brother 70        Bi-polar, terminal cancer nodules     Cancer Brother 65        Lung, Aorta, liver     Hypertension Brother         bypass surgery     Good Health Brother      Good Health Brother      Good Health Son      Good Health Daughter      Good Health Daughter         Manic Depressive Disorder       PAST DIFFICULTY WITH ANESTHESIA: None     Physical Exam   /60 (Cuff Site: Right Arm, Position: Sitting, Cuff Size: Adult Regular)   Pulse 67   Wt 68.9 kg (152 lb)   BMI 22.78 kg/m   Body mass index is 22.78 kg/m .  EXAM:  General Appearance: Pleasant, alert, appropriate appearance for age. No acute distress  Head Exam: Normal. Normocephalic, atraumatic.  Eye Exam:  Normal external eye, conjunctiva, lids, cornea. AALIYAH.  Ear Exam: Normal TM's bilaterally. Normal auditory canals and external ears. Non-tender.  Nose Exam: Normal external nose, mucous membranes, and septum.  OroPharynx Exam:  Dental hygiene adequate.  Normal buccal mucosa. Normal pharynx.  Neck Exam:  Supple, no masses or nodes.  Thyroid Exam: No nodules or enlargement.  Chest/Respiratory Exam: Normal chest wall and respirations. Clear to auscultation.  Cardiovascular Exam: Regular rate and rhythm. S1, S2, no murmur, click, gallop, or rubs.  Gastrointestinal Exam: Soft, non-tender, no masses or organomegaly.  Musculoskeletal Exam: Back is straight and non-tender, full ROM of upper and lower extremities.  Neurologic Exam: Nonfocal, symmetric DTRs, normal gross motor, tone coordination and no tremor.  Psychiatric Exam: Alert and oriented - appropriate affect.       Assessment / Plan     The Pre-Op Tool    Recommendations      Low Risk Procedure    Cardiac History  History of atrial fibrillation; not present at preoperative exam.  No history of coronary artery disease           Labs  No routine labs indicated  EKG  Not indicated  Stress Testing  Not indicated    * Testing recommendations are intended to assist, but not direct, clinical decisions.           INR to be drawn by anesthesiologist in pre-op area     *    Continue taking Metoprolol (Lopressor, Toprol); be sure to take the evening before and/or morning of the procedure.  Take your other medications as usual prior to the procedure  Hold vitamins and/or supplements for 1 week prior to the procedure  Hold fish oil for 2 weeks prior to the procedure  Okay to take Acetaminophen (Tylenol) up until the procedure  Hold / avoid NSAIDs (e.g. ibuprofen, naproxen) prior to procedure: 2 days for ibuprofen (Advil) and 4 days for naproxen (Aleve).    Anticoagulation / Bridging  Procedure date: 07/20/2023  Thrombotic Risk Factor(s):  Atrial fibrillation with a CHADS-2 score of 2  Last dose of Warfarin to be taken 07-  Based on this patient s individual thrombotic risk factors, bridging therapy was considered and it was determined that the risks of bridging outweighed those of not bridging.  * Medication  recommendations are not intended to be exhaustive; they are limited to common medications that are potentially dangerous if incorrectly managed            Labs  * Data supports elimination of  routine  laboratory testing in favor of focused,  indicated  testing based on medical co-morbidities. A 2009 study randomized 1061 patients undergoing ambulatory, non-cataract surgery to routine or to indicated testing. Perioperative adverse events were similar (Anesthesia & Analgesia 2009;108:467-75; Anesthesiol. Clin. 2016 Mar;34(1):43-58).  EKG  * The ACC/AHA recommends against obtaining routine EKGs in patients undergoing low risk surgeries, a class IIa recommendation (JACC. 2014;64(21);e1-76).  Anticoagulation Management  * Based on the BRIDGE trial and other studies, in most circumstances we recommend against bridging patients with CHADS-2 scores of 0-4 (NE 2015; 373(9):823-33; JACC 2015;66:1392-403; [facility] 2017;69:87-98; CHEST 2018;154:1121-201).     Session ID: [id number]  Endnotes and bibliography available upon request: [email or web address]    Labs: yes  ECG: yes      ICD-10-CM    1. Pre-op examination  Z01.818 OH READING EKG - NO CHARGE, COMP ONLY     EKG 12 LEAD     OH ECG ROUTINE ECG W/LEAST 12 LDS W/I&R     CBC AND DIFFERENTIAL     BASIC METABOLIC PANEL      2. Hydrocele in adult  N43.3       3. Elevated PSA  R97.20       4. History of atrial fibrillation  Z86.79       5. History of kidney stones  Z87.442       6. Benign non-nodular prostatic hyperplasia without lower urinary tract symptoms  N40.0       7. Post-traumatic osteoarthritis of multiple joints  M15.3       8. Hypercholesteremia  E78.00       9. Sensorineural hearing loss (SNHL) of right ear with restricted hearing of left ear  H90.A21         Patient is cleared for planned procedure.     Electronically Signed by:   Nica Garcia DO  7/19/2023  *Some images could not be shown.

## 2023-07-20 ENCOUNTER — ANESTHESIA (OUTPATIENT)
Dept: SURGERY | Facility: CLINIC | Age: 83
End: 2023-07-20
Payer: MEDICARE

## 2023-07-20 ENCOUNTER — HOSPITAL ENCOUNTER (OUTPATIENT)
Facility: CLINIC | Age: 83
Discharge: HOME | End: 2023-07-20
Attending: UROLOGY | Admitting: UROLOGY
Payer: MEDICARE

## 2023-07-20 ENCOUNTER — TRANSFERRED RECORDS (OUTPATIENT)
Dept: MULTI SPECIALTY CLINIC | Facility: CLINIC | Age: 83
End: 2023-07-20

## 2023-07-20 VITALS
WEIGHT: 152 LBS | HEIGHT: 69 IN | TEMPERATURE: 97.2 F | SYSTOLIC BLOOD PRESSURE: 123 MMHG | HEART RATE: 60 BPM | RESPIRATION RATE: 16 BRPM | DIASTOLIC BLOOD PRESSURE: 73 MMHG | BODY MASS INDEX: 22.51 KG/M2 | OXYGEN SATURATION: 100 %

## 2023-07-20 DIAGNOSIS — N43.3 HYDROCELE IN ADULT: Primary | ICD-10-CM

## 2023-07-20 LAB — INR PPP: 1.03 (ref 0.85–1.15)

## 2023-07-20 PROCEDURE — 710N000012 HC RECOVERY PHASE 2, PER MINUTE: Performed by: UROLOGY

## 2023-07-20 PROCEDURE — 250N000011 HC RX IP 250 OP 636: Performed by: NURSE ANESTHETIST, CERTIFIED REGISTERED

## 2023-07-20 PROCEDURE — 36415 COLL VENOUS BLD VENIPUNCTURE: CPT | Performed by: ANESTHESIOLOGY

## 2023-07-20 PROCEDURE — 710N000009 HC RECOVERY PHASE 1, LEVEL 1, PER MIN: Performed by: UROLOGY

## 2023-07-20 PROCEDURE — 250N000011 HC RX IP 250 OP 636: Performed by: UROLOGY

## 2023-07-20 PROCEDURE — 250N000013 HC RX MED GY IP 250 OP 250 PS 637: Performed by: PHYSICIAN ASSISTANT

## 2023-07-20 PROCEDURE — 999N000141 HC STATISTIC PRE-PROCEDURE NURSING ASSESSMENT: Performed by: UROLOGY

## 2023-07-20 PROCEDURE — 360N000075 HC SURGERY LEVEL 2, PER MIN: Performed by: UROLOGY

## 2023-07-20 PROCEDURE — 258N000003 HC RX IP 258 OP 636: Performed by: NURSE ANESTHETIST, CERTIFIED REGISTERED

## 2023-07-20 PROCEDURE — 250N000009 HC RX 250: Performed by: NURSE ANESTHETIST, CERTIFIED REGISTERED

## 2023-07-20 PROCEDURE — 250N000011 HC RX IP 250 OP 636: Mod: JZ | Performed by: NURSE ANESTHETIST, CERTIFIED REGISTERED

## 2023-07-20 PROCEDURE — 250N000025 HC SEVOFLURANE, PER MIN: Performed by: UROLOGY

## 2023-07-20 PROCEDURE — 370N000017 HC ANESTHESIA TECHNICAL FEE, PER MIN: Performed by: UROLOGY

## 2023-07-20 PROCEDURE — 250N000011 HC RX IP 250 OP 636: Performed by: PHYSICIAN ASSISTANT

## 2023-07-20 PROCEDURE — 85610 PROTHROMBIN TIME: CPT | Performed by: ANESTHESIOLOGY

## 2023-07-20 PROCEDURE — 272N000001 HC OR GENERAL SUPPLY STERILE: Performed by: UROLOGY

## 2023-07-20 RX ORDER — SODIUM CHLORIDE, SODIUM LACTATE, POTASSIUM CHLORIDE, CALCIUM CHLORIDE 600; 310; 30; 20 MG/100ML; MG/100ML; MG/100ML; MG/100ML
INJECTION, SOLUTION INTRAVENOUS CONTINUOUS
Status: DISCONTINUED | OUTPATIENT
Start: 2023-07-20 | End: 2023-07-20 | Stop reason: HOSPADM

## 2023-07-20 RX ORDER — ONDANSETRON 4 MG/1
4 TABLET, ORALLY DISINTEGRATING ORAL EVERY 30 MIN PRN
Status: DISCONTINUED | OUTPATIENT
Start: 2023-07-20 | End: 2023-07-20 | Stop reason: HOSPADM

## 2023-07-20 RX ORDER — FENTANYL CITRATE 50 UG/ML
INJECTION, SOLUTION INTRAMUSCULAR; INTRAVENOUS PRN
Status: DISCONTINUED | OUTPATIENT
Start: 2023-07-20 | End: 2023-07-20

## 2023-07-20 RX ORDER — CEFAZOLIN SODIUM/WATER 2 G/20 ML
2 SYRINGE (ML) INTRAVENOUS
Status: COMPLETED | OUTPATIENT
Start: 2023-07-20 | End: 2023-07-20

## 2023-07-20 RX ORDER — ONDANSETRON 2 MG/ML
4 INJECTION INTRAMUSCULAR; INTRAVENOUS EVERY 30 MIN PRN
Status: DISCONTINUED | OUTPATIENT
Start: 2023-07-20 | End: 2023-07-20 | Stop reason: HOSPADM

## 2023-07-20 RX ORDER — LIDOCAINE HYDROCHLORIDE 20 MG/ML
INJECTION, SOLUTION INFILTRATION; PERINEURAL PRN
Status: DISCONTINUED | OUTPATIENT
Start: 2023-07-20 | End: 2023-07-20

## 2023-07-20 RX ORDER — OXYCODONE HYDROCHLORIDE 5 MG/1
5 TABLET ORAL
Status: DISCONTINUED | OUTPATIENT
Start: 2023-07-20 | End: 2023-07-20 | Stop reason: HOSPADM

## 2023-07-20 RX ORDER — PROPOFOL 10 MG/ML
INJECTION, EMULSION INTRAVENOUS CONTINUOUS PRN
Status: DISCONTINUED | OUTPATIENT
Start: 2023-07-20 | End: 2023-07-20

## 2023-07-20 RX ORDER — DEXAMETHASONE SODIUM PHOSPHATE 4 MG/ML
INJECTION, SOLUTION INTRA-ARTICULAR; INTRALESIONAL; INTRAMUSCULAR; INTRAVENOUS; SOFT TISSUE PRN
Status: DISCONTINUED | OUTPATIENT
Start: 2023-07-20 | End: 2023-07-20

## 2023-07-20 RX ORDER — ACETAMINOPHEN 325 MG/1
975 TABLET ORAL ONCE
Status: COMPLETED | OUTPATIENT
Start: 2023-07-20 | End: 2023-07-20

## 2023-07-20 RX ORDER — BUPIVACAINE HYDROCHLORIDE 5 MG/ML
INJECTION, SOLUTION PERINEURAL PRN
Status: DISCONTINUED | OUTPATIENT
Start: 2023-07-20 | End: 2023-07-20 | Stop reason: HOSPADM

## 2023-07-20 RX ORDER — HYDRALAZINE HYDROCHLORIDE 20 MG/ML
2.5-5 INJECTION INTRAMUSCULAR; INTRAVENOUS EVERY 10 MIN PRN
Status: DISCONTINUED | OUTPATIENT
Start: 2023-07-20 | End: 2023-07-20 | Stop reason: HOSPADM

## 2023-07-20 RX ORDER — HYDROMORPHONE HCL IN WATER/PF 6 MG/30 ML
0.2 PATIENT CONTROLLED ANALGESIA SYRINGE INTRAVENOUS EVERY 5 MIN PRN
Status: DISCONTINUED | OUTPATIENT
Start: 2023-07-20 | End: 2023-07-20 | Stop reason: HOSPADM

## 2023-07-20 RX ORDER — GLYCOPYRROLATE 0.2 MG/ML
INJECTION, SOLUTION INTRAMUSCULAR; INTRAVENOUS PRN
Status: DISCONTINUED | OUTPATIENT
Start: 2023-07-20 | End: 2023-07-20

## 2023-07-20 RX ORDER — ONDANSETRON 2 MG/ML
INJECTION INTRAMUSCULAR; INTRAVENOUS PRN
Status: DISCONTINUED | OUTPATIENT
Start: 2023-07-20 | End: 2023-07-20

## 2023-07-20 RX ORDER — PROPOFOL 10 MG/ML
INJECTION, EMULSION INTRAVENOUS PRN
Status: DISCONTINUED | OUTPATIENT
Start: 2023-07-20 | End: 2023-07-20

## 2023-07-20 RX ORDER — OXYCODONE HYDROCHLORIDE 5 MG/1
10 TABLET ORAL
Status: DISCONTINUED | OUTPATIENT
Start: 2023-07-20 | End: 2023-07-20 | Stop reason: HOSPADM

## 2023-07-20 RX ORDER — LABETALOL HYDROCHLORIDE 5 MG/ML
10 INJECTION, SOLUTION INTRAVENOUS
Status: DISCONTINUED | OUTPATIENT
Start: 2023-07-20 | End: 2023-07-20 | Stop reason: HOSPADM

## 2023-07-20 RX ORDER — CEPHALEXIN 500 MG/1
500 CAPSULE ORAL 2 TIMES DAILY
Qty: 14 CAPSULE | Refills: 0 | Status: SHIPPED | OUTPATIENT
Start: 2023-07-20 | End: 2023-07-27

## 2023-07-20 RX ORDER — BUPIVACAINE HYDROCHLORIDE 5 MG/ML
INJECTION, SOLUTION EPIDURAL; INTRACAUDAL
Status: DISCONTINUED
Start: 2023-07-20 | End: 2023-07-20 | Stop reason: HOSPADM

## 2023-07-20 RX ORDER — SODIUM CHLORIDE, SODIUM LACTATE, POTASSIUM CHLORIDE, CALCIUM CHLORIDE 600; 310; 30; 20 MG/100ML; MG/100ML; MG/100ML; MG/100ML
INJECTION, SOLUTION INTRAVENOUS CONTINUOUS PRN
Status: DISCONTINUED | OUTPATIENT
Start: 2023-07-20 | End: 2023-07-20

## 2023-07-20 RX ORDER — TRAMADOL HYDROCHLORIDE 50 MG/1
50 TABLET ORAL 2 TIMES DAILY
Qty: 10 TABLET | Refills: 0 | Status: SHIPPED | OUTPATIENT
Start: 2023-07-20 | End: 2023-07-23

## 2023-07-20 RX ORDER — CEFAZOLIN SODIUM/WATER 2 G/20 ML
2 SYRINGE (ML) INTRAVENOUS SEE ADMIN INSTRUCTIONS
Status: DISCONTINUED | OUTPATIENT
Start: 2023-07-20 | End: 2023-07-20 | Stop reason: HOSPADM

## 2023-07-20 RX ORDER — FENTANYL CITRATE 0.05 MG/ML
50 INJECTION, SOLUTION INTRAMUSCULAR; INTRAVENOUS
Status: DISCONTINUED | OUTPATIENT
Start: 2023-07-20 | End: 2023-07-20 | Stop reason: HOSPADM

## 2023-07-20 RX ORDER — FENTANYL CITRATE 0.05 MG/ML
25 INJECTION, SOLUTION INTRAMUSCULAR; INTRAVENOUS EVERY 5 MIN PRN
Status: DISCONTINUED | OUTPATIENT
Start: 2023-07-20 | End: 2023-07-20 | Stop reason: HOSPADM

## 2023-07-20 RX ADMIN — DEXAMETHASONE SODIUM PHOSPHATE 4 MG: 4 INJECTION, SOLUTION INTRA-ARTICULAR; INTRALESIONAL; INTRAMUSCULAR; INTRAVENOUS; SOFT TISSUE at 07:36

## 2023-07-20 RX ADMIN — ONDANSETRON 4 MG: 2 INJECTION INTRAMUSCULAR; INTRAVENOUS at 08:06

## 2023-07-20 RX ADMIN — GLYCOPYRROLATE 0.1 MG: 0.2 INJECTION, SOLUTION INTRAMUSCULAR; INTRAVENOUS at 07:36

## 2023-07-20 RX ADMIN — PROPOFOL 200 MG: 10 INJECTION, EMULSION INTRAVENOUS at 07:36

## 2023-07-20 RX ADMIN — PHENYLEPHRINE HYDROCHLORIDE 50 MCG: 10 INJECTION INTRAVENOUS at 07:45

## 2023-07-20 RX ADMIN — PROPOFOL 30 MCG/KG/MIN: 10 INJECTION, EMULSION INTRAVENOUS at 07:38

## 2023-07-20 RX ADMIN — FENTANYL CITRATE 50 MCG: 50 INJECTION, SOLUTION INTRAMUSCULAR; INTRAVENOUS at 07:33

## 2023-07-20 RX ADMIN — SODIUM CHLORIDE, POTASSIUM CHLORIDE, SODIUM LACTATE AND CALCIUM CHLORIDE: 600; 310; 30; 20 INJECTION, SOLUTION INTRAVENOUS at 07:30

## 2023-07-20 RX ADMIN — Medication 2 G: at 07:30

## 2023-07-20 RX ADMIN — PHENYLEPHRINE HYDROCHLORIDE 100 MCG: 10 INJECTION INTRAVENOUS at 07:39

## 2023-07-20 RX ADMIN — ACETAMINOPHEN 975 MG: 325 TABLET, FILM COATED ORAL at 06:16

## 2023-07-20 RX ADMIN — PHENYLEPHRINE HYDROCHLORIDE 100 MCG: 10 INJECTION INTRAVENOUS at 08:00

## 2023-07-20 RX ADMIN — PHENYLEPHRINE HYDROCHLORIDE 100 MCG: 10 INJECTION INTRAVENOUS at 08:18

## 2023-07-20 RX ADMIN — LIDOCAINE HYDROCHLORIDE 80 MG: 20 INJECTION, SOLUTION INFILTRATION; PERINEURAL at 07:36

## 2023-07-20 RX ADMIN — PHENYLEPHRINE HYDROCHLORIDE 100 MCG: 10 INJECTION INTRAVENOUS at 08:06

## 2023-07-20 ASSESSMENT — ACTIVITIES OF DAILY LIVING (ADL)
ADLS_ACUITY_SCORE: 41

## 2023-07-20 ASSESSMENT — LIFESTYLE VARIABLES: TOBACCO_USE: 0

## 2023-07-20 ASSESSMENT — ENCOUNTER SYMPTOMS
DYSRHYTHMIAS: 1
SEIZURES: 0

## 2023-07-20 NOTE — ANESTHESIA CARE TRANSFER NOTE
Patient: Nigel Rodarte Jr.    Procedure: Procedure(s):  RIGHT HYDROCELECTOMY       Diagnosis: Hydrocele [N43.3]  Diagnosis Additional Information: No value filed.    Anesthesia Type:   General     Note:    Oropharynx: oropharynx clear of all foreign objects  Level of Consciousness: awake and drowsy  Oxygen Supplementation: face mask  Level of Supplemental Oxygen (L/min / FiO2): 6  Independent Airway: airway patency satisfactory and stable  Dentition: dentition unchanged  Vital Signs Stable: post-procedure vital signs reviewed and stable  Report to RN Given: handoff report given  Patient transferred to: PACU  Comments: To PACU: Arouses easily, good airway, 02 face mask, VSS  Report to RN  Handoff Report: Identifed the Patient, Identified the Reponsible Provider, Reviewed the pertinent medical history, Discussed the surgical course, Reviewed Intra-OP anesthesia mangement and issues during anesthesia, Set expectations for post-procedure period and Allowed opportunity for questions and acknowledgement of understanding      Vitals:  Vitals Value Taken Time   BP     Temp     Pulse 61 07/20/23 0840   Resp 11 07/20/23 0840   SpO2 100 % 07/20/23 0840   Vitals shown include unvalidated device data.    Electronically Signed By: BRENDA Heath CRNA  July 20, 2023  8:41 AM

## 2023-07-20 NOTE — ANESTHESIA POSTPROCEDURE EVALUATION
Patient: Nigel Rodarte Jr.    Procedure: Procedure(s):  RIGHT HYDROCELECTOMY       Anesthesia Type:  General    Note:  Disposition: Outpatient   Postop Pain Control: Uneventful            Sign Out: Well controlled pain   PONV: No   Neuro/Psych: Uneventful            Sign Out: Acceptable/Baseline neuro status   Airway/Respiratory: Uneventful            Sign Out: Acceptable/Baseline resp. status   CV/Hemodynamics: Uneventful            Sign Out: Acceptable CV status   Other NRE: NONE   DID A NON-ROUTINE EVENT OCCUR? No           Last vitals:  Vitals Value Taken Time   /70 07/20/23 0915   Temp     Pulse 58 07/20/23 0923   Resp 10 07/20/23 0923   SpO2 99 % 07/20/23 0923   Vitals shown include unvalidated device data.    Electronically Signed By: Red Juarez MD  July 20, 2023  1:52 PM

## 2023-07-20 NOTE — DISCHARGE INSTRUCTIONS
Resume Warfarin on 7/20 with 10mg then 7.5mg on 7/21 then normal dosing and INR check about 1 week later per [facility] hold plan.    Today you were given 975 mg of Tylenol at 6:16AM. The recommended daily maximum dose is 4000 mg.     Same Day Surgery Discharge Instructions for  Sedation and General Anesthesia     It's not unusual to feel dizzy, light-headed or faint for up to 24 hours after surgery or while taking pain medication.  If you have these symptoms: sit for a few minutes before standing and have someone assist you when you get up to walk or use the bathroom.    You should rest and relax for the next 24 hours. We recommend you make arrangements to have an adult stay with you for at least 24 hours after your discharge.  Avoid hazardous and strenuous activity.    DO NOT DRIVE any vehicle or operate mechanical equipment for 24 hours following the end of your surgery.  Even though you may feel normal, your reactions may be affected by the medication you have received.    Do not drink alcoholic beverages for 24 hours following surgery.     Slowly progress to your regular diet as you feel able. It's not unusual to feel nauseated and/or vomit after receiving anesthesia.  If you develop these symptoms, drink clear liquids (apple juice, ginger ale, broth, 7-up, etc. ) until you feel better.  If your nausea and vomiting persists for 24 hours, please notify your surgeon.      All narcotic pain medications, along with inactivity and anesthesia, can cause constipation. Drinking plenty of liquids and increasing fiber intake will help.    For any questions of a medical nature, call your surgeon.    Do not make important decisions for 24 hours.    If you had general anesthesia, you may have a sore throat for a couple of days related to the breathing tube used during surgery.  You may use Cepacol lozenges to help with this discomfort.  If it worsens or if you develop a fever, contact your surgeon.     If you feel your pain is not  well managed with the pain medications prescribed by your surgeon, please contact your surgeon's office to let them know so they can address your concerns.     Carlos Alberto Graf Drain  Home Care Instructions    What is a Carlos Alberto Graf (SALINA) Drain?  This is a small tube that connects to a bulb.  Its gentle suction removes extra fluid from a surgical wound.  Your doctor will remove the tube when the amount of fluid decreases.  The color and amount of fluid varies.  Right after surgery the fluid is bright red.  Over time, it changes to light pink and may become clear or the color of straw.    How should I care for my tube site?  Keep the skin around the tube dry.  Check with your doctor about how to shower.  You may need to cover the site with plastic when you shower.  Or, it may be okay to let the site get wet and put on a clean bandage after you shower.    If the bandage gets wet, you will need to change it.  How should I care for the bulb?  Keep the bulb compressed at all times except while you empty it.   Attach the bulb to your clothing with tape and a safety pin.  Try to empty the bulb at the same time every day.  Empty the bulb at least once a day, or when the bulb becomes half full.  If it becomes too full, there will not be enough suction.    To empty the bulb:  Wash your hands.  Open the bulb cap.  Drain the fluid from the bulb into the measuring cup.  If you have two drains, use two cups.    Clean the mouth of the bulb with an alcohol wipe if your nurse told you to.  Squeeze the bulb (fold it in half before you close the bulb cap) If it does not stay compressed, call your nurse or clinic.  Write the amount of drainage on the drainage record (see back page).  If you have two drains, write the amount for each bulb.  Flush the drainage down the toilet.  Rinse the measuring cup.  Wash your hands.    When should I call my doctor?   Call your doctor if:  You have a fever over 101 F (38.3 C), taken under the tongue.    The drainage increases or smells bad.  The skin around your tube has increased redness, swelling, warmth or pain.  You have pus or fluid leaking at the tube site.  Your stitches break.  You think the tube is not draining.  The tube falls out.  You have any problems or concerns.    Your drainage record:    Empty your bulb at least once per day or when 1/2 to 1/3 full.  Write down the date, time and amount of drainage in each bulb.   Bring this record to each clinic visit.    Date Time Bulb 1: amount of  Drainage in (ml or cc) Bulb 2: amount of drainage (in ml or cc) Notes                                                        **If you have questions or concerns about your procedure,  call Dr. Mcknight at [phone]**

## 2023-07-20 NOTE — INTERVAL H&P NOTE
I have reviewed the surgical (or preoperative) H&P that is linked to this encounter, and examined the patient. There are no significant changes    Clinical Conditions Present on Arrival:  Clinically Significant Risk Factors Present on Admission                # Drug Induced Coagulation Defect: home medication list includes an anticoagulant medication         High Dose Vitamin A Counseling: Side effects reviewed, pt to contact office should one occur.

## 2023-07-20 NOTE — BRIEF OP NOTE
UROLOGY BRIEF OP NOTE  PREOP DX RT HYDROCELE  POSTOP DX -SAME  PROCEDURE-RT HYDROCELECTOMY  ANES- GEN  SURGEON- MELECIO  EBL <5CC  DRAIN 10F SALINA DRAIN  FINDINGS - RT HYDROCELE

## 2023-07-20 NOTE — OP NOTE
Procedure Date: 07/20/2023    PREOPERATIVE DIAGNOSIS:  Right hydrocele.    POSTOPERATIVE DIAGNOSIS:  Right hydrocele.    PROCEDURE:  Right hydrocelectomy.    ANESTHESIA:  General.    SURGEON:  Carlos Mcknight MD    ESTIMATED BLOOD LOSS:  Less than 5 mL.    SUMMARY OF FINDINGS:  Right hydrocele.    DRAIN:  SALINA 10-Russian x1.    BRIEF HISTORY AND PHYSICAL:  An 83-year-old male with a history of an enlarging right hemiscrotum.  He has noted intermittent discomfort.  Exam demonstrates classic right hydrocele.  He has elected to undergo repair.  The procedure thoroughly explained to the patient including possible complications and realistic expectations and he has elected to proceed.    DESCRIPTION OF PROCEDURE:  Proper consents were obtained and signed.  After adequate general anesthesia, he was shaved and prepped in the usual sterile fashion.  A 3 cm transverse incision was made in the midportion of the right hemiscrotum.  Sharp dissection was used to transect through the skin, dartos muscle down to the level of the processus vaginalis.  The processus vaginalis then was freed in a circumferential manner and the hydrocele sac then was opened, drained and the testicle was brought into the operative field.  The hydrocele sac then was completely opened and trimmed and everted with 3-0 Vicryl sutures.  No evidence of bleeding was noted.  0.5% Marcaine was injected into the skin edges and cord for postoperative analgesia.  The testicle then was replaced into the right hemiscrotum in anatomical position.  A 10-Russian SALINA drain was placed into the right hemiscrotum and out a separate stab wound.  The drain was attached to the skin with 3-0 chromic sutures.  The wound was irrigated.  The dartos muscle layer was closed with a running 3-0 chromic suture.  The skin was closed with interrupted 4-0 Vicryl sutures.  Tegaderm dressings were applied.  Estimated blood loss less than 5 mL.  Taken to recovery in stable condition.    His drain  will be removed in the office in 1 day, on Friday.    Carlos Mcknight MD        D: 2023   T: 2023   MT: cathie    Name:     BEN KO  MRN:      [id number]        Account:        [id number]   :      1940           Procedure Date: 2023     Document: [id number]    cc:  Osmar Espinoza MD

## 2023-07-20 NOTE — ANESTHESIA PREPROCEDURE EVALUATION
Anesthesia Pre-Procedure Evaluation    Patient: Nigel Rodarte Jr.   MRN: [id number] : 1940        Procedure : Procedure(s):  RIGHT HYDROCELECTOMY          Past Medical History:   Diagnosis Date     Arthritis     osteoarthrosis     Coronary atherosclerosis of native coronary artery     Mild per angiogram     Elevated PSA      Fatigue      HLD (hyperlipidemia)      Palpitations      Paroxysmal atrial fibrillation (H) 2017    S/p PVI and CTI ablation 2020     Renal disease     kidney calculus     SOB (shortness of breath)       Past Surgical History:   Procedure Laterality Date     ARTHROPLASTY HIP  2013    Procedure: ARTHROPLASTY HIP;  RIGHT TOTAL HIP ARTHROPLASTY ;  Surgeon: Jamie Ohara MD;  Location:  OR     ARTHROPLASTY KNEE Right 2019    Procedure: RIGHT TOTAL KNEE ARTHROPLASTY (DEPUY)^;  Surgeon: Jamie Ohara MD;  Location:  OR     CARDIOVERSION       COLONOSCOPY N/A 2016    Procedure: COLONOSCOPY;  Surgeon: Astrid Vital MD;  Location:  GI     CV CORONARY ANGIOGRAM N/A 2021    Procedure: Coronary Angiogram;  Surgeon: Jacob Baird MD;  Location:  HEART CARDIAC CATH LAB     CV INSTANTANEOUS WAVE-FREE RATIO N/A 2021    Procedure: Instantaneous Wave-Free Ratio;  Surgeon: Jacob Baird MD;  Location:  HEART CARDIAC CATH LAB     CYSTOSCOPY, RETROGRADES, EXTRACT STONE, COMBINED  2013    Procedure: COMBINED CYSTOSCOPY, RETROGRADES, EXTRACT STONE;  CYSTOSCOPY, RIGHT RETROGRADE, STONE EXTRACTION;  Surgeon: Carlos Mcknight MD;  Location:  OR     ENT SURGERY      stapendectomy     EP ABLATION FOCAL AFIB N/A 2020    Procedure: EP Ablation Focal AFIB;  Surgeon: Grzegorz Malhotra MD;  Location:  HEART CARDIAC CATH LAB     GENITOURINARY SURGERY      cystoscopy for stone removal     HERNIA REPAIR       ORTHOPEDIC SURGERY      shoulder surgeries,bilat carpel tunnel     TONSILLECTOMY        No Known Allergies   Social  History     Tobacco Use     Smoking status: Never     Smokeless tobacco: Never   Substance Use Topics     Alcohol use: No      Wt Readings from Last 1 Encounters:   23 68 kg (150 lb)        Anesthesia Evaluation   Pt has had prior anesthetic. Type: General.    No history of anesthetic complications       ROS/MED HX  ENT/Pulmonary:    (-) tobacco use, asthma and sleep apnea   Neurologic:     (+) no peripheral neuropathy  (-) no seizures and no CVA   Cardiovascular:     (+) Dyslipidemia --CAD ---dysrhythmias (s/p), a-fib,  (-) hypertension, past MI and past MI   METS/Exercise Tolerance:     Hematologic:       Musculoskeletal:       GI/Hepatic:    (-) GERD   Renal/Genitourinary:    (-) renal disease   Endo:    (-) Type II DM and thyroid disease   Psychiatric/Substance Use:       Infectious Disease:    (-) Recent Fever   Malignancy:       Other:            Physical Exam    Airway  airway exam normal      Mallampati: II   TM distance: > 3 FB   Neck ROM: full   Mouth opening: > 3 cm    Respiratory Devices and Support         Dental       (+) Minor Abnormalities - some fillings, tiny chips      Cardiovascular   cardiovascular exam normal          Pulmonary   pulmonary exam normal                   Reading Physician Reading Date Result Priority   Lou Jaquez MD  [phone]  [phone] 2020      Narrative & Impression  [id number]  [id number]  [id number]^ROSARIO^JIMMY^NEO           [facility]  Echocardiography Laboratory  [address] [place] [address]        Name: MAIKEL BEN JR. TAE  MRN: [id number]  : 1940  Study Date: 2020 08:24 AM  Age: 79 yrs  Gender: Male  Patient Location: [facility]  Reason For Study: Paroxysmal atrial fibrillation (H)  Ordering Physician: JIMMY PONCE  Referring Physician: Osmar Espinoza  Performed By: Anjelica Wiseamn     BSA: 1.9 m2  Height: 69 in  Weight: 159 lb  HR: 60  BP: 112/77  mmHg  _____________________________________________________________________________  __        Procedure  Complete BEN Adult. BEN Probe serial #[id number] was used during the procedure.  The heart rate, respiratory rate and response to care were monitored  throughout the procedure with the assistance of the nurse.  _____________________________________________________________________________  __        Interpretation Summary     The left ventricle is normal in structure, function and size.  The visual ejection fraction is estimated at 55-60%.  The right ventricle is normal in structure, function and size.  No thrombus is detected in the left atrial appendage.  A patent foramen ovale is suspected.  Mild atherosclerotic plaque(s) in the aortic arch.  There is no pericardial effusion.        OUTSIDE LABS:  CBC:   Lab Results   Component Value Date    WBC 6.6 08/04/2022    WBC 5.0 04/14/2021    HGB 15.4 08/04/2022    HGB 14.7 04/14/2021    HCT 47.0 08/04/2022    HCT 43.7 04/14/2021     08/04/2022     04/14/2021     BMP:   Lab Results   Component Value Date     08/04/2022     04/14/2021    POTASSIUM 4.5 08/04/2022    POTASSIUM 4.2 04/14/2021    CHLORIDE 109 08/04/2022    CHLORIDE 110 (H) 04/14/2021    CO2 26 08/04/2022    CO2 26 04/14/2021    BUN 17 08/04/2022    BUN 20 04/14/2021    CR 0.97 08/04/2022    CR 1.19 04/14/2021     (H) 08/04/2022    GLC 86 04/14/2021     COAGS:   Lab Results   Component Value Date    PTT 33 04/14/2021    INR 2.2 (H) 07/13/2023     POC:   Lab Results   Component Value Date     (H) 08/10/2012     HEPATIC:   Lab Results   Component Value Date    ALBUMIN 4.0 08/04/2022    PROTTOTAL 7.3 08/04/2022    ALT 43 08/04/2022    AST 39 08/04/2022    ALKPHOS 73 08/04/2022    BILITOTAL 0.7 08/04/2022     OTHER:   Lab Results   Component Value Date    CHERI 9.3 08/04/2022    MAG 2.3 07/18/2015    TSH 2.34 05/16/2017       Anesthesia Plan    ASA Status:  3   NPO Status:  NPO  Appropriate    Anesthesia Type: General.     - Airway: LMA   Induction: Intravenous.   Maintenance: Balanced.        Consents    Anesthesia Plan(s) and associated risks, benefits, and realistic alternatives discussed. Questions answered and patient/representative(s) expressed understanding.    - Discussed:     - Discussed with:  Patient         Postoperative Care    Pain management: IV analgesics, Oral pain medications.   PONV prophylaxis: Ondansetron (or other 5HT-3), Dexamethasone or Solumedrol, Background Propofol Infusion     Comments:                Red Juarez MD

## 2023-07-21 ASSESSMENT — ACTIVITIES OF DAILY LIVING (ADL)
ADLS_ACUITY_SCORE: 41

## 2023-07-22 ASSESSMENT — ACTIVITIES OF DAILY LIVING (ADL)
ADLS_ACUITY_SCORE: 41

## 2023-07-23 ASSESSMENT — ACTIVITIES OF DAILY LIVING (ADL)
ADLS_ACUITY_SCORE: 41

## 2023-07-24 ASSESSMENT — ACTIVITIES OF DAILY LIVING (ADL)
ADLS_ACUITY_SCORE: 41

## 2023-07-25 ENCOUNTER — ANTICOAGULATION THERAPY VISIT (OUTPATIENT)
Dept: ANTICOAGULATION | Facility: CLINIC | Age: 83
End: 2023-07-25
Payer: MEDICARE

## 2023-07-25 DIAGNOSIS — Z79.01 LONG TERM CURRENT USE OF ANTICOAGULANTS WITH INR GOAL OF 2.0-3.0: ICD-10-CM

## 2023-07-25 DIAGNOSIS — I48.0 PAROXYSMAL ATRIAL FIBRILLATION (H): Primary | ICD-10-CM

## 2023-07-25 NOTE — PROGRESS NOTES
ANTICOAGULATION  MANAGEMENT: Discharge Review    Nigel Rodarte Jr. chart reviewed for anticoagulation continuity of care    Outpatient surgery/procedure on 7/20/23 for Right Hydrocelectomy.    Discharge disposition: Home    Results:    Recent labs: (last 7 days)     07/20/23  0556   INR 1.03     Anticoagulation inpatient management:     not applicable     Anticoagulation discharge instructions:     Warfarin dosing:  Took as advised by [facility] previously   Bridging: No   INR goal change: No      Medication changes affecting anticoagulation: No    Additional factors affecting anticoagulation: No     PLAN     No adjustment to anticoagulation plan needed    Spoke with Nigel and made INR appointment for 7/28 (soonest opening available)    Anticoagulation Calendar updated    Estrella Bowden RN

## 2023-07-28 ENCOUNTER — ANTICOAGULATION THERAPY VISIT (OUTPATIENT)
Dept: ANTICOAGULATION | Facility: CLINIC | Age: 83
End: 2023-07-28

## 2023-07-28 ENCOUNTER — LAB (OUTPATIENT)
Dept: LAB | Facility: CLINIC | Age: 83
End: 2023-07-28
Payer: MEDICARE

## 2023-07-28 DIAGNOSIS — Z79.01 LONG TERM CURRENT USE OF ANTICOAGULANTS WITH INR GOAL OF 2.0-3.0: ICD-10-CM

## 2023-07-28 DIAGNOSIS — I48.91 ATRIAL FIBRILLATION, UNSPECIFIED TYPE (H): Primary | ICD-10-CM

## 2023-07-28 DIAGNOSIS — I48.0 PAROXYSMAL ATRIAL FIBRILLATION (H): ICD-10-CM

## 2023-07-28 LAB — INR BLD: 2.2 (ref 0.9–1.1)

## 2023-07-28 PROCEDURE — 36416 COLLJ CAPILLARY BLOOD SPEC: CPT

## 2023-07-28 PROCEDURE — 85610 PROTHROMBIN TIME: CPT

## 2023-07-28 NOTE — PROGRESS NOTES
ANTICOAGULATION MANAGEMENT     Nigel Rodarte Jr. 83 year old male is on warfarin with therapeutic INR result. (Goal INR 2.0-3.0)    Recent labs: (last 7 days)     07/28/23  0714   INR 2.2*       ASSESSMENT     Source(s): Chart Review     Warfarin doses taken: Warfarin taken as instructed, held for 5 days then resumed on 7/20 with boost x 2 days, no bridge  Diet: No new diet changes identified  Medication/supplement changes: None noted  New illness, injury, or hospitalization: 7/20 right hydrocelectomy  Signs or symptoms of bleeding or clotting: unknown  Previous result: Subtherapeutic  Additional findings: None       PLAN     Recommended plan for temporary change(s) affecting INR     Dosing Instructions: Continue your current warfarin dose with next INR in 2 weeks       Summary  As of 7/28/2023      Full warfarin instructions:  7.5 mg every Tue; 5 mg all other days   Next INR check:  8/11/2023               Detailed voice message left for Nigel with dosing instructions and follow up date.     Contact [phone] to schedule and with any changes, questions or concerns.     Education provided:   Goal range and lab monitoring: goal range and significance of current result  Contact [phone] with any changes, questions or concerns.     Plan made per [facility] anticoagulation protocol    Gilma Anton, RN  Anticoagulation Clinic  7/28/2023    _______________________________________________________________________     Anticoagulation Episode Summary       Current INR goal:  2.0-3.0   TTR:  60.9 % (8.4 mo)   Target end date:  Indefinite   Send INR reminders to:  [facility] HEART INR NURSE    Indications    Atrial fibrillation (H) [I48.91] [I48.91]  Long term current use of anticoagulants with INR goal of 2.0-3.0 [Z79.01]  Paroxysmal atrial fibrillation (H) [I48.0]             Comments:               Anticoagulation Care Providers       Provider Role Specialty Phone number    Grzegorz Malhotra MD Referring  Cardiovascular Disease [phone]

## 2023-08-14 NOTE — PROGRESS NOTES
11/07/19 0948   Quick Adds   Type of Visit Initial Occupational Therapy Evaluation   Living Environment   Lives With spouse   Living Arrangements house   Home Accessibility stairs to enter home;stairs within home   Number of Stairs, Main Entrance 3   Number of Stairs, Within Home, Primary   (pt does not need to access additional floors)   Transportation Anticipated family or friend will provide;car, drives self   Self-Care   Usual Activity Tolerance excellent   Current Activity Tolerance good   Equipment Currently Used at Home none   Functional Level   Ambulation 0-->independent   Transferring 0-->independent   Toileting 0-->independent   Bathing 0-->independent   Dressing 0-->independent   Eating 0-->independent   Communication 0-->understands/communicates without difficulty   Swallowing 0-->swallows foods/liquids without difficulty   Cognition 0 - no cognition issues reported   Fall history within last six months no   General Information   Onset of Illness/Injury or Date of Surgery - Date 11/06/19   Referring Physician Dr DEE Ohara   Patient/Family Goals Statement Pt plans to discharge home tomorrow   Additional Occupational Profile Info/Pertinent History of Current Problem Seen POD #1 of R TKA   Precautions/Limitations fall precautions   Weight-Bearing Status - RLE weight-bearing as tolerated   Cognitive Status Examination   Orientation orientation to person, place and time   Level of Consciousness alert   Follows Commands (Cognition) WNL   Memory intact   Pain Assessment   Patient Currently in Pain Yes, see Vital Sign flowsheet   Transfer Skill: Bed to Chair/Chair to Bed   Level of [place]: Bed to Chair stand-by assist   Weight-Bearing Restrictions weight-bearing as tolerated   Assistive Device - Transfer Skill Bed to Chair Chair to Bed Rehab Eval standard walker   Transfer Skill: Sit to Stand   Level of [place]: Sit/Stand stand-by assist   Transfer Skill: Sit to Stand weight-bearing as tolerated  unable to reach pt as voice mail box is full-will try again later     "  Assistive Device for Transfer: Sit/Stand standard walker   Transfer Skill: Toilet Transfer   Level of [place]: Toilet stand-by assist   Weight-Bearing Restrictions: Toilet weight-bearing as tolerated   Assistive Device standard walker;seat riser;grab bars   Lower Body Dressing   Level of [place]: Dress Lower Body stand-by assist   Instrumental Activities of Daily Living (IADL)   Previous Responsibilities driving;finances;work   Activities of Daily Living Analysis   Impairments Contributing to Impaired Activities of Daily Living pain   General Therapy Interventions   Planned Therapy Interventions ADL retraining;transfer training   Clinical Impression   Criteria for Skilled Therapeutic Interventions Met yes, treatment indicated   OT Diagnosis Decreased I with ADLs   Influenced by the following impairments Pain; Decreased ROM of R LE   Assessment of Occupational Performance 1-3 Performance Deficits   Identified Performance Deficits Dressing; Toileting   Clinical Decision Making (Complexity) Low complexity   Predicted Duration of Therapy Intervention (days/wks) Eval and 1 Tx only   Anticipated Discharge Disposition Other (see comments)  (per ortho team)   Risks and Benefits of Treatment have been explained. Yes   Patient, Family & other staff in agreement with plan of care Yes   [facility] TM \"6 Clicks\"   2016, Trustees of [facility], under license to [employer]  All rights reserved.   6 Clicks Short Forms Daily Activity Inpatient Short Form   [facility] AM-PAC  \"6 Clicks\" Daily Activity Inpatient Short Form   1. Putting on and taking off regular lower body clothing? 3 - A Little   2. Bathing (including washing, rinsing, drying)? 3 - A Little   3. Toileting, which includes using toilet, bedpan or urinal? 3 - A Little   4. Putting on and taking off regular upper body clothing? 4 - None   5. Taking care of personal grooming such as brushing teeth? 4 - None   6. Eating meals? 4 - " None   Daily Activity Raw Score (Score out of 24.Lower scores equate to lower levels of function) 21   Total Evaluation Time   Total Evaluation Time (Minutes) 10

## 2023-08-18 ENCOUNTER — TELEPHONE (OUTPATIENT)
Dept: ANTICOAGULATION | Facility: CLINIC | Age: 83
End: 2023-08-18
Payer: MEDICARE

## 2023-08-18 NOTE — TELEPHONE ENCOUNTER
ANTICOAGULATION     Nigel Rodarte Jr. is overdue for INR check.      Left message for patient to call and schedule lab appointment as soon as possible. If returning call, please schedule.     Jess Lawrence RN

## 2023-08-25 ENCOUNTER — TELEPHONE (OUTPATIENT)
Dept: ANTICOAGULATION | Facility: CLINIC | Age: 83
End: 2023-08-25
Payer: MEDICARE

## 2023-08-25 NOTE — TELEPHONE ENCOUNTER
ANTICOAGULATION     Nigel Rodarte Jr. is overdue for INR check.      Spoke with patient and scheduled lab appointment on 9/1/23    Gilma Anton RN

## 2023-08-29 ENCOUNTER — TELEPHONE (OUTPATIENT)
Dept: FAMILY MEDICINE | Facility: CLINIC | Age: 83
End: 2023-08-29
Payer: MEDICARE

## 2023-08-29 ENCOUNTER — TELEPHONE (OUTPATIENT)
Dept: CARDIOLOGY | Facility: CLINIC | Age: 83
End: 2023-08-29
Payer: MEDICARE

## 2023-08-29 DIAGNOSIS — I48.0 PAROXYSMAL ATRIAL FIBRILLATION (H): Primary | ICD-10-CM

## 2023-08-29 RX ORDER — WARFARIN SODIUM 5 MG/1
TABLET ORAL
Qty: 36 TABLET | Refills: 0 | Status: SHIPPED | OUTPATIENT
Start: 2023-08-29 | End: 2023-10-03

## 2023-08-29 NOTE — TELEPHONE ENCOUNTER
[facility] Call Center    Phone Message    May a detailed message be left on voicemail: yes     Reason for Call: Medication Refill Request    Has the patient contacted the pharmacy for the refill? Yes   Name of medication being requested: warfarin ANTICOAGULANT (COUMADIN) 5 MG tablet   Provider who prescribed the medication: Dr Malhotra  Pharmacy:    [id number] [place], [place] - [address] AT [facility] [address]    Date medication is needed: 8/31   The patient will be out in 2 days     Action Taken: Other: Cardiology    Travel Screening: Not Applicable    Thank you!  [facility]

## 2023-08-29 NOTE — TELEPHONE ENCOUNTER
Reason for Call:  Other call back    Detailed comments: patient called and needs anti coag nurse to call him back today.    Warfarin questions.    Thank you.    Phone Number Patient can be reached at: Cell number on file:    Telephone Information:   Mobile [phone]       Best Time: any    Can we leave a detailed message on this number? YES    Call taken on 8/29/2023 at 12:13 PM by Kelsey Batista

## 2023-08-29 NOTE — TELEPHONE ENCOUNTER
ANTICOAGULATION MANAGEMENT:  Medication Refill    Anticoagulation Summary  As of 7/28/2023      Warfarin maintenance plan:  7.5 mg (5 mg x 1.5) every Tue; 5 mg (5 mg x 1) all other days   Next INR check:  8/11/2023   Target end date:  Indefinite    Indications    Atrial fibrillation (H) [I48.91] [I48.91]  Long term current use of anticoagulants with INR goal of 2.0-3.0 [Z79.01]  Paroxysmal atrial fibrillation (H) [I48.0]                 Anticoagulation Care Providers       Provider Role Specialty Phone number    Grzegorz Malhotra MD Referring Cardiovascular Disease [phone]            Refill Criteria    Visit with referring provider/group: Overdue for referring provider visit, last visit on 5/3/2022    [facility] referral signed last signed: 04/07/2023; within last year: Yes    Lab monitoring not exceeding 2 weeks overdue: Shahnaz    Nigel does NOT meet all criteria for refill: Visit with referring provider's group was >= 1 year ago and > 2 weeks overdue for lab monitoring .     Warfarin is no longer active on medication list. Care Everywhere updated today. Discharge summary from 7/20/23 shows patient was to stop Warfarin. He was taking warfarin after procedure and had INR done on 7/28/23 which was therapeutic. He was due for next INR on 8/11/23.    I called Nigel and left voice message asking for a call back to verify he is still taking warfarin. Will also need to have him see cardiology as soon as possible and get INR done if he is taking Warfarin.    Estrella Bowden RN  Anticoagulation Clinic

## 2023-08-29 NOTE — TELEPHONE ENCOUNTER
Spoke to Nigel    He has not stopped Warfarin, says it was never supposed to be taken off his list.    I informed him we can fill Warfarin for 30 days but he needs to establish care with new cardiologist. He and his wife like Dr. Plunkett so they will call to make appointment with them.    Nigel has INR on 9/1/23.    I will fill 30 day supply of Warfarin 5 mg tabs as he does not mind breaking them in half for dosing.    Estrella Bowden RN

## 2023-09-01 ENCOUNTER — ANTICOAGULATION THERAPY VISIT (OUTPATIENT)
Dept: ANTICOAGULATION | Facility: CLINIC | Age: 83
End: 2023-09-01

## 2023-09-01 ENCOUNTER — LAB (OUTPATIENT)
Dept: LAB | Facility: CLINIC | Age: 83
End: 2023-09-01
Payer: MEDICARE

## 2023-09-01 DIAGNOSIS — I48.91 ATRIAL FIBRILLATION, UNSPECIFIED TYPE (H): Primary | ICD-10-CM

## 2023-09-01 DIAGNOSIS — I48.0 PAROXYSMAL ATRIAL FIBRILLATION (H): ICD-10-CM

## 2023-09-01 DIAGNOSIS — Z79.01 LONG TERM CURRENT USE OF ANTICOAGULANTS WITH INR GOAL OF 2.0-3.0: ICD-10-CM

## 2023-09-01 LAB — INR BLD: 2.6 (ref 0.9–1.1)

## 2023-09-01 PROCEDURE — 36416 COLLJ CAPILLARY BLOOD SPEC: CPT

## 2023-09-01 PROCEDURE — 85610 PROTHROMBIN TIME: CPT

## 2023-09-01 NOTE — PROGRESS NOTES
ANTICOAGULATION MANAGEMENT     Nigel Rodarte Jr. 83 year old male is on warfarin with therapeutic INR result. (Goal INR 2.0-3.0)    Recent labs: (last 7 days)     09/01/23  1327   INR 2.6*       ASSESSMENT     Source(s): Chart Review  Previous INR was Therapeutic last visit; previously outside of goal range  Medication, diet, health changes since last INR chart reviewed; none identified         PLAN     Recommended plan for no diet, medication or health factor changes affecting INR     Dosing Instructions: Continue your current warfarin dose with next INR in 6 weeks, has been 5 weeks since last check.        Summary  As of 9/1/2023      Full warfarin instructions:  7.5 mg every Tue; 5 mg all other days   Next INR check:  10/13/2023               Detailed voice message left for Nigel with dosing instructions and follow up date.   Establishing with new provider (Kathe Hurd) at the heart clinic on 9/28/23 (will update referral orders next time)    Contact [phone] to schedule and with any changes, questions or concerns.     Education provided:   Goal range and lab monitoring: goal range and significance of current result  Contact [phone] with any changes, questions or concerns.     Plan made per [facility] anticoagulation protocol    Gilma Anton, RN  Anticoagulation Clinic  9/1/2023    _______________________________________________________________________     Anticoagulation Episode Summary       Current INR goal:  2.0-3.0   TTR:  69.4 % (8.4 mo)   Target end date:  Indefinite   Send INR reminders to:  [facility] HEART INR NURSE    Indications    Atrial fibrillation (H) [I48.91] [I48.91]  Long term current use of anticoagulants with INR goal of 2.0-3.0 [Z79.01]  Paroxysmal atrial fibrillation (H) [I48.0]             Comments:               Anticoagulation Care Providers       Provider Role Specialty Phone number    Grzegorz Malhotra MD Referring Cardiovascular Disease [phone]    Kathe Hurd APRN  CNP Responsible Cardiovascular Disease [phone]

## 2023-09-12 ENCOUNTER — TELEPHONE (OUTPATIENT)
Dept: CARDIOLOGY | Facility: CLINIC | Age: 83
End: 2023-09-12
Payer: MEDICARE

## 2023-09-12 DIAGNOSIS — R07.89 CHEST DISCOMFORT: ICD-10-CM

## 2023-09-12 DIAGNOSIS — I48.0 PAROXYSMAL ATRIAL FIBRILLATION (H): ICD-10-CM

## 2023-09-12 RX ORDER — ROSUVASTATIN CALCIUM 20 MG/1
20 TABLET, COATED ORAL DAILY
Qty: 90 TABLET | Refills: 0 | Status: SHIPPED | OUTPATIENT
Start: 2023-09-12 | End: 2023-12-21

## 2023-09-12 RX ORDER — METOPROLOL SUCCINATE 25 MG/1
TABLET, EXTENDED RELEASE ORAL
Qty: 30 TABLET | Refills: 3 | Status: SHIPPED | OUTPATIENT
Start: 2023-09-12 | End: 2023-09-28

## 2023-09-12 NOTE — TELEPHONE ENCOUNTER
23 [facility] Refill Guide Reviewed     Received refill request for: Toprol 25 mg daily  Last OV was: [id number]  Labs/EK2022  F/U scheduled :23  Rx sent to:  [facility] 1150 am

## 2023-09-28 ENCOUNTER — OFFICE VISIT (OUTPATIENT)
Dept: CARDIOLOGY | Facility: CLINIC | Age: 83
End: 2023-09-28
Payer: MEDICARE

## 2023-09-28 VITALS
SYSTOLIC BLOOD PRESSURE: 101 MMHG | HEIGHT: 69 IN | HEART RATE: 70 BPM | WEIGHT: 150.3 LBS | BODY MASS INDEX: 22.26 KG/M2 | DIASTOLIC BLOOD PRESSURE: 69 MMHG

## 2023-09-28 DIAGNOSIS — Z79.01 LONG TERM CURRENT USE OF ANTICOAGULANT THERAPY: Primary | ICD-10-CM

## 2023-09-28 DIAGNOSIS — I48.0 PAROXYSMAL ATRIAL FIBRILLATION (H): ICD-10-CM

## 2023-09-28 PROCEDURE — 99215 OFFICE O/P EST HI 40 MIN: CPT | Performed by: NURSE PRACTITIONER

## 2023-09-28 RX ORDER — METOPROLOL SUCCINATE 25 MG/1
25 TABLET, EXTENDED RELEASE ORAL DAILY
Qty: 90 TABLET | Refills: 3 | Status: SHIPPED | OUTPATIENT
Start: 2023-09-28

## 2023-09-28 NOTE — LETTER
9/28/2023    Osmar Espinoza MD  [address] [place] [address]    RE: Nigel Rodarte Jr.       Dear Colleague,     I had the pleasure of seeing Nigel Rodarte Jr. in the [facility].    Electrophysiology Clinic Progress Note  Nigel Rodarte Jr. MRN# [id number]   YOB: 1940 Age: 83 year old     Primary cardiologist: Previously Dr. Malhotra    Reason for visit: Annual follow up    History of presenting illness:    Nigel Rodarte Jr. is a pleasant 83 year old patient with past medical history significant for:    Paroxysmal atrial fibrillation/flutter: s/p ablation 5/2020 and redo ablation in 2/2022 in [place]  [id number] Score 2 (age++, CAD) on chronic Coumadin  Non flow limiting CAD based on cath from 4/2021  Hyperlipidemia    Patient failed rhythm control strategy with flecainide/ metoprolol and underwent atrial fibrillation/flutter ablation on 05/18/2020. Unfortunately, he started having more episodes of atrial fibrillation in the end of 2021.  He was a started on flecainide/metoprolol. He went to [place] for the winter, and there he underwent a redo ablation in 2/2023. At his last visit with Dr. Malhotra he recommended stopping Flecainide and continuing Metoprolol. He was asked to contact our office with breakthrough atrial fibrillation.     Today he returns for an annual follow up.  Thankfully, he has had no breakthrough atrial fibrillation since discontinuation of flecainide.  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    Diagnotic studies:  Coronary angiogram (4/2021): Prox LAD lesion is 40% stenosed. Mid LAD lesion is 30% stenosed. Pressure wire/FFR was performed on the lesion.  Two lesions in the LAD that are not flow limiting based on iFR (0.92, 0.93).  Otherwise, mild diffuse atherosclerosis without a focal lesion.  NM stress test (4/1/21): Exercised for 9 minutes and 30 seconds.   "There was mild count reduction in the basal, mid and apical inferior.  Stress Echo (2018): Exercised for 7 minutes and 20 seconds. Negative for ischemia, arrthyhmia or QRS widening.            Assessment and Plan:     ASSESSMENT:    Paroxysmal atrial fibrillation  S/p PVI ablation by Dr. Malhotra 5/2020 and redo ablation in [facility] 2/2023  Flecainide discontinued 5/2022 without break through  Rate control with metoprolol XL 25 mg daily  [id number] Score 3 (age++, CAD) on chronic Coumadin    Mild to moderate CAD  Noted on cath from 4/2021  Asymptomatic    PLAN:     No change in medical therapy  Return to clinic in 1 year or sooner if needed     Orders this Visit:  Orders Placed This Encounter   Procedures    Follow-Up with Cardiology IVA     Orders Placed This Encounter   Medications    Multiple Vitamins-Minerals (MENS MULTI VITAMIN & MINERAL PO)    metoprolol succinate ER (TOPROL XL) 25 MG 24 hr tablet     Sig: Take 1 tablet (25 mg) by mouth daily     Dispense:  90 tablet     Refill:  3     Medications Discontinued During This Encounter   Medication Reason    metoprolol succinate ER (TOPROL XL) 25 MG 24 hr tablet Reorder (No AVS)       Today's clinic visit entailed:  Review of the result(s) of each unique test - EKG, Cath, STress test, CT  Prescription drug management  40 minutes spent by me on the date of the encounter doing chart review, history and exam, documentation and further activities per the note  Provider  Link to [facility] Help Grid     The level of medical decision making during this visit was of moderate complexity.           Review of Systems:     Review of Systems:  Skin:        Eyes:       ENT:       Respiratory:  Negative    Cardiovascular:  Negative    Gastroenterology:      Genitourinary:       Musculoskeletal:       Neurologic:       Psychiatric:       Heme/Lymph/Imm:       Endocrine:  Negative thyroid disorder;diabetes            Physical Exam:     Vitals: /69   Pulse 70   Ht 1.753 m (5' 9\")  "  Wt 68.2 kg (150 lb 4.8 oz)   BMI 22.20 kg/m    Constitutional: Well nourished and in no apparent distress.  Eyes: Pupils equal, round. Sclerae anicteric.   HEENT: Normocephalic, atraumatic.   Neck: Supple.  Respiratory: Breathing non-labored. Lungs clear to auscultation bilaterally. No crackles, wheezes, rhonchi, or rales.  Cardiovascular:  Regular rate and rhythm, normal S1 and S2. No murmur, rub, or gallop.  Skin: Warm, dry. No rashes, cyanosis, or xanthelasma.  Extremities: No edema.  Neurologic: No gross motor deficits. Alert, awake, and oriented to person, place and time.  Psychiatric: Affect appropriate.        CURRENT MEDICATIONS:  Current Outpatient Medications   Medication Sig Dispense Refill    dutasteride (AVODART) 0.5 MG capsule Take 0.5 mg by mouth daily      metoprolol succinate ER (TOPROL XL) 25 MG 24 hr tablet Take 1 tablet (25 mg) by mouth daily 90 tablet 3    Multiple Vitamins-Minerals (MENS MULTI VITAMIN & MINERAL PO)       nitroGLYcerin (NITROSTAT) 0.4 MG sublingual tablet For chest pain place 1 tablet under the tongue every 5 minutes for 3 doses. If symptoms persist 5 minutes after 1st dose call 911. 25 tablet 1    rosuvastatin (CRESTOR) 20 MG tablet Take 1 tablet (20 mg) by mouth daily 90 tablet 0    warfarin ANTICOAGULANT (COUMADIN) 5 MG tablet Take 7.5 mg every Tuesday, Take 5 mg all other days. Or as directed. 36 tablet 0       ALLERGIES  No Known Allergies      PAST MEDICAL HISTORY:  Past Medical History:   Diagnosis Date    Arthritis     osteoarthrosis    Coronary atherosclerosis of native coronary artery     Mild per angiogram    Elevated PSA     Fatigue     HLD (hyperlipidemia)     Palpitations     Paroxysmal atrial fibrillation (H) 05/16/2017    S/p PVI and CTI ablation 5/18/2020    Renal disease     kidney calculus    SOB (shortness of breath)        PAST SURGICAL HISTORY:  Past Surgical History:   Procedure Laterality Date    ARTHROPLASTY HIP  1/14/2013    Procedure: ARTHROPLASTY  HIP;  RIGHT TOTAL HIP ARTHROPLASTY ;  Surgeon: Jamie Ohara MD;  Location:  OR    ARTHROPLASTY KNEE Right 11/6/2019    Procedure: RIGHT TOTAL KNEE ARTHROPLASTY (DEPUY)^;  Surgeon: Jamie Ohara MD;  Location:  OR    CARDIOVERSION      COLONOSCOPY N/A 2/9/2016    Procedure: COLONOSCOPY;  Surgeon: Astrid Vital MD;  Location:  GI    CV CORONARY ANGIOGRAM N/A 4/14/2021    Procedure: Coronary Angiogram;  Surgeon: Jacob Baird MD;  Location:  HEART CARDIAC CATH LAB    CV INSTANTANEOUS WAVE-FREE RATIO N/A 4/14/2021    Procedure: Instantaneous Wave-Free Ratio;  Surgeon: Jacob Baird MD;  Location:  HEART CARDIAC CATH LAB    CYSTOSCOPY, RETROGRADES, EXTRACT STONE, COMBINED  9/12/2013    Procedure: COMBINED CYSTOSCOPY, RETROGRADES, EXTRACT STONE;  CYSTOSCOPY, RIGHT RETROGRADE, STONE EXTRACTION;  Surgeon: Carlos Mcknight MD;  Location:  OR    ENT SURGERY      stapendectomy    EP ABLATION FOCAL AFIB N/A 5/18/2020    Procedure: EP Ablation Focal AFIB;  Surgeon: Grzegorz Malhotra MD;  Location:  HEART CARDIAC CATH LAB    GENITOURINARY SURGERY      cystoscopy for stone removal    HERNIA REPAIR      HYDROCELECTOMY SCROTAL Right 7/20/2023    Procedure: RIGHT HYDROCELECTOMY;  Surgeon: Carlos Mcknight MD;  Location:  OR    ORTHOPEDIC SURGERY      shoulder surgeries,bilat carpel tunnel    TONSILLECTOMY         FAMILY HISTORY:  Family History   Problem Relation Age of Onset    Lung Cancer Brother        SOCIAL HISTORY:  Social History     Socioeconomic History    Marital status:      Spouse name: None    Number of children: None    Years of education: None    Highest education level: None   Tobacco Use    Smoking status: Never    Smokeless tobacco: Never   Vaping Use    Vaping Use: Never used   Substance and Sexual Activity    Alcohol use: No    Drug use: No               Thank you for allowing me to participate in the care of your patient.      Sincerely,     Kathe PRICE  BRENDA Hurd [facility]  cc:   Grzegorz Malhotra MD  [address]  [place] [id number]

## 2023-09-28 NOTE — PROGRESS NOTES
Electrophysiology Clinic Progress Note  Nigel Rodarte Jr. MRN# [id number]   YOB: 1940 Age: 83 year old     Primary cardiologist: Previously Dr. Malhotra    Reason for visit: Annual follow up    History of presenting illness:    Nigel Rodarte Jr. is a pleasant 83 year old patient with past medical history significant for:    Paroxysmal atrial fibrillation/flutter: s/p ablation 5/2020 and redo ablation in 2/2022 in [place]  [id number] Score 2 (age++, CAD) on chronic Coumadin  Non flow limiting CAD based on cath from 4/2021  Hyperlipidemia    Patient failed rhythm control strategy with flecainide/ metoprolol and underwent atrial fibrillation/flutter ablation on 05/18/2020. Unfortunately, he started having more episodes of atrial fibrillation in the end of 2021.  He was a started on flecainide/metoprolol. He went to [place] for the winter, and there he underwent a redo ablation in 2/2023. At his last visit with Dr. Malhotra he recommended stopping Flecainide and continuing Metoprolol. He was asked to contact our office with breakthrough atrial fibrillation.     Today he returns for an annual follow up.  Thankfully, he has had no breakthrough atrial fibrillation since discontinuation of flecainide.  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    Diagnotic studies:  Coronary angiogram (4/2021): Prox LAD lesion is 40% stenosed. Mid LAD lesion is 30% stenosed. Pressure wire/FFR was performed on the lesion.  Two lesions in the LAD that are not flow limiting based on iFR (0.92, 0.93).  Otherwise, mild diffuse atherosclerosis without a focal lesion.  NM stress test (4/1/21): Exercised for 9 minutes and 30 seconds.  There was mild count reduction in the basal, mid and apical inferior.  Stress Echo (2018): Exercised for 7 minutes and 20 seconds. Negative for ischemia, arrthyhmia or QRS widening.            Assessment and Plan:  "    ASSESSMENT:    Paroxysmal atrial fibrillation  S/p PVI ablation by Dr. Malhotra 5/2020 and redo ablation in [facility] 2/2023  Flecainide discontinued 5/2022 without break through  Rate control with metoprolol XL 25 mg daily  [id number] Score 3 (age++, CAD) on chronic Coumadin    Mild to moderate CAD  Noted on cath from 4/2021  Asymptomatic    PLAN:     No change in medical therapy  Return to clinic in 1 year or sooner if needed     Orders this Visit:  Orders Placed This Encounter   Procedures    Follow-Up with Cardiology IVA     Orders Placed This Encounter   Medications    Multiple Vitamins-Minerals (MENS MULTI VITAMIN & MINERAL PO)    metoprolol succinate ER (TOPROL XL) 25 MG 24 hr tablet     Sig: Take 1 tablet (25 mg) by mouth daily     Dispense:  90 tablet     Refill:  3     Medications Discontinued During This Encounter   Medication Reason    metoprolol succinate ER (TOPROL XL) 25 MG 24 hr tablet Reorder (No AVS)       Today's clinic visit entailed:  Review of the result(s) of each unique test - EKG, Cath, STress test, CT  Prescription drug management  40 minutes spent by me on the date of the encounter doing chart review, history and exam, documentation and further activities per the note  Provider  Link to [facility] Help Grid     The level of medical decision making during this visit was of moderate complexity.           Review of Systems:     Review of Systems:  Skin:        Eyes:       ENT:       Respiratory:  Negative    Cardiovascular:  Negative    Gastroenterology:      Genitourinary:       Musculoskeletal:       Neurologic:       Psychiatric:       Heme/Lymph/Imm:       Endocrine:  Negative thyroid disorder;diabetes            Physical Exam:     Vitals: /69   Pulse 70   Ht 1.753 m (5' 9\")   Wt 68.2 kg (150 lb 4.8 oz)   BMI 22.20 kg/m    Constitutional: Well nourished and in no apparent distress.  Eyes: Pupils equal, round. Sclerae anicteric.   HEENT: Normocephalic, atraumatic.   Neck: " Supple.  Respiratory: Breathing non-labored. Lungs clear to auscultation bilaterally. No crackles, wheezes, rhonchi, or rales.  Cardiovascular:  Regular rate and rhythm, normal S1 and S2. No murmur, rub, or gallop.  Skin: Warm, dry. No rashes, cyanosis, or xanthelasma.  Extremities: No edema.  Neurologic: No gross motor deficits. Alert, awake, and oriented to person, place and time.  Psychiatric: Affect appropriate.        CURRENT MEDICATIONS:  Current Outpatient Medications   Medication Sig Dispense Refill    dutasteride (AVODART) 0.5 MG capsule Take 0.5 mg by mouth daily      metoprolol succinate ER (TOPROL XL) 25 MG 24 hr tablet Take 1 tablet (25 mg) by mouth daily 90 tablet 3    Multiple Vitamins-Minerals (MENS MULTI VITAMIN & MINERAL PO)       nitroGLYcerin (NITROSTAT) 0.4 MG sublingual tablet For chest pain place 1 tablet under the tongue every 5 minutes for 3 doses. If symptoms persist 5 minutes after 1st dose call 911. 25 tablet 1    rosuvastatin (CRESTOR) 20 MG tablet Take 1 tablet (20 mg) by mouth daily 90 tablet 0    warfarin ANTICOAGULANT (COUMADIN) 5 MG tablet Take 7.5 mg every Tuesday, Take 5 mg all other days. Or as directed. 36 tablet 0       ALLERGIES  No Known Allergies      PAST MEDICAL HISTORY:  Past Medical History:   Diagnosis Date    Arthritis     osteoarthrosis    Coronary atherosclerosis of native coronary artery     Mild per angiogram    Elevated PSA     Fatigue     HLD (hyperlipidemia)     Palpitations     Paroxysmal atrial fibrillation (H) 05/16/2017    S/p PVI and CTI ablation 5/18/2020    Renal disease     kidney calculus    SOB (shortness of breath)        PAST SURGICAL HISTORY:  Past Surgical History:   Procedure Laterality Date    ARTHROPLASTY HIP  1/14/2013    Procedure: ARTHROPLASTY HIP;  RIGHT TOTAL HIP ARTHROPLASTY ;  Surgeon: Jamie Ohara MD;  Location: [facility] OR    ARTHROPLASTY KNEE Right 11/6/2019    Procedure: RIGHT TOTAL KNEE ARTHROPLASTY (DEPUY)^;  Surgeon: Jamie Ohara  MD MAUREEN;  Location:  OR    CARDIOVERSION      COLONOSCOPY N/A 2/9/2016    Procedure: COLONOSCOPY;  Surgeon: Astrid Vital MD;  Location:  GI    CV CORONARY ANGIOGRAM N/A 4/14/2021    Procedure: Coronary Angiogram;  Surgeon: Jacob Baird MD;  Location:  HEART CARDIAC CATH LAB    CV INSTANTANEOUS WAVE-FREE RATIO N/A 4/14/2021    Procedure: Instantaneous Wave-Free Ratio;  Surgeon: Jacob Baird MD;  Location:  HEART CARDIAC CATH LAB    CYSTOSCOPY, RETROGRADES, EXTRACT STONE, COMBINED  9/12/2013    Procedure: COMBINED CYSTOSCOPY, RETROGRADES, EXTRACT STONE;  CYSTOSCOPY, RIGHT RETROGRADE, STONE EXTRACTION;  Surgeon: Carlos Mcknight MD;  Location:  OR    ENT SURGERY      stapendectomy    EP ABLATION FOCAL AFIB N/A 5/18/2020    Procedure: EP Ablation Focal AFIB;  Surgeon: Grzegorz Malhotra MD;  Location:  HEART CARDIAC CATH LAB    GENITOURINARY SURGERY      cystoscopy for stone removal    HERNIA REPAIR      HYDROCELECTOMY SCROTAL Right 7/20/2023    Procedure: RIGHT HYDROCELECTOMY;  Surgeon: Carlos Mcknight MD;  Location:  OR    ORTHOPEDIC SURGERY      shoulder surgeries,bilat carpel tunnel    TONSILLECTOMY         FAMILY HISTORY:  Family History   Problem Relation Age of Onset    Lung Cancer Brother        SOCIAL HISTORY:  Social History     Socioeconomic History    Marital status:      Spouse name: None    Number of children: None    Years of education: None    Highest education level: None   Tobacco Use    Smoking status: Never    Smokeless tobacco: Never   Vaping Use    Vaping Use: Never used   Substance and Sexual Activity    Alcohol use: No    Drug use: No

## 2023-09-28 NOTE — PATIENT INSTRUCTIONS
Today's Recommendations    Continue all medications without changes.  Please follow up with cardiology in 1 year.    Please send [place] message or call [phone] to the RN team with questions or concerns.     Scheduling and after hours number [phone]    BRENDA Brizulea, CNP

## 2023-10-03 DIAGNOSIS — I48.0 PAROXYSMAL ATRIAL FIBRILLATION (H): ICD-10-CM

## 2023-10-03 RX ORDER — WARFARIN SODIUM 5 MG/1
TABLET ORAL
Qty: 100 TABLET | Refills: 0 | Status: SHIPPED | OUTPATIENT
Start: 2023-10-03 | End: 2024-01-05

## 2023-10-03 NOTE — TELEPHONE ENCOUNTER
ANTICOAGULATION MANAGEMENT:  Medication Refill    Anticoagulation Summary  As of 9/1/2023      Warfarin maintenance plan:  7.5 mg (5 mg x 1.5) every Tue; 5 mg (5 mg x 1) all other days   Next INR check:  10/13/2023   Target end date:  Indefinite    Indications    Atrial fibrillation (H) [I48.91] [I48.91]  Long term current use of anticoagulants with INR goal of 2.0-3.0 [Z79.01]  Paroxysmal atrial fibrillation (H) [I48.0]                 Anticoagulation Care Providers       Provider Role Specialty Phone number    Grzegorz Malhotra MD Referring Cardiovascular Disease [phone]    Kathe Hurd APRN CNP Responsible Cardiovascular Disease [phone]            Refill Criteria    Visit with referring provider/group: Meets criteria: office visit within referring provider group in the last 1 year on 9/28/23    [facility] referral signed last signed: 04/07/2023; within last year: Yes    Lab monitoring not exceeding 2 weeks overdue: Yes    Nigel meets all criteria for refill. Rx instructions and quantity supplied updated to match patient's current dosing plan.  90 day supply with 0 refills granted per [facility] protocol     Estrella Bowden RN  Anticoagulation Clinic

## 2023-10-20 ENCOUNTER — TELEPHONE (OUTPATIENT)
Dept: ANTICOAGULATION | Facility: CLINIC | Age: 83
End: 2023-10-20
Payer: MEDICARE

## 2023-10-20 NOTE — TELEPHONE ENCOUNTER
ANTICOAGULATION     Nigel Rodarte Jr. is overdue for an INR check.      Left message for patient to call and schedule lab appointment as soon as possible. If returning call, please schedule.     Estrella Bowden RN

## 2023-10-27 ENCOUNTER — TELEPHONE (OUTPATIENT)
Dept: ANTICOAGULATION | Facility: CLINIC | Age: 83
End: 2023-10-27
Payer: MEDICARE

## 2023-11-03 ENCOUNTER — DOCUMENTATION ONLY (OUTPATIENT)
Dept: ANTICOAGULATION | Facility: CLINIC | Age: 83
End: 2023-11-03
Payer: MEDICARE

## 2023-11-03 NOTE — PROGRESS NOTES
ANTICOAGULATION     Nigel Rodarte Jr. is overdue for an INR check.     Reminder letter sent    Estrella Bowden RN

## 2023-11-03 NOTE — LETTER
[facility]  [address] [facility] [address]  Phone: [phone]  Fax: [phone]   November 3, 2023        Nigel Rodarte Jr.  [address] [place] [address]            Dear Nigel,    You are currently under the care of [facility] for your warfarin (Coumadin , Jantoven ) therapy.  We are contacting you because our records show you were due for an INR on 10/13/23.    There are potentially serious risks when taking warfarin without careful monitoring and we want to make sure you are safely managed.  Routine lab monitoring is required for warfarin refills.     Please call [phone] as soon as possible to schedule a lab appointment. If it is difficult for you to get to lab, please call us to discuss options.  If there has been a change in your care or other concerns, please let us know so we can help and/or update our records.         Sincerely,       [facility]

## 2023-11-10 ENCOUNTER — TELEPHONE (OUTPATIENT)
Dept: ANTICOAGULATION | Facility: CLINIC | Age: 83
End: 2023-11-10

## 2023-11-10 ENCOUNTER — ANTICOAGULATION THERAPY VISIT (OUTPATIENT)
Dept: ANTICOAGULATION | Facility: CLINIC | Age: 83
End: 2023-11-10

## 2023-11-10 ENCOUNTER — DOCUMENTATION ONLY (OUTPATIENT)
Dept: ANTICOAGULATION | Facility: CLINIC | Age: 83
End: 2023-11-10

## 2023-11-10 ENCOUNTER — LAB (OUTPATIENT)
Dept: LAB | Facility: CLINIC | Age: 83
End: 2023-11-10
Payer: MEDICARE

## 2023-11-10 DIAGNOSIS — I48.0 PAROXYSMAL ATRIAL FIBRILLATION (H): ICD-10-CM

## 2023-11-10 DIAGNOSIS — Z79.01 LONG TERM CURRENT USE OF ANTICOAGULANTS WITH INR GOAL OF 2.0-3.0: ICD-10-CM

## 2023-11-10 DIAGNOSIS — I48.91 ATRIAL FIBRILLATION, UNSPECIFIED TYPE (H): Primary | ICD-10-CM

## 2023-11-10 DIAGNOSIS — Z79.01 LONG TERM CURRENT USE OF ANTICOAGULANTS WITH INR GOAL OF 2.0-3.0: Primary | ICD-10-CM

## 2023-11-10 LAB — INR BLD: 2.6 (ref 0.9–1.1)

## 2023-11-10 PROCEDURE — 85610 PROTHROMBIN TIME: CPT

## 2023-11-10 PROCEDURE — 36416 COLLJ CAPILLARY BLOOD SPEC: CPT

## 2023-11-10 NOTE — PROGRESS NOTES
ANTICOAGULATION CLINIC REFERRAL RENEWAL REQUEST       An annual renewal order is required for all patients referred to [facility].?  Please review and sign the pended referral order for Nigel Rodarte Jr..       ANTICOAGULATION SUMMARY      Warfarin indication(s)   Atrial Fibrillation    Mechanical heart valve present?  NO       Current goal range   INR: 2.0-3.0     Goal appropriate for indication? Goal INR 2-3, standard for indication(s) above     Time in Therapeutic Range (TTR)  (Goal > 60%) 79.4%       Office visit with referring provider's group within last year yes on 9/28/23       Gilma Anton RN  [facility]

## 2023-11-10 NOTE — PROGRESS NOTES
ANTICOAGULATION MANAGEMENT     Nigel Rodarte . 83 year old male is on warfarin with therapeutic INR result. (Goal INR 2.0-3.0)    Recent labs: (last 7 days)     11/10/23  1345   INR 2.6*       ASSESSMENT     Source(s): Chart Review and Patient/Caregiver Call     Warfarin doses taken: Warfarin taken as instructed  Diet: No new diet changes identified  Medication/supplement changes: None noted  New illness, injury, or hospitalization: No  Signs or symptoms of bleeding or clotting: No  Previous result: Therapeutic last 2(+) visits  Additional findings:  Heading to [place] next week until the end of March 2024, has provider in [place] to managed his INRs until returning to [place]       PLAN     Recommended plan for no diet, medication or health factor changes affecting INR     Dosing Instructions: Continue your current warfarin dose with next INR in 6 weeks in [place]      Summary  As of 11/10/2023      Full warfarin instructions:  7.5 mg every Tue; 5 mg all other days   Next INR check:  3/25/2024               Telephone call with Nigel who verbalizes understanding and agrees to plan    Patient will call to schedule INR appointment when he is back in [place] around the end of March 2024    Education provided:   Goal range and lab monitoring: goal range and significance of current result  Contact [phone] with any changes, questions or concerns.     Plan made per [facility] anticoagulation protocol    Gilma Anton, RN  Anticoagulation Clinic  11/10/2023    _______________________________________________________________________     Anticoagulation Episode Summary       Current INR goal:  2.0-3.0   TTR:  79.4% (8.4 mo)   Target end date:  Indefinite   Send INR reminders to:  [facility] INR NURSE    Indications    Atrial fibrillation (H) [I48.91] [I48.91]  Long term current use of anticoagulants with INR goal of 2.0-3.0 [Z79.01]  Paroxysmal atrial fibrillation (H) [I48.0]             Comments:               Anticoagulation  Care Providers       Provider Role Specialty Phone number    Kathe Hurd APRN CNP Responsible Cardiovascular Disease [phone]

## 2023-11-10 NOTE — TELEPHONE ENCOUNTER
ANTICOAGULATION  MANAGEMENT- Travel planning    Nigel Rodarte Jr. reports upcoming travel plans to [place].    Departure date: Next week  Anticipated return date: End of March 2024  Alternate contact information (if applicable): same      INR monitoring plan:     Provider/Clinic name and phone number to manage warfarin: [place] provider    Anticoagulation calendar updated with date of return from travel (if monitoring with outside provider)     Instructed to call the Anticoauglation Clinic for any changes, questions or concerns. [phone]  ?   Gilma Anton RN

## 2023-11-12 ENCOUNTER — HOSPITAL ENCOUNTER (OUTPATIENT)
Facility: CLINIC | Age: 83
Setting detail: OBSERVATION
Discharge: HOME | End: 2023-11-13
Attending: EMERGENCY MEDICINE | Admitting: STUDENT IN AN ORGANIZED HEALTH CARE EDUCATION/TRAINING PROGRAM
Payer: MEDICARE

## 2023-11-12 ENCOUNTER — APPOINTMENT (OUTPATIENT)
Dept: GENERAL RADIOLOGY | Facility: CLINIC | Age: 83
End: 2023-11-12
Attending: EMERGENCY MEDICINE
Payer: MEDICARE

## 2023-11-12 DIAGNOSIS — R07.9 CHEST PAIN, UNSPECIFIED TYPE: ICD-10-CM

## 2023-11-12 LAB
ANION GAP SERPL CALCULATED.3IONS-SCNC: 7 MMOL/L (ref 7–15)
ATRIAL RATE - MUSE: 68 BPM
BASOPHILS # BLD AUTO: 0.1 10E3/UL (ref 0–0.2)
BASOPHILS NFR BLD AUTO: 1 %
BUN SERPL-MCNC: 16.6 MG/DL (ref 8–23)
CALCIUM SERPL-MCNC: 9.3 MG/DL (ref 8.8–10.2)
CHLORIDE SERPL-SCNC: 104 MMOL/L (ref 98–107)
CHOLEST SERPL-MCNC: 116 MG/DL
CREAT SERPL-MCNC: 1.13 MG/DL (ref 0.67–1.17)
DEPRECATED HCO3 PLAS-SCNC: 28 MMOL/L (ref 22–29)
DIASTOLIC BLOOD PRESSURE - MUSE: NORMAL MMHG
EGFRCR SERPLBLD CKD-EPI 2021: 65 ML/MIN/1.73M2
EOSINOPHIL # BLD AUTO: 0.2 10E3/UL (ref 0–0.7)
EOSINOPHIL NFR BLD AUTO: 3 %
ERYTHROCYTE [DISTWIDTH] IN BLOOD BY AUTOMATED COUNT: 12.4 % (ref 10–15)
GLUCOSE SERPL-MCNC: 109 MG/DL (ref 70–99)
HBA1C MFR BLD: 6.1 %
HCT VFR BLD AUTO: 44.9 % (ref 40–53)
HDLC SERPL-MCNC: 46 MG/DL
HGB BLD-MCNC: 14.8 G/DL (ref 13.3–17.7)
HOLD SPECIMEN: NORMAL
IMM GRANULOCYTES # BLD: 0 10E3/UL
IMM GRANULOCYTES NFR BLD: 0 %
INR PPP: 2.27 (ref 0.85–1.15)
INR PPP: 2.34 (ref 0.85–1.15)
INTERPRETATION ECG - MUSE: NORMAL
LDLC SERPL CALC-MCNC: 55 MG/DL
LYMPHOCYTES # BLD AUTO: 2.2 10E3/UL (ref 0.8–5.3)
LYMPHOCYTES NFR BLD AUTO: 34 %
MCH RBC QN AUTO: 30.5 PG (ref 26.5–33)
MCHC RBC AUTO-ENTMCNC: 33 G/DL (ref 31.5–36.5)
MCV RBC AUTO: 92 FL (ref 78–100)
MONOCYTES # BLD AUTO: 0.4 10E3/UL (ref 0–1.3)
MONOCYTES NFR BLD AUTO: 6 %
NEUTROPHILS # BLD AUTO: 3.5 10E3/UL (ref 1.6–8.3)
NEUTROPHILS NFR BLD AUTO: 56 %
NONHDLC SERPL-MCNC: 70 MG/DL
NRBC # BLD AUTO: 0 10E3/UL
NRBC BLD AUTO-RTO: 0 /100
P AXIS - MUSE: 87 DEGREES
PLATELET # BLD AUTO: 165 10E3/UL (ref 150–450)
POTASSIUM SERPL-SCNC: 4.9 MMOL/L (ref 3.4–5.3)
PR INTERVAL - MUSE: 180 MS
QRS DURATION - MUSE: 90 MS
QT - MUSE: 398 MS
QTC - MUSE: 423 MS
R AXIS - MUSE: 78 DEGREES
RBC # BLD AUTO: 4.86 10E6/UL (ref 4.4–5.9)
SODIUM SERPL-SCNC: 139 MMOL/L (ref 135–145)
SYSTOLIC BLOOD PRESSURE - MUSE: NORMAL MMHG
T AXIS - MUSE: 36 DEGREES
TRIGL SERPL-MCNC: 73 MG/DL
TROPONIN T SERPL HS-MCNC: 10 NG/L
TROPONIN T SERPL HS-MCNC: 11 NG/L
VENTRICULAR RATE- MUSE: 68 BPM
WBC # BLD AUTO: 6.3 10E3/UL (ref 4–11)

## 2023-11-12 PROCEDURE — 84484 ASSAY OF TROPONIN QUANT: CPT | Mod: 91 | Performed by: STUDENT IN AN ORGANIZED HEALTH CARE EDUCATION/TRAINING PROGRAM

## 2023-11-12 PROCEDURE — G0378 HOSPITAL OBSERVATION PER HR: HCPCS

## 2023-11-12 PROCEDURE — 80048 BASIC METABOLIC PNL TOTAL CA: CPT | Performed by: EMERGENCY MEDICINE

## 2023-11-12 PROCEDURE — 83036 HEMOGLOBIN GLYCOSYLATED A1C: CPT | Performed by: STUDENT IN AN ORGANIZED HEALTH CARE EDUCATION/TRAINING PROGRAM

## 2023-11-12 PROCEDURE — 71046 X-RAY EXAM CHEST 2 VIEWS: CPT

## 2023-11-12 PROCEDURE — 99223 1ST HOSP IP/OBS HIGH 75: CPT | Performed by: STUDENT IN AN ORGANIZED HEALTH CARE EDUCATION/TRAINING PROGRAM

## 2023-11-12 PROCEDURE — 85610 PROTHROMBIN TIME: CPT | Performed by: EMERGENCY MEDICINE

## 2023-11-12 PROCEDURE — 84484 ASSAY OF TROPONIN QUANT: CPT | Performed by: EMERGENCY MEDICINE

## 2023-11-12 PROCEDURE — 80061 LIPID PANEL: CPT | Performed by: STUDENT IN AN ORGANIZED HEALTH CARE EDUCATION/TRAINING PROGRAM

## 2023-11-12 PROCEDURE — 85610 PROTHROMBIN TIME: CPT | Performed by: STUDENT IN AN ORGANIZED HEALTH CARE EDUCATION/TRAINING PROGRAM

## 2023-11-12 PROCEDURE — 36415 COLL VENOUS BLD VENIPUNCTURE: CPT | Performed by: STUDENT IN AN ORGANIZED HEALTH CARE EDUCATION/TRAINING PROGRAM

## 2023-11-12 PROCEDURE — 93005 ELECTROCARDIOGRAM TRACING: CPT

## 2023-11-12 PROCEDURE — 85025 COMPLETE CBC W/AUTO DIFF WBC: CPT | Performed by: EMERGENCY MEDICINE

## 2023-11-12 PROCEDURE — 99207 PR MOONLIGHTING INDICATOR: CPT | Performed by: STUDENT IN AN ORGANIZED HEALTH CARE EDUCATION/TRAINING PROGRAM

## 2023-11-12 PROCEDURE — 250N000013 HC RX MED GY IP 250 OP 250 PS 637: Performed by: EMERGENCY MEDICINE

## 2023-11-12 PROCEDURE — 85049 AUTOMATED PLATELET COUNT: CPT | Performed by: EMERGENCY MEDICINE

## 2023-11-12 PROCEDURE — 36415 COLL VENOUS BLD VENIPUNCTURE: CPT | Performed by: EMERGENCY MEDICINE

## 2023-11-12 PROCEDURE — 99285 EMERGENCY DEPT VISIT HI MDM: CPT

## 2023-11-12 PROCEDURE — 250N000013 HC RX MED GY IP 250 OP 250 PS 637: Performed by: STUDENT IN AN ORGANIZED HEALTH CARE EDUCATION/TRAINING PROGRAM

## 2023-11-12 RX ORDER — NITROGLYCERIN 0.4 MG/1
0.4 TABLET SUBLINGUAL EVERY 5 MIN PRN
Status: DISCONTINUED | OUTPATIENT
Start: 2023-11-12 | End: 2023-11-13 | Stop reason: HOSPADM

## 2023-11-12 RX ORDER — ROSUVASTATIN CALCIUM 20 MG/1
20 TABLET, COATED ORAL DAILY
Status: DISCONTINUED | OUTPATIENT
Start: 2023-11-12 | End: 2023-11-13 | Stop reason: HOSPADM

## 2023-11-12 RX ORDER — METOPROLOL SUCCINATE 25 MG/1
25 TABLET, EXTENDED RELEASE ORAL DAILY
Status: DISCONTINUED | OUTPATIENT
Start: 2023-11-12 | End: 2023-11-13 | Stop reason: HOSPADM

## 2023-11-12 RX ORDER — AMOXICILLIN 250 MG
1 CAPSULE ORAL 2 TIMES DAILY
Status: DISCONTINUED | OUTPATIENT
Start: 2023-11-12 | End: 2023-11-13 | Stop reason: HOSPADM

## 2023-11-12 RX ORDER — DUTASTERIDE 0.5 MG/1
0.5 CAPSULE, LIQUID FILLED ORAL DAILY
Status: DISCONTINUED | OUTPATIENT
Start: 2023-11-12 | End: 2023-11-13 | Stop reason: HOSPADM

## 2023-11-12 RX ORDER — WARFARIN SODIUM 5 MG/1
5 TABLET ORAL
Qty: 1 TABLET | Refills: 0 | Status: COMPLETED | OUTPATIENT
Start: 2023-11-12 | End: 2023-11-12

## 2023-11-12 RX ORDER — ACETAMINOPHEN 650 MG/1
650 SUPPOSITORY RECTAL EVERY 4 HOURS PRN
Status: DISCONTINUED | OUTPATIENT
Start: 2023-11-12 | End: 2023-11-13 | Stop reason: HOSPADM

## 2023-11-12 RX ORDER — ACETAMINOPHEN 325 MG/1
650 TABLET ORAL EVERY 4 HOURS PRN
Status: DISCONTINUED | OUTPATIENT
Start: 2023-11-12 | End: 2023-11-13 | Stop reason: HOSPADM

## 2023-11-12 RX ORDER — AMOXICILLIN 250 MG
2 CAPSULE ORAL 2 TIMES DAILY
Status: DISCONTINUED | OUTPATIENT
Start: 2023-11-12 | End: 2023-11-13 | Stop reason: HOSPADM

## 2023-11-12 RX ORDER — MAGNESIUM HYDROXIDE/ALUMINUM HYDROXICE/SIMETHICONE 120; 1200; 1200 MG/30ML; MG/30ML; MG/30ML
30 SUSPENSION ORAL EVERY 4 HOURS PRN
Status: DISCONTINUED | OUTPATIENT
Start: 2023-11-12 | End: 2023-11-13 | Stop reason: HOSPADM

## 2023-11-12 RX ORDER — ASPIRIN 81 MG/1
162 TABLET, CHEWABLE ORAL ONCE
Status: COMPLETED | OUTPATIENT
Start: 2023-11-12 | End: 2023-11-12

## 2023-11-12 RX ORDER — LIDOCAINE 40 MG/G
CREAM TOPICAL
Status: DISCONTINUED | OUTPATIENT
Start: 2023-11-12 | End: 2023-11-13 | Stop reason: HOSPADM

## 2023-11-12 RX ADMIN — ASPIRIN 81 MG CHEWABLE TABLET 162 MG: 81 TABLET CHEWABLE at 12:42

## 2023-11-12 RX ADMIN — ROSUVASTATIN CALCIUM 20 MG: 20 TABLET, FILM COATED ORAL at 19:06

## 2023-11-12 RX ADMIN — DUTASTERIDE 0.5 MG: 0.5 CAPSULE, LIQUID FILLED ORAL at 17:58

## 2023-11-12 RX ADMIN — WARFARIN SODIUM 5 MG: 5 TABLET ORAL at 19:06

## 2023-11-12 ASSESSMENT — ACTIVITIES OF DAILY LIVING (ADL)
ADLS_ACUITY_SCORE: 41

## 2023-11-12 NOTE — PHARMACY-ANTICOAGULATION SERVICE
Clinical Pharmacy - Warfarin Dosing Consult     Pharmacy has been consulted to manage this patient s warfarin therapy.  Indication: Atrial Fibrillation  Therapy Goal: INR 2-3  OP Anticoag Clinic: [facility]  Warfarin Prior to Admission: Yes  Warfarin PTA Regimen: 7.5 mg every Tue; 5 mg all other days  Significant drug interactions: None    INR   Date Value Ref Range Status   11/12/2023 2.34 (H) 0.85 - 1.15 Final   11/10/2023 2.6 (H) 0.9 - 1.1 Final       Recommend warfarin 5 mg today.  Pharmacy will monitor Nigel Rodarte Jr. daily and order warfarin doses to achieve specified goal.      Please contact pharmacy as soon as possible if the warfarin needs to be held for a procedure or if the warfarin goals change.

## 2023-11-12 NOTE — PROGRESS NOTES
RECEIVING UNIT ED HANDOFF REVIEW    ED Nurse Handoff Report was reviewed by: Nica Cooper RN on November 12, 2023 at 5:06 PM

## 2023-11-12 NOTE — ED PROVIDER NOTES
History     Chief Complaint:  Chest Pain       HPI   Nigel Rodarte Jr. is a 83 year old male who presents with chest pain.  Patient reports that earlier this morning he was doing his regular stretch exercises when he developed right-sided chest pain.  Episode lasted roughly 10 minutes.  There was no radiation of pain, sweating, vomiting, or other significant associated symptoms.  On his way into the emergency department he also developed similar though less severe pain while walking in from the parking lot.  He notes increased tightness in his chest with walking for the last 1 week.  History of atrial fibrillation and is anticoagulated with warfarin.  Had ablation 2 years ago.  He is a never smoker.  Reports that his brother just  from massive MI last Thursday.  Underwent cardiac catheterization in 2021 which demonstrated mild to moderate LAD stenosis.      Independent Historian:   None - Patient Only    Review of External Notes:   N/A      Medications:    dutasteride (AVODART) 0.5 MG capsule  metoprolol succinate ER (TOPROL XL) 25 MG 24 hr tablet  Multiple Vitamins-Minerals (MENS MULTI VITAMIN & MINERAL PO)  nitroGLYcerin (NITROSTAT) 0.4 MG sublingual tablet  rosuvastatin (CRESTOR) 20 MG tablet  warfarin ANTICOAGULANT (COUMADIN) 5 MG tablet        Past Medical History:    Past Medical History:   Diagnosis Date    Arthritis     Coronary atherosclerosis of native coronary artery     Elevated PSA     Fatigue     HLD (hyperlipidemia)     Palpitations     Paroxysmal atrial fibrillation (H) 2017    Renal disease     SOB (shortness of breath)        Past Surgical History:    Past Surgical History:   Procedure Laterality Date    ARTHROPLASTY HIP  2013    Procedure: ARTHROPLASTY HIP;  RIGHT TOTAL HIP ARTHROPLASTY ;  Surgeon: Jamie Ohara MD;  Location:  OR    ARTHROPLASTY KNEE Right 2019    Procedure: RIGHT TOTAL KNEE ARTHROPLASTY (DEPUY)^;  Surgeon: Jamie Ohara MD;  Location:   OR    CARDIOVERSION      COLONOSCOPY N/A 2/9/2016    Procedure: COLONOSCOPY;  Surgeon: Astrid Vital MD;  Location:  GI    CV CORONARY ANGIOGRAM N/A 4/14/2021    Procedure: Coronary Angiogram;  Surgeon: Jacob Baird MD;  Location:  HEART CARDIAC CATH LAB    CV INSTANTANEOUS WAVE-FREE RATIO N/A 4/14/2021    Procedure: Instantaneous Wave-Free Ratio;  Surgeon: Jacob Baird MD;  Location:  HEART CARDIAC CATH LAB    CYSTOSCOPY, RETROGRADES, EXTRACT STONE, COMBINED  9/12/2013    Procedure: COMBINED CYSTOSCOPY, RETROGRADES, EXTRACT STONE;  CYSTOSCOPY, RIGHT RETROGRADE, STONE EXTRACTION;  Surgeon: Carlos Mcknight MD;  Location:  OR    ENT SURGERY      stapendectomy    EP ABLATION FOCAL AFIB N/A 5/18/2020    Procedure: EP Ablation Focal AFIB;  Surgeon: Grzegorz Malhotra MD;  Location:  HEART CARDIAC CATH LAB    GENITOURINARY SURGERY      cystoscopy for stone removal    HERNIA REPAIR      HYDROCELECTOMY SCROTAL Right 7/20/2023    Procedure: RIGHT HYDROCELECTOMY;  Surgeon: Carlos Mcknight MD;  Location:  OR    ORTHOPEDIC SURGERY      shoulder surgeries,bilat carpel tunnel    TONSILLECTOMY          Physical Exam   Patient Vitals for the past 24 hrs:   BP Temp Temp src Pulse Resp SpO2 Weight   11/12/23 1201 -- -- -- 64 11 97 % --   11/12/23 1200 116/75 -- -- 62 -- -- --   11/12/23 1152 -- -- -- 63 11 98 % --   11/12/23 1139 124/74 -- -- 66 -- -- --   11/12/23 1123 111/74 98.1  F (36.7  C) Temporal 66 18 100 % 68 kg (150 lb)        Physical Exam  General: Alert and cooperative with exam. Patient in mild distress. Normal mentation.  Head:  Scalp is NC/AT  Eyes:  No scleral icterus, PERRL  ENT:  The external nose and ears are normal. The oropharynx is normal and without erythema; mucus membranes are moist. Uvula midline, no evidence of deep space infection.  Neck:  Normal range of motion without rigidity.  CV:  Regular rate and rhythm    No pathologic murmur   Resp:  Breath sounds are clear  bilaterally    Non-labored, no retractions or accessory muscle use  GI:  Abdomen is soft, no distension, no tenderness. No peritoneal signs  MS:  No lower extremity edema   Skin:  Warm and dry, No rash or lesions noted.  Neuro: Oriented x 3. No gross motor deficits.        Emergency Department Course     ECG results from 11/12/23   EKG 12 lead     Value    Systolic Blood Pressure     Diastolic Blood Pressure     Ventricular Rate 68    Atrial Rate 68    SD Interval 180    QRS Duration 90        QTc 423    P Axis 87    R AXIS 78    T Axis 36    Interpretation ECG      Sinus rhythm  Normal ECG           Imaging:  Chest XR,  PA & LAT   Final Result   IMPRESSION:       Normal heart and mediastinal contours. Hilar interfaces and lung vascularity are normal.      Symmetric lung inflation. Slightly enlarged retrosternal clear space. Diaphragmatic curvature is preserved. No airspace or interstitial opacities. No pleural fluid.      Disc space narrowing and small marginal osteophytes of the thoracic spine. Suture anchor in the right humeral head.      NM MPI Treadmill (NUC CARD)    (Results Pending)          Laboratory:  Labs Ordered and Resulted from Time of ED Arrival to Time of ED Departure   BASIC METABOLIC PANEL - Abnormal       Result Value    Sodium 139      Potassium 4.9      Chloride 104      Carbon Dioxide (CO2) 28      Anion Gap 7      Urea Nitrogen 16.6      Creatinine 1.13      GFR Estimate 65      Calcium 9.3      Glucose 109 (*)    INR - Abnormal    INR 2.34 (*)    HEMOGLOBIN A1C - Abnormal    Hemoglobin A1C 6.1 (*)    TROPONIN T, HIGH SENSITIVITY - Normal    Troponin T, High Sensitivity 11     TROPONIN T, HIGH SENSITIVITY - Normal    Troponin T, High Sensitivity 10     CBC WITH PLATELETS AND DIFFERENTIAL    WBC Count 6.3      RBC Count 4.86      Hemoglobin 14.8      Hematocrit 44.9      MCV 92      MCH 30.5      MCHC 33.0      RDW 12.4      Platelet Count 165      % Neutrophils 56      % Lymphocytes 34       % Monocytes 6      % Eosinophils 3      % Basophils 1      % Immature Granulocytes 0      NRBCs per 100 WBC 0      Absolute Neutrophils 3.5      Absolute Lymphocytes 2.2      Absolute Monocytes 0.4      Absolute Eosinophils 0.2      Absolute Basophils 0.1      Absolute Immature Granulocytes 0.0      Absolute NRBCs 0.0          Emergency Department Course & Assessments:    Interventions:  Medications   aspirin (ASA) chewable tablet 162 mg (has no administration in time range)          Independent Interpretation (X-rays, CTs, rhythm strip):  Chest x-ray: No infiltrate, effusion, or pneumothorax    Consultations/Discussion of Management or Tests:  I discussed the patient's care with Dr. Aquino who accepted for admission       Social Determinants of Health affecting care:   None    Disposition:  The patient was admitted to the hospital under the care of Dr. Aquino.     Impression & Plan      Medical Decision Making:  Nigel Rodarte Jr. is a 83 year old male who presents for evaluation of chest pain. A broad differential was of course considered.  Certainly ischemia is in differential. I doubt pulmonary embolism, aortic dissection, pneumonia or pneumothorax.  Other etiologies of chest pain considered in this patient included chest wall source, esophageal spasm or GI source, pleuritis, referred pain, etc. Chest x-ray without significant acute findings.  Provided 162 mg aspirin.  INR therapeutic.  EKG demonstrates sinus rhythm without evidence of acute ischemia or infarction.  Initial troponin is normal though episode of pain was less than 6 hours ago.  He remained asymptomatic throughout my care.  Patient has known coronary artery disease and reports recent exertional symptoms.  Will admit to chest pain observation with hospitalist service for further evaluation/cardiac rule out.    Diagnosis:    ICD-10-CM    1. Chest pain, unspecified type  R07.9               Mamadou Sandoval, DO  11/12/23 8402

## 2023-11-12 NOTE — H&P
[facility]    History and Physical - Hospitalist Service       Date of Admission:  11/12/2023    Assessment & Plan      Nigel Rodarte Jr. is a 83 year old male admitted on 11/12/2023. He has a history of paroxysmal atrial fibrillation/flutter which was ablated in 2020 and subsequently in 2022 on Coumadin, nonobstructive CAD on coronary angiogram less than 2 years ago, hyperlipidemia who presents to the ED after traumatically losing his brother last Thursday with right sided subacute exertional chest pain who was admitted to the medicine service for further evaluation for possible underlying obstructive coronary artery disease    #Chest pain  #Possible stress cardiomyopathy?  Initial high-sensitivity troponin in the emergency department 2 hours after initial chest pain episode negative, therefore unlikely ACS, will obtain 1 more troponin to ensure negative  -Will obtain nonurgent TTE to ensure that there has been no change to his function in the setting of traumatically losing his brother and developing this atypical chest pain  - Follow-up lipid panel  - No indication to continue daily aspirin at this time, can reconsider pending the results of stress test  - Follow-up hemoglobin A1c  - Maintain telemetry  - Reasonable to obtain myocardial perfusion imaging with Janice MPI stress testing to evaluate for possible underlying ischemia given history of nonobstructive coronary artery disease, most recently on coronary catheterization 2 years ago, highly unlikely there has been significant progression.  Especially since he is consistently taking rosuvastatin.  I explained this to the patient and he is in agreement with proceeding.  He notes mild hip pain, but denies any significant limitation and exercises regularly without limitation.    #Atrial fibrillation  - [id number] 2 on home Coumadin which is therapeutic this admission  - Continue Coumadin, appreciate pharmacy consultation           Diet: Combination Diet Low Saturated Fat Na <2400mg DietCardiac  DVT Prophylaxis: Warfarin  Ye Catheter: Not present  Lines: None     Cardiac Monitoring: ACTIVE order. Indication: Chest pain/ ACS rule out (24 hours)  Code Status: Full Code    Clinically Significant Risk Factors Present on Admission               # Drug Induced Coagulation Defect: home medication list includes an anticoagulant medication                    Disposition Plan      Expected Discharge Date: 11/13/2023                  Arturo Aquino MD  Hospitalist Service  [facility]  Securely message with [employer] (more info)  Text page via [employer] Paging/Directory     ______________________________________________________________________    Chief Complaint   Exertional Chest Pain     History is obtained from the patient    History of Present Illness   Nigel Rodarte Jr. is a 83 year old male who has a history of atrial fibrillation/atrial flutter status post multiple ablation most recently in 2022 who presents with subacute exertional chest pain.    Patient notes that over the last week he has had exertional chest pain.  Earlier this morning, he had chest pain while doing his stretching exercises which lasted roughly 10 minutes and went away with rest.  He also notes that while walking to the emergency department, he had exertional chest pain.    Of note, the patient notes that he recently lost his brother very traumatically.  He notes that his brother passed away on Thursday in his sleep from a massive myocardial infarction.  He notes that since then he has had exertional right-sided chest pain without radiation.  He notes that he primarily experiences this when walking uphill, but denies any exertional limitations or chest pain with biking.  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  He denies any physical limitations with exertion.    In the emergency department, vital signs stable  normotensive afebrile, heart rate 63 and sinus.  Initial labs within normal renal function electrolytes and elevated INR secondary to Coumadin for atrial fibrillation chest x-ray within normal limits.  EKG sinus, early repolarization, nonspecific ST changes.  Initial troponin negative.  Subsequently admitted to medicine for further evaluation for possible obstructive coronary disease.    Past Medical History    Past Medical History:   Diagnosis Date    Arthritis     osteoarthrosis    Coronary atherosclerosis of native coronary artery     Mild per angiogram    Elevated PSA     Fatigue     HLD (hyperlipidemia)     Palpitations     Paroxysmal atrial fibrillation (H) 05/16/2017    S/p PVI and CTI ablation 5/18/2020    Renal disease     kidney calculus    SOB (shortness of breath)        Past Surgical History   Past Surgical History:   Procedure Laterality Date    ARTHROPLASTY HIP  1/14/2013    Procedure: ARTHROPLASTY HIP;  RIGHT TOTAL HIP ARTHROPLASTY ;  Surgeon: Jamie Ohara MD;  Location:  OR    ARTHROPLASTY KNEE Right 11/6/2019    Procedure: RIGHT TOTAL KNEE ARTHROPLASTY (DEPUY)^;  Surgeon: Jamie Ohara MD;  Location:  OR    CARDIOVERSION      COLONOSCOPY N/A 2/9/2016    Procedure: COLONOSCOPY;  Surgeon: Astrid Vital MD;  Location:  GI    CV CORONARY ANGIOGRAM N/A 4/14/2021    Procedure: Coronary Angiogram;  Surgeon: Jacob Baird MD;  Location:  HEART CARDIAC CATH LAB    CV INSTANTANEOUS WAVE-FREE RATIO N/A 4/14/2021    Procedure: Instantaneous Wave-Free Ratio;  Surgeon: Jacob Baird MD;  Location:  HEART CARDIAC CATH LAB    CYSTOSCOPY, RETROGRADES, EXTRACT STONE, COMBINED  9/12/2013    Procedure: COMBINED CYSTOSCOPY, RETROGRADES, EXTRACT STONE;  CYSTOSCOPY, RIGHT RETROGRADE, STONE EXTRACTION;  Surgeon: Carlos Mcknight MD;  Location:  OR    ENT SURGERY      stapendectomy    EP ABLATION FOCAL AFIB N/A 5/18/2020    Procedure: EP Ablation Focal AFIB;  Surgeon: Roscoe  Grzegorz Soler MD;  Location:  HEART CARDIAC CATH LAB    GENITOURINARY SURGERY      cystoscopy for stone removal    HERNIA REPAIR      HYDROCELECTOMY SCROTAL Right 7/20/2023    Procedure: RIGHT HYDROCELECTOMY;  Surgeon: Carlos Mcknight MD;  Location:  OR    ORTHOPEDIC SURGERY      shoulder surgeries,bilat carpel tunnel    TONSILLECTOMY         Prior to Admission Medications   Prior to Admission Medications   Prescriptions Last Dose Informant Patient Reported? Taking?   Multiple Vitamins-Minerals (MENS MULTI VITAMIN & MINERAL PO) 11/12/2023 at am  Yes Yes   Sig: Take 1 tablet by mouth daily   dutasteride (AVODART) 0.5 MG capsule 11/11/2023 at pm Self Yes Yes   Sig: Take 0.5 mg by mouth daily   metoprolol succinate ER (TOPROL XL) 25 MG 24 hr tablet 11/12/2023 at am  No Yes   Sig: Take 1 tablet (25 mg) by mouth daily   nitroGLYcerin (NITROSTAT) 0.4 MG sublingual tablet  at prn, never used  No Yes   Sig: For chest pain place 1 tablet under the tongue every 5 minutes for 3 doses. If symptoms persist 5 minutes after 1st dose call 911.   rosuvastatin (CRESTOR) 20 MG tablet 11/12/2023 at am  No Yes   Sig: Take 1 tablet (20 mg) by mouth daily   warfarin ANTICOAGULANT (COUMADIN) 5 MG tablet 11/11/2023 at [id number]  No Yes   Sig: Take 7.5 mg every Tuesday, Take 5 mg all other days. Or as directed.      Facility-Administered Medications: None      2023 UPDATE: these sections are not required for     Physical Exam   Vital Signs: Temp: 98.1  F (36.7  C) Temp src: Temporal BP: 117/75 Pulse: 61   Resp: 11 SpO2: 99 % O2 Device: None (Room air)    Weight: 150 lbs 0 oz    Constitutional: awake, alert, cooperative, no apparent distress, and appears stated age  Eyes: sclera clear, conjunctiva normal  Respiratory: No increased work of breathing, good air exchange, no wheezing  Cardiovascular: Normal apical impulse, regular rate and rhythm  GI: soft, non-distended, non-tender, no masses palpated  Skin: no bruising or  bleeding  Neurologic: Awake, alert, oriented to name, place and time.  Cranial nerves II-XII are grossly intact.        Medical Decision Making       75 MINUTES SPENT BY ME on the date of service doing chart review, history, exam, documentation & further activities per the note.      Data     I have personally reviewed the following data over the past 24 hrs:    6.3  \   14.8   / 165     139 104 16.6 /  109 (H)   4.9 28 1.13 \     Trop: 11 BNP: N/A     INR:  2.34 (H) PTT:  N/A   D-dimer:  N/A Fibrinogen:  N/A

## 2023-11-12 NOTE — ED NOTES
[facility]  ED Nurse Handoff Report    ED Chief complaint: Chest Pain      ED Diagnosis:   Final diagnoses:   Chest pain, unspecified type       Code Status: Full Code    Allergies: No Known Allergies    Patient Story: Pt presents to ED today with CP worsening over the past week - worse with activity.  Recently had brother die 3 days ago from MI.   Focused Assessment:  Neg trop.  No CP while in ED. Here for OBS stay w stress test tomorrow.  Pt a/o x4.  Independent ADLs. VSS.  Hx ablation for a fib 2 years ago.  On coumadin - therapeutic INR.     Treatments and/or interventions provided: ASA  Patient's response to treatments and/or interventions: No CP    To be done/followed up on inpatient unit:  n/a    Does this patient have any cognitive concerns?:  n/a    Activity level - Baseline/Home:  Independent  Activity Level - Current:   Independent    Patient's Preferred language: English   Needed?: No    Isolation: None  Infection: Not Applicable  Patient tested for COVID 19 prior to admission: NO  Bariatric?: No    Vital Signs:   Vitals:    11/12/23 1300 11/12/23 1330 11/12/23 1400 11/12/23 1430   BP: 118/75 130/77 121/80    BP Location:       Patient Position:       Cuff Size:       Pulse: 63 61 62    Resp:  13     Temp:       TempSrc:       SpO2: 99% 99% 100% 99%   Weight:           Cardiac Rhythm:Cardiac Rhythm: Normal sinus rhythm    Was the PSS-3 completed:   Yes  What interventions are required if any?               Family Comments: spouse @ bedside  OBS brochure/video discussed/provided to patient/family: Yes              Name of person given brochure if not patient:               Relationship to patient:     For the majority of the shift this patient's behavior was Green.   Behavioral interventions performed were .    ED NURSE PHONE NUMBER: *[phone]

## 2023-11-12 NOTE — ED TRIAGE NOTES
Chest pain this morning, lasted for about 45min hr. Hx of afib with ablation. CP with walking.

## 2023-11-12 NOTE — PHARMACY-ADMISSION MEDICATION HISTORY
Pharmacy Intern Admission Medication History    Admission medication history is complete. The information provided in this note is only as accurate as the sources available at the time of the update.    Information Source(s): Patient via in-person    Pertinent Information: None    Changes made to PTA medication list:  Added: None  Deleted: None  Changed: None    Allergies reviewed with patient and updates made in EHR: yes    Medication History Completed By: Danny Sampson 11/12/2023 2:13 PM    PTA Med List   Medication Sig Last Dose    dutasteride (AVODART) 0.5 MG capsule Take 0.5 mg by mouth daily 11/11/2023 at pm    metoprolol succinate ER (TOPROL XL) 25 MG 24 hr tablet Take 1 tablet (25 mg) by mouth daily 11/12/2023 at am    Multiple Vitamins-Minerals (MENS MULTI VITAMIN & MINERAL PO) Take 1 tablet by mouth daily 11/12/2023 at am    nitroGLYcerin (NITROSTAT) 0.4 MG sublingual tablet For chest pain place 1 tablet under the tongue every 5 minutes for 3 doses. If symptoms persist 5 minutes after 1st dose call 911.  at prn, never used    rosuvastatin (CRESTOR) 20 MG tablet Take 1 tablet (20 mg) by mouth daily 11/12/2023 at am    warfarin ANTICOAGULANT (COUMADIN) 5 MG tablet Take 7.5 mg every Tuesday, Take 5 mg all other days. Or as directed. 11/11/2023 at [id number]

## 2023-11-13 ENCOUNTER — APPOINTMENT (OUTPATIENT)
Dept: NUCLEAR MEDICINE | Facility: CLINIC | Age: 83
End: 2023-11-13
Attending: STUDENT IN AN ORGANIZED HEALTH CARE EDUCATION/TRAINING PROGRAM
Payer: MEDICARE

## 2023-11-13 ENCOUNTER — DOCUMENTATION ONLY (OUTPATIENT)
Dept: ANTICOAGULATION | Facility: CLINIC | Age: 83
End: 2023-11-13

## 2023-11-13 ENCOUNTER — APPOINTMENT (OUTPATIENT)
Dept: CARDIOLOGY | Facility: CLINIC | Age: 83
End: 2023-11-13
Attending: INTERNAL MEDICINE
Payer: MEDICARE

## 2023-11-13 VITALS
RESPIRATION RATE: 16 BRPM | OXYGEN SATURATION: 99 % | TEMPERATURE: 97.2 F | DIASTOLIC BLOOD PRESSURE: 79 MMHG | HEIGHT: 69 IN | BODY MASS INDEX: 21.22 KG/M2 | HEART RATE: 63 BPM | SYSTOLIC BLOOD PRESSURE: 112 MMHG | WEIGHT: 143.3 LBS

## 2023-11-13 LAB
CV STRESS MAX HR HE: 137
INR PPP: 2.51 (ref 0.85–1.15)
LVEF ECHO: NORMAL
RATE PRESSURE PRODUCT: NORMAL
STRESS ECHO BASELINE DIASTOLIC HE: 66
STRESS ECHO BASELINE HR: 73 BPM
STRESS ECHO BASELINE SYSTOLIC BP: 118
STRESS ECHO CALCULATED PERCENT HR: 99 %
STRESS ECHO LAST STRESS DIASTOLIC BP: 66
STRESS ECHO LAST STRESS SYSTOLIC BP: 142
STRESS ECHO POST ESTIMATED WORKLOAD: 7 METS
STRESS ECHO POST EXERCISE DUR MIN: 6 MIN
STRESS ECHO TARGET HR: 138

## 2023-11-13 PROCEDURE — 93017 CV STRESS TEST TRACING ONLY: CPT

## 2023-11-13 PROCEDURE — 255N000002 HC RX 255 OP 636: Performed by: INTERNAL MEDICINE

## 2023-11-13 PROCEDURE — 93306 TTE W/DOPPLER COMPLETE: CPT | Mod: 26 | Performed by: INTERNAL MEDICINE

## 2023-11-13 PROCEDURE — 93018 CV STRESS TEST I&R ONLY: CPT | Performed by: INTERNAL MEDICINE

## 2023-11-13 PROCEDURE — 99239 HOSP IP/OBS DSCHRG MGMT >30: CPT | Performed by: INTERNAL MEDICINE

## 2023-11-13 PROCEDURE — 85610 PROTHROMBIN TIME: CPT | Performed by: STUDENT IN AN ORGANIZED HEALTH CARE EDUCATION/TRAINING PROGRAM

## 2023-11-13 PROCEDURE — 78452 HT MUSCLE IMAGE SPECT MULT: CPT | Mod: ME

## 2023-11-13 PROCEDURE — A9500 TC99M SESTAMIBI: HCPCS | Performed by: INTERNAL MEDICINE

## 2023-11-13 PROCEDURE — G0378 HOSPITAL OBSERVATION PER HR: HCPCS

## 2023-11-13 PROCEDURE — 343N000001 HC RX 343: Performed by: INTERNAL MEDICINE

## 2023-11-13 PROCEDURE — G1010 CDSM STANSON: HCPCS | Performed by: INTERNAL MEDICINE

## 2023-11-13 PROCEDURE — 93016 CV STRESS TEST SUPVJ ONLY: CPT

## 2023-11-13 PROCEDURE — 250N000013 HC RX MED GY IP 250 OP 250 PS 637: Performed by: STUDENT IN AN ORGANIZED HEALTH CARE EDUCATION/TRAINING PROGRAM

## 2023-11-13 PROCEDURE — 78452 HT MUSCLE IMAGE SPECT MULT: CPT | Mod: 26 | Performed by: INTERNAL MEDICINE

## 2023-11-13 PROCEDURE — 36415 COLL VENOUS BLD VENIPUNCTURE: CPT | Performed by: STUDENT IN AN ORGANIZED HEALTH CARE EDUCATION/TRAINING PROGRAM

## 2023-11-13 PROCEDURE — 999N000208 ECHOCARDIOGRAM COMPLETE

## 2023-11-13 RX ORDER — WARFARIN SODIUM 5 MG/1
5 TABLET ORAL
Status: DISCONTINUED | OUTPATIENT
Start: 2023-11-13 | End: 2023-11-13 | Stop reason: HOSPADM

## 2023-11-13 RX ADMIN — HUMAN ALBUMIN MICROSPHERES AND PERFLUTREN 9 ML: 10; .22 INJECTION, SOLUTION INTRAVENOUS at 11:22

## 2023-11-13 RX ADMIN — Medication 9.4 MILLICURIE: at 07:20

## 2023-11-13 RX ADMIN — DUTASTERIDE 0.5 MG: 0.5 CAPSULE, LIQUID FILLED ORAL at 12:46

## 2023-11-13 RX ADMIN — ROSUVASTATIN CALCIUM 20 MG: 20 TABLET, FILM COATED ORAL at 12:46

## 2023-11-13 RX ADMIN — Medication 28.6 MILLICURIE: at 09:21

## 2023-11-13 ASSESSMENT — ACTIVITIES OF DAILY LIVING (ADL)
ADLS_ACUITY_SCORE: 41

## 2023-11-13 NOTE — PROGRESS NOTES
ANTICOAGULATION  MANAGEMENT: Discharge Review    Nigel Rodarte Jr. chart reviewed for anticoagulation continuity of care    Hospital Admission on 11/12-11/13 for chest pain.    Discharge disposition: Home    Results:    Recent labs: (last 7 days)     11/10/23  1345 11/12/23  1140 11/12/23 2001 11/13/23  0647   INR 2.6* 2.34* 2.27* 2.51*     Anticoagulation inpatient management:     home regimen continued    Anticoagulation discharge instructions:     Warfarin dosing: home regimen continued   Bridging: No   INR goal change: No      Medication changes affecting anticoagulation: No    Additional factors affecting anticoagulation: Grief due to brother passing away last Thursday     PLAN     No adjustment to anticoagulation plan needed    Patient not contacted. Next INR due 12/22/23 in [place].    No adjustment to Anticoagulation Calendar was required    Estrella Bowden RN

## 2023-11-13 NOTE — DISCHARGE SUMMARY
[facility]  Hospitalist Discharge Summary      Date of Admission:  11/12/2023  Date of Discharge:  11/13/2023  Discharging Provider: Sugar Morin MD, FACP  Discharge Service: Hospitalist Service    Discharge Diagnoses   Chest pain, ACS and a stress cardiomyopathy ruled out.  Hyperlipidemia.  Atrial fibrillation, on chronic anticoagulation.    Clinically Significant Risk Factors          Follow-ups Needed After Discharge   Patient has been discharged on home medications, patient has been dealing with grief due to sudden death of his brother last Thursday.    Unresulted Labs Ordered in the Past 30 Days of this Admission       No orders found for last 31 day(s).            Discharge Disposition   Discharged to home  Condition at discharge: Stable    Hospital Course     Nigel Rodarte  is a 83-year-old male, very physically active with history of atrial fibrillation, atrial flutter, s/p multiple ablations in the past, most recently in 2022, has been on warfarin for anticoagulation who presented to ER with subacute exertional chest pain.  Initial work-up came back normal in the emergency room with sinus rhythm on EKG with early repolarization changes.  Cardiac enzymes remained in the negative range.  Patient was admitted for further evaluation of possible obstructive coronary artery disease to undergo stress test.    Unfortunately patient has recently lost his brother last Thursday when his brother passed away in his sleep from massive MI.  Since then patient has noticed exertional right-sided chest discomfort intermittently although he is able to exercise and walk and does not have chest pain with biking but mostly he has noticed some discomfort when he is going uphill.  Patient INR was in therapeutic range as patient has been on warfarin.  Patient was admitted for further evaluation and management.  Lipid panel was checked which showed LDL of 55, HDL of 46 and total cholesterol of 116.   Patient initially received aspirin 162 mg x 1 on admission.  Patient underwent Lexiscan treadmill test which came back negative for inducible ischemic EKG changes, LV ejection fraction is normal with EF of 60%.  2D echo of the heart was also completed.  Echocardiogram showed normal EF with ejection fraction of 55 to 60%, no regional wall motion abnormalities and no hemodynamically significant valvular abnormalities were noticed.    Patient was seen and examined on the day of discharge , he is feeling well, does not have any complaints , I did review the discharge medications and instructions with the patient and plan for him to follow up with the PCP after the hospitalization .patient was in agreement , he is discharged in stable condition back to his home.  Plan of care was discussed in detail with patient and his wife.      Consultations This Hospital Stay   PHARMACY TO DOSE WARFARIN  SMOKING CESSATION PROGRAM IP CONSULT    Code Status   Full Code    Time Spent on this Encounter   I, Sugar Morin MD, personally saw the patient today and spent greater than 30 minutes discharging this patient.       Sugar Morin MD, [facility] EXTENDED RECOVERY AND SHORT STAY  [address] [facility] [address]  Phone: [phone]  ______________________________________________________________________    Physical Exam   Vital Signs: Temp: 97.2  F (36.2  C) Temp src: Oral BP: 128/84 Pulse: 61   Resp: 16 SpO2: 98 % O2 Device: None (Room air)    Weight: 143 lbs 4.8 oz    Physical Exam  Vitals and nursing note reviewed.   Constitutional:       Appearance: He is well-developed.   HENT:      Head: Normocephalic and atraumatic.   Eyes:      Pupils: Pupils are equal, round, and reactive to light.   Neck:      Thyroid: No thyromegaly.   Cardiovascular:      Rate and Rhythm: Normal rate and regular rhythm.      Heart sounds: Normal heart sounds.   Pulmonary:      Effort: Pulmonary effort is normal. No  respiratory distress.      Breath sounds: Normal breath sounds.   Abdominal:      General: Bowel sounds are normal. There is no distension.      Palpations: Abdomen is soft.   Musculoskeletal:         General: No tenderness. Normal range of motion.      Cervical back: Normal range of motion and neck supple.   Skin:     General: Skin is warm and dry.   Neurological:      Mental Status: He is alert and oriented to person, place, and time.   Psychiatric:         Behavior: Behavior normal.          Primary Care Physician   Osmar Espinoza    Discharge Orders   No discharge procedures on file.    Significant Results and Procedures   Results for orders placed or performed during the hospital encounter of 23   Chest XR,  PA & LAT    Narrative    EXAM: XR CHEST 2 VIEWS  LOCATION: [facility]  DATE: 2023    INDICATION: Chest pain, SOB w exertion  COMPARISON: None.      Impression    IMPRESSION:     Normal heart and mediastinal contours. Hilar interfaces and lung vascularity are normal.    Symmetric lung inflation. Slightly enlarged retrosternal clear space. Diaphragmatic curvature is preserved. No airspace or interstitial opacities. No pleural fluid.    Disc space narrowing and small marginal osteophytes of the thoracic spine. Suture anchor in the right humeral head.   Echocardiogram Complete     Value    LVEF  55-60%    Narrative    [id number]  [id number]  [id number]  [id number]^JOSY     [facility]  Echocardiography Laboratory  [address] [place] [address]     Name: MAIKEL BEN JR. TAE  MRN: [id number]  : 1940  Study Date: 2023 10:33 AM  Age: 83 yrs  Gender: Male  Patient Location: [facility]  Reason For Study: Chest Pain  Ordering Physician: JOSY BOND  Referring Physician: Osmar Espinoza MD  Performed By: Pasha Murguia     BSA: 1.8 m2  Height: 69 in  Weight: 143 lb  HR: 62  BP: 128/84  mmHg  ______________________________________________________________________________  Procedure  Complete Portable Echo Adult. Optison (NDC #[id number]) given intravenously.  ______________________________________________________________________________  Interpretation Summary     The left ventricle is normal in size.  Left ventricular systolic function is normal. The visual ejection fraction is  55-60%.  No regional wall motion abnormalities noted.  The right ventricle is moderately dilated. Mildly decreased right ventricular  systolic function  No hemodynamically significant valvular abnormalities on 2D or color flow  imaging. Compared to prior study, there is no significant change.  ______________________________________________________________________________  Left Ventricle  The left ventricle is normal in size. There is normal left ventricular wall  thickness. Left ventricular systolic function is normal. The visual ejection  fraction is 55-60%. Diastolic Doppler findings (E/E' ratio and/or other  parameters) suggest left ventricular filling pressures are indeterminate. No  regional wall motion abnormalities noted.     Right Ventricle  The right ventricle is moderately dilated. Mildly decreased right ventricular  systolic function.     Atria  Normal left atrial size. Right atrial size is normal. There is no atrial shunt  seen.     Mitral Valve  The mitral valve is normal in structure and function. There is trace mitral  regurgitation.     Tricuspid Valve  The tricuspid valve is normal in structure and function. There is trace  tricuspid regurgitation. Right ventricular systolic pressure could not be  approximated due to inadequate tricuspid regurgitation. IVC diameter and  respiratory changes fall into an intermediate range suggesting an RA pressure  of 8 mmHg.     Aortic Valve  The aortic valve is normal in structure and function. No aortic regurgitation  is present. No aortic stenosis is present.     Pulmonic  Valve  The pulmonic valve is not well seen, but is grossly normal. There is trace  pulmonic valvular regurgitation.     Vessels  Normal size aorta.     Pericardium  There is no pericardial effusion.     Rhythm  Sinus rhythm was noted.  ______________________________________________________________________________  MMode/2D Measurements & Calculations  IVSd: 1.1 cm     LVIDd: 3.5 cm  LVIDs: 2.5 cm  LVPWd: 1.1 cm  FS: 28.1 %  LV mass(C)d: 119.0 grams  LV mass(C)dI: 66.4 grams/m2  Ao root diam: 3.7 cm  LA dimension: 2.1 cm  asc Aorta Diam: 3.6 cm  LA/Ao: 0.56  LVOT diam: 2.5 cm  LVOT area: 4.8 cm2  Ao root diam index Ht(cm/m): 2.1  Ao root diam index BSA (cm/m2): 2.1  Asc Ao diam index BSA (cm/m2): 2.0  Asc Ao diam index Ht(cm/m): 2.0  RV Base: 2.9 cm  RWT: 0.63  TAPSE: 1.9 cm     Doppler Measurements & Calculations  MV E max levy: 58.0 cm/sec  MV A max levy: 53.3 cm/sec  MV E/A: 1.1     MV dec time: 0.20 sec  PA acc time: 0.12 sec  E/E' av.4  Lateral E/e': 8.9  Medial E/e': 7.8  RV S Levy: 8.6 cm/sec     ______________________________________________________________________________  Report approved by: Sadi Dominguez 2023 02:54 PM         NM MPI Treadmill (NUC CARD)     Value    Target     Baseline Systolic     Baseline Diastolic BP 66    Last Stress Systolic     Last Stress Diastolic BP 66    Baseline HR 73    Max HR  137    Max Predicted HR  99    Exercise duration (min) 6    Estimated workload 7.0    Rate Pressure Product 19,454.0    Narrative      The nuclear stress test is negative for inducible myocardial ischemia   or infarction.    Left ventricular function is normal, EF 60%.    A prior study was conducted on 2021.  Thew previously noted   inferior ischemia is not present on the current study.         Discharge Medications   Current Discharge Medication List        CONTINUE these medications which have NOT CHANGED    Details   dutasteride (AVODART) 0.5 MG capsule Take 0.5 mg by  mouth daily      metoprolol succinate ER (TOPROL XL) 25 MG 24 hr tablet Take 1 tablet (25 mg) by mouth daily  Qty: 90 tablet, Refills: 3    Associated Diagnoses: Paroxysmal atrial fibrillation (H)      Multiple Vitamins-Minerals (MENS MULTI VITAMIN & MINERAL PO) Take 1 tablet by mouth daily      nitroGLYcerin (NITROSTAT) 0.4 MG sublingual tablet For chest pain place 1 tablet under the tongue every 5 minutes for 3 doses. If symptoms persist 5 minutes after 1st dose call 911.  Qty: 25 tablet, Refills: 1    Associated Diagnoses: Chest discomfort      rosuvastatin (CRESTOR) 20 MG tablet Take 1 tablet (20 mg) by mouth daily  Qty: 90 tablet, Refills: 0    Associated Diagnoses: Chest discomfort      warfarin ANTICOAGULANT (COUMADIN) 5 MG tablet Take 7.5 mg every Tuesday, Take 5 mg all other days. Or as directed.  Qty: 100 tablet, Refills: 0    Associated Diagnoses: Paroxysmal atrial fibrillation (H)           Allergies   No Known Allergies

## 2023-11-13 NOTE — PROGRESS NOTES
[facility]    HOSPITALIST PROGRESS NOTE :   --------------------------------------------------    Date of Admission:  11/12/2023    Cumulative Summary: Nigel Rodarte Jr. is a 83 year old male , very physically active, with history of atrial fibrillation, atrial flutter, s/p multiple ablations in the past, most recently in 2022, has been on warfarin for anticoagulation who presented to ER with subacute exertional chest pain.  Initial work-up was normal with sinus rhythm on EKG with early repolarization changes, initial Trope was negative.  Patient was subsequently admitted for further evaluation for possible obstructive coronary artery disease.    Assessment & Plan     Chest pain  Concern for possible stress cardiomyopathy  Hyperlipidemia      Patient is a pleasant 83-year-old gentleman with past medical history significant for paroxysmal atrial fibrillation/flutter, has undergone multiple ablations in the past, most recent ablation was in 2020.  The patient is currently on warfarin for anticoagulation.  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  Patient also has history of hyperlipidemia.  Unfortunately patient lost his brother last Thursday, traumatically and been his brother passed away in his sleep from massive MI.  In the last week patient has noticed exertional right-sided chest discomfort, admittedly when he exercises and walking uphill but does not have any chest pain with biking.  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  He is denying any physical limitations with exertion  His initial labs were normal, patient has expected elevated INR.  Initial EKG showed sinus rhythm with early repolarization changes.  Patient initial and follow-up troponin stayed in normal range.    --Patient has been admitted for further evaluation and management.  --His presentation is less likely secondary to ACS.  --TTE has  been ordered to evaluate for stress cardiomyopathy in the picture of recent stress.  --Lipid panel has been checked showing LDL of 55, HDL of 46, total cholesterol of 116.  --Patient has received aspirin 162 mg x 1 on admission.  --PTA metoprolol succinate 25 mg daily has been held.  --Continue PTA rosuvastatin 20 mg p.o. daily.  --Continue PTA warfarin, pharmacy to dose warfarin.  --Lexiscan treadmill test has been ordered, will follow up on the results.  -- Plan of care was discussed in detail with patient, wife was also present at bedside, JOSE wife has worked for 27 years in [employer] RN float RN and now is retired.  -- Exercise stress test results were reviewed the stress electrocardiogram was negative for inducible ischemic EKG changes, left ventricular ejection fraction is normal with EF of 60%.    Atrial fibrillation: S/p cardioversion in past  - [id number] 2 on home Coumadin which is therapeutic this admission  - Continue Coumadin, appreciate pharmacy consultation    Clinically Significant Risk Factors Present on Admission               # Drug Induced Coagulation Defect: home medication list includes an anticoagulant medication                    Diet: Combination Diet Low Saturated Fat Na <2400mg Diet    Ye Catheter: Not present  DVT Prophylaxis: Warfarin  Code Status: Full Code    The patient's care was discussed with the Patient and Patient's Family.    Medical Decision Making       50 MINUTES SPENT BY ME on the date of service doing chart review, history, exam, documentation & further activities per the note.        Disposition Plan   Tentative plan to discharge home later today, awaiting echocardiogram results although EF seems to be in normal range on his stress test, will review the results with patient.     Expected Discharge Date: 11/13/2023             Entered: Sugar Morin MD 11/13/2023, 7:52 AM       Sugar Morin MD, MD, FACP  Text Page (7am -  6pm)    ----------------------------------------------------------------------------------------------------------------------      Interval History   Patient care was assumed this morning, patient was seen and examined.  Wife also present at bedside.  Patient has undergone a stress test and echocardiogram, results are pending.  Patient is denying any chest pain at this point, has been able to exercise, aware that a big component of his symptoms might be secondary to his stress related in the picture of his recently his brother passing away.  Patient is otherwise denying any fever or chills.    -Data reviewed today: I reviewed all new labs and imaging results over the last 24 hours.    I personally reviewed twelve-lead EKG which is showing normal sinus rhythm.      Physical Exam   Temp: 97.2  F (36.2  C) Temp src: Oral BP: 128/84 Pulse: 61   Resp: 16 SpO2: 98 % O2 Device: None (Room air)    Vitals:    11/12/23 1123 11/12/23 1755   Weight: 68 kg (150 lb) 65 kg (143 lb 4.8 oz)     Vital Signs with Ranges  Temp:  [97.2  F (36.2  C)-98.3  F (36.8  C)] 97.2  F (36.2  C)  Pulse:  [60-66] 61  Resp:  [9-18] 16  BP: (111-143)/() 128/84  SpO2:  [97 %-100 %] 98 %  No intake/output data recorded.    GENERAL: Alert , awake and oriented. NAD. Conversational, appropriate.   HEENT: Normocephalic. EOMI. No icterus or injection. Nares normal.   LUNGS: Clear to auscultation. No dyspnea at rest.   HEART: Regular rate. Extremities perfused.   ABDOMEN: Soft, nontender, and nondistended. Positive bowel sounds.   EXTREMITIES: No LE edema noted.   NEUROLOGIC: Moves extremities x4 on command. No acute focal neurologic abnormalities noted.     Medications    - MEDICATION INSTRUCTIONS -      - MEDICATION INSTRUCTIONS -        dutasteride  0.5 mg Oral Daily    [Held by provider] metoprolol succinate ER  25 mg Oral Daily    rosuvastatin  20 mg Oral Daily    senna-docusate  1 tablet Oral BID    Or    senna-docusate  2 tablet Oral BID     sodium chloride (PF)  3 mL Intracatheter Q8H    technetium sestamibi 2 UD per study  3-42 millicurie Intravenous Q2H    Warfarin Therapy Reminder  1 each Oral See Admin Instructions       Data   Recent Labs   Lab 11/13/23  0647 11/12/23 2001 11/12/23  1140   WBC  --   --  6.3   HGB  --   --  14.8   MCV  --   --  92   PLT  --   --  165   INR 2.51* 2.27* 2.34*   NA  --   --  139   POTASSIUM  --   --  4.9   CHLORIDE  --   --  104   CO2  --   --  28   BUN  --   --  16.6   CR  --   --  1.13   ANIONGAP  --   --  7   CHERI  --   --  9.3   GLC  --   --  109*       Imaging:   Recent Results (from the past 24 hour(s))   Chest XR,  PA & LAT    Narrative    EXAM: XR CHEST 2 VIEWS  LOCATION: [facility]  DATE: 11/12/2023    INDICATION: Chest pain, SOB w exertion  COMPARISON: None.      Impression    IMPRESSION:     Normal heart and mediastinal contours. Hilar interfaces and lung vascularity are normal.    Symmetric lung inflation. Slightly enlarged retrosternal clear space. Diaphragmatic curvature is preserved. No airspace or interstitial opacities. No pleural fluid.    Disc space narrowing and small marginal osteophytes of the thoracic spine. Suture anchor in the right humeral head.

## 2023-11-13 NOTE — PLAN OF CARE
PRIMARY Concern: chest pain  SAFETY RISK Concerns (fall risk, behaviors, etc.): none      Isolation/Type: none  Tests/Procedures for NEXT shift: echo, stress test  Consults? (Pending/following, signed-off?) none  Where is patient from? (Home, TCU, etc.): home  Other Important info for NEXT shift:.       Anticipated DC date & active delays: 11/13-14  _____________________________________________________________________________  SUMMARY NOTE:              (either paste note here OR complete the info below- RN to choose one- delete info below if not used)  Orientation/Cognitive: A/Ox4  Observation Goals (Met/ Not Met): not met  Mobility Level/Assist Equipment: ind  Antibiotics & Plan (IV/po, length of tx left): n/a  Pain Management: denies, nitro available  Tele/VS/O2: VSS RA x , tele SR  ABNL Lab/BG: trop negative, INR 2.34  Diet: low fat, N+<2400 mg  Bowel/Bladder: continent  Skin Concerns: wdl  Drains/Devices: PIV SL  Patient Stated Goal for Today: rest           PRIMARY DIAGNOSIS: CHEST PAIN  OUTPATIENT/OBSERVATION GOALS TO BE MET BEFORE DISCHARGE:  1. Ruled out acute coronary syndrome (negative or stable Troponin):  Yes  2. Pain Status: Pain free.  3. Appropriate provocative testing performed: No  - Stress Test Procedure: Nuclear  - Interpretation of cardiac rhythm per telemetry tech: NSR     4. Cleared by Consultants (if applicable):N/A, no consults ordered  5. Return to near baseline physical activity: Yes

## 2023-11-13 NOTE — PROGRESS NOTES
PRIMARY Concern: Chest pain   SAFETY RISK Concerns (fall risk, behaviors, etc.): NA      Isolation/Type: NA  Tests/Procedures for NEXT shift: None  Consults? (Pending/following, signed-off?) Signed off   Where is patient from? (Home, TCU, etc.): Home   Other Important info for NEXT shift: None  Anticipated DC date & active delays: Today in afternoon  _____________________________________________________________________________  SUMMARY NOTE:  Orientation/Cognitive: AOx4  Observation Goals (Met/ Not Met): Met   Mobility Level/Assist Equipment: IND  Antibiotics & Plan (IV/po, length of tx left): NA  Pain Management: Denies chest pain  Tele/VS/O2: VSS on RA  ABNL Lab/BG: See results  Diet: Cardiac  Bowel/Bladder: Continent  Skin Concerns: None  Drains/Devices: PIV removed   Patient Stated Goal for Today: Discharge    AVS reviewed with patient and family, all questions answered. All belongings gathered. Discharged off the floor to home, wife to provide transportation.

## 2023-11-13 NOTE — PROGRESS NOTES
PRIMARY DIAGNOSIS: CHEST PAIN  OUTPATIENT/OBSERVATION GOALS TO BE MET BEFORE DISCHARGE:  1. Ruled out acute coronary syndrome (negative or stable Troponin):  Yes  2. Pain Status: Pain free.  3. Appropriate provocative testing performed: No  - Stress Test Procedure: Nuclear  - Interpretation of cardiac rhythm per telemetry tech: NSR     4. Cleared by Consultants (if applicable):N/A, no consults ordered  5. Return to near baseline physical activity: Yes

## 2023-11-13 NOTE — PROGRESS NOTES
PRIMARY DIAGNOSIS: CHEST PAIN  OUTPATIENT/OBSERVATION GOALS TO BE MET BEFORE DISCHARGE:  1. Ruled out acute coronary syndrome (negative or stable Troponin):  Yes  2. Pain Status: Pain free.  3. Appropriate provocative testing performed: No  - Stress Test Procedure: Nuclear  - Interpretation of cardiac rhythm per telemetry tech: NSR    4. Cleared by Consultants (if applicable):N/A, no consults ordered  5. Return to near baseline physical activity: Yes  Discharge Planner Nurse   Safe discharge environment identified: Yes  Barriers to discharge: Yes       Entered by: Nica Cooper RN 11/12/2023 7:18 PM     Please review provider order for any additional goals.   Nurse to notify provider when observation goals have been met and patient is ready for discharge.  Top portion must ALWAYS be completed  PRIMARY Concern: chest pain  SAFETY RISK Concerns (fall risk, behaviors, etc.): none      Isolation/Type: none  Tests/Procedures for NEXT shift: echo, stress test  Consults? (Pending/following, signed-off?) none  Where is patient from? (Home, TCU, etc.): home  Other Important info for NEXT shift: admit documentation incomplete. Deferred x asking about meds in belongings as pt had visitors.  Anticipated DC date & active delays: 11/13-14  _____________________________________________________________________________  SUMMARY NOTE:              (either paste note here OR complete the info below- RN to choose one- delete info below if not used)  Orientation/Cognitive: A/Ox4  Observation Goals (Met/ Not Met): not met  Mobility Level/Assist Equipment: ind  Antibiotics & Plan (IV/po, length of tx left): n/a  Pain Management: denies, nitro available  Tele/VS/O2: VSS RA x , tele SR  ABNL Lab/BG: trop negative, INR 2.34  Diet: low fat, N+<2400 mg  Bowel/Bladder: continent  Skin Concerns: wdl  Drains/Devices: PIV SL  Patient Stated Goal for Today: rest

## 2023-12-21 DIAGNOSIS — R07.89 CHEST DISCOMFORT: ICD-10-CM

## 2023-12-21 RX ORDER — ROSUVASTATIN CALCIUM 20 MG/1
20 TABLET, COATED ORAL DAILY
Qty: 90 TABLET | Refills: 3 | Status: SHIPPED | OUTPATIENT
Start: 2023-12-21

## 2024-01-05 ENCOUNTER — TELEPHONE (OUTPATIENT)
Dept: CARDIOLOGY | Facility: CLINIC | Age: 84
End: 2024-01-05
Payer: MEDICARE

## 2024-01-05 DIAGNOSIS — I48.0 PAROXYSMAL ATRIAL FIBRILLATION (H): ICD-10-CM

## 2024-01-05 RX ORDER — WARFARIN SODIUM 5 MG/1
5-7.5 TABLET ORAL DAILY
Qty: 135 TABLET | Refills: 1 | Status: SHIPPED | OUTPATIENT
Start: 2024-01-05 | End: 2024-07-05

## 2024-01-05 NOTE — TELEPHONE ENCOUNTER
ANTICOAGULATION MANAGEMENT:  Medication Refill    Anticoagulation Summary  As of 11/10/2023      Warfarin maintenance plan:  7.5 mg (5 mg x 1.5) every Tue; 5 mg (5 mg x 1) all other days   Next INR check:  3/25/2024   Target end date:  Indefinite    Indications    Atrial fibrillation (H) [I48.91] [I48.91]  Long term current use of anticoagulants with INR goal of 2.0-3.0 [Z79.01]  Paroxysmal atrial fibrillation (H) [I48.0]                 Anticoagulation Care Providers       Provider Role Specialty Phone number    Kathe Hurd, BRENDA CNP Referring Cardiovascular Disease [phone]            Refill Criteria    Visit with referring provider/group: Meets criteria: office visit within referring provider group in the last 1 year on 9/28/23    [facility] referral signed last signed: 11/13/2023; within last year: Yes    Lab monitoring not exceeding 2 weeks overdue: Yes    Nigel meets all criteria for refill. Rx instructions and quantity supplied updated to match patient's current dosing plan. Warfarin 90 day supply with 1 refill granted per [facility] protocol     Jess Lawrence, RN  Anticoagulation Clinic

## 2024-03-01 NOTE — MR AVS SNAPSHOT
Nigel Rodarte Jr.   7/13/2018 7:20 AM   Anticoagulation Therapy Visit    Description:  78 year old male   Provider:  YAIR ANTICOAGULATION   Department:  [facility]           INR as of 7/13/2018     Today's INR 1.8!      Anticoagulation Summary as of 7/13/2018     INR goal 2.0-3.0   Today's INR 1.8!   Full warfarin instructions 7/14: 10 mg; Otherwise 10 mg on Fri; 7.5 mg all other days   Next INR check 7/30/2018    Indications   Long-term (current) use of anticoagulants [Z79.01] [Z79.01]  Atrial fibrillation (H) [I48.91] [I48.91]         Your next Anticoagulation Clinic appointment(s)     Jul 30, 2018  7:00 AM CDT   Anticoagulation Visit with  ANTICOAGULATION   [facility] ([facility])    [address] [place] [id number]   [phone] OPT 2              Contact Numbers     Anticoagulant (INR) Clinic Number: [phone]          July 2018 Details    Sun Mon Tue Wed Thu Fri Sat     1               2               3               4               5               6               7                 8               9               10               11               12               13      10 mg   See details      14      10 mg           15      7.5 mg         16      7.5 mg         17      7.5 mg         18      7.5 mg         19      7.5 mg         20      10 mg         21      7.5 mg           22      7.5 mg         23      7.5 mg         24      7.5 mg         25      7.5 mg         26      7.5 mg         27      10 mg         28      7.5 mg           29      7.5 mg         30            31                    Date Details   07/13 This INR check       Date of next INR:  7/30/2018         How to take your warfarin dose     To take:  7.5 mg Take 1.5 of the 5 mg tablets.    To take:  10 mg Take 2 of the 5 mg tablets.           
none

## 2024-04-01 ENCOUNTER — ANTICOAGULATION THERAPY VISIT (OUTPATIENT)
Dept: ANTICOAGULATION | Facility: CLINIC | Age: 84
End: 2024-04-01

## 2024-04-01 ENCOUNTER — LAB (OUTPATIENT)
Dept: LAB | Facility: CLINIC | Age: 84
End: 2024-04-01
Payer: MEDICARE

## 2024-04-01 DIAGNOSIS — Z79.01 LONG TERM CURRENT USE OF ANTICOAGULANTS WITH INR GOAL OF 2.0-3.0: ICD-10-CM

## 2024-04-01 DIAGNOSIS — I48.0 PAROXYSMAL ATRIAL FIBRILLATION (H): ICD-10-CM

## 2024-04-01 DIAGNOSIS — I48.0 PAROXYSMAL ATRIAL FIBRILLATION (H): Primary | ICD-10-CM

## 2024-04-01 LAB — INR BLD: 2.7 (ref 0.9–1.1)

## 2024-04-01 PROCEDURE — 85610 PROTHROMBIN TIME: CPT

## 2024-04-01 PROCEDURE — 36416 COLLJ CAPILLARY BLOOD SPEC: CPT

## 2024-04-01 NOTE — PROGRESS NOTES
ANTICOAGULATION MANAGEMENT     Updated dosing    Previous result: per Nigel, INR was 2.6 2/26/24.  Additional findings: In hospital for a week Feb. 7th after fall. Had collapsed lung. Back to normal now.    Being back in [place] with possible change in diet and environment could affect INR moving forward.       PLAN     Recommended plan for ongoing change(s) affecting INR     Dosing Instructions: Continue your current warfarin dose with next INR in 3 weeks       Summary  As of 4/1/2024      Full warfarin instructions:  7.5 mg every Mon, Thu; 5 mg all other days   Next INR check:  4/22/2024               Telephone call with Nigel who verbalizes understanding and agrees to plan    Patient elected to schedule next visit 4/24    Education provided:   Goal range and lab monitoring: goal range and significance of current result  Contact [phone] with any changes, questions or concerns.     Plan made per [facility] anticoagulation protocol    Estrella Bowden RN  Anticoagulation Clinic  4/1/2024    _______________________________________________________________________     Anticoagulation Episode Summary       Current INR goal:  2.0-3.0   TTR:  90.1% (7.5 mo)   Target end date:  Indefinite   Send INR reminders to:  YA[facility] HEART INR NURSE    Indications    Atrial fibrillation (H) [I48.91] [I48.91]  Long term current use of anticoagulants with INR goal of 2.0-3.0 [Z79.01]  Paroxysmal atrial fibrillation (H) [I48.0]             Comments:               Anticoagulation Care Providers       Provider Role Specialty Phone number    Kathe Hurd APRN CNP Referring Cardiovascular Disease [phone]

## 2024-04-01 NOTE — PROGRESS NOTES
ANTICOAGULATION MANAGEMENT     Nigel Rodarte Jr. 83 year old male is on warfarin with therapeutic INR result. (Goal INR 2.0-3.0)    Recent labs: (last 7 days)     04/01/24  1326   INR 2.7*       ASSESSMENT     Warfarin Lab Questionnaire          4/1/2024   Warfarin Lab Questionnaire   Missed doses within past 14 days? No   Changes in diet or alcohol within past 14 days? No   Medication changes since last result? No   Injuries or illness since last result? Yes   If yes, please explain: fractured ribs   New shortness of breath, severe headaches or sudden changes in vision since last result? No   Abnormal bleeding since last result? No   Upcoming surgery, procedure? No   Best number to call with results? [phone]     Previous result:  Unknown, he has been in [place]  Additional findings: In [place] November 2023 to March 2024; managed by provider there.       PLAN     Unable to reach Nigel today.    Left message to continue current dose of warfarin tonight. Request call back for assessment.    Follow up required to confirm warfarin dose taken and assess for changes    Estrella Bowden RN  Anticoagulation Clinic  4/1/2024

## 2024-04-24 ENCOUNTER — LAB (OUTPATIENT)
Dept: LAB | Facility: CLINIC | Age: 84
End: 2024-04-24
Payer: MEDICARE

## 2024-04-24 ENCOUNTER — ANTICOAGULATION THERAPY VISIT (OUTPATIENT)
Dept: ANTICOAGULATION | Facility: CLINIC | Age: 84
End: 2024-04-24

## 2024-04-24 DIAGNOSIS — I48.91 ATRIAL FIBRILLATION (H): Primary | ICD-10-CM

## 2024-04-24 DIAGNOSIS — Z79.01 LONG TERM CURRENT USE OF ANTICOAGULANTS WITH INR GOAL OF 2.0-3.0: ICD-10-CM

## 2024-04-24 DIAGNOSIS — I48.0 PAROXYSMAL ATRIAL FIBRILLATION (H): ICD-10-CM

## 2024-04-24 LAB — INR BLD: 2.4 (ref 0.9–1.1)

## 2024-04-24 PROCEDURE — 36416 COLLJ CAPILLARY BLOOD SPEC: CPT

## 2024-04-24 PROCEDURE — 85610 PROTHROMBIN TIME: CPT

## 2024-04-24 NOTE — PROGRESS NOTES
ANTICOAGULATION MANAGEMENT     Nigel Rodarte . 83 year old male is on warfarin with therapeutic INR result. (Goal INR 2.0-3.0)    Recent labs: (last 7 days)     04/24/24  0918   INR 2.4*       ASSESSMENT     Warfarin Lab Questionnaire    Warfarin Doses Last 7 Days  **verbally confirmed dosing with patient-- taken as instructed; below note about missed doses is incorrect, he misunderstood the question**        4/24/2024   Warfarin Lab Questionnaire   Missed doses within past 14 days? Yes   If yes; please list when: twice a week   Changes in diet or alcohol within past 14 days? No   Medication changes since last result? No   Injuries or illness since last result? No   New shortness of breath, severe headaches or sudden changes in vision since last result? No   Abnormal bleeding since last result? No   Upcoming surgery, procedure? No     Previous result: Therapeutic last 2(+) visits  Additional findings: None       PLAN     Recommended plan for no diet, medication or health factor changes affecting INR     Dosing Instructions: Continue your current warfarin dose with next INR in 4 weeks       Summary  As of 4/24/2024      Full warfarin instructions:  7.5 mg every Mon, Thu; 5 mg all other days   Next INR check:  5/22/2024               Telephone call with Nigel who verbalizes understanding and agrees to plan    Lab visit scheduled    Education provided:   Contact [phone] with any changes, questions or concerns.     Plan made per [facility] anticoagulation protocol    Jess Lawrence RN  Anticoagulation Clinic  4/24/2024    _______________________________________________________________________     Anticoagulation Episode Summary       Current INR goal:  2.0-3.0   TTR:  94.5% (7.5 mo)   Target end date:  Indefinite   Send INR reminders to:  [facility] HEART INR NURSE    Indications    Atrial fibrillation (H) [I48.91] [I48.91]  Long term current use of anticoagulants with INR goal of 2.0-3.0 [Z79.01]  Paroxysmal atrial  fibrillation (H) [I48.0]             Comments:               Anticoagulation Care Providers       Provider Role Specialty Phone number    Kathe Hurd APRN CNP Referring Cardiovascular Disease [phone]

## 2024-05-22 ENCOUNTER — LAB (OUTPATIENT)
Dept: LAB | Facility: CLINIC | Age: 84
End: 2024-05-22
Payer: MEDICARE

## 2024-05-22 ENCOUNTER — ANTICOAGULATION THERAPY VISIT (OUTPATIENT)
Dept: ANTICOAGULATION | Facility: CLINIC | Age: 84
End: 2024-05-22

## 2024-05-22 DIAGNOSIS — Z79.01 LONG TERM CURRENT USE OF ANTICOAGULANTS WITH INR GOAL OF 2.0-3.0: ICD-10-CM

## 2024-05-22 DIAGNOSIS — I48.91 ATRIAL FIBRILLATION (H): Primary | ICD-10-CM

## 2024-05-22 DIAGNOSIS — I48.0 PAROXYSMAL ATRIAL FIBRILLATION (H): ICD-10-CM

## 2024-05-22 LAB — INR BLD: 2.4 (ref 0.9–1.1)

## 2024-05-22 PROCEDURE — 36416 COLLJ CAPILLARY BLOOD SPEC: CPT

## 2024-05-22 PROCEDURE — 85610 PROTHROMBIN TIME: CPT

## 2024-05-22 NOTE — PROGRESS NOTES
ANTICOAGULATION MANAGEMENT     Nigel Rodarte Jr. 83 year old male is on warfarin with therapeutic INR result. (Goal INR 2.0-3.0)    Recent labs: (last 7 days)     05/22/24  0914   INR 2.4*       ASSESSMENT     Source(s): Chart Review  Previous INR was Therapeutic last 2(+) visits  Medication, diet, health changes since last INR chart reviewed; none identified         PLAN     Recommended plan for no diet, medication or health factor changes affecting INR     Dosing Instructions: Continue your current warfarin dose with next INR in 5 weeks       Summary  As of 5/22/2024      Full warfarin instructions:  7.5 mg every Mon, Thu; 5 mg all other days   Next INR check:  6/26/2024               Detailed voice message left for Nigel with dosing instructions and follow up date.     Contact [phone] to schedule and with any changes, questions or concerns.     Education provided:   Please call back if any changes to your diet, medications or how you've been taking warfarin  Goal range and lab monitoring: goal range and significance of current result    Plan made per [facility] anticoagulation protocol    Jess Lawrence RN  Anticoagulation Clinic  5/22/2024    _______________________________________________________________________     Anticoagulation Episode Summary       Current INR goal:  2.0-3.0   TTR:  94.5% (7.5 mo)   Target end date:  Indefinite   Send INR reminders to:  [facility] HEART INR NURSE    Indications    Atrial fibrillation (H) [I48.91] [I48.91]  Long term current use of anticoagulants with INR goal of 2.0-3.0 [Z79.01]  Paroxysmal atrial fibrillation (H) [I48.0]             Comments:               Anticoagulation Care Providers       Provider Role Specialty Phone number    Kathe Hurd APRN CNP Referring Cardiovascular Disease [phone]

## 2024-06-19 ENCOUNTER — ANTICOAGULATION THERAPY VISIT (OUTPATIENT)
Dept: ANTICOAGULATION | Facility: CLINIC | Age: 84
End: 2024-06-19

## 2024-06-19 ENCOUNTER — LAB (OUTPATIENT)
Dept: LAB | Facility: CLINIC | Age: 84
End: 2024-06-19
Payer: MEDICARE

## 2024-06-19 DIAGNOSIS — I48.0 PAROXYSMAL ATRIAL FIBRILLATION (H): Primary | ICD-10-CM

## 2024-06-19 DIAGNOSIS — Z79.01 LONG TERM CURRENT USE OF ANTICOAGULANTS WITH INR GOAL OF 2.0-3.0: ICD-10-CM

## 2024-06-19 DIAGNOSIS — I48.0 PAROXYSMAL ATRIAL FIBRILLATION (H): ICD-10-CM

## 2024-06-19 LAB — INR BLD: 4.1 (ref 0.9–1.1)

## 2024-06-19 PROCEDURE — 36416 COLLJ CAPILLARY BLOOD SPEC: CPT

## 2024-06-19 PROCEDURE — 85610 PROTHROMBIN TIME: CPT

## 2024-06-19 NOTE — PROGRESS NOTES
ANTICOAGULATION MANAGEMENT     Nigel Rodarte Jr. 84 year old male is on warfarin with supratherapeutic INR result. (Goal INR 2.0-3.0)    Recent labs: (last 7 days)     06/19/24  0736   INR 4.1*       ASSESSMENT     Source(s): Chart Review and Patient/Caregiver Call     Warfarin doses taken: Warfarin taken as instructed  Diet: Decreased greens/vitamin K in diet; plans to resume previous intake-hasn't had greens or salads for about 3 weeks but will make a conscious effort to add them back in now.  Medication/supplement changes: None noted  New illness, injury, or hospitalization: No  Signs or symptoms of bleeding or clotting: No  Previous result: Therapeutic last 2(+) visits  Additional findings: None       PLAN     Recommended plan for temporary change(s) affecting INR     Dosing Instructions: hold dose then continue your current warfarin dose with next INR in 10 days       Summary  As of 6/19/2024      Full warfarin instructions:  6/19: Hold; Otherwise 7.5 mg every Mon, Thu; 5 mg all other days   Next INR check:  6/28/2024               Telephone call with Nigel who verbalizes understanding and agrees to plan    Lab visit scheduled    Education provided:   Goal range and lab monitoring: goal range and significance of current result and Importance of following up at instructed interval  Dietary considerations: importance of consistent vitamin K intake  Contact [phone]  with any changes, questions or concerns.     Plan made per [facility] anticoagulation protocol    Estrella Bowden, RN  Anticoagulation Clinic  6/19/2024    _______________________________________________________________________     Anticoagulation Episode Summary       Current INR goal:  2.0-3.0   TTR:  86.4% (7.5 mo)   Target end date:  Indefinite   Send INR reminders to:  [facility] HEART INR NURSE    Indications    Atrial fibrillation (H) [I48.91] [I48.91]  Long term current use of anticoagulants with INR goal of 2.0-3.0 [Z79.01]  Paroxysmal atrial  fibrillation (H) [I48.0]             Comments:               Anticoagulation Care Providers       Provider Role Specialty Phone number    Kathe Hurd APRN CNP Referring Cardiovascular Disease [phone]

## 2024-06-28 ENCOUNTER — LAB (OUTPATIENT)
Dept: LAB | Facility: CLINIC | Age: 84
End: 2024-06-28
Payer: MEDICARE

## 2024-06-28 ENCOUNTER — ANTICOAGULATION THERAPY VISIT (OUTPATIENT)
Dept: ANTICOAGULATION | Facility: CLINIC | Age: 84
End: 2024-06-28

## 2024-06-28 DIAGNOSIS — Z79.01 LONG TERM CURRENT USE OF ANTICOAGULANTS WITH INR GOAL OF 2.0-3.0: ICD-10-CM

## 2024-06-28 DIAGNOSIS — I48.0 PAROXYSMAL ATRIAL FIBRILLATION (H): ICD-10-CM

## 2024-06-28 DIAGNOSIS — Z79.01 LONG TERM CURRENT USE OF ANTICOAGULANTS WITH INR GOAL OF 2.0-3.0: Primary | ICD-10-CM

## 2024-06-28 LAB — INR BLD: 2.5 (ref 0.9–1.1)

## 2024-06-28 PROCEDURE — 85610 PROTHROMBIN TIME: CPT

## 2024-06-28 PROCEDURE — 36416 COLLJ CAPILLARY BLOOD SPEC: CPT

## 2024-06-28 NOTE — PROGRESS NOTES
ANTICOAGULATION MANAGEMENT     Nigel Rodarte . 84 year old male is on warfarin with therapeutic INR result. (Goal INR 2.0-3.0)    Recent labs: (last 7 days)     06/28/24  0754   INR 2.5*       ASSESSMENT     Warfarin Lab Questionnaire    Warfarin Doses Last 7 Days          6/28/2024   Warfarin Lab Questionnaire   Missed doses within past 14 days? No   Changes in diet or alcohol within past 14 days? No   Medication changes since last result? No   Injuries or illness since last result? No   New shortness of breath, severe headaches or sudden changes in vision since last result? No   Abnormal bleeding since last result? No   Upcoming surgery, procedure? No   Best number to call with results? [phone]        Previous result: Supratherapeutic held x1   Additional findings: None       PLAN     Recommended plan for no diet, medication or health factor changes affecting INR     Dosing Instructions: Continue your current warfarin dose with next INR in 2-3 weeks       Summary  As of 6/28/2024      Full warfarin instructions:  7.5 mg every Mon, Thu; 5 mg all other days   Next INR check:  7/17/2024               Telephone call with Nigel who verbalizes understanding and agrees to plan    Lab visit scheduled    Education provided: Goal range and lab monitoring: goal range and significance of current result  Contact [phone] with any changes, questions or concerns.     Plan made per [facility] anticoagulation protocol    Gilma Anton, RN  Anticoagulation Clinic  6/28/2024    _______________________________________________________________________     Anticoagulation Episode Summary       Current INR goal:  2.0-3.0   TTR:  83.7% (7.5 mo)   Target end date:  Indefinite   Send INR reminders to:  [facility] HEART INR NURSE    Indications    Atrial fibrillation (H) [I48.91] [I48.91]  Long term current use of anticoagulants with INR goal of 2.0-3.0 [Z79.01]  Paroxysmal atrial fibrillation (H) [I48.0]             Comments:                Anticoagulation Care Providers       Provider Role Specialty Phone number    Kathe Hurd APRN CNP Referring Cardiovascular Disease [phone]

## 2024-07-05 DIAGNOSIS — I48.0 PAROXYSMAL ATRIAL FIBRILLATION (H): ICD-10-CM

## 2024-07-05 RX ORDER — WARFARIN SODIUM 5 MG/1
5-7.5 TABLET ORAL DAILY
Qty: 135 TABLET | Refills: 1 | Status: SHIPPED | OUTPATIENT
Start: 2024-07-05

## 2024-07-05 NOTE — TELEPHONE ENCOUNTER
ANTICOAGULATION MANAGEMENT:  Medication Refill    Anticoagulation Summary  As of 6/28/2024      Warfarin maintenance plan:  7.5 mg (5 mg x 1.5) every Mon, Thu; 5 mg (5 mg x 1) all other days   Next INR check:  7/17/2024   Target end date:  Indefinite    Indications    Atrial fibrillation (H) [I48.91] [I48.91]  Long term current use of anticoagulants with INR goal of 2.0-3.0 [Z79.01]  Paroxysmal atrial fibrillation (H) [I48.0]                 Anticoagulation Care Providers       Provider Role Specialty Phone number    Kathe Hurd APRN CNP Referring Cardiovascular Disease [phone]            Refill Criteria    Visit with referring provider/group: Meets criteria: office visit within referring provider group in the last 1 year on 9/28/23    [facility] referral last signed: 11/13/2023; within last year: Yes    Lab monitoring not exceeding 2 weeks overdue: Yes    Nigel meets all criteria for refill. Rx instructions and quantity match patient's current dosing plan. Warfarin 90 day supply with 1 refill granted per [facility] protocol     Jess Lawrence, RN  Anticoagulation Clinic

## 2024-07-17 ENCOUNTER — ANTICOAGULATION THERAPY VISIT (OUTPATIENT)
Dept: ANTICOAGULATION | Facility: CLINIC | Age: 84
End: 2024-07-17

## 2024-07-17 ENCOUNTER — LAB (OUTPATIENT)
Dept: LAB | Facility: CLINIC | Age: 84
End: 2024-07-17
Payer: MEDICARE

## 2024-07-17 DIAGNOSIS — Z79.01 LONG TERM CURRENT USE OF ANTICOAGULANTS WITH INR GOAL OF 2.0-3.0: ICD-10-CM

## 2024-07-17 DIAGNOSIS — I48.91 ATRIAL FIBRILLATION (H): Primary | ICD-10-CM

## 2024-07-17 DIAGNOSIS — I48.0 PAROXYSMAL ATRIAL FIBRILLATION (H): ICD-10-CM

## 2024-07-17 LAB — INR BLD: 2.6 (ref 0.9–1.1)

## 2024-07-17 PROCEDURE — 85610 PROTHROMBIN TIME: CPT

## 2024-07-17 PROCEDURE — 36416 COLLJ CAPILLARY BLOOD SPEC: CPT

## 2024-07-17 NOTE — PROGRESS NOTES
ANTICOAGULATION MANAGEMENT     Nigel Rodarte . 84 year old male is on warfarin with therapeutic INR result. (Goal INR 2.0-3.0)    Recent labs: (last 7 days)     07/17/24  0722   INR 2.6*       ASSESSMENT     Warfarin Lab Questionnaire    Warfarin Doses Last 7 Days  **verbally confirmed dosing with patient, taken as instructed**        7/17/2024   Warfarin Lab Questionnaire   Missed doses within past 14 days? No   Changes in diet or alcohol within past 14 days? No   Medication changes since last result? No   Injuries or illness since last result? No   New shortness of breath, severe headaches or sudden changes in vision since last result? No   Abnormal bleeding since last result? No   Upcoming surgery, procedure? No        Previous result: Therapeutic last visit; previously outside of goal range  Additional findings: None       PLAN     Recommended plan for no diet, medication or health factor changes affecting INR     Dosing Instructions: Continue your current warfarin dose with next INR in 4 weeks       Summary  As of 7/17/2024      Full warfarin instructions:  7.5 mg every Mon, Thu; 5 mg all other days   Next INR check:  8/14/2024               Telephone call with Nigel who verbalizes understanding and agrees to plan    Lab visit scheduled    Education provided: Contact [phone] with any changes, questions or concerns.     Plan made per [facility] anticoagulation protocol    Jess Lawrence, RN  Anticoagulation Clinic  7/17/2024    _______________________________________________________________________     Anticoagulation Episode Summary       Current INR goal:  2.0-3.0   TTR:  85.2% (7.5 mo)   Target end date:  Indefinite   Send INR reminders to:  [facility] HEART INR NURSE    Indications    Atrial fibrillation (H) [I48.91] [I48.91]  Long term current use of anticoagulants with INR goal of 2.0-3.0 [Z79.01]  Paroxysmal atrial fibrillation (H) [I48.0]             Comments:               Anticoagulation Care  Providers       Provider Role Specialty Phone number    Kathe Hurd APRN CNP Referring Cardiovascular Disease [phone]

## 2024-07-18 ENCOUNTER — NURSE TRIAGE (OUTPATIENT)
Dept: CARDIOLOGY | Facility: CLINIC | Age: 84
End: 2024-07-18
Payer: MEDICARE

## 2024-07-18 NOTE — TELEPHONE ENCOUNTER
7/18/24 Spoke w pt who has had a few Afib episodes in the past week. He has been asymptomatic and Hrs have been 58-80.   Explained that when he saw Dr Malhotra 5/2022 Flecainide was stopped to focus on rate control. Explained that breakthrough Afib is to be expected and as long as HR is control and pt is asymptomatic, it is ok to be in Afib.  Explained that there is nothing further to do right now . Asked that he keep a diary of events to bring to OV 7/29 at 3 pm   Pt voiced understanding and agreement with plan.   Sajan 1230 pm

## 2024-07-18 NOTE — TELEPHONE ENCOUNTER
"Received a call from the call center to discuss irregular heart beats.  HX: PAF, s/p ablation x 2, palpitations    Patient has a scheduled appointment 7/29/24 with Kathe Hurd CNP.    Spoke to Nigel, who says 2 days ago and again yesterday, had episodes of an irregular heart beat. Monday it occurred at night and lasted 2-3 hours, and Tuesday morning lasted maybe 1 hour. He says his heart rate did not go above 100 bpm either time, and the highest HR was 75 bpm. He denies any dizziness but had some slight or minor shortness of breath where he had to \"breathe more\".   Current HR on the call is 65 bpm and regular rhythm.  He is taking metoprolol and warfarin, last INR 2 days ago was 2.6 per patient.  He is anxious and concerned about these episodes, and has questions. He wonders about being seen sooner to get some answers.  Advised a message will be sent to Kathe Hurd's team for follow up.  Patient is currently working, so asked to call his wife's phone at [phone].    1. DESCRIPTION: \"Please describe your heart rate or heartbeat that you are having\" (e.g., fast/slow, regular/irregular, skipped or extra beats, \"palpitations\") irregular  2. ONSET: \"When did it start?\" (Minutes, hours or days) 2 days ago  3. DURATION: \"How long does it last\" (e.g., seconds, minutes, hours) 1-3 hours  4. PATTERN \"Does it come and go, or has it been constant since it started?\" \"Does it get worse with exertion?\" \"Are you feeling it now?\" Not feeling it now  5. TAP: \"Using your hand, can you tap out what you are feeling on a chair or table in front of you, so that I can hear?\" (Note: not all patients can do this)  6. HEART RATE: \"Can you tell me your heart rate?\" \"How many beats in 15 seconds?\" (Note: not all patients can do this) currently 65 bpm  7. RECURRENT SYMPTOM: \"Have you ever had this before?\" If Yes, ask: \"When was the last time?\" and \"What happened that time?\"  8. CAUSE: \"What do you think is causing the palpitations?\"  9. " "CARDIAC HISTORY: \"Do you have any history of heart disease?\" (e.g., heart attack, angina, bypass surgery, angioplasty, arrhythmia) see above  10. OTHER SYMPTOMS: \"Do you have any other symptoms?\" (e.g., dizziness, chest pain, sweating, difficulty breathing) mild shortness of breath  11. PREGNANCY: \"Is there any chance you are pregnant?\" \"When was your last menstrual period?\"  Additional Information   Negative: Difficulty breathing   Negative: Dizziness, lightheadedness, or weakness   Negative: Heart beating very rapidly (e.g., > 140 / minute) and present now  (Exception: During exercise.)    Protocols used: Heart Rate and Heartbeat [id number]-DW    "

## 2024-07-29 ENCOUNTER — OFFICE VISIT (OUTPATIENT)
Dept: CARDIOLOGY | Facility: CLINIC | Age: 84
End: 2024-07-29
Payer: MEDICARE

## 2024-07-29 VITALS
BODY MASS INDEX: 21.56 KG/M2 | OXYGEN SATURATION: 98 % | SYSTOLIC BLOOD PRESSURE: 108 MMHG | HEART RATE: 77 BPM | HEIGHT: 69 IN | DIASTOLIC BLOOD PRESSURE: 67 MMHG | WEIGHT: 145.6 LBS

## 2024-07-29 DIAGNOSIS — Z79.01 LONG TERM CURRENT USE OF ANTICOAGULANT THERAPY: ICD-10-CM

## 2024-07-29 DIAGNOSIS — I48.0 PAROXYSMAL ATRIAL FIBRILLATION (H): ICD-10-CM

## 2024-07-29 PROCEDURE — 99213 OFFICE O/P EST LOW 20 MIN: CPT | Performed by: NURSE PRACTITIONER

## 2024-07-29 NOTE — PROGRESS NOTES
Electrophysiology Clinic Progress Note  Nigel Rodarte Jr. MRN# [id number]   YOB: 1940 Age: 84 year old     Primary cardiologist: Previously Dr. Malhotra    Reason for visit: Annual follow up    History of presenting illness:    Nigel Rodarte Jr. is a pleasant 84 year old patient with past medical history significant for:    Paroxysmal atrial fibrillation/flutter: Previously failed rhythm control with flecainide and metoprolol. S/p ablation 5/2020 and redo ablation in 2/2022 in [place]. S/p ILR ([employer]) in [place]   [id number] Score 2 (age++, CAD) on chronic Coumadin  Non flow limiting CAD based on cath from 4/2021  Hyperlipidemia    Patient failed rhythm control strategy with flecainide/ metoprolol and underwent atrial fibrillation/flutter ablation on 05/18/2020. Unfortunately, he started having more episodes of atrial fibrillation in the end of 2021.  He was a started on flecainide/metoprolol. He went to [place] for the winter, and there he underwent a redo ablation in 2/2022. At his last visit with Dr. Malhotra he recommended stopping Flecainide and continuing Metoprolol. He was asked to contact our office with breakthrough atrial fibrillation.     He was admitted to [facility] in 11/2023 with chest discomfort. Troponin was negative and stress test no inducible ischemia or infarction. Echo showed normal LVEF without wall motion or valvular abnormalities.     He returns today for an annual follow-up.  Nigel tells me that while wintering in [place] he had a fall and subsequent collapsed right lung.  Also he had an [employer] implantable loop recorder placed.  Unfortunately, we do not have those records but will attempt to obtain them.    He continues to deny breakthrough atrial fibrillation since discontinuation of flecainide.  He previously was the  at the [place] [employer] and now continues to  high school sailing both in [place] and [place].    Diagnotic  studies:  Echocardiogram 11/2023: LVEF 55-60%, no wall motion or valvular abnormalities.   Nuclear stress test 11/2023: Negative for inducible myocardial ischemia or infarction.  Coronary angiogram (4/2021): Prox LAD lesion is 40% stenosed. Mid LAD lesion is 30% stenosed. Pressure wire/FFR was performed on the lesion.  Two lesions in the LAD that are not flow limiting based on iFR (0.92, 0.93).  Otherwise, mild diffuse atherosclerosis without a focal lesion.  NM stress test (4/1/21): Exercised for 9 minutes and 30 seconds.  There was mild count reduction in the basal, mid and apical inferior.  Stress Echo (2018): Exercised for 7 minutes and 20 seconds. Negative for ischemia, arrthyhmia or QRS widening.            Assessment and Plan:     ASSESSMENT:    Paroxysmal atrial fibrillation  S/p PVI ablation by Dr. Malhotra 5/2020 and redo ablation in [facility] 2/2022  Flecainide discontinued 5/2022 without break through  Rate control with metoprolol XL 25 mg daily  [id number] Score 3 (age++, CAD) on chronic Coumadin    Mild to moderate CAD  Noted on cath from 4/2021  Asymptomatic    PLAN:     No change in medical therapy  Return to clinic in 1 year or sooner if needed  Will obtain records from [place] cardiology clinic    [facility]  [facility] [facility] [place] [address]  Phone [phone]  Fax [phone]     Orders this Visit:  Orders Placed This Encounter   Procedures    Follow-Up with Cardiology IVA     No orders of the defined types were placed in this encounter.    There are no discontinued medications.      Today's clinic visit entailed:  Review of the result(s) of each unique test - EKG, Cath, STress test, CT  Prescription drug management  28 minutes spent by me on the date of the encounter doing chart review, history and exam, documentation and further activities per the note  Provider  Link to [facility] Help Grid     The level of medical decision making during this visit was of  "moderate complexity.           Review of Systems:     Review of Systems:  Skin:        Eyes:       ENT:       Respiratory:  Negative    Cardiovascular:  Negative    Gastroenterology:      Genitourinary:       Musculoskeletal:       Neurologic:       Psychiatric:       Heme/Lymph/Imm:       Endocrine:  Negative              Physical Exam:     Vitals: /67   Pulse 77   Ht 1.74 m (5' 8.5\")   Wt 66 kg (145 lb 9.6 oz)   SpO2 98%   BMI 21.82 kg/m    Constitutional: Well nourished and in no apparent distress.  Eyes: Pupils equal, round. Sclerae anicteric.   HEENT: Normocephalic, atraumatic.   Neck: Supple.  Respiratory: Breathing non-labored. Lungs clear to auscultation bilaterally. No crackles, wheezes, rhonchi, or rales.  Cardiovascular:  Regular rate and rhythm, normal S1 and S2. No murmur, rub, or gallop.  Skin: Warm, dry. No rashes, cyanosis, or xanthelasma.  Extremities: No edema.  Neurologic: No gross motor deficits. Alert, awake, and oriented to person, place and time.  Psychiatric: Affect appropriate.        CURRENT MEDICATIONS:  Current Outpatient Medications   Medication Sig Dispense Refill    dutasteride (AVODART) 0.5 MG capsule Take 0.5 mg by mouth daily      metoprolol succinate ER (TOPROL XL) 25 MG 24 hr tablet Take 1 tablet (25 mg) by mouth daily 90 tablet 3    Multiple Vitamins-Minerals (MENS MULTI VITAMIN & MINERAL PO) Take 1 tablet by mouth daily      nitroGLYcerin (NITROSTAT) 0.4 MG sublingual tablet For chest pain place 1 tablet under the tongue every 5 minutes for 3 doses. If symptoms persist 5 minutes after 1st dose call 911. 25 tablet 1    rosuvastatin (CRESTOR) 20 MG tablet Take 1 tablet (20 mg) by mouth daily 90 tablet 3    warfarin ANTICOAGULANT (COUMADIN) 5 MG tablet Take 1-1.5 tablets (5-7.5 mg) by mouth daily Adjust dose per INR as directed by  tablet 1       ALLERGIES  No Known Allergies      PAST MEDICAL HISTORY:  Past Medical History:   Diagnosis Date    Arthritis     " osteoarthrosis    Coronary atherosclerosis of native coronary artery     Mild per angiogram    Elevated PSA     Fatigue     HLD (hyperlipidemia)     Palpitations     Paroxysmal atrial fibrillation (H) 05/16/2017    S/p PVI and CTI ablation 5/18/2020    Renal disease     kidney calculus    SOB (shortness of breath)        PAST SURGICAL HISTORY:  Past Surgical History:   Procedure Laterality Date    ARTHROPLASTY HIP  1/14/2013    Procedure: ARTHROPLASTY HIP;  RIGHT TOTAL HIP ARTHROPLASTY ;  Surgeon: Jamie Oahra MD;  Location:  OR    ARTHROPLASTY KNEE Right 11/6/2019    Procedure: RIGHT TOTAL KNEE ARTHROPLASTY (DEPUY)^;  Surgeon: Jamie Ohara MD;  Location:  OR    CARDIOVERSION      COLONOSCOPY N/A 2/9/2016    Procedure: COLONOSCOPY;  Surgeon: Astrid Vital MD;  Location:  GI    CV CORONARY ANGIOGRAM N/A 4/14/2021    Procedure: Coronary Angiogram;  Surgeon: Jacob Baird MD;  Location:  HEART CARDIAC CATH LAB    CV INSTANTANEOUS WAVE-FREE RATIO N/A 4/14/2021    Procedure: Instantaneous Wave-Free Ratio;  Surgeon: Jacob Baird MD;  Location:  HEART CARDIAC CATH LAB    CYSTOSCOPY, RETROGRADES, EXTRACT STONE, COMBINED  9/12/2013    Procedure: COMBINED CYSTOSCOPY, RETROGRADES, EXTRACT STONE;  CYSTOSCOPY, RIGHT RETROGRADE, STONE EXTRACTION;  Surgeon: Carlos Mcknight MD;  Location:  OR    ENT SURGERY      stapendectomy    EP ABLATION FOCAL AFIB N/A 5/18/2020    Procedure: EP Ablation Focal AFIB;  Surgeon: Grzegorz Malhotra MD;  Location:  HEART CARDIAC CATH LAB    GENITOURINARY SURGERY      cystoscopy for stone removal    HERNIA REPAIR      HYDROCELECTOMY SCROTAL Right 7/20/2023    Procedure: RIGHT HYDROCELECTOMY;  Surgeon: Carlos Mcknight MD;  Location:  OR    ORTHOPEDIC SURGERY      shoulder surgeries,bilat carpel tunnel    TONSILLECTOMY         FAMILY HISTORY:  Family History   Problem Relation Age of Onset    Lung Cancer Brother     Myocardial Infarction Brother         SOCIAL HISTORY:  Social History     Socioeconomic History    Marital status:      Spouse name: None    Number of children: None    Years of education: None    Highest education level: None   Tobacco Use    Smoking status: Never    Smokeless tobacco: Never   Vaping Use    Vaping status: Never Used   Substance and Sexual Activity    Alcohol use: No    Drug use: No     Social Determinants of Health      Received from [facility], [facility]    Financial Resource Strain    [facility], [facility]    Social Connections

## 2024-07-29 NOTE — LETTER
7/29/2024    Osmar Espinoza MD  [address] [place] [address]    RE: Nigel Rodarte Jr.       Dear Colleague,     I had the pleasure of seeing Nigel Rodarte Jr. in the [facility].    Electrophysiology Clinic Progress Note  Nigel Rodarte Jr. MRN# [id number]   YOB: 1940 Age: 84 year old     Primary cardiologist: Previously Dr. Malhotra    Reason for visit: Annual follow up    History of presenting illness:    Nigel Rodarte Jr. is a pleasant 84 year old patient with past medical history significant for:    Paroxysmal atrial fibrillation/flutter: Previously failed rhythm control with flecainide and metoprolol. S/p ablation 5/2020 and redo ablation in 2/2022 in [place]. S/p ILR ([employer]) in [place]   [id number] Score 2 (age++, CAD) on chronic Coumadin  Non flow limiting CAD based on cath from 4/2021  Hyperlipidemia    Patient failed rhythm control strategy with flecainide/ metoprolol and underwent atrial fibrillation/flutter ablation on 05/18/2020. Unfortunately, he started having more episodes of atrial fibrillation in the end of 2021.  He was a started on flecainide/metoprolol. He went to [place] for the winter, and there he underwent a redo ablation in 2/2022. At his last visit with Dr. Malhotra he recommended stopping Flecainide and continuing Metoprolol. He was asked to contact our office with breakthrough atrial fibrillation.     He was admitted to [facility] in 11/2023 with chest discomfort. Troponin was negative and stress test no inducible ischemia or infarction. Echo showed normal LVEF without wall motion or valvular abnormalities.     He returns today for an annual follow-up.  Nigel tells me that while wintering in [place] he had a fall and subsequent collapsed right lung.  Also he had an [employer] implantable loop recorder placed.  Unfortunately, we do not have those records but will attempt to obtain them.    He continues to deny breakthrough atrial  fibrillation since discontinuation of flecainide.  He previously was the  at the [place] [employer] and now continues to  high school sailing both in [place] and [place].    Diagnotic studies:  Echocardiogram 11/2023: LVEF 55-60%, no wall motion or valvular abnormalities.   Nuclear stress test 11/2023: Negative for inducible myocardial ischemia or infarction.  Coronary angiogram (4/2021): Prox LAD lesion is 40% stenosed. Mid LAD lesion is 30% stenosed. Pressure wire/FFR was performed on the lesion.  Two lesions in the LAD that are not flow limiting based on iFR (0.92, 0.93).  Otherwise, mild diffuse atherosclerosis without a focal lesion.  NM stress test (4/1/21): Exercised for 9 minutes and 30 seconds.  There was mild count reduction in the basal, mid and apical inferior.  Stress Echo (2018): Exercised for 7 minutes and 20 seconds. Negative for ischemia, arrthyhmia or QRS widening.            Assessment and Plan:     ASSESSMENT:    Paroxysmal atrial fibrillation  S/p PVI ablation by Dr. Malhotra 5/2020 and redo ablation in [facility] 2/2022  Flecainide discontinued 5/2022 without break through  Rate control with metoprolol XL 25 mg daily  [id number] Score 3 (age++, CAD) on chronic Coumadin    Mild to moderate CAD  Noted on cath from 4/2021  Asymptomatic    PLAN:     No change in medical therapy  Return to clinic in 1 year or sooner if needed  Will obtain records from [place] cardiology clinic    [facility]  [address] [facility] [place] [address]  Phone [phone]  Fax [phone]     Orders this Visit:  Orders Placed This Encounter   Procedures    Follow-Up with Cardiology IVA     No orders of the defined types were placed in this encounter.    There are no discontinued medications.      Today's clinic visit entailed:  Review of the result(s) of each unique test - EKG, Cath, STress test, CT  Prescription drug management  28 minutes spent by me on the date of the  "encounter doing chart review, history and exam, documentation and further activities per the note  Provider  Link to [facility] Help Grid     The level of medical decision making during this visit was of moderate complexity.           Review of Systems:     Review of Systems:  Skin:        Eyes:       ENT:       Respiratory:  Negative    Cardiovascular:  Negative    Gastroenterology:      Genitourinary:       Musculoskeletal:       Neurologic:       Psychiatric:       Heme/Lymph/Imm:       Endocrine:  Negative              Physical Exam:     Vitals: /67   Pulse 77   Ht 1.74 m (5' 8.5\")   Wt 66 kg (145 lb 9.6 oz)   SpO2 98%   BMI 21.82 kg/m    Constitutional: Well nourished and in no apparent distress.  Eyes: Pupils equal, round. Sclerae anicteric.   HEENT: Normocephalic, atraumatic.   Neck: Supple.  Respiratory: Breathing non-labored. Lungs clear to auscultation bilaterally. No crackles, wheezes, rhonchi, or rales.  Cardiovascular:  Regular rate and rhythm, normal S1 and S2. No murmur, rub, or gallop.  Skin: Warm, dry. No rashes, cyanosis, or xanthelasma.  Extremities: No edema.  Neurologic: No gross motor deficits. Alert, awake, and oriented to person, place and time.  Psychiatric: Affect appropriate.        CURRENT MEDICATIONS:  Current Outpatient Medications   Medication Sig Dispense Refill    dutasteride (AVODART) 0.5 MG capsule Take 0.5 mg by mouth daily      metoprolol succinate ER (TOPROL XL) 25 MG 24 hr tablet Take 1 tablet (25 mg) by mouth daily 90 tablet 3    Multiple Vitamins-Minerals (MENS MULTI VITAMIN & MINERAL PO) Take 1 tablet by mouth daily      nitroGLYcerin (NITROSTAT) 0.4 MG sublingual tablet For chest pain place 1 tablet under the tongue every 5 minutes for 3 doses. If symptoms persist 5 minutes after 1st dose call 911. 25 tablet 1    rosuvastatin (CRESTOR) 20 MG tablet Take 1 tablet (20 mg) by mouth daily 90 tablet 3    warfarin ANTICOAGULANT (COUMADIN) 5 MG tablet Take 1-1.5 tablets " (5-7.5 mg) by mouth daily Adjust dose per INR as directed by  tablet 1       ALLERGIES  No Known Allergies      PAST MEDICAL HISTORY:  Past Medical History:   Diagnosis Date    Arthritis     osteoarthrosis    Coronary atherosclerosis of native coronary artery     Mild per angiogram    Elevated PSA     Fatigue     HLD (hyperlipidemia)     Palpitations     Paroxysmal atrial fibrillation (H) 05/16/2017    S/p PVI and CTI ablation 5/18/2020    Renal disease     kidney calculus    SOB (shortness of breath)        PAST SURGICAL HISTORY:  Past Surgical History:   Procedure Laterality Date    ARTHROPLASTY HIP  1/14/2013    Procedure: ARTHROPLASTY HIP;  RIGHT TOTAL HIP ARTHROPLASTY ;  Surgeon: Jamie Ohara MD;  Location:  OR    ARTHROPLASTY KNEE Right 11/6/2019    Procedure: RIGHT TOTAL KNEE ARTHROPLASTY (DEPUY)^;  Surgeon: Jamie Ohara MD;  Location:  OR    CARDIOVERSION      COLONOSCOPY N/A 2/9/2016    Procedure: COLONOSCOPY;  Surgeon: Astrid Vital MD;  Location:  GI    CV CORONARY ANGIOGRAM N/A 4/14/2021    Procedure: Coronary Angiogram;  Surgeon: Jacob Baird MD;  Location:  HEART CARDIAC CATH LAB    CV INSTANTANEOUS WAVE-FREE RATIO N/A 4/14/2021    Procedure: Instantaneous Wave-Free Ratio;  Surgeon: Jacob Baird MD;  Location:  HEART CARDIAC CATH LAB    CYSTOSCOPY, RETROGRADES, EXTRACT STONE, COMBINED  9/12/2013    Procedure: COMBINED CYSTOSCOPY, RETROGRADES, EXTRACT STONE;  CYSTOSCOPY, RIGHT RETROGRADE, STONE EXTRACTION;  Surgeon: Carlos Mcknight MD;  Location:  OR    ENT SURGERY      stapendectomy    EP ABLATION FOCAL AFIB N/A 5/18/2020    Procedure: EP Ablation Focal AFIB;  Surgeon: Grzegorz Malhotra MD;  Location:  HEART CARDIAC CATH LAB    GENITOURINARY SURGERY      cystoscopy for stone removal    HERNIA REPAIR      HYDROCELECTOMY SCROTAL Right 7/20/2023    Procedure: RIGHT HYDROCELECTOMY;  Surgeon: Carlos Mcknight MD;  Location:  OR    ORTHOPEDIC  SURGERY      shoulder surgeries,bilat carpel tunnel    TONSILLECTOMY         FAMILY HISTORY:  Family History   Problem Relation Age of Onset    Lung Cancer Brother     Myocardial Infarction Brother        SOCIAL HISTORY:  Social History     Socioeconomic History    Marital status:      Spouse name: None    Number of children: None    Years of education: None    Highest education level: None   Tobacco Use    Smoking status: Never    Smokeless tobacco: Never   Vaping Use    Vaping status: Never Used   Substance and Sexual Activity    Alcohol use: No    Drug use: No     Social Determinants of Health      [facility] [employer]    Financial Resource Strain    Received from [employer], [employer]    Social Connections     Thank you for allowing me to participate in the care of your patient.      Sincerely,     BRENDA Gaytan [facility]  cc: Referred Self

## 2024-08-14 ENCOUNTER — LAB (OUTPATIENT)
Dept: LAB | Facility: CLINIC | Age: 84
End: 2024-08-14
Payer: MEDICARE

## 2024-08-14 ENCOUNTER — ANTICOAGULATION THERAPY VISIT (OUTPATIENT)
Dept: ANTICOAGULATION | Facility: CLINIC | Age: 84
End: 2024-08-14

## 2024-08-14 DIAGNOSIS — I48.0 PAROXYSMAL ATRIAL FIBRILLATION (H): ICD-10-CM

## 2024-08-14 DIAGNOSIS — Z79.01 LONG TERM CURRENT USE OF ANTICOAGULANTS WITH INR GOAL OF 2.0-3.0: ICD-10-CM

## 2024-08-14 DIAGNOSIS — I48.0 PAROXYSMAL ATRIAL FIBRILLATION (H): Primary | ICD-10-CM

## 2024-08-14 LAB — INR BLD: 2.8 (ref 0.9–1.1)

## 2024-08-14 PROCEDURE — 36416 COLLJ CAPILLARY BLOOD SPEC: CPT

## 2024-08-14 PROCEDURE — 85610 PROTHROMBIN TIME: CPT

## 2024-08-14 NOTE — PROGRESS NOTES
ANTICOAGULATION MANAGEMENT     Nigel Rodarte . 84 year old male is on warfarin with therapeutic INR result. (Goal INR 2.0-3.0)    Recent labs: (last 7 days)     08/14/24  0724   INR 2.8*       ASSESSMENT     Source(s): Chart Review and Patient/Caregiver Call     Warfarin doses taken: Warfarin taken as instructed  Diet: No new diet changes identified  Medication/supplement changes: None noted  New illness, injury, or hospitalization: Yes: he had some dizziness but this is much better now.  Signs or symptoms of bleeding or clotting: No  Previous result: Therapeutic last 2(+) visits  Additional findings: None       PLAN     Recommended plan for no diet, medication or health factor changes affecting INR     Dosing Instructions: Continue your current warfarin dose with next INR in 5 weeks       Summary  As of 8/14/2024      Full warfarin instructions:  7.5 mg every Mon, Thu; 5 mg all other days   Next INR check:  9/18/2024               Telephone call with Nigel who agrees to plan and repeated back plan correctly    Lab visit scheduled    Education provided: Please call back if any changes to your diet, medications or how you've been taking warfarin  Goal range and lab monitoring: goal range and significance of current result and Importance of therapeutic range  Contact [phone] with any changes, questions or concerns.     Plan made per [facility] anticoagulation protocol    Stacey Heart, RN  Anticoagulation Clinic  8/14/2024    _______________________________________________________________________     Anticoagulation Episode Summary       Current INR goal:  2.0-3.0   TTR:  89.2% (7.5 mo)   Target end date:  Indefinite   Send INR reminders to:  [facility] HEART INR NURSE    Indications    Atrial fibrillation (H) [I48.91] [I48.91]  Long term current use of anticoagulants with INR goal of 2.0-3.0 [Z79.01]  Paroxysmal atrial fibrillation (H) [I48.0]             Comments:               Anticoagulation Care Providers        Provider Role Specialty Phone number    Kathe Hurd APRN CNP Referring Cardiovascular Disease [phone]

## 2024-08-22 ENCOUNTER — APPOINTMENT (OUTPATIENT)
Dept: CT IMAGING | Facility: CLINIC | Age: 84
End: 2024-08-22
Attending: EMERGENCY MEDICINE
Payer: MEDICARE

## 2024-08-22 ENCOUNTER — HOSPITAL ENCOUNTER (EMERGENCY)
Facility: CLINIC | Age: 84
Discharge: HOME | End: 2024-08-22
Attending: EMERGENCY MEDICINE | Admitting: EMERGENCY MEDICINE
Payer: MEDICARE

## 2024-08-22 VITALS
TEMPERATURE: 97.7 F | DIASTOLIC BLOOD PRESSURE: 56 MMHG | HEIGHT: 68 IN | SYSTOLIC BLOOD PRESSURE: 100 MMHG | RESPIRATION RATE: 16 BRPM | HEART RATE: 61 BPM | WEIGHT: 150 LBS | OXYGEN SATURATION: 97 % | BODY MASS INDEX: 22.73 KG/M2

## 2024-08-22 DIAGNOSIS — M54.2 NECK PAIN: ICD-10-CM

## 2024-08-22 DIAGNOSIS — Z79.01 LONG TERM (CURRENT) USE OF ANTICOAGULANTS: ICD-10-CM

## 2024-08-22 DIAGNOSIS — S09.90XA CLOSED HEAD INJURY, INITIAL ENCOUNTER: ICD-10-CM

## 2024-08-22 PROCEDURE — 72125 CT NECK SPINE W/O DYE: CPT

## 2024-08-22 PROCEDURE — 99284 EMERGENCY DEPT VISIT MOD MDM: CPT | Mod: 25

## 2024-08-22 PROCEDURE — 70450 CT HEAD/BRAIN W/O DYE: CPT

## 2024-08-22 PROCEDURE — 250N000013 HC RX MED GY IP 250 OP 250 PS 637: Performed by: EMERGENCY MEDICINE

## 2024-08-22 RX ORDER — ACETAMINOPHEN 500 MG
1000 TABLET ORAL ONCE
Status: COMPLETED | OUTPATIENT
Start: 2024-08-22 | End: 2024-08-22

## 2024-08-22 RX ADMIN — ACETAMINOPHEN 1000 MG: 500 TABLET, FILM COATED ORAL at 19:00

## 2024-08-22 ASSESSMENT — ACTIVITIES OF DAILY LIVING (ADL)
ADLS_ACUITY_SCORE: 43
ADLS_ACUITY_SCORE: 43

## 2024-08-22 ASSESSMENT — COLUMBIA-SUICIDE SEVERITY RATING SCALE - C-SSRS
6. HAVE YOU EVER DONE ANYTHING, STARTED TO DO ANYTHING, OR PREPARED TO DO ANYTHING TO END YOUR LIFE?: NO
2. HAVE YOU ACTUALLY HAD ANY THOUGHTS OF KILLING YOURSELF IN THE PAST MONTH?: NO
1. IN THE PAST MONTH, HAVE YOU WISHED YOU WERE DEAD OR WISHED YOU COULD GO TO SLEEP AND NOT WAKE UP?: NO

## 2024-08-22 NOTE — ED TRIAGE NOTES
[facility]  ED Arrival Note    Arrives through triage. ABC's intact. A &O X4. . Pt arrives with c/o hitting his head with a stationary garage door. Abrasion to the forehead.       Visitors during triage: Spouse      Triage Interventions: CT    Ambulatory: Yes    Meets Stroke Criteria?: No    Meets Trauma Criteria?: No    Shock Index (HR/SBP): <0.8, for provider reference    Directed to: Triage Lobby    Pronouns: he/him       Triage Assessment (Adult)       Row Name 08/22/24 5513          Triage Assessment    Airway WDL WDL        Respiratory WDL    Respiratory WDL WDL        Skin Circulation/Temperature WDL    Skin Circulation/Temperature WDL WDL        Cardiac WDL    Cardiac WDL WDL        Peripheral/Neurovascular WDL    Peripheral Neurovascular WDL WDL        Cognitive/Neuro/Behavioral WDL    Cognitive/Neuro/Behavioral WDL WDL

## 2024-08-23 ENCOUNTER — DOCUMENTATION ONLY (OUTPATIENT)
Dept: ANTICOAGULATION | Facility: CLINIC | Age: 84
End: 2024-08-23
Payer: MEDICARE

## 2024-08-23 NOTE — PROGRESS NOTES
ANTICOAGULATION  MANAGEMENT: Discharge Review    Nigel Rodarte Jr. chart reviewed for anticoagulation continuity of care    Emergency room visit on 8/22/24 for closed head injury, neck pain.    Discharge disposition: Home    Results:    No results for input(s):    Anticoagulation inpatient management:     not applicable     Anticoagulation discharge instructions:     Warfarin dosing: home regimen continued   Bridging: No   INR goal change: No      Medication changes affecting anticoagulation: No    Additional factors affecting anticoagulation: No     PLAN     No adjustment to anticoagulation plan needed    Patient not contacted    No adjustment to Anticoagulation Calendar was required    Estrella Bowden RN

## 2024-08-23 NOTE — ED PROVIDER NOTES
"  Emergency Department Note      History of Present Illness     Chief Complaint   Head Injury      HPI   Nigel Rodarte Jr. is a 84 year old male who is anticoagulated on Coumadin with and INR of 2.7 for atrial fibrillation with a history of hyperlipidemia, renal disease, coronary atherosclerosis who presents with a head injury. Patient states that he was rushing out of his garage and sustained head trauma after walking into a half-opened garaged door. He endorses neck soreness on both sides of his neck, and especially on his right side with movement. He denies loss of consciousness, headache. He denies abdominal pain. He denies nausea, vomiting, diarrhea, fever, chills, pharyngitis.     Independent Historian   None    Review of External Notes   None    Past Medical History     Medical History and Problem List   Arthritis  Coronary atherosclerosis of native coronary artery  Elevated PSA  Fatigue  HLD (hyperlipidemia)  Palpitations  Paroxysmal atrial fibrillation (H)  Renal disease  SOB (shortness of breath)      Medications   Toprol  Nitrostat  Crestor  Coumadin  Avodart    Surgical History   Right total hip arthroplasty  Right total knee arthroplasty  Cardioversion  Colonoscopy  Cystoscopy   Stapendectomy  Right hydrocelectomy  Shoulder surgery  Tonsillectomy and adenoidectomy     Physical Exam     Patient Vitals for the past 24 hrs:   BP Temp Pulse Resp SpO2 Height Weight   08/22/24 1835 122/64 97.7  F (36.5  C) 71 16 98 % 1.727 m (5' 8\") 68 kg (150 lb)     Physical Exam  General: Alert, appears well-developed and well-nourished. Cooperative.     In mild distress  HEENT:  Head:  Abrasion to forehead, midline, no laceration  Ears:  External ears are normal  Mouth/Throat:  Oropharynx is without erythema or exudate and mucous membranes are moist.  No dental trauma.    Eyes:   Conjunctivae normal and EOM are normal. No scleral icterus.    Pupils are equal, round, and reactive to light.   Neck:   Normal range of " motion. Neck supple.  CV:  Normal rate, irregular rhythm, normal heart sounds and radial pulses are 2+ and symmetric.  No murmur.  Resp:  Breath sounds are clear bilaterally    Non-labored, no retractions or accessory muscle use  GI:  Abdomen is soft, no distension, no tenderness. No rebound or guarding.  No CVA tenderness bilaterally  MS:  Normal range of motion. No edema.    Normal strength in all 4 extremities.     Back atraumatic.    No midline cervical, thoracic, or lumbar tenderness  Skin:  Warm and dry.  Abrasion to forehead.   Neuro:   Alert. Normal strength.  GCS: 15  Psych: Normal mood and affect.      Diagnostics     Lab Results   Labs Ordered and Resulted from Time of ED Arrival to Time of ED Departure - No data to display    Imaging   CT Cervical Spine w/o Contrast   Final Result   IMPRESSION:    1.  No acute cervical spine fracture.         CT Head w/o Contrast   Final Result   IMPRESSION:   1.  No CT evidence for acute intracranial process.   2.  Mild chronic microvascular ischemic changes as above.        Independent Interpretation   CT Head: No intracranial hemorrhage.    ED Course      Medications Administered   Medications   acetaminophen (TYLENOL) tablet 1,000 mg (1,000 mg Oral $Given 8/22/24 1900)       Procedures   Procedures     Discussion of Management   None    ED Course   ED Course as of 08/22/24 2044   Thu Aug 22, 2024   1850 I obtained history and examined the patient as noted above         Additional Documentation  None    Medical Decision Making / Diagnosis     CMS Diagnoses: None    MIPS       None    [facility]   Nigel Rodarte Jr. is a 84 year old male with a history of atrial fibrillation on long-term anticoagulation with Coumadin who presents after a head injury where the top of his forehead was hit by his garage door.  Thankfully today CT scan of the head shows no acute skull fracture nor intracranial hemorrhage.  CT cervical spine shows no evidence of cervical spine fracture nor  subluxation.  Patient had a small contusion and abrasion to the forehead midline.  There is no gaping laceration requiring primary repair.  Patient's tetanus is up-to-date in 2023.  Encouraged Tylenol for pain as needed.  Ice packs at home.  After return precautions understood and all questions answered, discharged home.    Disposition   The patient was discharged.     Diagnosis     ICD-10-CM    1. Closed head injury, initial encounter  S09.90XA       2. Neck pain  M54.2       3. Long term (current) use of anticoagulants  Z79.01            Discharge Medications   New Prescriptions    No medications on file         Scribe Disclosure:  I, Celia Santos, am serving as a scribe at 7:15 PM on 8/22/2024 to document services personally performed by Robert Barbour MD based on my observations and the provider's statements to me.        Robert Barbour MD  08/22/24 2044

## 2024-09-18 ENCOUNTER — ANTICOAGULATION THERAPY VISIT (OUTPATIENT)
Dept: ANTICOAGULATION | Facility: CLINIC | Age: 84
End: 2024-09-18

## 2024-09-18 ENCOUNTER — LAB (OUTPATIENT)
Dept: LAB | Facility: CLINIC | Age: 84
End: 2024-09-18
Payer: MEDICARE

## 2024-09-18 DIAGNOSIS — I48.91 ATRIAL FIBRILLATION, UNSPECIFIED TYPE (H): Primary | ICD-10-CM

## 2024-09-18 DIAGNOSIS — Z79.01 LONG TERM CURRENT USE OF ANTICOAGULANTS WITH INR GOAL OF 2.0-3.0: ICD-10-CM

## 2024-09-18 DIAGNOSIS — I48.0 PAROXYSMAL ATRIAL FIBRILLATION (H): ICD-10-CM

## 2024-09-18 LAB — INR BLD: 2.4 (ref 0.9–1.1)

## 2024-09-18 PROCEDURE — 36416 COLLJ CAPILLARY BLOOD SPEC: CPT

## 2024-09-18 PROCEDURE — 85610 PROTHROMBIN TIME: CPT

## 2024-09-18 NOTE — PROGRESS NOTES
ANTICOAGULATION MANAGEMENT     Nigel Rodarte Jr. 84 year old male is on warfarin with therapeutic INR result. (Goal INR 2.0-3.0)    Recent labs: (last 7 days)     09/18/24  0755   INR 2.4*       ASSESSMENT     Source(s): Chart Review and Patient/Caregiver Call     Warfarin doses taken: Warfarin taken as instructed  Diet: No new diet changes identified  Medication/supplement changes: None noted  New illness, injury, or hospitalization: 8/22/24 head injury (hit head on garage door), no intracranial hemorrhage  Signs or symptoms of bleeding or clotting: No  Previous result: Therapeutic last 2(+) visits  Additional findings:  Might be heading to [place] after next INR check       PLAN     Recommended plan for no diet, medication or health factor changes affecting INR     Dosing Instructions: Continue your current warfarin dose with next INR in 5-6 weeks       Summary  As of 9/18/2024      Full warfarin instructions:  7.5 mg every Mon, Thu; 5 mg all other days   Next INR check:  10/24/2024               Telephone call with Nigel who verbalizes understanding and agrees to plan    Lab visit scheduled    Education provided: Goal range and lab monitoring: goal range and significance of current result  Contact [phone] with any changes, questions or concerns.     Plan made per [facility] anticoagulation protocol    Gilma Anton RN  9/18/2024  Anticoagulation Clinic  [employer] for routing messages: otto MAZARIEGOS [facility] HEART INR NURSE  [facility] patient phone line: [phone]        _______________________________________________________________________     Anticoagulation Episode Summary       Current INR goal:  2.0-3.0   TTR:  89.2% (7.5 mo)   Target end date:  Indefinite   Send INR reminders to:  YAIR SMITH HEART INR NURSE    Indications    Atrial fibrillation (H) [I48.91] [I48.91]  Long term current use of anticoagulants with INR goal of 2.0-3.0 [Z79.01]  Paroxysmal atrial fibrillation (H) [I48.0]             Comments:                Anticoagulation Care Providers       Provider Role Specialty Phone number    Kathe Hurd APRN CNP Referring Cardiovascular Disease [phone]

## 2024-10-24 ENCOUNTER — DOCUMENTATION ONLY (OUTPATIENT)
Dept: ANTICOAGULATION | Facility: CLINIC | Age: 84
End: 2024-10-24

## 2024-10-24 ENCOUNTER — LAB (OUTPATIENT)
Dept: LAB | Facility: CLINIC | Age: 84
End: 2024-10-24
Payer: MEDICARE

## 2024-10-24 ENCOUNTER — ANTICOAGULATION THERAPY VISIT (OUTPATIENT)
Dept: ANTICOAGULATION | Facility: CLINIC | Age: 84
End: 2024-10-24

## 2024-10-24 DIAGNOSIS — I48.91 ATRIAL FIBRILLATION, UNSPECIFIED TYPE (H): Primary | ICD-10-CM

## 2024-10-24 DIAGNOSIS — I48.0 PAROXYSMAL ATRIAL FIBRILLATION (H): ICD-10-CM

## 2024-10-24 DIAGNOSIS — Z79.01 LONG TERM CURRENT USE OF ANTICOAGULANTS WITH INR GOAL OF 2.0-3.0: ICD-10-CM

## 2024-10-24 LAB — INR BLD: 2.8 (ref 0.9–1.1)

## 2024-10-24 PROCEDURE — 36416 COLLJ CAPILLARY BLOOD SPEC: CPT

## 2024-10-24 PROCEDURE — 85610 PROTHROMBIN TIME: CPT

## 2024-10-24 RX ORDER — METOPROLOL SUCCINATE 25 MG/1
25 TABLET, EXTENDED RELEASE ORAL DAILY
Qty: 90 TABLET | Refills: 3 | Status: SHIPPED | OUTPATIENT
Start: 2024-10-24

## 2024-10-24 NOTE — PROGRESS NOTES
ANTICOAGULATION MANAGEMENT     Nigel Rodarte Jr. 84 year old male is on warfarin with therapeutic INR result. (Goal INR 2.0-3.0)    Recent labs: (last 7 days)     10/24/24  0716   INR 2.8*       ASSESSMENT     Source(s): Chart Review  Previous INR was Therapeutic last 2(+) visits  Medication, diet, health changes since last INR chart reviewed; none identified         PLAN     Recommended plan for no diet, medication or health factor changes affecting INR     Dosing Instructions: Continue your current warfarin dose with next INR in 6 weeks       Summary  As of 10/24/2024      Full warfarin instructions:  7.5 mg every Mon, Thu; 5 mg all other days   Next INR check:  12/5/2024               Detailed voice message left for Nigel with dosing instructions and follow up date.  Asked that he call back with his travel plans for the winter if he is heading to [place] before the next INR is due.       Contact [phone] to schedule and with any changes, questions or concerns.     Education provided: Goal range and lab monitoring: goal range and significance of current result  Contact [phone] with any changes, questions or concerns.     Plan made per [facility] anticoagulation protocol    Gilma Anton, RN  10/24/2024  Anticoagulation Clinic  [employer] for routing messages: otto MAZARIEGOS [facility] HEART INR NURSE  [facility] patient phone line: [phone]        _______________________________________________________________________     Anticoagulation Episode Summary       Current INR goal:  2.0-3.0   TTR:  89.2% (7.5 mo)   Target end date:  Indefinite   Send INR reminders to:  YAIR [facility] HEART INR NURSE    Indications    Atrial fibrillation (H) [I48.91] [I48.91]  Long term current use of anticoagulants with INR goal of 2.0-3.0 [Z79.01]  Paroxysmal atrial fibrillation (H) [I48.0]             Comments:  --             Anticoagulation Care Providers       Provider Role Specialty Phone number    Kathe Hurd, BRENDA CNP Referring Cardiovascular  Disease [phone]

## 2024-10-24 NOTE — PROGRESS NOTES
ANTICOAGULATION CLINIC REFERRAL RENEWAL REQUEST       An annual renewal order is required for all patients referred to [facility]ticoagulation [facility].?  Please review and sign the pended referral order for Nigel Rodarte Jr..       ANTICOAGULATION SUMMARY      Warfarin indication(s)   Atrial Fibrillation    Mechanical heart valve present?  NO       Current goal range   INR: 2.0-3.0     Goal appropriate for indication? Goal INR 2-3, standard for indication(s) above     Time in Therapeutic Range (TTR)  (Goal > 60%) 89.2%       Office visit with referring provider's group within last year yes on 7/29/24       Gilma Anton RN  [facility]ticoagulation [facility]

## 2025-02-11 ENCOUNTER — ANCILLARY PROCEDURE (OUTPATIENT)
Dept: CARDIOLOGY | Facility: CLINIC | Age: 85
End: 2025-02-11
Attending: INTERNAL MEDICINE
Payer: MEDICARE

## 2025-02-11 DIAGNOSIS — Z95.0 CARDIAC PACEMAKER IN SITU: ICD-10-CM

## 2025-02-11 PROCEDURE — 93298 REM INTERROG DEV EVAL SCRMS: CPT | Performed by: INTERNAL MEDICINE

## 2025-02-12 LAB
MDC_IDC_EPISODE_DTM: NORMAL
MDC_IDC_EPISODE_DURATION: 10 S
MDC_IDC_EPISODE_DURATION: 11 S
MDC_IDC_EPISODE_DURATION: 17 S
MDC_IDC_EPISODE_DURATION: 9 S
MDC_IDC_EPISODE_ID: NORMAL
MDC_IDC_EPISODE_TYPE: NORMAL
MDC_IDC_EPISODE_VENDOR_TYPE: NORMAL
MDC_IDC_MSMT_BATTERY_DTM: NORMAL
MDC_IDC_MSMT_BATTERY_STATUS: NORMAL
MDC_IDC_PG_IMPLANT_DTM: NORMAL
MDC_IDC_PG_MFG: NORMAL
MDC_IDC_PG_MODEL: NORMAL
MDC_IDC_PG_SERIAL: NORMAL
MDC_IDC_PG_TYPE: NORMAL
MDC_IDC_SESS_CLINIC_NAME: NORMAL
MDC_IDC_SESS_DTM: NORMAL
MDC_IDC_SESS_TYPE: NORMAL
MDC_IDC_STAT_AT_BURDEN_PERCENT: 0 %
MDC_IDC_STAT_AT_DTM_END: NORMAL
MDC_IDC_STAT_AT_DTM_START: NORMAL

## 2025-03-03 ENCOUNTER — TELEPHONE (OUTPATIENT)
Dept: CARDIOLOGY | Facility: CLINIC | Age: 85
End: 2025-03-03
Payer: MEDICARE

## 2025-03-03 NOTE — TELEPHONE ENCOUNTER
Pt needing to updated loop recorder software on phone.  Tried to call pt, but went straight to .  Left message to call [facility] RN callback number: [phone]  .

## 2025-03-04 NOTE — TELEPHONE ENCOUNTER
"Pt called and left VM,  Tried to call pt back again and was unable to reach directly.  Left detailed VM in regards to what he needed to do with loop recorder lux nate on \"I-phone\" and that he needed to update this software on his phone. Left [facility] RN callback number: [phone]   if he had questions.   "

## 2025-04-14 ENCOUNTER — TELEPHONE (OUTPATIENT)
Dept: ANTICOAGULATION | Facility: CLINIC | Age: 85
End: 2025-04-14
Payer: MEDICARE

## 2025-04-14 NOTE — TELEPHONE ENCOUNTER
ANTICOAGULATION     Nigel Rodarte Jr. is overdue for an INR check.     Spoke with Nigel and scheduled lab appointment on 4/18    Estrella Bowden, RN  4/14/2025  Anticoagulation Clinic  [facility] for routing messages: suzan MAZARIEGOS [facility] HEART INR NURSE  [facility] patient phone line: [phone]

## 2025-04-18 ENCOUNTER — ANTICOAGULATION THERAPY VISIT (OUTPATIENT)
Dept: ANTICOAGULATION | Facility: CLINIC | Age: 85
End: 2025-04-18

## 2025-04-18 DIAGNOSIS — I48.91 ATRIAL FIBRILLATION, UNSPECIFIED TYPE (H): ICD-10-CM

## 2025-04-18 DIAGNOSIS — I48.91 ATRIAL FIBRILLATION, UNSPECIFIED TYPE (H): Primary | ICD-10-CM

## 2025-04-18 DIAGNOSIS — I48.0 PAROXYSMAL ATRIAL FIBRILLATION (H): ICD-10-CM

## 2025-04-18 DIAGNOSIS — Z79.01 LONG TERM CURRENT USE OF ANTICOAGULANTS WITH INR GOAL OF 2.0-3.0: ICD-10-CM

## 2025-04-18 NOTE — PROGRESS NOTES
ANTICOAGULATION MANAGEMENT     Nigel Rodarte Jr. 84 year old male is on warfarin with supratherapeutic INR result. (Goal INR 2.0-3.0)    Recent labs: (last 7 days)     04/18/25  1353   INR 3.1*       ASSESSMENT     Source(s): Chart Review  Previous INR was INRs from [place] are unknown.  Last INR in [place] was 10/24/24.  Medication, diet, health changes since last INR   Has been in [place] since last year.  Unsure if Warfarin dose has changed.       PLAN     Unable to reach patient today.    Left message to continue normal dosing this weekend. Request call back for assessment.    Follow up required to assess for changes , discuss out of range result , and discuss dosing instructions and confirm understanding of instructions    Gilma Anton, RN  4/18/2025  Anticoagulation Clinic  [facility] for routing messages: suzan MAZARIEGOS [facility] HEART INR NURSE  [facility] patient phone line: [phone]

## 2025-04-21 NOTE — PROGRESS NOTES
ANTICOAGULATION MANAGEMENT     Nigel Rodarte Jr. 84 year old male is on warfarin with supratherapeutic INR result. (Goal INR 2.0-3.0)    Recent labs: (last 7 days)     04/18/25  1353   INR 3.1*       ASSESSMENT     Source(s): Chart Review  Previous INR was INRs from [place] are unknown.  Last INR in [place] was 10/24/24.  Medication, diet, health changes since last INR   Has been in [place] since last year.  Unsure if Warfarin dose has changed.         PLAN     Recommended plan for no diet, medication or health factor changes affecting INR     Dosing Instructions: Continue your current warfarin dose with next INR in 2 weeks       Summary  As of 4/18/2025      Full warfarin instructions:  7.5 mg every Mon, Thu; 5 mg all other days   Next INR check:  5/2/2025               Detailed voice message left for Nigel with dosing instructions and follow up date.     Contact [phone] to schedule and with any changes, questions or concerns.     Education provided: Goal range and lab monitoring: goal range and significance of current result  Contact [phone] with any changes, questions or concerns.     Plan made per [facility] anticoagulation protocol    Gilma Anton, RN  4/21/2025  Anticoagulation Clinic  [employer] for routing messages: otto MAZARIEGOS [facility] HEART INR NURSE  [facility] patient phone line: [phone]        _______________________________________________________________________     Anticoagulation Episode Summary       Current INR goal:  2.0-3.0   TTR:  87.0% (6.2 mo)   Target end date:  Indefinite   Send INR reminders to:  YAIR [facility] HEART INR NURSE    Indications    Atrial fibrillation (H) [I48.91] [I48.91]  Long term current use of anticoagulants with INR goal of 2.0-3.0 [Z79.01]  Paroxysmal atrial fibrillation (H) [I48.0]  Atrial fibrillation  unspecified type (H) [I48.91]             Comments:  --             Anticoagulation Care Providers       Provider Role Specialty Phone number    Kathe Hurd, BRENDA CNP Referring  Cardiovascular Disease [phone]

## 2025-05-13 ENCOUNTER — TELEPHONE (OUTPATIENT)
Dept: CARDIOLOGY | Facility: CLINIC | Age: 85
End: 2025-05-13

## 2025-05-13 DIAGNOSIS — I48.0 PAROXYSMAL ATRIAL FIBRILLATION (H): ICD-10-CM

## 2025-05-13 RX ORDER — METOPROLOL SUCCINATE 25 MG/1
25 TABLET, EXTENDED RELEASE ORAL DAILY
Qty: 90 TABLET | Refills: 0 | Status: SHIPPED | OUTPATIENT
Start: 2025-05-13

## 2025-05-13 NOTE — TELEPHONE ENCOUNTER
[facility] Call [facility]    Phone Message    May a detailed message be left on voicemail: yes     Reason for Call: Medication Refill Request    Has the patient contacted the pharmacy for the refill? Yes   Name of medication being requested:   metoprolol succinate ER (TOPROL XL) 25 MG 24 hr tablet    Provider who prescribed the medication: Kathe Hurd  Pharmacy:    [id number] - [place], [place] - [address]  Date medication is needed: 5/13/2025   The patient is totally out, the patient is scheduled for an appointment. The spouse Denise was not sure what kind of device the patient has and so not sure if device check in-clinic is needed. Please review    Action Taken: Other: Cardiology    Travel Screening: Not Applicable    Thank you!  [facility]       Date of Service:

## 2025-05-14 DIAGNOSIS — I48.0 PAROXYSMAL ATRIAL FIBRILLATION (H): ICD-10-CM

## 2025-05-14 RX ORDER — WARFARIN SODIUM 5 MG/1
5-7.5 TABLET ORAL DAILY
Qty: 120 TABLET | Refills: 0 | Status: SHIPPED | OUTPATIENT
Start: 2025-05-14

## 2025-05-14 NOTE — TELEPHONE ENCOUNTER
ANTICOAGULATION MANAGEMENT:  Medication Refill    Anticoagulation Summary  As of 4/18/2025      Warfarin maintenance plan:  7.5 mg (5 mg x 1.5) every Mon, Thu; 5 mg (5 mg x 1) all other days   Next INR check:  5/2/2025   Target end date:  Indefinite    Indications    Atrial fibrillation (H) [I48.91] [I48.91]  Long term current use of anticoagulants with INR goal of 2.0-3.0 [Z79.01]  Paroxysmal atrial fibrillation (H) [I48.0]  Atrial fibrillation  unspecified type (H) [I48.91]                 Anticoagulation Care Providers       Provider Role Specialty Phone number    Kathe Hurd APRN CNP Referring Cardiovascular Disease [phone]            Refill Criteria    Visit with referring provider/group: Meets criteria: visit within referring provider group in the last 15 months on 7/29/24    [facility] referral last signed: 10/24/2024; within last year:  Yes    Lab monitoring is up to date (not exceeding 2 weeks overdue): Yes    Nigel meets all criteria for refill. Rx instructions and quantity supplied updated to match patient's current dosing plan. 90 day supply with 0 refills granted per [facility] protocol     Estrella Bowden RN  Anticoagulation Clinic

## 2025-05-14 NOTE — TELEPHONE ENCOUNTER
Medication refilled in this encounter, pharmacy received script per receipt. No further action needed at this time.

## 2025-05-14 NOTE — TELEPHONE ENCOUNTER
Health Call Center    Phone Message    May a detailed message be left on voicemail: yes    Reason for Call: Spouse called stating the pharmacy has not received anything from us regarding this medication. Records show they sent us a receipt yesterday at 10:53 AM. Please review if any more steps need to be taken at this time.    Thank you!  [facility]

## 2025-05-16 ENCOUNTER — ANTICOAGULATION THERAPY VISIT (OUTPATIENT)
Dept: ANTICOAGULATION | Facility: CLINIC | Age: 85
End: 2025-05-16

## 2025-05-16 DIAGNOSIS — I48.0 PAROXYSMAL ATRIAL FIBRILLATION (H): ICD-10-CM

## 2025-05-16 DIAGNOSIS — I48.91 ATRIAL FIBRILLATION (H): Primary | ICD-10-CM

## 2025-05-16 DIAGNOSIS — I48.91 ATRIAL FIBRILLATION, UNSPECIFIED TYPE (H): ICD-10-CM

## 2025-05-16 DIAGNOSIS — Z79.01 LONG TERM CURRENT USE OF ANTICOAGULANTS WITH INR GOAL OF 2.0-3.0: ICD-10-CM

## 2025-05-16 NOTE — PROGRESS NOTES
ANTICOAGULATION MANAGEMENT     Nigel Rodarte Jr. 84 year old male is on warfarin with subtherapeutic INR result. (Goal INR 2.0-3.0)    Recent labs: (last 7 days)     05/16/25  1337   INR 1.4*       ASSESSMENT     Source(s): Chart Review  Previous INR was Supratherapeutic on 4/18/25-- prior INRs were completed in [place] and are unavailable to [facility]  Medication, diet, health changes since last INR chart reviewed; none identified         PLAN     Unable to reach Nigel today.    Left message to take a booster dose of 10 mg warfarin tonight, then return to maintenance dose this weekend. Request call back for assessment.    Follow up required to confirm warfarin dose taken and assess for changes, discuss out of range result , and discuss dosing instructions and confirm understanding of instructions    Jess Lawrence, RN  5/16/2025  Anticoagulation Clinic  [facility] for routing messages: otto MAZARIEGOS [facility] HEART INR NURSE  [facility] patient phone line: [phone]

## 2025-05-19 ENCOUNTER — RESULTS FOLLOW-UP (OUTPATIENT)
Dept: ANTICOAGULATION | Facility: CLINIC | Age: 85
End: 2025-05-19

## 2025-05-19 NOTE — PROGRESS NOTES
ANTICOAGULATION MANAGEMENT     Nigel Rodarte Jr. 84 year old male is on warfarin with subtherapeutic INR result. (Goal INR 2.0-3.0)    Recent labs: (last 7 days)     05/16/25  1337   INR 1.4*       ASSESSMENT     Source(s): Chart Review and Patient/Caregiver Call     Warfarin doses taken: Missed dose(s) may be affecting INR-- had an issue filling his warfarin so missed a couple doses while waiting on that, did get it filled on the 14th and resumed his maintenance dose; got VM on Friday to take booster dose which he did  Diet: No new diet changes identified  Medication/supplement changes: None noted  New illness, injury, or hospitalization: No  Signs or symptoms of bleeding or clotting: No  Previous result: Supratherapeutic  Additional findings: None       PLAN     Recommended plan for temporary change(s) affecting INR     Dosing Instructions: Continue your current warfarin dose with next INR in 1 week       Summary  As of 5/16/2025      Full warfarin instructions:  5/16: 10 mg; Otherwise 7.5 mg every Mon, Thu; 5 mg all other days   Next INR check:  5/23/2025               Telephone call with Nigel who agrees to plan and repeated back plan correctly    Lab visit scheduled    Education provided: Contact [phone] with any changes, questions or concerns.     Plan made per [facility] anticoagulation protocol    Jess Lawrence RN  5/19/2025  Anticoagulation Clinic  [facility] for routing messages: otto MAZARIEGOS [facility] HEART INR NURSE  [facility] patient phone line: [phone]        _______________________________________________________________________     Anticoagulation Episode Summary       Current INR goal:  2.0-3.0   TTR:  80.8% (6.2 mo)   Target end date:  Indefinite   Send INR reminders to:  YAIR [facility] HEART INR NURSE    Indications    Atrial fibrillation (H) [I48.91] [I48.91]  Long term current use of anticoagulants with INR goal of 2.0-3.0 [Z79.01]  Paroxysmal atrial fibrillation (H) [I48.0]  Atrial fibrillation  unspecified type  (H) [I48.91]             Comments:  --             Anticoagulation Care Providers       Provider Role Specialty Phone number    Kathe Hurd APRN CNP Referring Cardiovascular Disease [phone]

## 2025-05-23 ENCOUNTER — RESULTS FOLLOW-UP (OUTPATIENT)
Dept: ANTICOAGULATION | Facility: CLINIC | Age: 85
End: 2025-05-23

## 2025-06-01 NOTE — PROGRESS NOTES
Electrophysiology Clinic Progress Note  Nigel Rodarte Jr. MRN# [id number]   YOB: 1940 Age: 85 year old     Primary cardiologist: Previously Dr. Malhotra    Reason for visit: Annual follow up      Assessment & Plan:  Paroxysmal atrial fibrillation/flutter  Well-controlled with no known recurrence  Continue metoprolol XL 25 mg daily  Continue warfarin, management per INR clinic  Continue rosuvastatin 20 mg daily  Follow up in 1 year, earlier if experiencing new or worsening cardiac symptoms      Pertinent Detailed Problem List:  Paroxysmal atrial fibrillation/flutter  Timeline:  Previously failed rhythm control with flecainide and metoprolol  S/p PVI ablation 5/2020 with Dr. Malhotra  S/p redo ablation in 2/2022 in [place] with Dr. Naidu  S/p ILR ([employer]) in [place]  [id number]- VASc Score: 3 (age ++, CAD)   Warfarin (INR goal 2-3, management per INR clinic)  Rate control with metoprolol XL 25 mg QD  ILR check in 2/2025-1 brief SVT, 1 episode of SB in the 30s for 9 seconds at 0918  TTE on 11/13/2023-LVEF 55-60%, no RWMA, normal biatrial size  Nonobstructive coronary artery disease  Angiogram in 4/2021-proximal LAD 40%, mid LAD 30%  Nuclear stress test in 11/2023-negative for inducible ischemia  Hyperlipidemia  Rosuvastatin 20 mg QD  Managed by PCP    HPI:  Nigel Rodarte Jr. is a pleasant 85 year old patient who presents today for annual follow-up. 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  He has had no known recurrence of his A-fib.  He typically travels to [facility] for the winter and follows up with cardiology annually there.  This is where he had his redo ablation done. He has no acute cardiac concerns. Denies Syncope, Palpitations, Dizziness/lightheadedness, Dyspnea, Chest pain, Chest tightness or heaviness, Edema, and Pre-syncope.      Apolonia Parada PA-C   Physician  "   [facility]     PAST SURGICAL HISTORY:   Past Surgical History:   Procedure Laterality Date    ARTHROPLASTY HIP  1/14/2013    Procedure: ARTHROPLASTY HIP;  RIGHT TOTAL HIP ARTHROPLASTY ;  Surgeon: Jamie Ohara MD;  Location:  OR    ARTHROPLASTY KNEE Right 11/6/2019    Procedure: RIGHT TOTAL KNEE ARTHROPLASTY (DEPUY)^;  Surgeon: Jamie Ohara MD;  Location:  OR    CARDIOVERSION      COLONOSCOPY N/A 2/9/2016    Procedure: COLONOSCOPY;  Surgeon: Astrid Vital MD;  Location:  GI    CV CORONARY ANGIOGRAM N/A 4/14/2021    Procedure: Coronary Angiogram;  Surgeon: Jacob Baird MD;  Location:  HEART CARDIAC CATH LAB    CV INSTANTANEOUS WAVE-FREE RATIO N/A 4/14/2021    Procedure: Instantaneous Wave-Free Ratio;  Surgeon: Jacob Baird MD;  Location:  HEART CARDIAC CATH LAB    CYSTOSCOPY, RETROGRADES, EXTRACT STONE, COMBINED  9/12/2013    Procedure: COMBINED CYSTOSCOPY, RETROGRADES, EXTRACT STONE;  CYSTOSCOPY, RIGHT RETROGRADE, STONE EXTRACTION;  Surgeon: Carlos Mcknight MD;  Location:  OR    ENT SURGERY      stapendectomy    EP ABLATION FOCAL AFIB N/A 5/18/2020    Procedure: EP Ablation Focal AFIB;  Surgeon: Grzegorz Malhotra MD;  Location:  HEART CARDIAC CATH LAB    GENITOURINARY SURGERY      cystoscopy for stone removal    HERNIA REPAIR      HYDROCELECTOMY SCROTAL Right 7/20/2023    Procedure: RIGHT HYDROCELECTOMY;  Surgeon: Carlos Mcknight MD;  Location:  OR    ORTHOPEDIC SURGERY      shoulder surgeries,bilat carpel tunnel    TONSILLECTOMY         FAMILY HISTORY:   Family History   Problem Relation Age of Onset    Lung Cancer Brother     Myocardial Infarction Brother        SOCIAL HISTORY:   Social History     Tobacco Use    Smoking status: Never    Smokeless tobacco: Never   Substance Use Topics    Alcohol use: No           Objective:    Vitals: /73   Pulse 65   Ht 1.753 m (5' 9\")   Wt 69.1 kg (152 lb 6.4 oz)   SpO2 98%   BMI " 22.51 kg/m      Physical Exam:  General: Awake and alert. No acute distress.  Skin: Warm and dry. Appropriate color. No lesions or rashes noted.  HEENT: Normocephalic and atraumatic. EOM intact. PERRL. Trachea midline. Right-sided JVD: none.  Cardiac: Normal S1 and S2, no murmurs, rubs, or gallops noted. Regular rate and rhythm.  Lung: Regular work of breathing without accessory muscle use. Clear to auscultation bilaterally.  Abdomen: No distension.  Extremities: Bilateral lower extremity edema: none.  Neuro: A & O. No focal deficits noted. Moves all extremities appropriately.      CURRENT MEDICATIONS:  Current Outpatient Medications   Medication Sig Dispense Refill    dutasteride (AVODART) 0.5 MG capsule Take 0.5 mg by mouth daily      metoprolol succinate ER (TOPROL XL) 25 MG 24 hr tablet Take 1 tablet (25 mg) by mouth daily. 90 tablet 4    Multiple Vitamins-Minerals (MENS MULTI VITAMIN & MINERAL PO) Take 1 tablet by mouth daily      nitroGLYcerin (NITROSTAT) 0.4 MG sublingual tablet For chest pain place 1 tablet under the tongue every 5 minutes for 3 doses. If symptoms persist 5 minutes after 1st dose call 911. 25 tablet 1    rosuvastatin (CRESTOR) 20 MG tablet Take 1 tablet (20 mg) by mouth daily. 90 tablet 4    warfarin ANTICOAGULANT (COUMADIN/JANTOVEN) 5 MG tablet Take 1-1.5 tablets (5-7.5 mg) by mouth daily. As directed by INR clinic. 120 tablet 4       ALLERGIES:  No Known Allergies      PAST MEDICAL HISTORY:  Past Medical History:   Diagnosis Date    Arthritis     osteoarthrosis    Coronary atherosclerosis of native coronary artery     Mild per angiogram    Elevated PSA     Fatigue     HLD (hyperlipidemia)     Palpitations     Paroxysmal atrial fibrillation (H) 05/16/2017    S/p PVI and CTI ablation 5/18/2020    Renal disease     kidney calculus    SOB (shortness of breath)        PAST SURGICAL HISTORY:  Past Surgical History:   Procedure Laterality Date    ARTHROPLASTY HIP  1/14/2013    Procedure:  ARTHROPLASTY HIP;  RIGHT TOTAL HIP ARTHROPLASTY ;  Surgeon: Jamie Ohara MD;  Location:  OR    ARTHROPLASTY KNEE Right 11/6/2019    Procedure: RIGHT TOTAL KNEE ARTHROPLASTY (DEPUY)^;  Surgeon: Jamie Ohara MD;  Location:  OR    CARDIOVERSION      COLONOSCOPY N/A 2/9/2016    Procedure: COLONOSCOPY;  Surgeon: Astrid Vital MD;  Location:  GI    CV CORONARY ANGIOGRAM N/A 4/14/2021    Procedure: Coronary Angiogram;  Surgeon: Jacob Baird MD;  Location:  HEART CARDIAC CATH LAB    CV INSTANTANEOUS WAVE-FREE RATIO N/A 4/14/2021    Procedure: Instantaneous Wave-Free Ratio;  Surgeon: Jacob Baird MD;  Location:  HEART CARDIAC CATH LAB    CYSTOSCOPY, RETROGRADES, EXTRACT STONE, COMBINED  9/12/2013    Procedure: COMBINED CYSTOSCOPY, RETROGRADES, EXTRACT STONE;  CYSTOSCOPY, RIGHT RETROGRADE, STONE EXTRACTION;  Surgeon: Carlos Mcknight MD;  Location:  OR    ENT SURGERY      stapendectomy    EP ABLATION FOCAL AFIB N/A 5/18/2020    Procedure: EP Ablation Focal AFIB;  Surgeon: Grzegorz Malhotra MD;  Location:  HEART CARDIAC CATH LAB    GENITOURINARY SURGERY      cystoscopy for stone removal    HERNIA REPAIR      HYDROCELECTOMY SCROTAL Right 7/20/2023    Procedure: RIGHT HYDROCELECTOMY;  Surgeon: Carlos Mcknight MD;  Location:  OR    ORTHOPEDIC SURGERY      shoulder surgeries,bilat carpel tunnel    TONSILLECTOMY         FAMILY HISTORY:  Family History   Problem Relation Age of Onset    Lung Cancer Brother     Myocardial Infarction Brother        SOCIAL HISTORY:  Social History     Socioeconomic History    Marital status:      Spouse name: None    Number of children: None    Years of education: None    Highest education level: None   Tobacco Use    Smoking status: Never    Smokeless tobacco: Never   Vaping Use    Vaping status: Never Used   Substance and Sexual Activity    Alcohol use: No    Drug use: No     Social Drivers of Health      Received from [employer] [facility]    Financial Resource Strain    Received from [facility]    Social Connections       Today's clinic visit entailed:  25 minutes spent by me on the date of the encounter doing chart review, history and exam, documentation and further activities per the note.  The level of medical decision making during this visit was of moderate complexity.     The longitudinal plan of care for the diagnosis(es)/condition(s) as documented were addressed during this visit. Due to the added complexity in care, I will continue to support Nigel Rodarte Jr. in the subsequent management and with ongoing continuity of care.         Review of Systems:     Review of Systems:  Skin:        Eyes:       ENT:       Respiratory:  Negative    Cardiovascular:  Negative    Gastroenterology:      Genitourinary:       Musculoskeletal:       Neurologic:       Psychiatric:       Heme/Lymph/Imm:       Endocrine:

## 2025-06-02 ENCOUNTER — OFFICE VISIT (OUTPATIENT)
Dept: CARDIOLOGY | Facility: CLINIC | Age: 85
End: 2025-06-02
Attending: NURSE PRACTITIONER
Payer: MEDICARE

## 2025-06-02 VITALS
SYSTOLIC BLOOD PRESSURE: 119 MMHG | OXYGEN SATURATION: 98 % | HEIGHT: 69 IN | BODY MASS INDEX: 22.57 KG/M2 | WEIGHT: 152.4 LBS | HEART RATE: 65 BPM | DIASTOLIC BLOOD PRESSURE: 73 MMHG

## 2025-06-02 DIAGNOSIS — I48.0 PAROXYSMAL ATRIAL FIBRILLATION (H): ICD-10-CM

## 2025-06-02 DIAGNOSIS — Z79.01 LONG TERM CURRENT USE OF ANTICOAGULANT THERAPY: ICD-10-CM

## 2025-06-02 DIAGNOSIS — R07.89 CHEST DISCOMFORT: ICD-10-CM

## 2025-06-02 PROCEDURE — 3078F DIAST BP <80 MM HG: CPT

## 2025-06-02 PROCEDURE — 3074F SYST BP LT 130 MM HG: CPT

## 2025-06-02 PROCEDURE — 99214 OFFICE O/P EST MOD 30 MIN: CPT

## 2025-06-02 PROCEDURE — G2211 COMPLEX E/M VISIT ADD ON: HCPCS

## 2025-06-02 RX ORDER — NITROGLYCERIN 0.4 MG/1
TABLET SUBLINGUAL
Qty: 25 TABLET | Refills: 1 | Status: SHIPPED | OUTPATIENT
Start: 2025-06-02

## 2025-06-02 RX ORDER — METOPROLOL SUCCINATE 25 MG/1
25 TABLET, EXTENDED RELEASE ORAL DAILY
Qty: 90 TABLET | Refills: 4 | Status: SHIPPED | OUTPATIENT
Start: 2025-06-02

## 2025-06-02 RX ORDER — WARFARIN SODIUM 5 MG/1
5-7.5 TABLET ORAL DAILY
Qty: 120 TABLET | Refills: 4 | Status: SHIPPED | OUTPATIENT
Start: 2025-06-02

## 2025-06-02 RX ORDER — ROSUVASTATIN CALCIUM 20 MG/1
20 TABLET, COATED ORAL DAILY
Qty: 90 TABLET | Refills: 4 | Status: SHIPPED | OUTPATIENT
Start: 2025-06-02

## 2025-06-02 NOTE — LETTER
6/2/2025    Osmar Espinoza MD  [address] [place] [address]    RE: Nigel Rodarte Jr.       Dear Colleague,     I had the pleasure of seeing Nigel Rodarte Jr. in the [facility].      Electrophysiology Clinic Progress Note  Nigel Rodarte Jr. MRN# [id number]   YOB: 1940 Age: 85 year old     Primary cardiologist: Previously Dr. Malhotra    Reason for visit: Annual follow up      Assessment & Plan:  Paroxysmal atrial fibrillation/flutter  Well-controlled with no known recurrence  Continue metoprolol XL 25 mg daily  Continue warfarin, management per INR clinic  Continue rosuvastatin 20 mg daily  Follow up in 1 year, earlier if experiencing new or worsening cardiac symptoms      Pertinent Detailed Problem List:  Paroxysmal atrial fibrillation/flutter  Timeline:  Previously failed rhythm control with flecainide and metoprolol  S/p PVI ablation 5/2020 with Dr. Malhotra  S/p redo ablation in 2/2022 in [place] with Dr. Naidu  S/p ILR ([employer]) in [place]  [id number]- VASc Score: 3 (age ++, CAD)   Warfarin (INR goal 2-3, management per INR clinic)  Rate control with metoprolol XL 25 mg QD  ILR check in 2/2025-1 brief SVT, 1 episode of SB in the 30s for 9 seconds at 0918  TTE on 11/13/2023-LVEF 55-60%, no RWMA, normal biatrial size  Nonobstructive coronary artery disease  Angiogram in 4/2021-proximal LAD 40%, mid LAD 30%  Nuclear stress test in 11/2023-negative for inducible ischemia  Hyperlipidemia  Rosuvastatin 20 mg QD  Managed by PCP    HPI:  Nigel Rodarte Jr. is a pleasant 85 year old patient who presents today for annual follow-up. 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  He has had no known recurrence of his A-fib.  He typically travels to [facility] for the winter and follows up with cardiology annually there.  This is where he had his redo ablation  done. He has no acute cardiac concerns. Denies Syncope, Palpitations, Dizziness/lightheadedness, Dyspnea, Chest pain, Chest tightness or heaviness, Edema, and Pre-syncope.      Apolonia Parada PA-C   Physician Assistant   [facility]     PAST SURGICAL HISTORY:   Past Surgical History:   Procedure Laterality Date     ARTHROPLASTY HIP  1/14/2013    Procedure: ARTHROPLASTY HIP;  RIGHT TOTAL HIP ARTHROPLASTY ;  Surgeon: Jamie Ohara MD;  Location:  OR     ARTHROPLASTY KNEE Right 11/6/2019    Procedure: RIGHT TOTAL KNEE ARTHROPLASTY (DEPUY)^;  Surgeon: Jamie Ohara MD;  Location:  OR     CARDIOVERSION       COLONOSCOPY N/A 2/9/2016    Procedure: COLONOSCOPY;  Surgeon: Astrid Vital MD;  Location:  GI     CV CORONARY ANGIOGRAM N/A 4/14/2021    Procedure: Coronary Angiogram;  Surgeon: Jacob Baird MD;  Location:  HEART CARDIAC CATH LAB     CV INSTANTANEOUS WAVE-FREE RATIO N/A 4/14/2021    Procedure: Instantaneous Wave-Free Ratio;  Surgeon: Jacob Baird MD;  Location:  HEART CARDIAC CATH LAB     CYSTOSCOPY, RETROGRADES, EXTRACT STONE, COMBINED  9/12/2013    Procedure: COMBINED CYSTOSCOPY, RETROGRADES, EXTRACT STONE;  CYSTOSCOPY, RIGHT RETROGRADE, STONE EXTRACTION;  Surgeon: Carlos Mcknight MD;  Location:  OR     ENT SURGERY      stapendectomy     EP ABLATION FOCAL AFIB N/A 5/18/2020    Procedure: EP Ablation Focal AFIB;  Surgeon: Grzegorz Malhotra MD;  Location:  HEART CARDIAC CATH LAB     GENITOURINARY SURGERY      cystoscopy for stone removal     HERNIA REPAIR       HYDROCELECTOMY SCROTAL Right 7/20/2023    Procedure: RIGHT HYDROCELECTOMY;  Surgeon: Carlos Mcknight MD;  Location:  OR     ORTHOPEDIC SURGERY      shoulder surgeries,bilat carpel tunnel     TONSILLECTOMY         FAMILY HISTORY:   Family History   Problem Relation Age of Onset     Lung Cancer Brother      Myocardial Infarction Brother        SOCIAL HISTORY:   Social History  "    Tobacco Use     Smoking status: Never     Smokeless tobacco: Never   Substance Use Topics     Alcohol use: No           Objective:    Vitals: /73   Pulse 65   Ht 1.753 m (5' 9\")   Wt 69.1 kg (152 lb 6.4 oz)   SpO2 98%   BMI 22.51 kg/m      Physical Exam:  General: Awake and alert. No acute distress.  Skin: Warm and dry. Appropriate color. No lesions or rashes noted.  HEENT: Normocephalic and atraumatic. EOM intact. PERRL. Trachea midline. Right-sided JVD: none.  Cardiac: Normal S1 and S2, no murmurs, rubs, or gallops noted. Regular rate and rhythm.  Lung: Regular work of breathing without accessory muscle use. Clear to auscultation bilaterally.  Abdomen: No distension.  Extremities: Bilateral lower extremity edema: none.  Neuro: A & O. No focal deficits noted. Moves all extremities appropriately.      CURRENT MEDICATIONS:  Current Outpatient Medications   Medication Sig Dispense Refill     dutasteride (AVODART) 0.5 MG capsule Take 0.5 mg by mouth daily       metoprolol succinate ER (TOPROL XL) 25 MG 24 hr tablet Take 1 tablet (25 mg) by mouth daily. 90 tablet 4     Multiple Vitamins-Minerals (MENS MULTI VITAMIN & MINERAL PO) Take 1 tablet by mouth daily       nitroGLYcerin (NITROSTAT) 0.4 MG sublingual tablet For chest pain place 1 tablet under the tongue every 5 minutes for 3 doses. If symptoms persist 5 minutes after 1st dose call 911. 25 tablet 1     rosuvastatin (CRESTOR) 20 MG tablet Take 1 tablet (20 mg) by mouth daily. 90 tablet 4     warfarin ANTICOAGULANT (COUMADIN/JANTOVEN) 5 MG tablet Take 1-1.5 tablets (5-7.5 mg) by mouth daily. As directed by INR clinic. 120 tablet 4       ALLERGIES:  No Known Allergies      PAST MEDICAL HISTORY:  Past Medical History:   Diagnosis Date     Arthritis     osteoarthrosis     Coronary atherosclerosis of native coronary artery     Mild per angiogram     Elevated PSA      Fatigue      HLD (hyperlipidemia)      Palpitations      Paroxysmal atrial fibrillation " (H) 05/16/2017    S/p PVI and CTI ablation 5/18/2020     Renal disease     kidney calculus     SOB (shortness of breath)        PAST SURGICAL HISTORY:  Past Surgical History:   Procedure Laterality Date     ARTHROPLASTY HIP  1/14/2013    Procedure: ARTHROPLASTY HIP;  RIGHT TOTAL HIP ARTHROPLASTY ;  Surgeon: Jamie Ohara MD;  Location:  OR     ARTHROPLASTY KNEE Right 11/6/2019    Procedure: RIGHT TOTAL KNEE ARTHROPLASTY (DEPUY)^;  Surgeon: Jamie Ohara MD;  Location:  OR     CARDIOVERSION       COLONOSCOPY N/A 2/9/2016    Procedure: COLONOSCOPY;  Surgeon: Astrid Vital MD;  Location:  GI     CV CORONARY ANGIOGRAM N/A 4/14/2021    Procedure: Coronary Angiogram;  Surgeon: Jacob Baird MD;  Location:  HEART CARDIAC CATH LAB     CV INSTANTANEOUS WAVE-FREE RATIO N/A 4/14/2021    Procedure: Instantaneous Wave-Free Ratio;  Surgeon: Jacob Baird MD;  Location:  HEART CARDIAC CATH LAB     CYSTOSCOPY, RETROGRADES, EXTRACT STONE, COMBINED  9/12/2013    Procedure: COMBINED CYSTOSCOPY, RETROGRADES, EXTRACT STONE;  CYSTOSCOPY, RIGHT RETROGRADE, STONE EXTRACTION;  Surgeon: Carlos Mcknight MD;  Location:  OR     ENT SURGERY      stapendectomy     EP ABLATION FOCAL AFIB N/A 5/18/2020    Procedure: EP Ablation Focal AFIB;  Surgeon: Grzegorz Malhotra MD;  Location:  HEART CARDIAC CATH LAB     GENITOURINARY SURGERY      cystoscopy for stone removal     HERNIA REPAIR       HYDROCELECTOMY SCROTAL Right 7/20/2023    Procedure: RIGHT HYDROCELECTOMY;  Surgeon: Carlos Mcknight MD;  Location:  OR     ORTHOPEDIC SURGERY      shoulder surgeries,bilat carpel tunnel     TONSILLECTOMY         FAMILY HISTORY:  Family History   Problem Relation Age of Onset     Lung Cancer Brother      Myocardial Infarction Brother        SOCIAL HISTORY:  Social History     Socioeconomic History     Marital status:      Spouse name: None     Number of children: None     Years of education: None      Highest education level: None   Tobacco Use     Smoking status: Never     Smokeless tobacco: Never   Vaping Use     Vaping status: Never Used   Substance and Sexual Activity     Alcohol use: No     Drug use: No     Social Drivers of Health      Received from [facility]    Financial Resource Strain    Received from [facility]    Social Connections       Today's clinic visit entailed:  25 minutes spent by me on the date of the encounter doing chart review, history and exam, documentation and further activities per the note.  The level of medical decision making during this visit was of moderate complexity.     The longitudinal plan of care for the diagnosis(es)/condition(s) as documented were addressed during this visit. Due to the added complexity in care, I will continue to support Nigel Rodarte Jr. in the subsequent management and with ongoing continuity of care.         Review of Systems:     Review of Systems:  Skin:        Eyes:       ENT:       Respiratory:  Negative    Cardiovascular:  Negative    Gastroenterology:      Genitourinary:       Musculoskeletal:       Neurologic:       Psychiatric:       Heme/Lymph/Imm:       Endocrine:               Thank you for allowing me to participate in the care of your patient.      Sincerely,     Apolonia Parada PA-C     [facility]  cc:   BRENDA Brizuela CNP  [address]  [place] [address]

## 2025-06-02 NOTE — PATIENT INSTRUCTIONS
It was a pleasure seeing you today!    Today's Recommendations    Keep up the good work!  Continue warfarin, management per INR clinic  Continue metoprolol XL 25 mg daily and rosuvastatin 20 mg daily  Follow up in 1 year, earlier if experiencing worsening or new cardiac symptoms    Please send a [employer] message or call our team with any questions or concerns.     Apolonia Parada PA-C     Electrophysiology (EP) Nurse: [phone]  Device Nurse: [phone]  General scheduling and after hours: [phone]  Electrophysiology (EP) scheduling [phone]

## 2025-06-06 ENCOUNTER — RESULTS FOLLOW-UP (OUTPATIENT)
Dept: ANTICOAGULATION | Facility: CLINIC | Age: 85
End: 2025-06-06

## 2025-06-21 ENCOUNTER — APPOINTMENT (OUTPATIENT)
Dept: CT IMAGING | Facility: CLINIC | Age: 85
End: 2025-06-21
Attending: EMERGENCY MEDICINE
Payer: MEDICARE

## 2025-06-21 ENCOUNTER — HOSPITAL ENCOUNTER (EMERGENCY)
Facility: CLINIC | Age: 85
Discharge: HOME | End: 2025-06-21
Attending: STUDENT IN AN ORGANIZED HEALTH CARE EDUCATION/TRAINING PROGRAM | Admitting: STUDENT IN AN ORGANIZED HEALTH CARE EDUCATION/TRAINING PROGRAM
Payer: MEDICARE

## 2025-06-21 VITALS
OXYGEN SATURATION: 99 % | DIASTOLIC BLOOD PRESSURE: 62 MMHG | TEMPERATURE: 98.3 F | HEART RATE: 69 BPM | SYSTOLIC BLOOD PRESSURE: 115 MMHG | RESPIRATION RATE: 18 BRPM

## 2025-06-21 DIAGNOSIS — H57.02 ANISOCORIA: ICD-10-CM

## 2025-06-21 DIAGNOSIS — S09.90XA INJURY OF HEAD, INITIAL ENCOUNTER: ICD-10-CM

## 2025-06-21 LAB
ANION GAP SERPL CALCULATED.3IONS-SCNC: 10 MMOL/L (ref 7–15)
BUN SERPL-MCNC: 22 MG/DL (ref 8–23)
CALCIUM SERPL-MCNC: 9.7 MG/DL (ref 8.8–10.4)
CHLORIDE SERPL-SCNC: 104 MMOL/L (ref 98–107)
CREAT SERPL-MCNC: 0.98 MG/DL (ref 0.67–1.17)
EGFRCR SERPLBLD CKD-EPI 2021: 76 ML/MIN/1.73M2
GLUCOSE SERPL-MCNC: 89 MG/DL (ref 70–99)
HCO3 SERPL-SCNC: 25 MMOL/L (ref 22–29)
HOLD SPECIMEN: NORMAL
INR PPP: 2.44 (ref 0.85–1.15)
POTASSIUM SERPL-SCNC: 4.5 MMOL/L (ref 3.4–5.3)
PROTHROMBIN TIME: 25.6 SECONDS (ref 11.8–14.8)
SODIUM SERPL-SCNC: 139 MMOL/L (ref 135–145)

## 2025-06-21 PROCEDURE — 36415 COLL VENOUS BLD VENIPUNCTURE: CPT | Performed by: STUDENT IN AN ORGANIZED HEALTH CARE EDUCATION/TRAINING PROGRAM

## 2025-06-21 PROCEDURE — 250N000011 HC RX IP 250 OP 636: Performed by: EMERGENCY MEDICINE

## 2025-06-21 PROCEDURE — 72125 CT NECK SPINE W/O DYE: CPT

## 2025-06-21 PROCEDURE — 70498 CT ANGIOGRAPHY NECK: CPT

## 2025-06-21 PROCEDURE — 36415 COLL VENOUS BLD VENIPUNCTURE: CPT | Performed by: EMERGENCY MEDICINE

## 2025-06-21 PROCEDURE — 250N000009 HC RX 250: Performed by: EMERGENCY MEDICINE

## 2025-06-21 PROCEDURE — 99285 EMERGENCY DEPT VISIT HI MDM: CPT | Mod: 25

## 2025-06-21 PROCEDURE — 70450 CT HEAD/BRAIN W/O DYE: CPT

## 2025-06-21 PROCEDURE — 85610 PROTHROMBIN TIME: CPT | Performed by: STUDENT IN AN ORGANIZED HEALTH CARE EDUCATION/TRAINING PROGRAM

## 2025-06-21 PROCEDURE — 80048 BASIC METABOLIC PNL TOTAL CA: CPT | Performed by: EMERGENCY MEDICINE

## 2025-06-21 RX ORDER — IOPAMIDOL 755 MG/ML
67 INJECTION, SOLUTION INTRAVASCULAR ONCE
Status: COMPLETED | OUTPATIENT
Start: 2025-06-21 | End: 2025-06-21

## 2025-06-21 RX ADMIN — IOPAMIDOL 67 ML: 755 INJECTION, SOLUTION INTRAVENOUS at 17:14

## 2025-06-21 RX ADMIN — SODIUM CHLORIDE 90 ML: 9 INJECTION, SOLUTION INTRAVENOUS at 17:14

## 2025-06-21 ASSESSMENT — ACTIVITIES OF DAILY LIVING (ADL)
ADLS_ACUITY_SCORE: 50

## 2025-06-21 ASSESSMENT — COLUMBIA-SUICIDE SEVERITY RATING SCALE - C-SSRS
1. IN THE PAST MONTH, HAVE YOU WISHED YOU WERE DEAD OR WISHED YOU COULD GO TO SLEEP AND NOT WAKE UP?: NO
2. HAVE YOU ACTUALLY HAD ANY THOUGHTS OF KILLING YOURSELF IN THE PAST MONTH?: NO
6. HAVE YOU EVER DONE ANYTHING, STARTED TO DO ANYTHING, OR PREPARED TO DO ANYTHING TO END YOUR LIFE?: NO

## 2025-06-21 NOTE — DISCHARGE INSTRUCTIONS
Please follow with your PCP in 2-3 days.  Return to the ED if notice increasing weakness, headache, confusion, not acting like your self, vomiting more than 2 times, or other acute changes.    On our examination today we did note that your pupils were slightly asymmetric.  We assume this is your baseline given that your images are all reassuring.  We recommend that you follow-up with your primary doctor and ophthalmologist regarding this.  This is a very subtle finding on examination.    Discharge Instructions  Head Injury    You have been seen today for a head injury. Your evaluation included a history and physical examination. You may have had a CT (CAT) scan performed, though most head injuries do not require a scan. Based on this evaluation, your provider today does not feel that your head injury is serious.    Generally, every Emergency Department visit should have a follow-up clinic visit with either a primary or a specialty clinic/provider. Please follow-up as instructed by your emergency provider today.  Return to the Emergency Department if:  You are confused or you are not acting right.  Your headache gets worse or you start to have a really bad headache even with your recommended treatment plan.  You vomit (throw up) more than once.  You have a seizure.  You have trouble walking.  You have weakness or paralysis (cannot move) in an arm or a leg.  You have blood or fluid coming from your ears or nose.  You have new symptoms or anything that worries you.    Sleeping:  It is okay for you to sleep, but someone should wake you up if instructed by your provider, and someone should check on you at your usual time to wake up.     Activity:  Do not drive for at least 24 hours.  Do not drive if you have dizzy spells or trouble concentrating, or remembering things.  Do not return to any contact sports until cleared by your regular provider.     MORE INFORMATION:    Concussion:  A concussion is a minor head injury  that may cause temporary problems with the way the brain works. Although concussions are important, they are generally not an emergency or a reason that a person needs to be hospitalized. Some concussion symptoms include confusion, amnesia (forgetful), nausea (sick to your stomach) and vomiting (throwing up), dizziness, fatigue, memory or concentration problems, irritability and sleep problems. For most people, concussions are mild and temporary but some will have more severe and persistent symptoms that require on-going care and treatment.  CT Scans: Your evaluation today may have included a CT scan (CAT scan) to look for things like bleeding or a skull fracture (broken bone).  CT scans involve radiation and too many CT scans can cause serious health problems like cancer, especially in children.  Because of this, your provider may not have ordered a CT scan today if they think you are at low risk for a serious or life threatening problem.    Blood Thinners. If you take blood thinners, there is a very small risk of delayed bleeding after your head injury. In the days/weeks to come, watch for the symptoms described above particularly headaches, confusion, problems walking, and weakness in an arm or leg.    If you were given a prescription for medicine here today, be sure to read all of the information (including the package insert) that comes with your prescription.  This will include important information about the medicine, its side effects, and any warnings that you need to know about.  The pharmacist who fills the prescription can provide more information and answer questions you may have about the medicine.  If you have questions or concerns that the pharmacist cannot address, please call or return to the Emergency Department.     Remember that you can always come back to the Emergency Department if you are not able to see your regular provider in the amount of time listed above, if you get any new symptoms, or  if there is anything that worries you.
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
## 2025-06-23 ENCOUNTER — DOCUMENTATION ONLY (OUTPATIENT)
Dept: ANTICOAGULATION | Facility: CLINIC | Age: 85
End: 2025-06-23
Payer: MEDICARE

## 2025-06-23 DIAGNOSIS — I48.91 ATRIAL FIBRILLATION, UNSPECIFIED TYPE (H): ICD-10-CM

## 2025-06-23 DIAGNOSIS — I48.0 PAROXYSMAL ATRIAL FIBRILLATION (H): ICD-10-CM

## 2025-06-23 DIAGNOSIS — Z79.01 LONG TERM CURRENT USE OF ANTICOAGULANTS WITH INR GOAL OF 2.0-3.0: Primary | ICD-10-CM
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
## 2025-06-25 ENCOUNTER — LAB (OUTPATIENT)
Dept: LAB | Facility: CLINIC | Age: 85
End: 2025-06-25
Payer: MEDICARE

## 2025-06-25 ENCOUNTER — ANTICOAGULATION THERAPY VISIT (OUTPATIENT)
Dept: ANTICOAGULATION | Facility: CLINIC | Age: 85
End: 2025-06-25

## 2025-06-25 ENCOUNTER — RESULTS FOLLOW-UP (OUTPATIENT)
Dept: ANTICOAGULATION | Facility: CLINIC | Age: 85
End: 2025-06-25

## 2025-06-25 DIAGNOSIS — I48.0 PAROXYSMAL ATRIAL FIBRILLATION (H): ICD-10-CM

## 2025-06-25 DIAGNOSIS — I48.91 ATRIAL FIBRILLATION, UNSPECIFIED TYPE (H): ICD-10-CM

## 2025-06-25 DIAGNOSIS — I48.91 ATRIAL FIBRILLATION, UNSPECIFIED TYPE (H): Primary | ICD-10-CM

## 2025-06-25 DIAGNOSIS — Z79.01 LONG TERM CURRENT USE OF ANTICOAGULANTS WITH INR GOAL OF 2.0-3.0: ICD-10-CM

## 2025-06-25 LAB — INR BLD: 1.6 (ref 0.9–1.1)

## 2025-06-25 PROCEDURE — 85610 PROTHROMBIN TIME: CPT

## 2025-06-25 PROCEDURE — 36416 COLLJ CAPILLARY BLOOD SPEC: CPT
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
## 2025-07-09 ENCOUNTER — ANTICOAGULATION THERAPY VISIT (OUTPATIENT)
Dept: ANTICOAGULATION | Facility: CLINIC | Age: 85
End: 2025-07-09

## 2025-07-09 ENCOUNTER — LAB (OUTPATIENT)
Dept: LAB | Facility: CLINIC | Age: 85
End: 2025-07-09
Payer: MEDICARE

## 2025-07-09 DIAGNOSIS — I48.0 PAROXYSMAL ATRIAL FIBRILLATION (H): ICD-10-CM

## 2025-07-09 DIAGNOSIS — I48.91 ATRIAL FIBRILLATION, UNSPECIFIED TYPE (H): ICD-10-CM

## 2025-07-09 DIAGNOSIS — Z79.01 LONG TERM CURRENT USE OF ANTICOAGULANTS WITH INR GOAL OF 2.0-3.0: Primary | ICD-10-CM

## 2025-07-09 LAB — INR BLD: 3 (ref 0.9–1.1)

## 2025-07-09 PROCEDURE — 85610 PROTHROMBIN TIME: CPT

## 2025-07-09 PROCEDURE — 36416 COLLJ CAPILLARY BLOOD SPEC: CPT
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
## 2025-07-29 ENCOUNTER — LAB (OUTPATIENT)
Dept: LAB | Facility: CLINIC | Age: 85
End: 2025-07-29
Payer: MEDICARE

## 2025-07-29 ENCOUNTER — ANTICOAGULATION THERAPY VISIT (OUTPATIENT)
Dept: ANTICOAGULATION | Facility: CLINIC | Age: 85
End: 2025-07-29

## 2025-07-29 DIAGNOSIS — I48.91 ATRIAL FIBRILLATION, UNSPECIFIED TYPE (H): ICD-10-CM

## 2025-07-29 DIAGNOSIS — I48.0 PAROXYSMAL ATRIAL FIBRILLATION (H): ICD-10-CM

## 2025-07-29 DIAGNOSIS — I48.0 PAROXYSMAL ATRIAL FIBRILLATION (H): Primary | ICD-10-CM

## 2025-07-29 DIAGNOSIS — Z79.01 LONG TERM CURRENT USE OF ANTICOAGULANTS WITH INR GOAL OF 2.0-3.0: ICD-10-CM

## 2025-07-29 LAB — INR BLD: 3 (ref 0.9–1.1)

## 2025-07-29 PROCEDURE — 36416 COLLJ CAPILLARY BLOOD SPEC: CPT

## 2025-07-29 PROCEDURE — 85610 PROTHROMBIN TIME: CPT

## 2025-07-29 NOTE — PROGRESS NOTES
ANTICOAGULATION MANAGEMENT     Nigel Rodarte Jr. 85 year old male is on warfarin with therapeutic INR result. (Goal INR 2.0-3.0)    Recent labs: (last 7 days)     07/29/25  1304   INR 3.0*       ASSESSMENT     Warfarin Lab Questionnaire    Warfarin Doses Last 7 Days      7/29/2025    12:56 PM   Dose in Tablet or Mg   TAB or MG? milligram (mg)           7/29/2025   Warfarin Lab Questionnaire   Missed doses within past 14 days? No   Changes in diet or alcohol within past 14 days? No   Medication changes since last result? No   Injuries or illness since last result? No   New shortness of breath, severe headaches or sudden changes in vision since last result? No   Abnormal bleeding since last result? No   Upcoming surgery, procedure? No     Previous result: Therapeutic last visit; previously outside of goal range  Additional findings: None       PLAN     Recommended plan for no diet, medication or health factor changes affecting INR     Dosing Instructions: Continue your current warfarin dose with next INR in 4 weeks       Summary  As of 7/29/2025      Full warfarin instructions:  7.5 mg every Mon, Thu; 5 mg all other days   Next INR check:  8/26/2025               Telephone call with Denise, his wife, while Nigel was in the background who verbalizes understanding and agrees to plan    Lab visit scheduled 8/28/25    Education provided: Please call back if any changes to your diet, medications or how you've been taking warfarin  Goal range and lab monitoring: goal range and significance of current result  Contact [phone] with any changes, questions or concerns.     Plan made per [facility] anticoagulation protocol    Estrella Bowden RN  7/29/2025  Anticoagulation Clinic  [employer] for routing messages: otto [facility] NUR[facility]  [facility] patient phone line: [phone]        _______________________________________________________________________     Anticoagulation Episode Summary       Current INR goal:  2.0-3.0   TTR:  81.3%  (6.3 mo)   Target end date:  Indefinite   Send INR reminders to:  YA[facility] HEART INR NURSE    Indications    Atrial fibrillation (H) [I48.91] [I48.91]  Long term current use of anticoagulants with INR goal of 2.0-3.0 [Z79.01]  Paroxysmal atrial fibrillation (H) [I48.0]  Atrial fibrillation  unspecified type (H) [I48.91]             Comments:  --             Anticoagulation Care Providers       Provider Role Specialty Phone number    Kathe Hurd APRN CNP Referring Cardiovascular Disease [phone]

## 2025-08-28 ENCOUNTER — ANTICOAGULATION THERAPY VISIT (OUTPATIENT)
Dept: ANTICOAGULATION | Facility: CLINIC | Age: 85
End: 2025-08-28

## 2025-08-28 ENCOUNTER — LAB (OUTPATIENT)
Dept: LAB | Facility: CLINIC | Age: 85
End: 2025-08-28
Payer: MEDICARE

## 2025-08-28 DIAGNOSIS — I48.0 PAROXYSMAL ATRIAL FIBRILLATION (H): ICD-10-CM

## 2025-08-28 DIAGNOSIS — I48.91 ATRIAL FIBRILLATION, UNSPECIFIED TYPE (H): ICD-10-CM

## 2025-08-28 DIAGNOSIS — Z79.01 LONG TERM CURRENT USE OF ANTICOAGULANTS WITH INR GOAL OF 2.0-3.0: Primary | ICD-10-CM

## 2025-08-28 LAB — INR BLD: 2.4 (ref 0.9–1.1)

## (undated) DEVICE — DRAIN JACKSON PRATT RESERVOIR 100ML SU130-1305

## (undated) DEVICE — ESU PENCIL W/SMOKE EVAC NEPTUNE STRYKER 0703-046-000

## (undated) DEVICE — GLOVE PROTEXIS POWDER FREE 8.0 ORTHOPEDIC 2D73ET80

## (undated) DEVICE — TOTE ANGIO CORP PC15AT SAN32CC83O

## (undated) DEVICE — GLOVE PROTEXIS POWDER FREE 8.5 ORTHOPEDIC 2D73ET85

## (undated) DEVICE — DRSG TEGADERM 2 1/2X 2 3/4"

## (undated) DEVICE — CATH LAUNCHER 6FR LA6EBU375

## (undated) DEVICE — PREP SKIN SCRUB TRAY 4461A

## (undated) DEVICE — BLADE SAW SAGITTAL STRK 19.5X95X1.27MM 2108-109-000S15

## (undated) DEVICE — PREP CHLORAPREP 26ML TINTED ORANGE  260815

## (undated) DEVICE — CATH SOUNDSTAR 8FRX90CM 10439011

## (undated) DEVICE — NDL TRNSEPT 18GA X 71CM 86 DEG

## (undated) DEVICE — GLOVE PROTEXIS BLUE W/NEU-THERA 8.5  2D73EB85

## (undated) DEVICE — SU CHROMIC 4-0 SH 27" G121H

## (undated) DEVICE — GW SURG OMNIWIRE STRAIGHT TIP L185CM PRESSURE GU 89185

## (undated) DEVICE — PIN SAFTY INF 1.5" STERILE SS C18700-020

## (undated) DEVICE — MANIFOLD NEPTUNE 4 PORT 700-20

## (undated) DEVICE — INTRODUCER SHEATH GREEN 6.5FRX11CM .038IN PSI-6F-11-038ACT

## (undated) DEVICE — ESU GROUND PAD UNIVERSAL W/O CORD

## (undated) DEVICE — INTRO GLIDESHEATH SLENDER 6FR 10X45CM 60-1060

## (undated) DEVICE — DRSG KERLIX FLUFFS X5

## (undated) DEVICE — SU VICRYL 4-0 SH-1 27" J315H

## (undated) DEVICE — GLOVE BIOGEL PI MICRO SZ 7.5 48575

## (undated) DEVICE — NDL 18GA 1.5" 305196

## (undated) DEVICE — GUIDEWIRE VASC 0.035INX150CM INQWIRE J TIP IQ35F150J3F/A

## (undated) DEVICE — DEFIB PRO-PADZ LVP LQD GEL ADULT 8900-2105-01

## (undated) DEVICE — GUIDEWIRE TRNSEPT 0.014 X35CM SAFE SE

## (undated) DEVICE — BONE CEMENT MIXEVAC III HI VAC KIT  0206-015-000

## (undated) DEVICE — BLADE SAW RECIP STRK 70X12.5X1.2MM 0277-096-281

## (undated) DEVICE — SOL WATER IRRIG 1000ML BOTTLE 2F7114

## (undated) DEVICE — GLOVE PROTEXIS W/NEU-THERA 8.5  2D73TE85

## (undated) DEVICE — SU VICRYL 3-0 SH 27" UND J416H

## (undated) DEVICE — DRAIN ROUND W/RESERV KIT JACKSON PRATT 10FR 400ML SU130-402D

## (undated) DEVICE — SYR ANGIOGRAPHY MULTIUSE KIT ACIST 014612

## (undated) DEVICE — CATH EP 6FR 2MM TIP 2-8-2 115C

## (undated) DEVICE — SYR 10ML FINGER CONTROL W/O NDL 309695

## (undated) DEVICE — SU ETHIBOND 1 CT-1 30" X425H

## (undated) DEVICE — DRSG ADAPTIC 3X8" 6113

## (undated) DEVICE — MANIFOLD KIT ANGIO AUTOMATED 014613

## (undated) DEVICE — Device

## (undated) DEVICE — SU VICRYL 2-0 CP-1 27" UND J266H

## (undated) DEVICE — SU ETHIBOND 0 CTX CR  8X18" CX31D

## (undated) DEVICE — SPONGE KITTNER 31001010

## (undated) DEVICE — CATH BLAZER DX-20 DUO-DECAPOLA

## (undated) DEVICE — DECANTER BAG 2002S

## (undated) DEVICE — DRAIN PENROSE 0.25"X18" LATEX FREE GR201

## (undated) DEVICE — SLEEVE TR BAND RADIAL COMPRESSION DEVICE 24CM TRB24-REG

## (undated) DEVICE — INTRODUCER SHEATH FAST-CATH SWARTZ 8.5FRX63CM SL1 CVD 406849

## (undated) DEVICE — BONE CLEANING TIP INTERPULSE  0210-010-000

## (undated) DEVICE — GLOVE PROTEXIS W/NEU-THERA 8.0  2D73TE80

## (undated) DEVICE — IMM KNEE 24" 0814-2664

## (undated) DEVICE — NDL 22GA 1.5"

## (undated) DEVICE — SU VICRYL 0 CP-1 27" J467H

## (undated) DEVICE — CATH JACKY 5FR 3.5 CURVE 40-5023

## (undated) DEVICE — BLADE SAW SAGITTAL STRK 19.5X90X1.20MM 2108-109-000S17

## (undated) DEVICE — PACK EP SRG PROC LF DISP SAN32EPFSR

## (undated) DEVICE — SU CHROMIC 3-0 SH 27" G122H

## (undated) DEVICE — PAD CHUX UNDERPAD 23X24" 7136

## (undated) DEVICE — CATH LAUNCHER 6FR EBU 3.5 LA6EBU35

## (undated) DEVICE — DRAIN JACKSON PRATT 10FR ROUND SU130-1321

## (undated) DEVICE — SUPPORTER ATHLETIC LG LATEX 202636

## (undated) DEVICE — CATH NAV PENTARAY F CURVE

## (undated) DEVICE — INFL DVC KIT W/10CC NITRO IN4530

## (undated) DEVICE — SOL NACL 0.9% IRRIG 1000ML BOTTLE 2F7124

## (undated) DEVICE — CATH THERMOCOOL SMARTTOUCH SF FJ CURVE

## (undated) DEVICE — BLADE SAW SAGITTAL STRK 18X90X1.37MM HD SYS 6 6118-137-090

## (undated) DEVICE — PACK TOTAL KNEE SOP15TKFSD

## (undated) DEVICE — TUBE SET SMARKABLATE IRRIGATION

## (undated) DEVICE — SYR BULB IRRIG DOVER 60 ML LATEX FREE 67000

## (undated) DEVICE — LINEN TOWEL PACK X5 5464

## (undated) DEVICE — INTRODUCER SHEATH FAST-CATH 9FRX12CM 406116

## (undated) DEVICE — SUCTION IRR SYSTEM W/O TIP INTERPULSE HANDPIECE 0210-100-000

## (undated) DEVICE — KIT HAND CONTROL ANGIOTOUCH ACIST 65CM AT-P65

## (undated) DEVICE — RAD INTRODUCER KIT MICRO 5FRX10CM .018 NITINOL G/W

## (undated) DEVICE — CATH LAUNCHER 6FR JL 3.5 LA6JL35

## (undated) DEVICE — DRAPE MINOR PROCEDURE LAP 29496

## (undated) DEVICE — SYR 30ML LL W/O NDL 302832

## (undated) DEVICE — PACK MINOR SBA15MIFSE

## (undated) RX ORDER — HEPARIN SODIUM 1000 [USP'U]/ML
INJECTION, SOLUTION INTRAVENOUS; SUBCUTANEOUS
Status: DISPENSED
Start: 2021-04-14

## (undated) RX ORDER — HEPARIN SODIUM 200 [USP'U]/100ML
INJECTION, SOLUTION INTRAVENOUS
Status: DISPENSED
Start: 2020-05-18

## (undated) RX ORDER — LIDOCAINE HYDROCHLORIDE 10 MG/ML
INJECTION, SOLUTION EPIDURAL; INFILTRATION; INTRACAUDAL; PERINEURAL
Status: DISPENSED
Start: 2019-11-06

## (undated) RX ORDER — CEFAZOLIN SODIUM 2 G/100ML
INJECTION, SOLUTION INTRAVENOUS
Status: DISPENSED
Start: 2019-11-06

## (undated) RX ORDER — BUPIVACAINE HYDROCHLORIDE 2.5 MG/ML
INJECTION, SOLUTION EPIDURAL; INFILTRATION; INTRACAUDAL
Status: DISPENSED
Start: 2019-11-06

## (undated) RX ORDER — LIDOCAINE HYDROCHLORIDE 10 MG/ML
INJECTION, SOLUTION EPIDURAL; INFILTRATION; INTRACAUDAL; PERINEURAL
Status: DISPENSED
Start: 2020-05-18

## (undated) RX ORDER — FUROSEMIDE 10 MG/ML
INJECTION INTRAMUSCULAR; INTRAVENOUS
Status: DISPENSED
Start: 2020-05-18

## (undated) RX ORDER — HYDROMORPHONE HYDROCHLORIDE 1 MG/ML
INJECTION, SOLUTION INTRAMUSCULAR; INTRAVENOUS; SUBCUTANEOUS
Status: DISPENSED
Start: 2019-11-06

## (undated) RX ORDER — FENTANYL CITRATE 50 UG/ML
INJECTION, SOLUTION INTRAMUSCULAR; INTRAVENOUS
Status: DISPENSED
Start: 2019-11-06

## (undated) RX ORDER — VERAPAMIL HYDROCHLORIDE 2.5 MG/ML
INJECTION, SOLUTION INTRAVENOUS
Status: DISPENSED
Start: 2021-04-14

## (undated) RX ORDER — VANCOMYCIN HYDROCHLORIDE 1 G/20ML
INJECTION, POWDER, LYOPHILIZED, FOR SOLUTION INTRAVENOUS
Status: DISPENSED
Start: 2019-11-06

## (undated) RX ORDER — PROPOFOL 10 MG/ML
INJECTION, EMULSION INTRAVENOUS
Status: DISPENSED
Start: 2019-11-06

## (undated) RX ORDER — CEFAZOLIN SODIUM/WATER 2 G/20 ML
SYRINGE (ML) INTRAVENOUS
Status: DISPENSED
Start: 2023-07-20

## (undated) RX ORDER — DEXAMETHASONE SODIUM PHOSPHATE 4 MG/ML
INJECTION, SOLUTION INTRA-ARTICULAR; INTRALESIONAL; INTRAMUSCULAR; INTRAVENOUS; SOFT TISSUE
Status: DISPENSED
Start: 2023-07-20

## (undated) RX ORDER — BUPIVACAINE HYDROCHLORIDE AND EPINEPHRINE 2.5; 5 MG/ML; UG/ML
INJECTION, SOLUTION EPIDURAL; INFILTRATION; INTRACAUDAL; PERINEURAL
Status: DISPENSED
Start: 2019-11-06

## (undated) RX ORDER — FENTANYL CITRATE 50 UG/ML
INJECTION, SOLUTION INTRAMUSCULAR; INTRAVENOUS
Status: DISPENSED
Start: 2020-05-18

## (undated) RX ORDER — DEXAMETHASONE SODIUM PHOSPHATE 4 MG/ML
INJECTION, SOLUTION INTRA-ARTICULAR; INTRALESIONAL; INTRAMUSCULAR; INTRAVENOUS; SOFT TISSUE
Status: DISPENSED
Start: 2019-11-06

## (undated) RX ORDER — KETOROLAC TROMETHAMINE 30 MG/ML
INJECTION, SOLUTION INTRAMUSCULAR; INTRAVENOUS
Status: DISPENSED
Start: 2020-05-18

## (undated) RX ORDER — HEPARIN SODIUM 1000 [USP'U]/ML
INJECTION, SOLUTION INTRAVENOUS; SUBCUTANEOUS
Status: DISPENSED
Start: 2020-05-18

## (undated) RX ORDER — LIDOCAINE HYDROCHLORIDE 20 MG/ML
INJECTION, SOLUTION EPIDURAL; INFILTRATION; INTRACAUDAL; PERINEURAL
Status: DISPENSED
Start: 2019-11-06

## (undated) RX ORDER — FENTANYL CITRATE 50 UG/ML
INJECTION, SOLUTION INTRAMUSCULAR; INTRAVENOUS
Status: DISPENSED
Start: 2021-04-14

## (undated) RX ORDER — LIDOCAINE HYDROCHLORIDE 10 MG/ML
INJECTION, SOLUTION EPIDURAL; INFILTRATION; INTRACAUDAL; PERINEURAL
Status: DISPENSED
Start: 2021-04-14

## (undated) RX ORDER — ONDANSETRON 2 MG/ML
INJECTION INTRAMUSCULAR; INTRAVENOUS
Status: DISPENSED
Start: 2023-07-20

## (undated) RX ORDER — ACETAMINOPHEN 325 MG/1
TABLET ORAL
Status: DISPENSED
Start: 2023-07-20

## (undated) RX ORDER — ONDANSETRON 2 MG/ML
INJECTION INTRAMUSCULAR; INTRAVENOUS
Status: DISPENSED
Start: 2019-11-06

## (undated) RX ORDER — FENTANYL CITRATE 50 UG/ML
INJECTION, SOLUTION INTRAMUSCULAR; INTRAVENOUS
Status: DISPENSED
Start: 2023-07-20

## (undated) RX ORDER — PROPOFOL 10 MG/ML
INJECTION, EMULSION INTRAVENOUS
Status: DISPENSED
Start: 2023-07-20

## (undated) RX ORDER — HEPARIN SODIUM 200 [USP'U]/100ML
INJECTION, SOLUTION INTRAVENOUS
Status: DISPENSED
Start: 2021-04-14

## (undated) RX ORDER — GLYCOPYRROLATE 0.2 MG/ML
INJECTION, SOLUTION INTRAMUSCULAR; INTRAVENOUS
Status: DISPENSED
Start: 2023-07-20